# Patient Record
Sex: FEMALE | Race: WHITE | NOT HISPANIC OR LATINO | Employment: OTHER | URBAN - METROPOLITAN AREA
[De-identification: names, ages, dates, MRNs, and addresses within clinical notes are randomized per-mention and may not be internally consistent; named-entity substitution may affect disease eponyms.]

---

## 2017-01-03 ENCOUNTER — OFFICE VISIT (OUTPATIENT)
Dept: FAMILY MEDICINE | Facility: CLINIC | Age: 77
End: 2017-01-03
Payer: COMMERCIAL

## 2017-01-03 ENCOUNTER — RADIANT APPOINTMENT (OUTPATIENT)
Dept: GENERAL RADIOLOGY | Facility: CLINIC | Age: 77
End: 2017-01-03
Attending: FAMILY MEDICINE
Payer: COMMERCIAL

## 2017-01-03 VITALS
HEART RATE: 72 BPM | DIASTOLIC BLOOD PRESSURE: 68 MMHG | BODY MASS INDEX: 29.53 KG/M2 | SYSTOLIC BLOOD PRESSURE: 124 MMHG | WEIGHT: 173 LBS | HEIGHT: 64 IN | TEMPERATURE: 98.1 F

## 2017-01-03 DIAGNOSIS — M48.061 LUMBAR SPINAL STENOSIS: ICD-10-CM

## 2017-01-03 DIAGNOSIS — Z01.818 PREOP GENERAL PHYSICAL EXAM: Primary | ICD-10-CM

## 2017-01-03 DIAGNOSIS — R05.9 COUGH: ICD-10-CM

## 2017-01-03 DIAGNOSIS — N18.30 CKD (CHRONIC KIDNEY DISEASE) STAGE 3, GFR 30-59 ML/MIN (H): ICD-10-CM

## 2017-01-03 DIAGNOSIS — Z01.812 PRE-OPERATIVE LABORATORY EXAMINATION: ICD-10-CM

## 2017-01-03 DIAGNOSIS — E11.22 DIABETES MELLITUS WITH STAGE 3 CHRONIC KIDNEY DISEASE (H): ICD-10-CM

## 2017-01-03 DIAGNOSIS — N18.30 DIABETES MELLITUS WITH STAGE 3 CHRONIC KIDNEY DISEASE (H): ICD-10-CM

## 2017-01-03 LAB
HBA1C MFR BLD: 6 % (ref 4.3–6)
HGB BLD-MCNC: 12.4 G/DL (ref 11.7–15.7)
MRSA DNA SPEC QL NAA+PROBE: NORMAL
SPECIMEN SOURCE: NORMAL

## 2017-01-03 PROCEDURE — 87640 STAPH A DNA AMP PROBE: CPT | Mod: 90 | Performed by: NEUROLOGICAL SURGERY

## 2017-01-03 PROCEDURE — 93000 ELECTROCARDIOGRAM COMPLETE: CPT | Performed by: FAMILY MEDICINE

## 2017-01-03 PROCEDURE — 87641 MR-STAPH DNA AMP PROBE: CPT | Mod: 90 | Performed by: NEUROLOGICAL SURGERY

## 2017-01-03 PROCEDURE — 71020 XR CHEST 2 VW: CPT

## 2017-01-03 PROCEDURE — 83036 HEMOGLOBIN GLYCOSYLATED A1C: CPT | Performed by: FAMILY MEDICINE

## 2017-01-03 PROCEDURE — 80048 BASIC METABOLIC PNL TOTAL CA: CPT | Performed by: FAMILY MEDICINE

## 2017-01-03 PROCEDURE — 99000 SPECIMEN HANDLING OFFICE-LAB: CPT | Performed by: NEUROLOGICAL SURGERY

## 2017-01-03 PROCEDURE — 85018 HEMOGLOBIN: CPT | Performed by: FAMILY MEDICINE

## 2017-01-03 PROCEDURE — 36415 COLL VENOUS BLD VENIPUNCTURE: CPT | Performed by: FAMILY MEDICINE

## 2017-01-03 PROCEDURE — 99214 OFFICE O/P EST MOD 30 MIN: CPT | Performed by: FAMILY MEDICINE

## 2017-01-03 ASSESSMENT — ANXIETY QUESTIONNAIRES
7. FEELING AFRAID AS IF SOMETHING AWFUL MIGHT HAPPEN: NOT AT ALL
GAD7 TOTAL SCORE: 0
IF YOU CHECKED OFF ANY PROBLEMS ON THIS QUESTIONNAIRE, HOW DIFFICULT HAVE THESE PROBLEMS MADE IT FOR YOU TO DO YOUR WORK, TAKE CARE OF THINGS AT HOME, OR GET ALONG WITH OTHER PEOPLE: NOT DIFFICULT AT ALL
1. FEELING NERVOUS, ANXIOUS, OR ON EDGE: NOT AT ALL
5. BEING SO RESTLESS THAT IT IS HARD TO SIT STILL: NOT AT ALL
6. BECOMING EASILY ANNOYED OR IRRITABLE: NOT AT ALL
3. WORRYING TOO MUCH ABOUT DIFFERENT THINGS: NOT AT ALL
2. NOT BEING ABLE TO STOP OR CONTROL WORRYING: NOT AT ALL

## 2017-01-03 ASSESSMENT — PATIENT HEALTH QUESTIONNAIRE - PHQ9: 5. POOR APPETITE OR OVEREATING: NOT AT ALL

## 2017-01-03 NOTE — PROGRESS NOTES
[address] [address]  [phone]  Dept: [phone]    PRE-OP EVALUATION:  Today's date: 1/3/2017    Maira Leon (: 1940) presents for pre-operative evaluation assessment as requested by Dr. Davies.  She requires evaluation and anesthesia risk assessment prior to undergoing surgery/procedure for treatment of spine .  Proposed procedure: spinal fusion     Date of Surgery/ Procedure: 2017  Time of Surgery/ Procedure: 7:30 am  Hospital/Surgical Facility: [facility]   Primary Physician: Michelle Monet  Type of Anesthesia Anticipated: General    Patient has a Health Care Directive or Living Will:  YES     1. NO - Do you have a history of heart attack, stroke, stent, bypass or surgery on an artery in the head, neck, heart or legs?  2. NO - Do you ever have any pain or discomfort in your chest?  3. NO - Do you have a history of  Heart Failure?  4. NO - Are you troubled by shortness of breath when: walking on the level, up a slight hill or at night?  5. YES - DO YOU CURRENTLY HAVE A COLD, BRONCHITIS OR OTHER RESPIRATORY INFECTION? Cough - see below  6. YES - DO YOU HAVE A COUGH, SHORTNESS OF BREATH OR WHEEZING? Mostly in the mornings  7. YES - DO YOU SOMETIMES GET PAINS IN THE CALVES OF YOUR LEGS WHEN YOU WALK? legs  8. NO - Do you or anyone in your family have previous history of blood clots?  9. NO - Do you or does anyone in your family have a serious bleeding problem such as prolonged bleeding following surgeries or cuts?  10. NO - Have you ever had problems with anemia or been told to take iron pills?  11. NO - Have you had any abnormal blood loss such as black, tarry or bloody stools, or abnormal vaginal bleeding?  12. NO - Have you ever had a blood transfusion?  13. NO - Have you or any of your relatives ever had problems with anesthesia?  14. NO - Do you have sleep apnea, excessive snoring or daytime  drowsiness?  15. NO - Do you have any prosthetic heart valves?  16. NO - Do you have prosthetic joints?  17. NO - Is there any chance that you may be pregnant?      HPI:                                                      Brief HPI related to upcoming procedure:     Shingles Resolved  Patient had shingles on December 5th, which resolved after 7 days of valacyclovir    Chronic Cough  Patient believes she coughs every morning due to nasal drip  Cough has been going on for years, resolves during the day.    Does not cause difficulty with sleep.    Resolves in morning after a couple of coughs.    Raised patch of skin over right eye  Over right eye raised patch that is painful to the touch    Diabetes   Blood sugars have been fine   On micheal knew she had high blood sugar because she ate many cookies        MEDICAL HISTORY:                                                      Patient Active Problem List    Diagnosis Date Noted     Spondylolisthesis of lumbar region 09/12/2016     Priority: Medium     Lumbar spinal stenosis 09/12/2016     Priority: Medium     Medial meniscus tear, left, subsequent encounter 04/12/2016     Priority: Medium     Primary osteoarthritis of left knee 04/05/2016     Priority: Medium     Diabetes mellitus with stage 3 chronic kidney disease (H) 10/12/2015     Priority: Medium     Advance Care Planning 06/30/2011     Priority: Medium     Advance Care Planning:   Followup facilitation and documentation of choices:  Maira Leon presented for follow-up session regarding ACP at their residence.  She was accompanied by no one.  Previous ACP discussions reviewed and questions answered. At this time she has completed a HCD reflecting current values, goals, and choices. Health Care Agent understands responsibility of role and choices.  Documents completed at this visit: HCD. Signed and notarized. Validation form completed and sent with copy of document to be scanned. Confirmed/documented  designated decision maker(s). See permanent comments of demographics in clinical tab. Confirmed current code status reflects current choices. View document(s) and details by clicking on code status.  Added by Ashlee Dubose on 5/14/2015  Received outside advance directive. Two witness signatures or one witness and notary signature present.  scanned and placed behind media tab.  Please see advance directive for specifics.            CKD (chronic kidney disease) stage 3, GFR 30-59 ml/min 02/08/2011     Priority: Medium     Hyperlipidemia LDL goal <100 10/31/2010     Priority: Medium     Per provider       Osteoarthritis 05/23/2005     Priority: Medium     L4-5  Gets pain down into her legs  Problem list name updated by automated process. Provider to review        Past Medical History   Diagnosis Date     Need for prophylactic hormone replacement therapy (postmenopausal)      Other and unspecified hyperlipidemia      Osteoarthrosis, unspecified whether generalized or localized, unspecified site      Type II or unspecified type diabetes mellitus without mention of complication, not stated as uncontrolled      POSTMENOPAUSAL HORMONAL REPLACEMENT TX 5/23/2005     Nonsenile cataract      left eye     Hypertension      H/O arthroscopic knee surgery left     5/6/16     Low back pain      & radiates down left buttock & left leg     History of shingles 12/6/16     Nocturia      states she gets up every 2 hours at night to urinate     Past Surgical History   Procedure Laterality Date     Phacoemulsification clear cornea with standard intraocular lens implant Right 2014     Arthroscopy knee Left 5/6/2016     Procedure: ARTHROSCOPY KNEE;  Surgeon: Ramin Ramirez MD;  Location: [facility] OR     Appendectomy  age 4     Ent surgery       Tonsillectomy     Genitourinary surgery       bladder repair/sling     Current Outpatient Prescriptions   Medication Sig Dispense Refill     IBUPROFEN PO Take 200-600 mg by mouth  every 6 hours as needed for moderate pain       multivitamin, therapeutic with minerals (MULTI-VITAMIN) TABS tablet Take 1 tablet by mouth daily       insulin pen needle 31G X 5 MM Use 2 times a day with Byetta - dispense BD Mini 5mm x 31g pen needles 200 each 2     metFORMIN (GLUCOPHAGE) 500 MG tablet Take 1 tablet (500 mg) by mouth 2 times daily (with meals) 180 tablet 1     lisinopril (PRINIVIL,ZESTRIL) 2.5 MG tablet Take 1 tablet (2.5 mg) by mouth daily 90 tablet 3     exenatide (BYETTA 10 MCG PEN) 10 MCG/0.04ML pen (contains 2.4 ml = 60 doses) Inject 10 mcg Subcutaneous 2 times daily 7.2 mL 3     ezetimibe-simvastatin (VYTORIN) 10-40 MG per tablet Take 1 tablet by mouth At Bedtime 90 tablet 3     multivitamin (OCUVITE) TABS Take 1 tablet by mouth daily. 30 each 0     fish oil-omega-3 fatty acids (FISH OIL) 1000 MG capsule Take 4 g by mouth daily.       calcium-vitamin D (CALCIUM 500 +D) 500-125 MG-UNIT TABS Take 1 tablet by mouth daily.       ONETOUCH ULTRA TEST VI STRP TEST BLOOD SUGARS TWICE DAILY AS DIRECTED. 3 MONTH SUPPLY 1 YEAR     ASPIRIN 81 MG OR TABS 1 TABLET DAILY       ONE TOUCH ULTRA SYSTEM KIT KIT as directed  1 0     ACETAMINOPHEN PO Take 1,000 mg by mouth every 8 hours as needed for pain       OTC products: NSAIDS    Allergies   Allergen Reactions     No Known Drug Allergies       Latex Allergy: NO    Social History   Substance Use Topics     Smoking status: Former Smoker     Quit date: 01/01/1981     Smokeless tobacco: Never Used     Alcohol Use: Yes      Comment: 1 glass of wine per day     History   Drug Use No       REVIEW OF SYSTEMS:                                                    Constitutional, neuro, ENT, endocrine, pulmonary, cardiac, gastrointestinal, genitourinary, musculoskeletal, integument and psychiatric systems are negative, except as otherwise noted.    The information in this document, created by the medical scribe Kimmy Coronado for me, accurately reflects the services I  "personally performed and the decisions made by me. I have reviewed and approved this document for accuracy prior to leaving the patient care area.    Michelle Monet MD      EXAM:                                                    /68 mmHg  Pulse 72  Temp(Src) 98.1  F (36.7  C) (Oral)  Ht 5' 4\" (1.626 m)  Wt 173 lb (78.472 kg)  BMI 29.68 kg/m2  Breastfeeding? No    GENERAL APPEARANCE: healthy, alert and no distress     EYES: EOMI,- PERRL     HENT: ear canals and TM's normal and nose and mouth without ulcers or lesions     NECK: no adenopathy, no asymmetry, masses, or scars and thyroid normal to palpation     RESP: lungs clear to auscultation - no rales, rhonchi or wheezes     BREAST: normal without masses, tenderness or nipple discharge and no palpable axillary masses or adenopathy     CV: regular rates and rhythm, normal S1 S2, no S3 or S4 and no murmur, click or rub -     ABDOMEN:  soft, nontender, no HSM or masses and bowel sounds normal     : normal cervix, adnexae, and uterus without masses or discharge and rectal exam normal without masses-guaiac negative stool     MS: extremities normal- no gross deformities noted, no evidence of inflammation in joints, FROM in all extremities.     SKIN: 4 mm red macule next to right eye      NEURO: Normal strength and tone, sensory exam grossly normal, mentation intact and speech normal     PSYCH: mentation appears normal. and affect normal/bright     LYMPHATICS: No axillary, cervical, inguinal, or supraclavicular nodes    DIAGNOSTICS:                                                    EKG: appears normal, NSR, normal axis, normal intervals, no acute ST/T changes c/w ischemia, no LVH by voltage criteria, unchanged from previous tracings    Recent Labs   Lab Test  11/15/16   0931  04/28/16   1240   04/09/15   1045   03/08/11   0918   05/20/10   0801   HGB   --   13.4   --    --    --   13.5   --   13.8   PLT   --    --    --    --    --   288   --   " 265   NA   --   140   --   141   < >  143   --   141   POTASSIUM   --   4.0   --   4.0   < >  4.5   --   4.8   CR   --   0.85   --   0.91   < >  0.88   --   1.04   A1C  5.8  6.0   < >  6.1*   < >   --    < >  6.5*    < > = values in this interval not displayed.    CXR - appears clear - awaiting radiology overread    IMPRESSION:                                                    Reason for surgery/procedure: back pain  Diagnosis/reason for consult: spinal stenosis, causing symptoms.    The proposed surgical procedure is considered INTERMEDIATE risk.    REVISED CARDIAC RISK INDEX  The patient has the following serious cardiovascular risks for perioperative complications such as (MI, PE, VFib and 3  AV Block):  No serious cardiac risks  INTERPRETATION: 1 risks: Class II (low risk - 0.9% complication rate)    The patient has the following additional risks for perioperative complications:  No identified additional risks      ICD-10-CM    1. Preop general physical exam Z01.818 Basic metabolic panel     Hemoglobin A1c     Hemoglobin     EKG 12-lead complete w/read - Clinics   2. Lumbar spinal stenosis M48.06    3. Diabetes mellitus with stage 3 chronic kidney disease (H) E11.22 Hemoglobin A1c    N18.3    4. CKD (chronic kidney disease) stage 3, GFR 30-59 ml/min N18.3 Basic metabolic panel     Hemoglobin   5. Pre-operative laboratory examination Z01.812 Methicillin Resistant Staph Aureus PCR   6. Cough R05 XR Chest 2 Views   suspect cough is due to chronic nasal drainage.    Macule next to right eye appears benign.  Discussed ABCD's of skin monitoring.    RECOMMENDATIONS:                                                      --Consult hospital rounder / IM to assist post-op medical management    --Patient is to take all scheduled medications on the day of surgery EXCEPT for modifications listed below.    Diabetes Medication Use  Hold glucophage the evening before surgery  Hold byetta the morning of surgery      Anticoagulant  or Antiplatelet Medication Use  ASPIRIN: Discontinue ASA 5 days prior to procedure to reduce bleeding risk.  It should be resumed post-operatively.        ACE Inhibitor or Angiotensin Receptor Blocker (ARB) Use  Ace inhibitor or Angiotensin Receptor Blocker (ARB) and should HOLD this medication for the 24 hours prior to surgery.      APPROVAL GIVEN to proceed with proposed procedure, without further diagnostic evaluation     The information in this document, created by the medical scribe Kimmy Coronado for me, accurately reflects the services I personally performed and the decisions made by me. I have reviewed and approved this document for accuracy prior to leaving the patient care area.    Michelle Monet MD      Signed Electronically by: Michelle Monet MD    Copy of this evaluation report is provided to requesting physician.    [facility] Preop Guidelines

## 2017-01-03 NOTE — MR AVS SNAPSHOT
After Visit Summary   1/3/2017    Maira Leon    MRN: [id number]           Patient Information     Date Of Birth          1940        Visit Information        Provider Department      1/3/2017 9:20 AM Michelle Monet MD [facility]        Today's Diagnoses     Preop general physical exam    -  1     Lumbar spinal stenosis         Diabetes mellitus with stage 3 chronic kidney disease (H)         CKD (chronic kidney disease) stage 3, GFR 30-59 ml/min         Pre-operative laboratory examination         Cough           Care Instructions    Stop aspirin and ibuprofen 5-7 days before surgery.    Hold metformin/glucophage and lisinopril the night before surgery.  Hold byetta the morning of surgery.    I will look forward to seeing you 3-4 weeks after surgery.  But I am certainly happy to see you sooner if you have concerns.  Before Your Surgery      Call your surgeon if there is any change in your health. This includes signs of a cold or flu (such as a sore throat, runny nose, cough, rash or fever).    Do not smoke, drink alcohol or take over the counter medicine (unless your surgeon or primary care doctor tells you to) for the 24 hours before and after surgery.    If you take prescribed drugs: Follow your doctor s orders about which medicines to take and which to stop until after surgery.    Eating and drinking prior to surgery: follow the instructions from your surgeon    Take a shower or bath the night before surgery. Use the soap your surgeon gave you to gently clean your skin. If you do not have soap from your surgeon, use your regular soap. Do not shave or scrub the surgery site.  Wear clean pajamas and have clean sheets on your bed.     [facility]   Discharged by : Ashwini WITT CMA (AAMA)    Paper scripts provided to patient : none      If you have any questions regarding your visit please contact your care team:     [facility] Clinic Hours  Telephone Number   Dr. Dara Simon, ROBERT   7am-7pm Monday - Thursday   7am-5pm Fridays  [phone]   (Appointment scheduling available 24/7)   RN Line   [phone] option 2       For a Price Quote for your services, please call our Consumer Price Line at [phone].     What options do I have for visits at the clinic other than the traditional office visit?     To expand how we care for you, many of our providers are utilizing electronic visits (e-visits) and telephone visits, when medically appropriate, for interactions with their patients rather than a visit in the clinic. We also offer nurse visits for many medical concerns. Just like any other service, we will bill your insurance company for this type of visit based on time spent on the phone with your provider. Not all insurance companies cover these visits. Please check with your medical insurance if this type of visit is covered. You will be responsible for any charges that are not paid by your insurance.   E-visits via [employer]: generally incur a $35.00 fee.     Telephone visits:   Time spent on the phone: *charged based on time that is spent on the phone in increments of 10 minutes. Estimated cost:   5-10 mins $30.00   11-20 mins. $59.00   21-30 mins. $85.00     Use [employer] (secure email communication and access to your chart) to send your primary care provider a message or make an appointment. Ask someone on your Team how to sign up for [employer].     As always, Thank you for trusting us with your health care needs!    WHERE TO GO FOR CARE?    Clinic    Make an appointment if you:       Are sick (cold, cough, flu, sore throat, earache or in pain).       Have a small injury (sprain, small cut, burn or broken bone).       Need a physical exam, Pap smear, vaccine or prescription refill.       Have questions about your health or medicines.    To reach us:      Call [phone] ([phone]).  Open 24 hours every day. (For counseling services, call [phone].)    Log into [email or web address] at [email or web address]. (Visit [email or web address] to create an account.) Hospital emergency room    An emergency is a serious or life- threatening problem that must be treated right away.    Call 911 or get to the hospital if you have:      Very bad or sudden:            - Chest pain or pressure         - Bleeding         - Head or belly pain         - Dizziness or trouble seeing, walking or                          Speaking      Problems breathing      Blood in your vomit or you are coughing up blood      A major injury (knocked out, loss of a finger or limb, rape, broken bone protruding from skin)    A mental health crisis. (Or call the Mental Health Crisis line at 1-383.518.7974 or Suicide Prevention Hotline at 1-149.550.7250.)    Open 24 hours every day. You don't need an appointment.     Urgent care    Visit urgent care for sickness or small injuries when the clinic is closed. You don't need an appointment. To check hours or find an urgent care near you, visit [email or web address]. Online care    Get online care from [employer] for more than 70 common problems, like colds, allergies and infections. Open 24 hours every day at: [email or web address]   Need help deciding?    For advice about where to be seen, you may call your clinic and ask to speak with a nurse. We're here for you 24 hours every day.         If you are deaf or hard of hearing, please let us know. We provide many free services including sign language interpreters, oral interpreters, TTYs, telephone amplifiers, note takers and written materials.                       Follow-ups after your visit        Your next 10 appointments already scheduled     Jan 20, 2017   Procedure with Buddy Davies MD   [facility]    [address] [facility] [address] [phone]              Future tests that were  "ordered for you today     Open Future Orders        Priority Expected Expires Ordered    XR Chest 2 Views Routine 1/3/2017 1/3/2018 1/3/2017            Who to contact     If you have questions or need follow up information about today's clinic visit or your schedule please contact [facility] directly at [phone].  Normal or non-critical lab and imaging results will be communicated to you by MyChart, letter or phone within 4 business days after the clinic has received the results. If you do not hear from us within 7 days, please contact the clinic through [employer] or phone. If you have a critical or abnormal lab result, we will notify you by phone as soon as possible.  Submit refill requests through [employer] or call your pharmacy and they will forward the refill request to us. Please allow 3 business days for your refill to be completed.          Additional Information About Your Visit        [employer] Information     [employer] gives you secure access to your electronic health record. If you see a primary care provider, you can also send messages to your care team and make appointments. If you have questions, please call your primary care clinic.  If you do not have a primary care provider, please call [phone] and they will assist you.        Care EveryWhere ID     This is your Care EveryWhere ID. This could be used by other organizations to access your [place] medical records  [id number]        Your Vitals Were     Pulse Temperature Height BMI (Body Mass Index) Breastfeeding?       72 98.1  F (36.7  C) (Oral) 5' 4\" (1.626 m) 29.68 kg/m2 No        Blood Pressure from Last 3 Encounters:   01/03/17 124/68   12/05/16 128/72   11/22/16 127/85    Weight from Last 3 Encounters:   01/03/17 173 lb (78.472 kg)   12/05/16 174 lb (78.926 kg)   11/22/16 177 lb (80.287 kg)              We Performed the Following     Basic metabolic panel     EKG 12-lead complete w/read - Clinics     Hemoglobin A1c     " Hemoglobin     Methicillin Resistant Staph Aureus PCR        Primary Care Provider Office Phone # Fax #    Michelle Verenice Monet [phone]       [facility] [address] [facility] [address]        Thank you!     Thank you for choosing [facility]  for your care. Our goal is always to provide you with excellent care. Hearing back from our patients is one way we can continue to improve our services. Please take a few minutes to complete the written survey that you may receive in the mail after your visit with us. Thank you!             Your Updated Medication List - Protect others around you: Learn how to safely use, store and throw away your medicines at www.disposemymeds.org.          This list is accurate as of: 1/3/17 10:25 AM.  Always use your most recent med list.                   Brand Name Dispense Instructions for use    ACETAMINOPHEN PO      Take 1,000 mg by mouth every 8 hours as needed for pain       aspirin 81 MG tablet      1 TABLET DAILY       calcium 500 +D 500-400 MG-UNIT Tabs   Generic drug:  Calcium Carb-Cholecalciferol      Take 1 tablet by mouth daily.       exenatide 10 MCG/0.04ML injection    BYETTA 10 MCG PEN    7.2 mL    Inject 10 mcg Subcutaneous 2 times daily       ezetimibe-simvastatin 10-40 MG per tablet    VYTORIN    90 tablet    Take 1 tablet by mouth At Bedtime       fish oil-omega-3 fatty acids 1000 MG capsule      Take 4 g by mouth daily.       IBUPROFEN PO      Take 200-600 mg by mouth every 6 hours as needed for moderate pain       insulin pen needle 31G X 5 MM     200 each    Use 2 times a day with Byetta - dispense BD Mini 5mm x 31g pen needles       lisinopril 2.5 MG tablet    PRINIVIL/Zestril    90 tablet    Take 1 tablet (2.5 mg) by mouth daily       metFORMIN 500 MG tablet    GLUCOPHAGE    180 tablet    Take 1 tablet (500 mg) by mouth 2 times daily (with meals)       * Multi-vitamin Tabs tablet      Take 1 tablet by  mouth daily       * multivitamin Tabs tablet     30 each    Take 1 tablet by mouth daily.       ONE TOUCH ULTRA SYSTEM KIT W/DEVICE Kit     1    as directed       ONE TOUCH ULTRA test strip   Generic drug:  blood glucose monitoring     3 MONTH SUPPLY    TEST BLOOD SUGARS TWICE DAILY AS DIRECTED.       * Notice:  This list has 2 medication(s) that are the same as other medications prescribed for you. Read the directions carefully, and ask your doctor or other care provider to review them with you.

## 2017-01-03 NOTE — NURSING NOTE
"Chief Complaint   Patient presents with     Pre-Op Exam     DOS: 1/20/2017       Initial /68 mmHg  Pulse 72  Temp(Src) 98.1  F (36.7  C) (Oral)  Ht 5' 4\" (1.626 m)  Wt 173 lb (78.472 kg)  BMI 29.68 kg/m2  Breastfeeding? No Estimated body mass index is 29.68 kg/(m^2) as calculated from the following:    Height as of this encounter: 5' 4\" (1.626 m).    Weight as of this encounter: 173 lb (78.472 kg).  BP completed using cuff size: veronika Hobbs CMA (AAMA)      "

## 2017-01-03 NOTE — PATIENT INSTRUCTIONS
Stop aspirin and ibuprofen 5-7 days before surgery.    Hold metformin/glucophage and lisinopril the night before surgery.  Hold byetta the morning of surgery.    I will look forward to seeing you 3-4 weeks after surgery.  But I am certainly happy to see you sooner if you have concerns.  Before Your Surgery      Call your surgeon if there is any change in your health. This includes signs of a cold or flu (such as a sore throat, runny nose, cough, rash or fever).    Do not smoke, drink alcohol or take over the counter medicine (unless your surgeon or primary care doctor tells you to) for the 24 hours before and after surgery.    If you take prescribed drugs: Follow your doctor s orders about which medicines to take and which to stop until after surgery.    Eating and drinking prior to surgery: follow the instructions from your surgeon    Take a shower or bath the night before surgery. Use the soap your surgeon gave you to gently clean your skin. If you do not have soap from your surgeon, use your regular soap. Do not shave or scrub the surgery site.  Wear clean pajamas and have clean sheets on your bed.     [facility]   Discharged by : Ashwini WITT CMA ([facility])    Paper scripts provided to patient : none      If you have any questions regarding your visit please contact your care team:     [facility] Clinic Hours Telephone Number   Dr. Dara Simon PA-C   7am-7pm Monday - Thursday   7am-5pm Fridays  [phone]   (Appointment scheduling available 24/7)   RN Line   [phone] option 2       For a Price Quote for your services, please call our Consumer Price Line at [phone].     What options do I have for visits at the clinic other than the traditional office visit?     To expand how we care for you, many of our providers are utilizing electronic visits (e-visits) and telephone visits, when medically appropriate, for interactions with their  patients rather than a visit in the clinic. We also offer nurse visits for many medical concerns. Just like any other service, we will bill your insurance company for this type of visit based on time spent on the phone with your provider. Not all insurance companies cover these visits. Please check with your medical insurance if this type of visit is covered. You will be responsible for any charges that are not paid by your insurance.   E-visits via [email or web address]: generally incur a $35.00 fee.     Telephone visits:   Time spent on the phone: *charged based on time that is spent on the phone in increments of 10 minutes. Estimated cost:   5-10 mins $30.00   11-20 mins. $59.00   21-30 mins. $85.00     Use [email or web address] (secure email communication and access to your chart) to send your primary care provider a message or make an appointment. Ask someone on your Team how to sign up for [email or web address].     As always, Thank you for trusting us with your health care needs!    WHERE TO GO FOR CARE?    Clinic    Make an appointment if you:       Are sick (cold, cough, flu, sore throat, earache or in pain).       Have a small injury (sprain, small cut, burn or broken bone).       Need a physical exam, Pap smear, vaccine or prescription refill.       Have questions about your health or medicines.    To reach us:      Call [phone] ([phone]). Open 24 hours every day. (For counseling services, call [phone].)    Log into [email or web address] at [email or web address]. (Visit [email or web address] to create an account.) Hospital emergency room    An emergency is a serious or life- threatening problem that must be treated right away.    Call 410 or get to the hospital if you have:      Very bad or sudden:            - Chest pain or pressure         - Bleeding         - Head or belly pain         - Dizziness or trouble seeing, walking or                          Speaking      Problems breathing      Blood in your vomit or you are coughing up  blood      A major injury (knocked out, loss of a finger or limb, rape, broken bone protruding from skin)    A mental health crisis. (Or call the Mental Health Crisis line at 1-826.753.8007 or Suicide Prevention Hotline at 1-204.697.4421.)    Open 24 hours every day. You don't need an appointment.     Urgent care    Visit urgent care for sickness or small injuries when the clinic is closed. You don't need an appointment. To check hours or find an urgent care near you, visit [email or web address]. Online care    Get online care from [facility] for more than 70 common problems, like colds, allergies and infections. Open 24 hours every day at: [email or web address]   Need help deciding?    For advice about where to be seen, you may call your clinic and ask to speak with a nurse. We're here for you 24 hours every day.         If you are deaf or hard of hearing, please let us know. We provide many free services including sign language interpreters, oral interpreters, TTYs, telephone amplifiers, note takers and written materials.

## 2017-01-04 LAB
ANION GAP SERPL CALCULATED.3IONS-SCNC: 10 MMOL/L (ref 3–14)
BUN SERPL-MCNC: 23 MG/DL (ref 7–30)
CALCIUM SERPL-MCNC: 9.5 MG/DL (ref 8.5–10.1)
CHLORIDE SERPL-SCNC: 106 MMOL/L (ref 94–109)
CO2 SERPL-SCNC: 25 MMOL/L (ref 20–32)
CREAT SERPL-MCNC: 0.95 MG/DL (ref 0.52–1.04)
GFR SERPL CREATININE-BSD FRML MDRD: 57 ML/MIN/1.7M2
GLUCOSE SERPL-MCNC: 120 MG/DL (ref 70–99)
POTASSIUM SERPL-SCNC: 4 MMOL/L (ref 3.4–5.3)
SODIUM SERPL-SCNC: 141 MMOL/L (ref 133–144)

## 2017-01-04 ASSESSMENT — PATIENT HEALTH QUESTIONNAIRE - PHQ9: SUM OF ALL RESPONSES TO PHQ QUESTIONS 1-9: 0

## 2017-01-04 ASSESSMENT — ANXIETY QUESTIONNAIRES: GAD7 TOTAL SCORE: 0

## 2017-01-19 ENCOUNTER — HOSPITAL ENCOUNTER (OUTPATIENT)
Dept: LAB | Facility: CLINIC | Age: 77
Discharge: HOME | DRG: 460 | End: 2017-01-19
Attending: NEUROLOGICAL SURGERY | Admitting: NEUROLOGICAL SURGERY
Payer: COMMERCIAL

## 2017-01-19 DIAGNOSIS — Z01.812 PRE-OPERATIVE LABORATORY EXAMINATION: ICD-10-CM

## 2017-01-19 LAB
ABO + RH BLD: NORMAL
ABO + RH BLD: NORMAL
BLD GP AB SCN SERPL QL: NORMAL
BLOOD BANK CMNT PATIENT-IMP: NORMAL
SPECIMEN EXP DATE BLD: NORMAL

## 2017-01-20 ENCOUNTER — APPOINTMENT (OUTPATIENT)
Dept: GENERAL RADIOLOGY | Facility: CLINIC | Age: 77
DRG: 460 | End: 2017-01-20
Attending: NEUROLOGICAL SURGERY
Payer: COMMERCIAL

## 2017-01-20 ENCOUNTER — SURGERY (OUTPATIENT)
Age: 77
End: 2017-01-20

## 2017-01-20 ENCOUNTER — ANESTHESIA EVENT (OUTPATIENT)
Dept: SURGERY | Facility: CLINIC | Age: 77
DRG: 460 | End: 2017-01-20
Payer: COMMERCIAL

## 2017-01-20 ENCOUNTER — ANESTHESIA (OUTPATIENT)
Dept: SURGERY | Facility: CLINIC | Age: 77
DRG: 460 | End: 2017-01-20
Payer: COMMERCIAL

## 2017-01-20 PROBLEM — Z98.1 S/P LUMBAR FUSION: Status: ACTIVE | Noted: 2017-01-20

## 2017-01-20 PROCEDURE — 25000125 ZZHC RX 250: Performed by: NEUROLOGICAL SURGERY

## 2017-01-20 PROCEDURE — 25800025 ZZH RX 258: Performed by: ANESTHESIOLOGY

## 2017-01-20 PROCEDURE — 25000125 ZZHC RX 250: Performed by: ANESTHESIOLOGY

## 2017-01-20 PROCEDURE — 40000278 XR SURGERY CARM FLUORO LESS THAN 5 MIN: Mod: TC

## 2017-01-20 PROCEDURE — 25000125 ZZHC RX 250: Performed by: NURSE ANESTHETIST, CERTIFIED REGISTERED

## 2017-01-20 PROCEDURE — 40000940 XR LUMBAR SPINE PORT 1 VW

## 2017-01-20 PROCEDURE — 40000940 XR LUMBAR SPINE PORT 1 VW: Mod: TC

## 2017-01-20 PROCEDURE — 25000128 H RX IP 250 OP 636: Performed by: NURSE ANESTHETIST, CERTIFIED REGISTERED

## 2017-01-20 RX ORDER — PROPOFOL 10 MG/ML
INJECTION, EMULSION INTRAVENOUS PRN
Status: DISCONTINUED | OUTPATIENT
Start: 2017-01-20 | End: 2017-01-20

## 2017-01-20 RX ORDER — KETOROLAC TROMETHAMINE 30 MG/ML
INJECTION, SOLUTION INTRAMUSCULAR; INTRAVENOUS PRN
Status: DISCONTINUED | OUTPATIENT
Start: 2017-01-20 | End: 2017-01-20

## 2017-01-20 RX ORDER — DEXAMETHASONE SODIUM PHOSPHATE 4 MG/ML
INJECTION, SOLUTION INTRA-ARTICULAR; INTRALESIONAL; INTRAMUSCULAR; INTRAVENOUS; SOFT TISSUE PRN
Status: DISCONTINUED | OUTPATIENT
Start: 2017-01-20 | End: 2017-01-20

## 2017-01-20 RX ORDER — GLYCOPYRROLATE 0.2 MG/ML
INJECTION, SOLUTION INTRAMUSCULAR; INTRAVENOUS PRN
Status: DISCONTINUED | OUTPATIENT
Start: 2017-01-20 | End: 2017-01-20

## 2017-01-20 RX ORDER — FENTANYL CITRATE 50 UG/ML
INJECTION, SOLUTION INTRAMUSCULAR; INTRAVENOUS PRN
Status: DISCONTINUED | OUTPATIENT
Start: 2017-01-20 | End: 2017-01-20

## 2017-01-20 RX ORDER — ONDANSETRON 2 MG/ML
INJECTION INTRAMUSCULAR; INTRAVENOUS PRN
Status: DISCONTINUED | OUTPATIENT
Start: 2017-01-20 | End: 2017-01-20

## 2017-01-20 RX ADMIN — HYDROMORPHONE HYDROCHLORIDE 1 MG: 1 INJECTION, SOLUTION INTRAMUSCULAR; INTRAVENOUS; SUBCUTANEOUS at 07:42

## 2017-01-20 RX ADMIN — SODIUM CHLORIDE, POTASSIUM CHLORIDE, SODIUM LACTATE AND CALCIUM CHLORIDE: 600; 310; 30; 20 INJECTION, SOLUTION INTRAVENOUS at 07:34

## 2017-01-20 RX ADMIN — SODIUM CHLORIDE, POTASSIUM CHLORIDE, SODIUM LACTATE AND CALCIUM CHLORIDE: 600; 310; 30; 20 INJECTION, SOLUTION INTRAVENOUS at 11:00

## 2017-01-20 RX ADMIN — CEFAZOLIN 1 G: 1 INJECTION, POWDER, FOR SOLUTION INTRAMUSCULAR; INTRAVENOUS at 09:43

## 2017-01-20 RX ADMIN — PHENYLEPHRINE HYDROCHLORIDE 100 MCG: 10 INJECTION, SOLUTION INTRAMUSCULAR; INTRAVENOUS; SUBCUTANEOUS at 08:30

## 2017-01-20 RX ADMIN — SODIUM CHLORIDE 200 ML: 900 IRRIGANT IRRIGATION at 11:08

## 2017-01-20 RX ADMIN — THROMBIN, TOPICAL (BOVINE) 15000 UNITS: KIT at 11:04

## 2017-01-20 RX ADMIN — PHENYLEPHRINE HYDROCHLORIDE 100 MCG: 10 INJECTION, SOLUTION INTRAMUSCULAR; INTRAVENOUS; SUBCUTANEOUS at 08:17

## 2017-01-20 RX ADMIN — PHENYLEPHRINE HYDROCHLORIDE 100 MCG: 10 INJECTION, SOLUTION INTRAMUSCULAR; INTRAVENOUS; SUBCUTANEOUS at 08:01

## 2017-01-20 RX ADMIN — SODIUM CHLORIDE, POTASSIUM CHLORIDE, SODIUM LACTATE AND CALCIUM CHLORIDE: 600; 310; 30; 20 INJECTION, SOLUTION INTRAVENOUS at 08:00

## 2017-01-20 RX ADMIN — PHENYLEPHRINE HYDROCHLORIDE 100 MCG: 10 INJECTION, SOLUTION INTRAMUSCULAR; INTRAVENOUS; SUBCUTANEOUS at 10:23

## 2017-01-20 RX ADMIN — PHENYLEPHRINE HYDROCHLORIDE 100 MCG: 10 INJECTION, SOLUTION INTRAMUSCULAR; INTRAVENOUS; SUBCUTANEOUS at 10:02

## 2017-01-20 RX ADMIN — PHENYLEPHRINE HYDROCHLORIDE 100 MCG: 10 INJECTION, SOLUTION INTRAMUSCULAR; INTRAVENOUS; SUBCUTANEOUS at 08:51

## 2017-01-20 RX ADMIN — PHENYLEPHRINE HYDROCHLORIDE 100 MCG: 10 INJECTION, SOLUTION INTRAMUSCULAR; INTRAVENOUS; SUBCUTANEOUS at 09:41

## 2017-01-20 RX ADMIN — PHENYLEPHRINE HYDROCHLORIDE 100 MCG: 10 INJECTION, SOLUTION INTRAMUSCULAR; INTRAVENOUS; SUBCUTANEOUS at 08:14

## 2017-01-20 RX ADMIN — PHENYLEPHRINE HYDROCHLORIDE 100 MCG: 10 INJECTION, SOLUTION INTRAMUSCULAR; INTRAVENOUS; SUBCUTANEOUS at 07:53

## 2017-01-20 RX ADMIN — KETOROLAC TROMETHAMINE 15 MG: 30 INJECTION, SOLUTION INTRAMUSCULAR at 07:42

## 2017-01-20 RX ADMIN — BUPIVACAINE HYDROCHLORIDE AND EPINEPHRINE BITARTRATE 10 ML: 5; .005 INJECTION, SOLUTION EPIDURAL; INTRACAUDAL; PERINEURAL at 08:12

## 2017-01-20 RX ADMIN — PHENYLEPHRINE HYDROCHLORIDE 100 MCG: 10 INJECTION, SOLUTION INTRAMUSCULAR; INTRAVENOUS; SUBCUTANEOUS at 08:24

## 2017-01-20 RX ADMIN — PHENYLEPHRINE HYDROCHLORIDE 100 MCG: 10 INJECTION, SOLUTION INTRAMUSCULAR; INTRAVENOUS; SUBCUTANEOUS at 10:40

## 2017-01-20 RX ADMIN — PHENYLEPHRINE HYDROCHLORIDE 100 MCG: 10 INJECTION, SOLUTION INTRAMUSCULAR; INTRAVENOUS; SUBCUTANEOUS at 10:55

## 2017-01-20 RX ADMIN — FENTANYL CITRATE 50 MCG: 50 INJECTION, SOLUTION INTRAMUSCULAR; INTRAVENOUS at 11:15

## 2017-01-20 RX ADMIN — FENTANYL CITRATE 50 MCG: 50 INJECTION, SOLUTION INTRAMUSCULAR; INTRAVENOUS at 09:40

## 2017-01-20 RX ADMIN — Medication 1 G: at 11:04

## 2017-01-20 RX ADMIN — ROCURONIUM BROMIDE 40 MG: 10 INJECTION INTRAVENOUS at 07:42

## 2017-01-20 RX ADMIN — PHENYLEPHRINE HYDROCHLORIDE 100 MCG: 10 INJECTION, SOLUTION INTRAMUSCULAR; INTRAVENOUS; SUBCUTANEOUS at 10:33

## 2017-01-20 RX ADMIN — CEFAZOLIN SODIUM 2 G: 2 INJECTION, SOLUTION INTRAVENOUS at 07:34

## 2017-01-20 RX ADMIN — FIBRINOGEN HUMAN AND THROMBIN HUMAN 5 ML: KIT at 11:03

## 2017-01-20 RX ADMIN — PHENYLEPHRINE HYDROCHLORIDE 100 MCG: 10 INJECTION, SOLUTION INTRAMUSCULAR; INTRAVENOUS; SUBCUTANEOUS at 08:08

## 2017-01-20 RX ADMIN — GENTAMICIN SULFATE 1000 ML: 40 INJECTION, SOLUTION INTRAMUSCULAR; INTRAVENOUS at 11:03

## 2017-01-20 RX ADMIN — ONDANSETRON 4 MG: 2 INJECTION INTRAMUSCULAR; INTRAVENOUS at 07:42

## 2017-01-20 RX ADMIN — PHENYLEPHRINE HYDROCHLORIDE 100 MCG: 10 INJECTION, SOLUTION INTRAMUSCULAR; INTRAVENOUS; SUBCUTANEOUS at 10:45

## 2017-01-20 RX ADMIN — SODIUM CHLORIDE, POTASSIUM CHLORIDE, SODIUM LACTATE AND CALCIUM CHLORIDE: 600; 310; 30; 20 INJECTION, SOLUTION INTRAVENOUS at 08:42

## 2017-01-20 RX ADMIN — DEXAMETHASONE SODIUM PHOSPHATE 8 MG: 4 INJECTION, SOLUTION INTRAMUSCULAR; INTRAVENOUS at 07:42

## 2017-01-20 RX ADMIN — PROPOFOL 150 MG: 10 INJECTION, EMULSION INTRAVENOUS at 07:42

## 2017-01-20 RX ADMIN — THROMBIN HUMAN 2 KIT: 2000 POWDER, FOR SOLUTION TOPICAL at 11:03

## 2017-01-20 RX ADMIN — PHENYLEPHRINE HYDROCHLORIDE 100 MCG: 10 INJECTION, SOLUTION INTRAMUSCULAR; INTRAVENOUS; SUBCUTANEOUS at 09:07

## 2017-01-20 RX ADMIN — Medication 30 MG: at 07:42

## 2017-01-20 RX ADMIN — GLYCOPYRROLATE 0.2 MG: 0.2 INJECTION, SOLUTION INTRAMUSCULAR; INTRAVENOUS at 07:42

## 2017-01-20 RX ADMIN — FENTANYL CITRATE 200 MCG: 50 INJECTION, SOLUTION INTRAMUSCULAR; INTRAVENOUS at 07:42

## 2017-01-20 NOTE — ANESTHESIA CARE TRANSFER NOTE
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    Anesthesia Type:   General, ETT     Note:  Airway :Face Mask  Patient transferred to:PACU        Vitals: (Last set prior to Anesthesia Care Transfer)              Electronically Signed By: LINDA Tran CRNA  January 20, 2017  11:51 AM

## 2017-01-20 NOTE — ANESTHESIA PREPROCEDURE EVALUATION
Anesthesia Evaluation     . Pt has had prior anesthetic. Type: General    No history of anesthetic complications     ROS/MED HX    ENT/Pulmonary:     (+), . Other pulmonary disease chronic morning cough pt related to post nasal drip.    Neurologic:     (+)other neuro spinal stenosis with neural claudication    Cardiovascular:     (+) Dyslipidemia, hypertension----. : . . . :. .       METS/Exercise Tolerance:     Hematologic:         Musculoskeletal:   (+) arthritis, , , -       GI/Hepatic:  - neg GI/hepatic ROS       Renal/Genitourinary:     (+) chronic renal disease, type: CRI, Pt does not require dialysis, Pt has no history of transplant,       Endo:     (+) type II DM Using insulin - not using insulin pump not previously admitted for DM/DKA Diabetic complications: nephropathy, .      Psychiatric:  - neg psychiatric ROS       Infectious Disease:  - neg infectious disease ROS       Malignancy:      - no malignancy   Other:    - neg other ROS           Physical Exam  Normal systems: cardiovascular, pulmonary and dental    Airway   Mallampati: II  TM distance: >3 FB  Neck ROM: full    Dental     Cardiovascular   Rhythm and rate: regular and normal      Pulmonary    breath sounds clear to auscultation    Other findings: Lab Test        01/03/17     04/28/16     03/08/11     05/20/10     11/11/09                       1030          1240          0918          0801          0936          WBC           --           --          6.6          6.8          9.0           HGB          12.4         13.4         13.5         13.8         13.8          MCV           --           --          89           89           89            PLT           --           --          288          265          258            Lab Test        01/03/17     04/28/16     04/09/15                       1030          1240          1045          NA           141          140          141           POTASSIUM    4.0          4.0          4.0            CHLORIDE     106          107          106           CO2          25           25           26            BUN          23           24           18            CR           0.95         0.85         0.91          ANIONGAP     10           8            9             ODETTE          9.5          9.5          9.8           GLC          120*         116*         97                     ECG  Sinus  Rhythm   Low voltage in precordial leads.    -Old anterior infarct.     ABNORMAL                   Anesthesia Plan      History & Physical Review  History and physical reviewed and following examination; no interval change.    ASA Status:  3 .    NPO Status:  > 8 hours    Plan for General and ETT with Intravenous induction. Maintenance will be Balanced.    PONV prophylaxis:  Ondansetron (or other 5HT-3) and Dexamethasone or Solumedrol       Postoperative Care  Postoperative pain management:  IV analgesics, Oral pain medications and Multi-modal analgesia.      Consents  Anesthetic plan, risks, benefits and alternatives discussed with:  Patient.  Use of blood products discussed: No .   .                          .

## 2017-01-20 NOTE — ANESTHESIA POSTPROCEDURE EVALUATION
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 Davies    Anesthesia Type:  General, ETT    Note:  Anesthesia Post Evaluation    Patient location during evaluation: PACU  Patient participation: Able to fully participate in evaluation  Level of consciousness: awake  Pain management: adequate  Airway patency: patent  Cardiovascular status: acceptable  Respiratory status: acceptable  Hydration status: acceptable  PONV: none             Last vitals:  Filed Vitals:    01/20/17 1300 01/20/17 1315 01/20/17 1330   BP: 99/59 110/58 107/58   Pulse:      Temp:      Resp: 15 11 12   SpO2: 97% 99% 99%       Electronically Signed By: Dex Paulson MD  January 20, 2017  1:58 PM

## 2017-01-21 ENCOUNTER — APPOINTMENT (OUTPATIENT)
Dept: OCCUPATIONAL THERAPY | Facility: CLINIC | Age: 77
DRG: 460 | End: 2017-01-21
Attending: NEUROLOGICAL SURGERY
Payer: COMMERCIAL

## 2017-01-21 ENCOUNTER — APPOINTMENT (OUTPATIENT)
Dept: PHYSICAL THERAPY | Facility: CLINIC | Age: 77
DRG: 460 | End: 2017-01-21
Attending: NEUROLOGICAL SURGERY
Payer: COMMERCIAL

## 2017-01-21 ENCOUNTER — APPOINTMENT (OUTPATIENT)
Dept: GENERAL RADIOLOGY | Facility: CLINIC | Age: 77
DRG: 460 | End: 2017-01-21
Attending: NEUROLOGICAL SURGERY
Payer: COMMERCIAL

## 2017-01-21 PROCEDURE — 40000940 XR LUMBAR SPINE 1 VW

## 2017-01-22 ENCOUNTER — APPOINTMENT (OUTPATIENT)
Dept: OCCUPATIONAL THERAPY | Facility: CLINIC | Age: 77
DRG: 460 | End: 2017-01-22
Attending: NEUROLOGICAL SURGERY
Payer: COMMERCIAL

## 2017-01-22 ENCOUNTER — APPOINTMENT (OUTPATIENT)
Dept: PHYSICAL THERAPY | Facility: CLINIC | Age: 77
DRG: 460 | End: 2017-01-22
Attending: NEUROLOGICAL SURGERY
Payer: COMMERCIAL

## 2017-01-23 ENCOUNTER — APPOINTMENT (OUTPATIENT)
Dept: GENERAL RADIOLOGY | Facility: CLINIC | Age: 77
DRG: 460 | End: 2017-01-23
Attending: INTERNAL MEDICINE
Payer: COMMERCIAL

## 2017-01-23 ENCOUNTER — APPOINTMENT (OUTPATIENT)
Dept: PHYSICAL THERAPY | Facility: CLINIC | Age: 77
DRG: 460 | End: 2017-01-23
Attending: NEUROLOGICAL SURGERY
Payer: COMMERCIAL

## 2017-01-23 PROCEDURE — 71010 XR CHEST PORT 1 VW: CPT

## 2017-01-24 ENCOUNTER — CARE COORDINATION (OUTPATIENT)
Dept: CASE MANAGEMENT | Facility: CLINIC | Age: 77
End: 2017-01-24

## 2017-01-24 NOTE — PROGRESS NOTES
Clinic Care Coordination Contact  [facility]/Voicemail    Referral Source: IP/TCU Report  Clinical Data: Care Coordinator Outreach. Patient was inpatient at [facility] from 1/20/17-1/23/17 for lumbar fusion.   Outreach attempted x 1.  Left message on voicemail with call back information and requested return call.  Plan: Care Coordinator will try to reach patient again in 1-2 business days.  Jackie Killian RN   FPA [facility]   [phone]  January 24, 2017

## 2017-01-24 NOTE — Clinical Note
Health Care Home - Access Care Plan    About Me  Patient Name:  Maira Leon    YOB: 1940  Age:                            77 year old   [facility] MRN:         [id number] Telephone Information:     Home Phone [phone]   Mobile [phone]       Address:    [address] [place]  [place] [address] Email address:  No e-mail address on record      Emergency Contact(s)  Name Relationship Lgl Grd Work Phone Home Phone Mobile Phone   1. MARIA ELENA VASQUEZ Relative  [phone]   2. STEVEN BARAHONA Relative    [phone]             Health Maintenance: Routine Health maintenance Reviewed: Due/Overdue Please discuss with PCP    My Access Plan  Medical Emergency 911   Questions or concerns during clinic hours Primary Clinic Line, I will call the clinic directly: Primary Clinic: [facility] [phone]   24 Hour Appointment Line [phone] or  [phone])  (toll free)   24 Hour Nurse Line [phone] (toll free)   Questions or concerns outside clinic hours 24 Hour Appointment Line, I will call the after-hours on-call line:   [facility] [phone] or [phone]) (toll-free)   Preferred Urgent Care Preferred Urgent Care: [facility], [phone]   Preferred Hospital Preferred Hospital: Other (not assessed)   Preferred Pharmacy [facility]     Behavioral Health Crisis Line [facility], [phone] or 911     My Care Team Members  Patient Care Team       Relationship Specialty Notifications Start End    Michelle Monet MD PCP - General Family Practice  4/17/12     Phone: [phone] Fax: [phone]         [facility] [address] [facility] [address]    Blake Powell MD MD Ophthalmology  3/11/15     Phone: [phone] Fax: [phone]         [facility] [address] [place] [address]     Jackie Killian, RN Case Manager   1/25/17     Comment:  [facility] Care Coordinator    Phone: [phone] Fax: [phone]            My Medical and Care Information  Problem List   Patient Active Problem List   Diagnosis     Osteoarthritis     Hyperlipidemia LDL goal <100     CKD (chronic kidney disease) stage 3, GFR 30-59 ml/min     Advance Care Planning     Diabetes mellitus with stage 3 chronic kidney disease (H)     Primary osteoarthritis of left knee     Medial meniscus tear, left, subsequent encounter     Spondylolisthesis of lumbar region     Lumbar spinal stenosis     S/P lumbar fusion      Current Medications and Allergies:  See printed Medication Report

## 2017-01-24 NOTE — Clinical Note
[facility] - CARE MANAGEMENT DEPT  [address] [facility] [address]  [facility] [address]  Phone: [phone]      January 25, 2017      Maira Leon  [address] [facility] [address]    Dear Maira,  I am the Clinic Care Coordinator that works with your primary care provider's clinic. I have been trying to reach you recently to introduce Clinic Care Coordination and to see if there was anything I could assist you with.  Below is a description of what Clinic Care Coordination is and how I can further assist you.     The Clinic Care Coordinator role is a Registered Nurse and/or  who understands the health care system. The goal of Clinic Care Coordination is to help you manage your health and improve access to the [facility] system in the most efficient manner.  The Registered Nurse can assist you in meeting your health care goals by providing education, coordinating services, and strengthening the communication among your providers. The  can assist you with financial, behavioral, psychosocial, and chemical dependency and counseling/psychiatric resources.    Please feel free to keep this letter and contact information to contact me at [phone] with any further questions or concerns that may arise. We at [facility] are focused on providing you with the highest-quality healthcare experience possible and that all starts with you.       Sincerely,     Jackie Killian RN   FPA [facility]   [phone]  January 25, 2017     Enclosed: I have enclosed a copy of a 24 Hour Access Plan. This has helpful phone numbers for you to call when needed. Please keep this in an easy to access place to use as needed.

## 2017-01-25 NOTE — PROGRESS NOTES
Clinic Care Coordination Contact  [facility]/Voicemail    Referral Source: IP/TCU Report  Clinical Data: Care Coordinator Outreach  Outreach attempted x 2.  Left message on voicemail with call back information and requested return call.  Plan: Care Coordinator will mail out care coordination introduction letter with care coordinator contact information and explanation of care coordination services. Care Coordinator will do no further outreaches at this time.  Jackie Killian, RN   A [facility]   [phone]  January 25, 2017

## 2017-01-27 ENCOUNTER — TELEPHONE (OUTPATIENT)
Dept: NEUROSURGERY | Facility: CLINIC | Age: 77
End: 2017-01-27

## 2017-02-01 ENCOUNTER — OFFICE VISIT (OUTPATIENT)
Dept: NEUROSURGERY | Facility: CLINIC | Age: 77
End: 2017-02-01
Attending: NURSE PRACTITIONER
Payer: COMMERCIAL

## 2017-02-01 VITALS
OXYGEN SATURATION: 97 % | WEIGHT: 172 LBS | TEMPERATURE: 97.9 F | BODY MASS INDEX: 29.51 KG/M2 | SYSTOLIC BLOOD PRESSURE: 121 MMHG | HEART RATE: 103 BPM | DIASTOLIC BLOOD PRESSURE: 77 MMHG

## 2017-02-01 DIAGNOSIS — Z98.1 S/P LUMBAR FUSION: Primary | ICD-10-CM

## 2017-02-01 PROCEDURE — 99024 POSTOP FOLLOW-UP VISIT: CPT | Performed by: NURSE PRACTITIONER

## 2017-02-01 PROCEDURE — 40000269 ZZH STATISTIC NO CHARGE FACILITY FEE: Performed by: NURSE PRACTITIONER

## 2017-02-01 RX ORDER — HYDROMORPHONE HYDROCHLORIDE 2 MG/1
2 TABLET ORAL EVERY 6 HOURS PRN
Qty: 60 TABLET | Refills: 0 | Status: SHIPPED | OUTPATIENT
Start: 2017-02-01 | End: 2017-02-21

## 2017-02-01 NOTE — PROGRESS NOTES
Suture/Staple removal visit note:  S: Maira Leon is a 76 yo with a history of low back pain with lower extremity pain and L4-5 spondylolisthesis.  She underwent L3-4 decompression and fusion and L4-5 TLIF with Dr. Buddy Davies on 1/20/16.   Patient has minimal pain, her leg pain has resolved.  She states her low back pain is her most prominent pain and she is taking Dilaudid 2mg every 5 hrs prn.  She has been taking Valium as well but limites to BID to TID.  O:  Four States removed by Leydi Soares MA.  Steri strips placed; wound is healing well without erythema, fluctuation, or induration with mild scabbing.  A: Maira returned to clinic post-op for staple removal.  Patient tolerated procedure without incident.  P:      - Keep your incision clean and dry at all times. No bathing swimming or submerging in water.  Call immediately or come to ED if any drainage occurs, or you develop new pain, redness, or swelling.    - Return in 4 weeks for follow up appointment and xray    - Brace on when out of bed. No lifting greater than 10-15 pounds. No bending, twisting, or overhead reaching.     Mimi Cohn [facility]  Sp[facility]  [address] [address]    Tel [phone]  Pager [phone]

## 2017-02-01 NOTE — PATIENT INSTRUCTIONS
-Keep your incision clean and dry at all times. No bathing swimming or submerging in water.  Call immediately or come to ED if any drainage occurs, or you develop new pain, redness, or swelling.    - Return in 4 weeks for follow up appointment and xray    - Brace on when out of bed. No lifting greater than 10-15 pounds. No bending, twisting, or overhead reaching.

## 2017-02-01 NOTE — MR AVS SNAPSHOT
After Visit Summary   2/1/2017    Maira Leon    MRN: [id number]           Patient Information     Date Of Birth          1940        Visit Information        Provider Department      2/1/2017 12:10 PM Mimi Cohn NP [facility]        Today's Diagnoses     S/P lumbar fusion    -  1       Care Instructions     -Keep your incision clean and dry at all times. No bathing swimming or submerging in water.  Call immediately or come to ED if any drainage occurs, or you develop new pain, redness, or swelling.    - Return in 4 weeks for follow up appointment and xray    - Brace on when out of bed. No lifting greater than 10-15 pounds. No bending, twisting, or overhead reaching.             Follow-ups after your visit        Your next 10 appointments already scheduled     Feb 21, 2017  8:40 AM   SHORT with Michelle Monet MD   [facility]    [address] [place] [address] [phone]           Your Arrival times is X, We need you to be here at this time to get checked in and have the assistant get you ready for the provider, which can take about 15 minutes. Your appointmen time with your provider is at  X. Thank you              Who to contact     If you have questions or need follow up information about today's clinic visit or your schedule please contact [facility]UR[facility] directly at [phone].  Normal or non-critical lab and imaging results will be communicated to you by MyChart, letter or phone within 4 business days after the clinic has received the results. If you do not hear from us within 7 days, please contact the clinic through MyChart or phone. If you have a critical or abnormal lab result, we will notify you by phone as soon as possible.  Submit refill requests through [employer] or call your pharmacy and they will forward the refill request to us.  Please allow 3 business days for your refill to be completed.          Additional Information About Your Visit        MyChart Information     [employer] gives you secure access to your electronic health record. If you see a primary care provider, you can also send messages to your care team and make appointments. If you have questions, please call your primary care clinic.  If you do not have a primary care provider, please call [phone] and they will assist you.        Care EveryWhere ID     This is your Care EveryWhere ID. This could be used by other organizations to access your [facility] medical records  [id number]        Your Vitals Were     Pulse Temperature Pulse Oximetry Breastfeeding?          103 97.9  F (36.6  C) (Oral) 97% No         Blood Pressure from Last 3 Encounters:   02/01/17 121/77   01/23/17 124/71   01/03/17 124/68    Weight from Last 3 Encounters:   02/01/17 172 lb (78.019 kg)   01/20/17 173 lb (78.472 kg)   01/03/17 173 lb (78.472 kg)              Today, you had the following     No orders found for display         Today's Medication Changes          These changes are accurate as of: 2/1/17 12:28 PM.  If you have any questions, ask your nurse or doctor.               These medicines have changed or have updated prescriptions.        Dose/Directions    HYDROmorphone 2 MG tablet   Commonly known as:  DILAUDID   This may have changed:    - how much to take  - when to take this   Used for:  S/P lumbar fusion   Changed by:  Mimi Cohn, NP        Dose:  2 mg   Take 1 tablet (2 mg) by mouth every 6 hours as needed for moderate to severe pain   Quantity:  60 tablet   Refills:  0            Where to get your medicines      Some of these will need a paper prescription and others can be bought over the counter.  Ask your nurse if you have questions.     Bring a paper prescription for each of these medications    - HYDROmorphone 2 MG tablet             Primary Care Provider Office Phone # Fax  #    Michelle Monet -[phone]       [facility] [address] [facility] [address]        Thank you!     Thank you for choosing [facility]  for your care. Our goal is always to provide you with excellent care. Hearing back from our patients is one way we can continue to improve our services. Please take a few minutes to complete the written survey that you may receive in the mail after your visit with us. Thank you!             Your Updated Medication List - Protect others around you: Learn how to safely use, store and throw away your medicines at www.disposemymeds.org.          This list is accurate as of: 2/1/17 12:28 PM.  Always use your most recent med list.                   Brand Name Dispense Instructions for use    * ACETAMINOPHEN PO      Take 1,000 mg by mouth every 8 hours as needed for pain       * acetaminophen 325 MG tablet    TYLENOL    100 tablet    Take 1-2 tablets (325-650 mg) by mouth every 6 hours as needed for mild pain Do not exceed 4000 mg in a 24 hour period.       aspirin 81 MG tablet     30 tablet    Take 1 tablet (81 mg) by mouth daily       bisacodyl 10 MG Suppository    DULCOLAX    30 suppository    Place 1 suppository (10 mg) rectally daily as needed for constipation       calcium 500 +D 500-400 MG-UNIT Tabs   Generic drug:  Calcium Carb-Cholecalciferol      Take 1 tablet by mouth daily.       diazepam 5 MG tablet    VALIUM    30 tablet    Take 0.5-1 tablets (2.5-5 mg) by mouth every 12 hours as needed for muscle spasms       exenatide 10 MCG/0.04ML injection    BYETTA 10 MCG PEN    7.2 mL    Inject 10 mcg Subcutaneous 2 times daily       ezetimibe-simvastatin 10-40 MG per tablet    VYTORIN    90 tablet    Take 1 tablet by mouth At Bedtime       fish oil-omega-3 fatty acids 1000 MG capsule      Take 4 g by mouth daily.       HYDROmorphone 2 MG tablet    DILAUDID    60 tablet    Take 1 tablet (2 mg) by mouth every  6 hours as needed for moderate to severe pain       insulin pen needle 31G X 5 MM     200 each    Use 2 times a day with Byetta - dispense BD Mini 5mm x 31g pen needles       lisinopril 2.5 MG tablet    PRINIVIL/Zestril    90 tablet    Take 1 tablet (2.5 mg) by mouth daily       metFORMIN 500 MG tablet    GLUCOPHAGE    180 tablet    Take 1 tablet (500 mg) by mouth 2 times daily (with meals)       * Multi-vitamin Tabs tablet      Take 1 tablet by mouth daily       * multivitamin Tabs tablet     30 each    Take 1 tablet by mouth daily.       ONE TOUCH ULTRA SYSTEM KIT W/DEVICE Kit     1    as directed       ONE TOUCH ULTRA test strip   Generic drug:  blood glucose monitoring     3 MONTH SUPPLY    TEST BLOOD SUGARS TWICE DAILY AS DIRECTED.       polyethylene glycol powder    MIRALAX    510 g    Take 17 g (1 capful) by mouth 2 times daily as needed for constipation       senna-docusate 8.6-50 MG per tablet    SENOKOT-S;PERICOLACE    50 tablet    Take 1-2 tablets by mouth 2 times daily       * Notice:  This list has 4 medication(s) that are the same as other medications prescribed for you. Read the directions carefully, and ask your doctor or other care provider to review them with you.

## 2017-02-01 NOTE — NURSING NOTE
"Maira Leon is a 77 year old female who presents for:  Chief Complaint   Patient presents with     Neurologic Problem     Schedule lumbar MRI        Initial Vitals:  There were no vitals taken for this visit. Estimated body mass index is 29.68 kg/(m^2) as calculated from the following:    Height as of 1/20/17: 5' 4\" (1.626 m).    Weight as of 1/3/17: 173 lb (78.472 kg).. There is no height or weight on file to calculate BSA. BP completed using cuff size: large  Data Unavailable    Do you feel safe in your environment?  Yes  Do you need any refills today? No    Nursing Comments: s/p lumbar fusion staple removal.  Patient rates her pain today as 2 muscle spasms      5 min. nursing intake time  Leydi Soares CMA, AAS      Discharge plan:   -Keep your incision clean and dry at all times. No bathing swimming or submerging in water.  Call immediately or come to ED if any drainage occurs, or you develop new pain, redness, or swelling.    - Return in 4 weeks for follow up appointment and xray    - Brace on when out of bed. No lifting greater than 10-15 pounds. No bending, twisting, or overhead reaching.         2 min. nursing discharge time  Leydi Soares CMA, AAS         "

## 2017-02-21 ENCOUNTER — OFFICE VISIT (OUTPATIENT)
Dept: FAMILY MEDICINE | Facility: CLINIC | Age: 77
End: 2017-02-21
Payer: COMMERCIAL

## 2017-02-21 VITALS
TEMPERATURE: 98 F | DIASTOLIC BLOOD PRESSURE: 70 MMHG | SYSTOLIC BLOOD PRESSURE: 122 MMHG | BODY MASS INDEX: 27.66 KG/M2 | HEART RATE: 72 BPM | HEIGHT: 64 IN | WEIGHT: 162 LBS

## 2017-02-21 DIAGNOSIS — M48.061 LUMBAR SPINAL STENOSIS: ICD-10-CM

## 2017-02-21 DIAGNOSIS — N18.30 DIABETES MELLITUS WITH STAGE 3 CHRONIC KIDNEY DISEASE (H): ICD-10-CM

## 2017-02-21 DIAGNOSIS — Z98.1 S/P LUMBAR FUSION: Primary | ICD-10-CM

## 2017-02-21 DIAGNOSIS — D62 POSTOPERATIVE ANEMIA DUE TO ACUTE BLOOD LOSS: ICD-10-CM

## 2017-02-21 DIAGNOSIS — E11.22 DIABETES MELLITUS WITH STAGE 3 CHRONIC KIDNEY DISEASE (H): ICD-10-CM

## 2017-02-21 LAB
ERYTHROCYTE [DISTWIDTH] IN BLOOD BY AUTOMATED COUNT: 14.7 % (ref 10–15)
FERRITIN SERPL-MCNC: 34 NG/ML (ref 8–252)
HCT VFR BLD AUTO: 38.4 % (ref 35–47)
HGB BLD-MCNC: 12.1 G/DL (ref 11.7–15.7)
MCH RBC QN AUTO: 28.7 PG (ref 26.5–33)
MCHC RBC AUTO-ENTMCNC: 31.5 G/DL (ref 31.5–36.5)
MCV RBC AUTO: 91 FL (ref 78–100)
PLATELET # BLD AUTO: 347 10E9/L (ref 150–450)
RBC # BLD AUTO: 4.21 10E12/L (ref 3.8–5.2)
WBC # BLD AUTO: 7.4 10E9/L (ref 4–11)

## 2017-02-21 PROCEDURE — 36415 COLL VENOUS BLD VENIPUNCTURE: CPT | Performed by: FAMILY MEDICINE

## 2017-02-21 PROCEDURE — 99214 OFFICE O/P EST MOD 30 MIN: CPT | Performed by: FAMILY MEDICINE

## 2017-02-21 PROCEDURE — 85027 COMPLETE CBC AUTOMATED: CPT | Performed by: FAMILY MEDICINE

## 2017-02-21 PROCEDURE — 82728 ASSAY OF FERRITIN: CPT | Performed by: FAMILY MEDICINE

## 2017-02-21 NOTE — NURSING NOTE
"Chief Complaint   Patient presents with     Hospital F/U       Initial /70 (BP Location: Right arm, Patient Position: Chair, Cuff Size: Adult Regular)  Pulse 72  Temp 98  F (36.7  C) (Oral)  Ht 5' 4\" (1.626 m)  Wt 162 lb (73.5 kg)  BMI 27.81 kg/m2 Estimated body mass index is 27.81 kg/(m^2) as calculated from the following:    Height as of this encounter: 5' 4\" (1.626 m).    Weight as of this encounter: 162 lb (73.5 kg).  Medication Reconciliation: complete   Aline Rojas MA      "

## 2017-02-21 NOTE — MR AVS SNAPSHOT
After Visit Summary   2/21/2017    Maira Leon    MRN: [id number]           Patient Information     Date Of Birth          1940        Visit Information        Provider Department      2/21/2017 8:40 AM Michelle Monet MD [facility]        Today's Diagnoses     S/P lumbar fusion    -  1    Lumbar spinal stenosis        Postoperative anemia due to acute blood loss        Diabetes mellitus with stage 3 chronic kidney disease (H)          Care Instructions    I recommend taking your pain medication at bedtime for at least a few more days until sleeping feels more comfortable.    If you are concerned about your pain please call Dr. Davies or his nurse Mimi.        Follow-ups after your visit        Follow-up notes from your care team     Return in about 6 months (around 8/21/2017) for chronic disease monitoring/management, diabetes.      Your next 10 appointments already scheduled     Feb 28, 2017 11:45 AM CST   MA SCREENING DIGITAL BILATERAL with FKMA1   [facility] ([facility])    [address] [place] [id number] [phone]           Do not use any powder, lotion or deodorant under your arms or on your breast. If you do, we will ask you to remove it before your exam.  Wear comfortable, two-piece clothing.  If you have any allergies, tell your care team.  Bring any previous mammograms from other facilities or have them mailed to the breast center.            Mar 01, 2017  9:30 AM CST   XR LUMBAR SPINE 2/3 VIEWS with SHXR3   [facility] ([facility])    [address] [place] [id number] [phone]           Please bring a list of your current medicines to your exam. (Include vitamins, minerals and over-thecounter medicines.) Leave your valuables at home.  Tell your doctor if there is a chance you may be pregnant.  You do not need to do anything special for this exam.     "        Mar 01, 2017 10:10 AM CST   Return Visit with Mimi Cohn NP   [facility] ([facility])    [address] [place] [address] [address]  [facility] [address] [phone]            Apr 25, 2017  8:20 AM CDT   Complete Exam with Blake Powell MD   [facility])    [facility]  [address]  [facility] [address] [phone]              Who to contact     If you have questions or need follow up information about today's clinic visit or your schedule please contact [facility] directly at [phone].  Normal or non-critical lab and imaging results will be communicated to you by MyChart, letter or phone within 4 business days after the clinic has received the results. If you do not hear from us within 7 days, please contact the clinic through MyChart or phone. If you have a critical or abnormal lab result, we will notify you by phone as soon as possible.  Submit refill requests through [email or web address] or call your pharmacy and they will forward the refill request to us. Please allow 3 business days for your refill to be completed.          Additional Information About Your Visit        [employer] Information     [email or web address] gives you secure access to your electronic health record. If you see a primary care provider, you can also send messages to your care team and make appointments. If you have questions, please call your primary care clinic.  If you do not have a primary care provider, please call [phone] and they will assist you.        Care EveryWhere ID     This is your Care EveryWhere ID. This could be used by other organizations to access your [facility] medical records  [id number]        Your Vitals Were     Pulse Temperature Height BMI (Body Mass Index)          72 98  F (36.7  C) (Oral) 5' 4\" (1.626 m) 27.81 kg/m2         Blood Pressure from Last 3 " Encounters:   02/21/17 122/70   02/01/17 121/77   01/23/17 124/71    Weight from Last 3 Encounters:   02/21/17 162 lb (73.5 kg)   02/01/17 172 lb (78 kg)   01/20/17 173 lb (78.5 kg)              We Performed the Following     CBC with platelets     Ferritin          Today's Medication Changes          These changes are accurate as of: 2/21/17  9:17 AM.  If you have any questions, ask your nurse or doctor.               Stop taking these medicines if you haven't already. Please contact your care team if you have questions.     HYDROmorphone 2 MG tablet   Commonly known as:  DILAUDID   Stopped by:  Michelle Monet MD                    Primary Care Provider Office Phone # Fax #    Michelle Monet [phone]       [facility] [address] [place] [address]        Thank you!     Thank you for choosing [facility]  for your care. Our goal is always to provide you with excellent care. Hearing back from our patients is one way we can continue to improve our services. Please take a few minutes to complete the written survey that you may receive in the mail after your visit with us. Thank you!             Your Updated Medication List - Protect others around you: Learn how to safely use, store and throw away your medicines at www.disposemymeds.org.          This list is accurate as of: 2/21/17  9:17 AM.  Always use your most recent med list.                   Brand Name Dispense Instructions for use    * ACETAMINOPHEN PO      Take 1,000 mg by mouth every 8 hours as needed for pain       * acetaminophen 325 MG tablet    TYLENOL    100 tablet    Take 1-2 tablets (325-650 mg) by mouth every 6 hours as needed for mild pain Do not exceed 4000 mg in a 24 hour period.       aspirin 81 MG tablet     30 tablet    Take 1 tablet (81 mg) by mouth daily       bisacodyl 10 MG Suppository    DULCOLAX    30 suppository    Place 1 suppository (10 mg) rectally daily  as needed for constipation       calcium 500 +D 500-400 MG-UNIT Tabs   Generic drug:  Calcium Carb-Cholecalciferol      Take 1 tablet by mouth daily.       diazepam 5 MG tablet    VALIUM    30 tablet    Take 0.5-1 tablets (2.5-5 mg) by mouth every 12 hours as needed for muscle spasms       exenatide 10 MCG/0.04ML injection    BYETTA 10 MCG PEN    7.2 mL    Inject 10 mcg Subcutaneous 2 times daily       ezetimibe-simvastatin 10-40 MG per tablet    VYTORIN    90 tablet    Take 1 tablet by mouth At Bedtime       fish oil-omega-3 fatty acids 1000 MG capsule      Take 4 g by mouth daily.       insulin pen needle 31G X 5 MM     200 each    Use 2 times a day with Byetta - dispense BD Mini 5mm x 31g pen needles       lisinopril 2.5 MG tablet    PRINIVIL/Zestril    90 tablet    Take 1 tablet (2.5 mg) by mouth daily       metFORMIN 500 MG tablet    GLUCOPHAGE    180 tablet    Take 1 tablet (500 mg) by mouth 2 times daily (with meals)       * Multi-vitamin Tabs tablet      Take 1 tablet by mouth daily       * multivitamin Tabs tablet     30 each    Take 1 tablet by mouth daily.       ONE TOUCH ULTRA SYSTEM KIT W/DEVICE Kit     1    as directed       ONE TOUCH ULTRA test strip   Generic drug:  blood glucose monitoring     3 MONTH SUPPLY    TEST BLOOD SUGARS TWICE DAILY AS DIRECTED.       polyethylene glycol powder    MIRALAX    510 g    Take 17 g (1 capful) by mouth 2 times daily as needed for constipation       senna-docusate 8.6-50 MG per tablet    SENOKOT-S;PERICOLACE    50 tablet    Take 1-2 tablets by mouth 2 times daily       * Notice:  This list has 4 medication(s) that are the same as other medications prescribed for you. Read the directions carefully, and ask your doctor or other care provider to review them with you.

## 2017-02-21 NOTE — PATIENT INSTRUCTIONS
I recommend taking your pain medication at bedtime for at least a few more days until sleeping feels more comfortable.    If you are concerned about your pain please call Dr. Davies or his nurse Paco

## 2017-02-21 NOTE — PROGRESS NOTES
"  SUBJECTIVE:                                                    Maira Leon is a 77 year old female who presents to clinic today for the following health issues:      Hospital Follow-up Visit:    Hospital/Nursing Home/IP Rehab Facility: [facility]  Date of Admission: 1/20/17  Date of Discharge: 1/23/17  Reason(s) for Admission: L3-4 decompression and fusion and L4-5 TLIF with Dr. Buddy Davies    Patient has not taken any pain medication other than tylenol for about three days            Problems taking medications regularly:  None       Medication changes since discharge: None       Problems adhering to non-medication therapy:  None    Summary of hospitalization:  [facility] discharge summary reviewed  Diagnostic Tests/Treatments reviewed.  Follow up needed: with Dr. Davies  Other Healthcare Providers Involved in Patient s Care:         Care Coordination and othropedics, neurosurgery  Update since discharge: improved.     Post Discharge Medication Reconciliation: discharge medications reconciled, continue medications without change.  Plan of care communicated with patient     Coding guidelines for this visit:  Type of Medical   Decision Making Face-to-Face Visit       within 7 Days of discharge Face-to-Face Visit        within 14 days of discharge   Moderate Complexity [id number]   High Complexity 19815 [id number]          Patient was slightly anemia following surgery. She did have a fever that night after surgery but it resolved.    Staples were removed two weeks ago. She will follow up with Dr. Davies for a 6-week post-op appointment.     She was prescribed Dilaudid 2 mg and Valium 5 mg for pain management but has been taking only Tylenol for the past 24 hours, however this is not providing relief. She also has some hydrocodone that was given upon discharge. She \"felt like it was time\" to go off the narcotic medications. She has no pain during the day but in the evening feels a " "\"jabbing\" pain in the lower back that radiates down her legs. She notes she is having trouble sleeping and is uncomfortable in any position.     Discharged with stool softener, Dulcolax suppository, and prn enema for constipation. She is still having trouble with constipation, and is using all of the above therapies. Miralax did not seem to be helpful. She has been taking an OTC medication for constipation (unable to identify which one, although presume this is the senna listed on her chart) 3-4 tablets nightly. This is a medication she has taken for several years as needed. Also using prune juice and milk of magnesia with good results.     She walks 1/2 a mile per day. If she walks further she does start to feel some discomfort.     No cough, shortness of breath, or chest pains.    Mood overall is doing well, but slightly down today due to lack of sleep. She is able to get out of the house about 3 times per week.       Problem list and histories reviewed & adjusted, as indicated.  Additional history: as documented    Patient Active Problem List   Diagnosis     Osteoarthritis     Hyperlipidemia LDL goal <100     CKD (chronic kidney disease) stage 3, GFR 30-59 ml/min     Advance Care Planning     Diabetes mellitus with stage 3 chronic kidney disease (H)     Primary osteoarthritis of left knee     Medial meniscus tear, left, subsequent encounter     Spondylolisthesis of lumbar region     Lumbar spinal stenosis     S/P lumbar fusion     Past Surgical History   Procedure Laterality Date     Phacoemulsification clear cornea with standard intraocular lens implant Right 2014     Arthroscopy knee Left 5/6/2016     Procedure: ARTHROSCOPY KNEE;  Surgeon: Ramin Ramirez MD;  Location: [facility] OR     Appendectomy  age 4     Ent surgery       Tonsillectomy     Genitourinary surgery       bladder repair/sling     Optical tracking system fusion spine posterior lumbar two levels N/A 1/20/2017     Procedure: OPTICAL TRACKING " SYSTEM FUSION SPINE POSTERIOR LUMBAR TWO LEVELS;  Surgeon: Buddy Davies MD;  Location: [facility] OR     Back surgery  01/20/2017     Lumbar fusion       Social History   Substance Use Topics     Smoking status: Former Smoker     Quit date: 1/1/1981     Smokeless tobacco: Never Used     Alcohol use Yes      Comment: 1 glass of wine per day     Family History   Problem Relation Age of Onset     OSTEOPOROSIS Mother      HEART DISEASE Father      CEREBROVASCULAR DISEASE Father      Musculoskeletal Disorder Father      gout     Genitourinary Problems Maternal Grandmother      CANCER Maternal Grandfather      CANCER Paternal Grandmother      CEREBROVASCULAR DISEASE Paternal Grandfather      Eye Disorder Brother      macular degeneration     Macular Degeneration Brother      DIABETES No family hx of      Hypertension No family hx of          Current Outpatient Prescriptions   Medication Sig Dispense Refill     diazepam (VALIUM) 5 MG tablet Take 0.5-1 tablets (2.5-5 mg) by mouth every 12 hours as needed for muscle spasms 30 tablet 0     polyethylene glycol (MIRALAX) powder Take 17 g (1 capful) by mouth 2 times daily as needed for constipation 510 g 0     acetaminophen (TYLENOL) 325 MG tablet Take 1-2 tablets (325-650 mg) by mouth every 6 hours as needed for mild pain Do not exceed 4000 mg in a 24 hour period. 100 tablet 0     aspirin 81 MG tablet Take 1 tablet (81 mg) by mouth daily 30 tablet 0     ACETAMINOPHEN PO Take 1,000 mg by mouth every 8 hours as needed for pain       multivitamin, therapeutic with minerals (MULTI-VITAMIN) TABS tablet Take 1 tablet by mouth daily       insulin pen needle 31G X 5 MM Use 2 times a day with Byetta - dispense BD Mini 5mm x 31g pen needles 200 each 2     metFORMIN (GLUCOPHAGE) 500 MG tablet Take 1 tablet (500 mg) by mouth 2 times daily (with meals) 180 tablet 1     lisinopril (PRINIVIL,ZESTRIL) 2.5 MG tablet Take 1 tablet (2.5 mg) by mouth daily 90 tablet 3     exenatide (BYETTA  "10 MCG PEN) 10 MCG/0.04ML pen (contains 2.4 ml = 60 doses) Inject 10 mcg Subcutaneous 2 times daily 7.2 mL 3     ezetimibe-simvastatin (VYTORIN) 10-40 MG per tablet Take 1 tablet by mouth At Bedtime 90 tablet 3     multivitamin (OCUVITE) TABS Take 1 tablet by mouth daily. 30 each 0     fish oil-omega-3 fatty acids (FISH OIL) 1000 MG capsule Take 4 g by mouth daily.       calcium-vitamin D (CALCIUM 500 +D) 500-125 MG-UNIT TABS Take 1 tablet by mouth daily.       ONETOUCH ULTRA TEST VI STRP TEST BLOOD SUGARS TWICE DAILY AS DIRECTED. 3 MONTH SUPPLY 1 YEAR     ONE TOUCH ULTRA SYSTEM KIT KIT as directed  1 0     bisacodyl (DULCOLAX) 10 MG Suppository Place 1 suppository (10 mg) rectally daily as needed for constipation (Patient not taking: Reported on 2/21/2017) 30 suppository 0     senna-docusate (SENOKOT-S;PERICOLACE) 8.6-50 MG per tablet Take 1-2 tablets by mouth 2 times daily (Patient not taking: Reported on 2/21/2017) 50 tablet 0     Allergies   Allergen Reactions     No Known Drug Allergies        ROS:  Constitutional, HEENT, cardiovascular, pulmonary, gi and gu systems are negative, except as otherwise noted.    This document serves as a record of the services and decisions personally performed by Michelle Monet MD. It was created on his/her behalf by Adele Keys, a trained medical scribe. The creation of this document is based on the provider's statements to the medical scribe. Adele Keys February 21, 2017 8:51 AM  OBJECTIVE:                                                    /70 (BP Location: Right arm, Patient Position: Chair, Cuff Size: Adult Regular)  Pulse 72  Temp 98  F (36.7  C) (Oral)  Ht 5' 4\" (1.626 m)  Wt 162 lb (73.5 kg)  BMI 27.81 kg/m2  Body mass index is 27.81 kg/(m^2).  GENERAL: healthy, alert and no distress  RESP: lungs clear to auscultation - no rales, rhonchi or wheezes  CV: regular rate and rhythm, normal S1 S2, no S3 or S4, no murmur, click or rub, no peripheral edema and " peripheral pulses strong  BACK: Lumbar incision healing well.      Diagnostic Test Results:  No results found for this or any previous visit (from the past 24 hour(s)).     ASSESSMENT/PLAN:                                                    (Z98.1) S/P lumbar fusion  (primary encounter diagnosis)  Comment: Doing well, although still having lower back pain at night which is impacting sleep.  Plan: CBC with platelets, Ferritin        Recommended continuing pain medication at bedtime for a few more days until able to sleep comfortably without it. F/u with Dr. Davies for 6-week post-op appt and sooner if concerned about ongoing pain or worsening pain.    (M48.06) Lumbar spinal stenosis  Comment: As above.  Plan: As above.    (D62) Postoperative anemia due to acute blood loss  Comment: Hb was 10 after surgery  Plan: CBC with platelets, Ferritin        Adjust therapy based on labs    (E11.22,  N18.3) Diabetes mellitus with stage 3 chronic kidney disease (H)  Comment: Stable. A1C was 6.2 on 1/21/17  Plan: Follow up in 6 months.    Patient Instructions   I recommend taking your pain medication at bedtime for at least a few more days until sleeping feels more comfortable.    If you are concerned about your pain please call Dr. Davies or his nurse Mimi.      The information in this document, created by the medical scribe Adele Keys for me, accurately reflects the services I personally performed and the decisions made by me. I have reviewed and approved this document for accuracy prior to leaving the patient care area.    Michelle Monet MD  [facility]

## 2017-02-24 DIAGNOSIS — E78.5 HYPERLIPIDEMIA LDL GOAL <100: Primary | ICD-10-CM

## 2017-02-24 DIAGNOSIS — E11.22 DIABETES MELLITUS WITH STAGE 3 CHRONIC KIDNEY DISEASE (H): ICD-10-CM

## 2017-02-24 DIAGNOSIS — N18.30 DIABETES MELLITUS WITH STAGE 3 CHRONIC KIDNEY DISEASE (H): ICD-10-CM

## 2017-02-24 RX ORDER — SIMVASTATIN 40 MG
40 TABLET ORAL AT BEDTIME
Qty: 90 TABLET | Refills: 0 | Status: CANCELLED | OUTPATIENT
Start: 2017-02-24

## 2017-02-24 RX ORDER — ATORVASTATIN CALCIUM 40 MG/1
40 TABLET, FILM COATED ORAL DAILY
Qty: 90 TABLET | Refills: 1 | Status: SHIPPED | OUTPATIENT
Start: 2017-02-24 | End: 2017-09-22

## 2017-02-24 RX ORDER — EZETIMIBE 10 MG/1
10 TABLET ORAL DAILY
Qty: 60 TABLET | Refills: 0 | Status: CANCELLED | OUTPATIENT
Start: 2017-02-24

## 2017-02-24 NOTE — TELEPHONE ENCOUNTER
Reason for Call:  Other prescription    Detailed comments: ezetimibe-simvastatin (VYTORIN) 10-40 MG per tablet   Patient is wondering if there is a generic for this, the copay went up this year    Phone Number Patient can be reached at: Home number on file [phone] (home)    Best Time: any    Can we leave a detailed message on this number? YES    Call taken on 2/24/2017 at 11:06 AM by Shweta Pryor

## 2017-02-24 NOTE — TELEPHONE ENCOUNTER
Zetia is now generic but Wilmer, pharmacist, isn't sure if it would be cheaper to split the two ingredients of vytorin but we can try it. She states that there is less evidence that Zetia is effective so she suggests I ask PCP if we want to try without it & just send the simvastatin. Pended both.    Saira Killian, RN

## 2017-02-24 NOTE — TELEPHONE ENCOUNTER
Called pharmacy, lipitor is covered at copay of $30 for a 90 day supply. Spoke to Nadya and she is happy with this option. However she has a couple weeks of Vytorin left and would like to finish those since she already paid for them Will pick Lipitor up once she runs out.     Hazel Ghotra RN   February 24, 2017 4:40 PM  [facility] Triage   [phone]

## 2017-02-24 NOTE — TELEPHONE ENCOUNTER
I would favor a trial of 40 mg of lipitor if that would be more cost effective for the patient.  Can we check with the pharmacy about possible cost? And then make sure the patient is okay with that transition?    And if so, then route back to me, I will send new med and we should recheck cholesterol levels in three months.

## 2017-02-28 ENCOUNTER — RADIANT APPOINTMENT (OUTPATIENT)
Dept: MAMMOGRAPHY | Facility: CLINIC | Age: 77
End: 2017-02-28
Attending: FAMILY MEDICINE
Payer: COMMERCIAL

## 2017-02-28 DIAGNOSIS — Z12.31 VISIT FOR SCREENING MAMMOGRAM: ICD-10-CM

## 2017-02-28 PROCEDURE — G0202 SCR MAMMO BI INCL CAD: HCPCS | Mod: TC

## 2017-03-01 ENCOUNTER — OFFICE VISIT (OUTPATIENT)
Dept: NEUROSURGERY | Facility: CLINIC | Age: 77
End: 2017-03-01
Attending: NURSE PRACTITIONER
Payer: COMMERCIAL

## 2017-03-01 ENCOUNTER — HOSPITAL ENCOUNTER (OUTPATIENT)
Dept: GENERAL RADIOLOGY | Facility: CLINIC | Age: 77
Discharge: HOME | End: 2017-03-01
Attending: NURSE PRACTITIONER | Admitting: NURSE PRACTITIONER
Payer: COMMERCIAL

## 2017-03-01 VITALS
HEART RATE: 90 BPM | SYSTOLIC BLOOD PRESSURE: 126 MMHG | OXYGEN SATURATION: 96 % | TEMPERATURE: 97.3 F | DIASTOLIC BLOOD PRESSURE: 80 MMHG | WEIGHT: 167 LBS | BODY MASS INDEX: 28.67 KG/M2

## 2017-03-01 DIAGNOSIS — Z98.1 S/P LUMBAR SPINAL FUSION: Primary | ICD-10-CM

## 2017-03-01 DIAGNOSIS — Z98.1 S/P LUMBAR FUSION: ICD-10-CM

## 2017-03-01 DIAGNOSIS — R29.818 NEUROGENIC CLAUDICATION: ICD-10-CM

## 2017-03-01 PROCEDURE — 40000269 ZZH STATISTIC NO CHARGE FACILITY FEE

## 2017-03-01 PROCEDURE — 99024 POSTOP FOLLOW-UP VISIT: CPT | Performed by: NURSE PRACTITIONER

## 2017-03-01 PROCEDURE — 72100 X-RAY EXAM L-S SPINE 2/3 VWS: CPT

## 2017-03-01 RX ORDER — HYDROMORPHONE HYDROCHLORIDE 2 MG/1
2 TABLET ORAL EVERY 4 HOURS PRN
Qty: 40 TABLET | Refills: 0 | Status: SHIPPED | OUTPATIENT
Start: 2017-03-01 | End: 2017-09-22

## 2017-03-01 NOTE — PATIENT INSTRUCTIONS
- Wean from brace per patient comfort   - May increase lifting restriction to 15-20 pounds   - followup in 6 weeks with xray prior   - Call the clinic at [phone] for increasing redness, swelling or pus draining from the incision, increased pain or any other questions and concerns.

## 2017-03-01 NOTE — NURSING NOTE
"Maira Leon is a 77 year old female who presents for:  Chief Complaint   Patient presents with     Neurologic Problem     s/p lumbar fusion 1-20-17        Initial Vitals:  There were no vitals taken for this visit. Estimated body mass index is 27.81 kg/(m^2) as calculated from the following:    Height as of 2/21/17: 5' 4\" (1.626 m).    Weight as of 2/21/17: 162 lb (73.5 kg).. There is no height or weight on file to calculate BSA. BP completed using cuff size: regular  Data Unavailable    Do you feel safe in your environment?  Yes  Do you need any refills today? No    Nursing Comments: s/p lumbar fusion 1-20-17.  Patient rates her pain today as  Lower incision feels not healed into buttucks, @ night increased leg restlessness      5 min. nursing intake time  Leydi Soares CMA, AAS      Discharge plan:   - Wean from brace per patient comfort   - May increase lifting restriction to 15-20 pounds   - followup in 6 weeks with xray prior   - Call the clinic at [phone] for increasing redness, swelling or pus draining from the incision, increased pain or any other questions and concerns.        2 min. nursing discharge time  Leydi Soares CMA, NEETA       "

## 2017-03-01 NOTE — PROGRESS NOTES
".[facility]  Neurosurgery followup:    HPI: Maira Leon is a 76 yo with a history of low back pain with lower extremity pain and L4-5 spondylolisthesis.  She underwent L3-4 decompression and fusion and L4-5 TLIF with Dr. Buddy Davies on 1/20/17.  She states that her leg pain has resolved, however, she has stiffness to the lower back.  She has been taking hydromorphone prn, not daily.  She states that she only takes it when the pain becomes \"severe,\" but not everyday.  She states she has been active, walking daily, and is inquiring about starting PT for her left knee as well as the \"Silver Sneakers\" class at the gym.  She denies weakness to the lower extremities or changes to bowel or bladder.    Exam:  Constitutional:  Alert, well nourished, NAD.  HEENT: Normocephalic, atraumatic.   Pulm:  Without shortness of breath   CV:  No pitting edema of BLE.      Neurological:  Awake  Alert  Oriented x 3  Motor exam:        IP Q DF PF EHL  R   5  5   5   5    5  L   5  5   5   5    5     Able to spontaneously move L/E bilaterally  Sensation intact throughout all L/E dermatomes     Incision: Clean, dry and intact. Mild scabbing to inferior border.  Imaging: Per Xray fusion is intact and stable    A/P: S/p L3-4 decompression and fusion and L4-5 TLIF  Maira is doing well post-op and is interested in increasing activities.  We discussed walking regularly as a good option and would wait another 4-6 weeks before participating in the Silver Sneaker Class due to it involving repetitive upper body movements.  She is also inquiring about PT for her left knee, again we will hold off on this until after her 12 wk post-op appointment when restrictions will be lifted.  She would like a refill for Dilaudid which she takes intermittently; we dicussed to decrease as able the next 4-6 weeks.    - Wean from brace per patient comfort   - May increase lifting restriction to 15-20 pounds   - followup in 6 weeks with xray " prior   - Call the clinic at [phone] for increasing redness, swelling or pus draining from the incision, increased pain or any other questions and concerns.      Mimi Cohn [facility]  [facility]  [address] [address]    Tel [phone]  Pager [phone]

## 2017-03-01 NOTE — MR AVS SNAPSHOT
After Visit Summary   3/1/2017    Maira Leon    MRN: [id number]           Patient Information     Date Of Birth          1940        Visit Information        Provider Department      3/1/2017 11:50 AM Mimi Cohn NP [facility] Neurosurgery [facility]        Today's Diagnoses     S/P lumbar spinal fusion    -  1      Care Instructions    - Wean from brace per patient comfort   - May increase lifting restriction to 15-20 pounds   - followup in 6 weeks with xray prior   - Call the clinic at [phone] for increasing redness, swelling or pus draining from the incision, increased pain or any other questions and concerns.            Follow-ups after your visit        Your next 10 appointments already scheduled     Apr 25, 2017  8:20 AM CDT   Complete Exam with Blake Powell MD   [facility]    [facility]  [address] [address] [phone]            Aug 22, 2017  9:40 AM CDT   SHORT with Michelle Monet MD   [facility]    [address] [place] [address] [phone]           Your Arrival times is X, We need you to be here at this time to get checked in and have the assistant get you ready for the provider, which can take about 15 minutes. Your appointmen time with your provider is at  X. Thank you              Future tests that were ordered for you today     Open Future Orders        Priority Expected Expires Ordered    XR Lumbar Spine 2/3 Views Routine 4/12/2017 3/1/2018 3/1/2017            Who to contact     If you have questions or need follow up information about today's clinic visit or your schedule please contact [facility] directly at [phone].  Normal or non-critical lab and imaging results will be communicated to you by MyChart, letter or phone  within 4 business days after the clinic has received the results. If you do not hear from us within 7 days, please contact the clinic through [employer] or phone. If you have a critical or abnormal lab result, we will notify you by phone as soon as possible.  Submit refill requests through [employer] or call your pharmacy and they will forward the refill request to us. Please allow 3 business days for your refill to be completed.          Additional Information About Your Visit        [employer] Information     [employer] gives you secure access to your electronic health record. If you see a primary care provider, you can also send messages to your care team and make appointments. If you have questions, please call your primary care clinic.  If you do not have a primary care provider, please call [phone] and they will assist you.        Care EveryWhere ID     This is your Care EveryWhere ID. This could be used by other organizations to access your [facility] medical records  [id number]        Your Vitals Were     Pulse Temperature Pulse Oximetry Breastfeeding? BMI (Body Mass Index)       90 97.3  F (36.3  C) (Oral) 96% No 28.67 kg/m2        Blood Pressure from Last 3 Encounters:   03/01/17 126/80   02/21/17 122/70   02/01/17 121/77    Weight from Last 3 Encounters:   03/01/17 167 lb (75.8 kg)   02/21/17 162 lb (73.5 kg)   02/01/17 172 lb (78 kg)               Primary Care Provider Office Phone # Fax #    Michelle Verenice Monet [phone]       [address] [address]        Thank you!     Thank you for choosing [facility]  for your care. Our goal is always to provide you with excellent care. Hearing back from our patients is one way we can continue to improve our services. Please take a few minutes to complete the written survey that you may receive in the mail after your visit with us. Thank you!             Your Updated Medication  List - Protect others around you: Learn how to safely use, store and throw away your medicines at www.disposemymeds.org.          This list is accurate as of: 3/1/17 12:23 PM.  Always use your most recent med list.                   Brand Name Dispense Instructions for use    * ACETAMINOPHEN PO      Take 1,000 mg by mouth every 8 hours as needed for pain       * acetaminophen 325 MG tablet    TYLENOL    100 tablet    Take 1-2 tablets (325-650 mg) by mouth every 6 hours as needed for mild pain Do not exceed 4000 mg in a 24 hour period.       aspirin 81 MG tablet     30 tablet    Take 1 tablet (81 mg) by mouth daily       atorvastatin 40 MG tablet    LIPITOR    90 tablet    Take 1 tablet (40 mg) by mouth daily       calcium 500 +D 500-400 MG-UNIT Tabs   Generic drug:  Calcium Carb-Cholecalciferol      Take 1 tablet by mouth daily.       exenatide 10 MCG/0.04ML injection    BYETTA 10 MCG PEN    7.2 mL    Inject 10 mcg Subcutaneous 2 times daily       fish oil-omega-3 fatty acids 1000 MG capsule      Take 4 g by mouth daily.       insulin pen needle 31G X 5 MM     200 each    Use 2 times a day with Byetta - dispense BD Mini 5mm x 31g pen needles       lisinopril 2.5 MG tablet    PRINIVIL/Zestril    90 tablet    Take 1 tablet (2.5 mg) by mouth daily       metFORMIN 500 MG tablet    GLUCOPHAGE    180 tablet    Take 1 tablet (500 mg) by mouth 2 times daily (with meals)       * Multi-vitamin Tabs tablet      Take 1 tablet by mouth daily       * multivitamin Tabs tablet     30 each    Take 1 tablet by mouth daily.       ONE TOUCH ULTRA SYSTEM KIT W/DEVICE Kit     1    as directed       ONE TOUCH ULTRA test strip   Generic drug:  blood glucose monitoring     3 MONTH SUPPLY    TEST BLOOD SUGARS TWICE DAILY AS DIRECTED.       polyethylene glycol powder    MIRALAX    510 g    Take 17 g (1 capful) by mouth 2 times daily as needed for constipation       senna-docusate 8.6-50 MG per tablet    SENOKOT-S;PERICOLACE    50 tablet     Take 1-2 tablets by mouth 2 times daily       * Notice:  This list has 4 medication(s) that are the same as other medications prescribed for you. Read the directions carefully, and ask your doctor or other care provider to review them with you.

## 2017-03-07 ENCOUNTER — TELEPHONE (OUTPATIENT)
Dept: NEUROSURGERY | Facility: CLINIC | Age: 77
End: 2017-03-07

## 2017-03-07 NOTE — TELEPHONE ENCOUNTER
Patient was recently seen on 3/1/17 for her 6 week post op visit. She is s/p lumbar fusion 1/20/17.  It was recommended to wean off wearing her brace. Patient reports 2 days ago she had her brace off and bent down to pick something up without thinking and heard a popping sound similar to when knuckles are cracked. Patient said she was immediately sore in her buttock area. Patient reports today she is much better. She said she took 1/2 of a pain pill. Advised to take pain med if needed, tylenol and apply ice to area. Reminded patient to continue to maintain restrictions as recommended by Mimi Cohn CNP from her 3/1/17 visit. Informed her to call back if pain persists or worsens. Patient verbalized understanding.

## 2017-03-07 NOTE — TELEPHONE ENCOUNTER
Did something with back brace off and wondering now if pain she is experiencing is ok.   Please call

## 2017-04-07 ENCOUNTER — OFFICE VISIT (OUTPATIENT)
Dept: PODIATRY | Facility: CLINIC | Age: 77
End: 2017-04-07
Payer: COMMERCIAL

## 2017-04-07 VITALS
BODY MASS INDEX: 29.19 KG/M2 | DIASTOLIC BLOOD PRESSURE: 70 MMHG | WEIGHT: 171 LBS | HEART RATE: 74 BPM | HEIGHT: 64 IN | SYSTOLIC BLOOD PRESSURE: 119 MMHG | RESPIRATION RATE: 18 BRPM

## 2017-04-07 DIAGNOSIS — M20.42 HAMMER TOES OF BOTH FEET: ICD-10-CM

## 2017-04-07 DIAGNOSIS — M21.619 BUNION: ICD-10-CM

## 2017-04-07 DIAGNOSIS — E11.22 DIABETES MELLITUS WITH STAGE 3 CHRONIC KIDNEY DISEASE (H): Primary | ICD-10-CM

## 2017-04-07 DIAGNOSIS — M20.41 HAMMER TOES OF BOTH FEET: ICD-10-CM

## 2017-04-07 DIAGNOSIS — N18.30 DIABETES MELLITUS WITH STAGE 3 CHRONIC KIDNEY DISEASE (H): Primary | ICD-10-CM

## 2017-04-07 PROCEDURE — 99213 OFFICE O/P EST LOW 20 MIN: CPT | Performed by: PODIATRIST

## 2017-04-07 NOTE — NURSING NOTE
"Chief Complaint   Patient presents with     RECHECK     Bilateral hammertoe on 2nd toe     Bunion     Bilateral bunions       Initial /70 (BP Location: Right arm, Patient Position: Chair, Cuff Size: Adult Regular)  Pulse 74  Resp 18  Ht 1.626 m (5' 4\")  Wt 77.6 kg (171 lb)  BMI 29.35 kg/m2 Estimated body mass index is 29.35 kg/(m^2) as calculated from the following:    Height as of this encounter: 1.626 m (5' 4\").    Weight as of this encounter: 77.6 kg (171 lb).  Medication Reconciliation: complete   Lidia Raza CMA      "

## 2017-04-07 NOTE — PATIENT INSTRUCTIONS
We wish you continued good healing. If you have any questions or concerns, please do not hesitate to contact us at [phone].      Please remember to call and schedule a follow up appointment if one was recommended at your earliest convenience.   PODIATRY CLINIC HOURS  TELEPHONE NUMBER    Dr. Brenden Rodrigues D.P.M [facility]    Clinics:  [facility]        Claudette Sharma MA  Medical Assistant  Tuesday 1PM-6PM  [facility]  Wednesday 7AM-2PM  [place]  Thursday 10AM-6PM  [place] Friday 7AM-345PM  [place]  Specialty schedulers:   [phone] to make an appointment with any Specialty Provider.        Urgent Care locations:    [facility] Monday-Friday 5 pm - 9 pm. Saturday-Sunday 9 am -5pm    Monday-Friday 11 am - 9 pm Saturday 9 am - 5 pm     Monday-Sunday 12 noon-8PM [phone]     If you need a medication refill, please contact us you may need lab work and/or a follow up visit prior to your refill (i.e. Antifungal medications).    [employer] (secure e-mail communication and access to your chart) to send a message or to make an appointment.    If MRI needed please call Vinnie Siddiqui at [phone]        Weight management plan: Patient was referred to their PCP to discuss a diet and exercise plan.

## 2017-04-07 NOTE — LETTER
4/7/2017       RE: Maira Leon  [address] [facility] [address]           Dear Colleague,    Thank you for referring your patient, Maira Leon, to the [facility]. Please see a copy of my visit note below.     Subjective:    2/2/16  Pt is seen today as a new pt referral with the c/c of painful left foot.  This has been symptomatic for the past few years.  Points to PIPJ of second toe.  Has pain w/ ambulation and shoewear and is relieved by rest and going barefoot.  Denies pain in the contralateral foot.  Describes as burning pain.  Only certain shoes bother this.  Has bilateral bunions but really no pain with these, but patient worried about these getting larger.  She has diabetes, CKD stage III     4/7/17  Patient using budin splints and mostly no pain when using these.  Had back surgery 3 months ago and left knee surgery 10 months ago.  Has diabetes and states this is well controlled.          ROS:  Denies weakness, numbness, edema and ecchymosis       Allergies   Allergen Reactions     No Known Drug Allergies        Current Outpatient Prescriptions   Medication Sig Dispense Refill     HYDROmorphone (DILAUDID) 2 MG tablet Take 1 tablet (2 mg) by mouth every 4 hours as needed for pain maximum 2 tablet(s) per day 40 tablet 0     atorvastatin (LIPITOR) 40 MG tablet Take 1 tablet (40 mg) by mouth daily 90 tablet 1     senna-docusate (SENOKOT-S;PERICOLACE) 8.6-50 MG per tablet Take 1-2 tablets by mouth 2 times daily 50 tablet 0     polyethylene glycol (MIRALAX) powder Take 17 g (1 capful) by mouth 2 times daily as needed for constipation 510 g 0     acetaminophen (TYLENOL) 325 MG tablet Take 1-2 tablets (325-650 mg) by mouth every 6 hours as needed for mild pain Do not exceed 4000 mg in a 24 hour period. 100 tablet 0     aspirin 81 MG tablet Take 1 tablet (81 mg) by mouth daily 30 tablet 0     ACETAMINOPHEN PO Take 1,000 mg by mouth every 8 hours as needed for pain        multivitamin, therapeutic with minerals (MULTI-VITAMIN) TABS tablet Take 1 tablet by mouth daily       insulin pen needle 31G X 5 MM Use 2 times a day with Byetta - dispense BD Mini 5mm x 31g pen needles 200 each 2     metFORMIN (GLUCOPHAGE) 500 MG tablet Take 1 tablet (500 mg) by mouth 2 times daily (with meals) 180 tablet 1     lisinopril (PRINIVIL,ZESTRIL) 2.5 MG tablet Take 1 tablet (2.5 mg) by mouth daily 90 tablet 3     exenatide (BYETTA 10 MCG PEN) 10 MCG/0.04ML pen (contains 2.4 ml = 60 doses) Inject 10 mcg Subcutaneous 2 times daily 7.2 mL 3     multivitamin (OCUVITE) TABS Take 1 tablet by mouth daily. 30 each 0     fish oil-omega-3 fatty acids (FISH OIL) 1000 MG capsule Take 4 g by mouth daily.       calcium-vitamin D (CALCIUM 500 +D) 500-125 MG-UNIT TABS Take 1 tablet by mouth daily.       ONETOUCH ULTRA TEST VI STRP TEST BLOOD SUGARS TWICE DAILY AS DIRECTED. 3 MONTH SUPPLY 1 YEAR     ONE TOUCH ULTRA SYSTEM KIT KIT as directed  1 0       Patient Active Problem List   Diagnosis     Osteoarthritis     Hyperlipidemia LDL goal <100     CKD (chronic kidney disease) stage 3, GFR 30-59 ml/min     Advance Care Planning     Diabetes mellitus with stage 3 chronic kidney disease (H)     Primary osteoarthritis of left knee     Lumbar spinal stenosis     S/P lumbar fusion       Past Medical History:   Diagnosis Date     H/O arthroscopic knee surgery left    5/6/16     History of shingles 12/6/16     Hypertension      Low back pain     & radiates down left buttock & left leg     Need for prophylactic hormone replacement therapy (postmenopausal)      Nocturia     states she gets up every 2 hours at night to urinate     Nonsenile cataract     left eye     Osteoarthrosis, unspecified whether generalized or localized, unspecified site      Other and unspecified hyperlipidemia      POSTMENOPAUSAL HORMONAL REPLACEMENT TX 5/23/2005     Type II or unspecified type diabetes mellitus without mention of complication, not stated  as uncontrolled        Past Surgical History:   Procedure Laterality Date     APPENDECTOMY  age 4     ARTHROSCOPY KNEE Left 5/6/2016    Procedure: ARTHROSCOPY KNEE;  Surgeon: Ramin Ramirez MD;  Location: [facility] OR     BACK SURGERY  01/20/2017    Lumbar fusion     ENT SURGERY      Tonsillectomy     GENITOURINARY SURGERY      bladder repair/sling     OPTICAL TRACKING SYSTEM FUSION SPINE POSTERIOR LUMBAR TWO LEVELS N/A 1/20/2017    Procedure: OPTICAL TRACKING SYSTEM FUSION SPINE POSTERIOR LUMBAR TWO LEVELS;  Surgeon: Buddy Davies MD;  Location: [facility] OR     PHACOEMULSIFICATION CLEAR CORNEA WITH STANDARD INTRAOCULAR LENS IMPLANT Right 2014       Family History   Problem Relation Age of Onset     OSTEOPOROSIS Mother      HEART DISEASE Father      CEREBROVASCULAR DISEASE Father      Musculoskeletal Disorder Father      gout     Genitourinary Problems Maternal Grandmother      CANCER Maternal Grandfather      CANCER Paternal Grandmother      CEREBROVASCULAR DISEASE Paternal Grandfather      Eye Disorder Brother      macular degeneration     Macular Degeneration Brother      DIABETES No family hx of      Hypertension No family hx of        Social History   Substance Use Topics     Smoking status: Former Smoker     Quit date: 1/1/1981     Smokeless tobacco: Never Used     Alcohol use Yes      Comment: 1 glass of wine per day       Objective:    O:  Alert and oriented X 3.  Patient good historian.   Pulses DP 2/4, PT 2/4 b/l.  CRT < 3 seconds X 10 digits.  Bilateral ankle edema or varicosities noted.  Sensation to light touch intact b/l.  Reflexes 2/4 b/l.  Skin thins with scant hair growth b/l.  Normal arch with weightbearing.  MS 5/5 all compartments.    No equinus.   Bilateral HAV and second hammertoe deformities noted with weightbearing with the left hammertoe being worse than the right, but right bunion larger.  Bunions have normal ROM with no pain.  No pain with range of motion either second toe and there  are reducible.  No masses noted.  No pain on the metatarsal heads or dorsally.  X-ray three views foot shows signs consistent with a hammertoe.  Bilateral IM angle 14 degrees and met heads look somewhat cystic.   No other fractures/pathology noted.      Assessment:  right and left second hammertoes                         Bilateral HAV    Plan:  Xray again reviewed.    Discussed conservative treatments  with wide forefoot and taping down toes and budin splint seems to be working well.  She will buy shoes with a large toe box.   Briefly discussed surgery and explained would have to fix bunion and hammertoe.  May or may not do osteotomy depending on bone density.     Increased risk with diabetes and CKD of complications.   Would recommend she give her back surgery another 3-4 months of healing, especially since foot pain minimally painful.   Return to clinic prn.    Brenden Rodrigues DPM, FACFAS              Again, thank you for allowing me to participate in the care of your patient.        Sincerely,              Brenden Rodrigues DPM

## 2017-04-07 NOTE — MR AVS SNAPSHOT
After Visit Summary   4/7/2017    Maira Leon    MRN: [id number]           Patient Information     Date Of Birth          1940        Visit Information        Provider Department      4/7/2017 7:45 AM Brenden Rodrigues, MADINA [facility]        Today's Diagnoses     Diabetes mellitus with stage 3 chronic kidney disease (H)    -  1    Bunion        Hammer toes of both feet          Care Instructions    We wish you continued good healing. If you have any questions or concerns, please do not hesitate to contact us at [phone].      Please remember to call and schedule a follow up appointment if one was recommended at your earliest convenience.   PODIATRY CLINIC HOURS  TELEPHONE NUMBER    Dr. Brenden Rodrigues DFaustinoPTOSIN [facility]    Clinics:  [facility]        Claudette Sharma MA  Medical Assistant  Tuesday 1PM-6PM  [place]  Wednesday 7AM-2PM  [place]  Thursday 10AM-6PM  [place]  Friday 7AM-345PM  [place]  Specialty schedulers:   [phone] to make an appointment with any Specialty Provider.        Urgent Care locations:    [facility] Monday-Friday 5 pm - 9 pm. Saturday-Sunday 9 am -5pm    Monday-Friday 11 am - 9 pm Saturday 9 am - 5 pm     Monday-Sunday 12 noon-8PM [phone]     If you need a medication refill, please contact us you may need lab work and/or a follow up visit prior to your refill (i.e. Antifungal medications).    [employer] (secure e-mail communication and access to your chart) to send a message or to make an appointment.    If MRI needed please call [place] S[facility]iqu[facility] at [phone]        Weight management plan: Patient was referred to their PCP to discuss a diet and exercise plan.          Follow-ups after your visit        Your next 10 appointments already scheduled     Apr 12, 2017 11:40 AM CDT   XR LUMBAR  SPINE 2/3 VIEWS with SHXR3   [facility] ([facility])    [address] [facility] [id number] [phone]           Please bring a list of your current medicines to your exam. (Include vitamins, minerals and over-thecounter medicines.) Leave your valuables at home.  Tell your doctor if there is a chance you may be pregnant.  You do not need to do anything special for this exam.            Apr 12, 2017  1:10 PM CDT   Return Visit with Mimi Cohn NP   [facility] ([facility])    [address] [id number] [phone]            Apr 25, 2017  8:20 AM CDT   Complete Exam with Blake Powell MD   [facility])    [facility]  [address] [id number] [phone]            Aug 22, 2017  9:40 AM CDT   SHORT with Michelle Monet MD   [facility]    [address] [facility] [address] [phone]           Your Arrival times is X, We need you to be here at this time to get checked in and have the assistant get you ready for the provider, which can take about 15 minutes. Your appointmen time with your provider is at  X. Thank you              Who to contact     If you have questions or need follow up information about today's clinic visit or your schedule please contact [facility] directly at [phone].  Normal or non-critical lab and imaging results will be communicated to you by MyChart, letter or phone within 4 business days after the clinic has received the results. If you do not hear from us within 7 days, please contact the clinic through MyChart or phone. If you have a critical or abnormal lab result, we will notify you by phone as soon as possible.  Submit refill requests  "through [employer] or call your pharmacy and they will forward the refill request to us. Please allow 3 business days for your refill to be completed.          Additional Information About Your Visit        [employer] Information     [employer] gives you secure access to your electronic health record. If you see a primary care provider, you can also send messages to your care team and make appointments. If you have questions, please call your primary care clinic.  If you do not have a primary care provider, please call [phone] and they will assist you.        Care EveryWhere ID     This is your Care EveryWhere ID. This could be used by other organizations to access your [place] medical records  [id number]        Your Vitals Were     Pulse Respirations Height BMI (Body Mass Index)          74 18 1.626 m (5' 4\") 29.35 kg/m2         Blood Pressure from Last 3 Encounters:   04/07/17 119/70   03/01/17 126/80   02/21/17 122/70    Weight from Last 3 Encounters:   04/07/17 77.6 kg (171 lb)   03/01/17 75.8 kg (167 lb)   02/21/17 73.5 kg (162 lb)              Today, you had the following     No orders found for display       Primary Care Provider Office Phone # Fax #    Michelle Monet [phone]       [address] [address]        Thank you!     Thank you for choosing [facility]  for your care. Our goal is always to provide you with excellent care. Hearing back from our patients is one way we can continue to improve our services. Please take a few minutes to complete the written survey that you may receive in the mail after your visit with us. Thank you!             Your Updated Medication List - Protect others around you: Learn how to safely use, store and throw away your medicines at www.disposemymeds.org.          This list is accurate as of: 4/7/17  8:33 AM.  Always use your most recent med list.                   Brand Name " Dispense Instructions for use    * ACETAMINOPHEN PO      Take 1,000 mg by mouth every 8 hours as needed for pain       * acetaminophen 325 MG tablet    TYLENOL    100 tablet    Take 1-2 tablets (325-650 mg) by mouth every 6 hours as needed for mild pain Do not exceed 4000 mg in a 24 hour period.       aspirin 81 MG tablet     30 tablet    Take 1 tablet (81 mg) by mouth daily       atorvastatin 40 MG tablet    LIPITOR    90 tablet    Take 1 tablet (40 mg) by mouth daily       calcium 500 +D 500-400 MG-UNIT Tabs   Generic drug:  Calcium Carb-Cholecalciferol      Take 1 tablet by mouth daily.       exenatide 10 MCG/0.04ML injection    BYETTA 10 MCG PEN    7.2 mL    Inject 10 mcg Subcutaneous 2 times daily       fish oil-omega-3 fatty acids 1000 MG capsule      Take 4 g by mouth daily.       HYDROmorphone 2 MG tablet    DILAUDID    40 tablet    Take 1 tablet (2 mg) by mouth every 4 hours as needed for pain maximum 2 tablet(s) per day       insulin pen needle 31G X 5 MM     200 each    Use 2 times a day with Byetta - dispense BD Mini 5mm x 31g pen needles       lisinopril 2.5 MG tablet    PRINIVIL/Zestril    90 tablet    Take 1 tablet (2.5 mg) by mouth daily       metFORMIN 500 MG tablet    GLUCOPHAGE    180 tablet    Take 1 tablet (500 mg) by mouth 2 times daily (with meals)       * Multi-vitamin Tabs tablet      Take 1 tablet by mouth daily       * multivitamin Tabs tablet     30 each    Take 1 tablet by mouth daily.       ONE TOUCH ULTRA SYSTEM KIT W/DEVICE Kit     1    as directed       ONE TOUCH ULTRA test strip   Generic drug:  blood glucose monitoring     3 MONTH SUPPLY    TEST BLOOD SUGARS TWICE DAILY AS DIRECTED.       polyethylene glycol powder    MIRALAX    510 g    Take 17 g (1 capful) by mouth 2 times daily as needed for constipation       senna-docusate 8.6-50 MG per tablet    SENOKOT-S;PERICOLACE    50 tablet    Take 1-2 tablets by mouth 2 times daily       * Notice:  This list has 4 medication(s) that are  the same as other medications prescribed for you. Read the directions carefully, and ask your doctor or other care provider to review them with you.

## 2017-04-07 NOTE — PROGRESS NOTES
Subjective:    2/2/16  Pt is seen today as a new pt referral with the c/c of painful left foot.  This has been symptomatic for the past few years.  Points to PIPJ of second toe.  Has pain w/ ambulation and shoewear and is relieved by rest and going barefoot.  Denies pain in the contralateral foot.  Describes as burning pain.  Only certain shoes bother this.  Has bilateral bunions but really no pain with these, but patient worried about these getting larger.  She has diabetes, CKD stage III     4/7/17  Patient using budin splints and mostly no pain when using these.  Had back surgery 3 months ago and left knee surgery 10 months ago.  Has diabetes and states this is well controlled.          ROS:  Denies weakness, numbness, edema and ecchymosis       Allergies   Allergen Reactions     No Known Drug Allergies        Current Outpatient Prescriptions   Medication Sig Dispense Refill     HYDROmorphone (DILAUDID) 2 MG tablet Take 1 tablet (2 mg) by mouth every 4 hours as needed for pain maximum 2 tablet(s) per day 40 tablet 0     atorvastatin (LIPITOR) 40 MG tablet Take 1 tablet (40 mg) by mouth daily 90 tablet 1     senna-docusate (SENOKOT-S;PERICOLACE) 8.6-50 MG per tablet Take 1-2 tablets by mouth 2 times daily 50 tablet 0     polyethylene glycol (MIRALAX) powder Take 17 g (1 capful) by mouth 2 times daily as needed for constipation 510 g 0     acetaminophen (TYLENOL) 325 MG tablet Take 1-2 tablets (325-650 mg) by mouth every 6 hours as needed for mild pain Do not exceed 4000 mg in a 24 hour period. 100 tablet 0     aspirin 81 MG tablet Take 1 tablet (81 mg) by mouth daily 30 tablet 0     ACETAMINOPHEN PO Take 1,000 mg by mouth every 8 hours as needed for pain       multivitamin, therapeutic with minerals (MULTI-VITAMIN) TABS tablet Take 1 tablet by mouth daily       insulin pen needle 31G X 5 MM Use 2 times a day with Byetta - dispense BD Mini 5mm x 31g pen needles 200 each 2     metFORMIN (GLUCOPHAGE) 500 MG tablet  Take 1 tablet (500 mg) by mouth 2 times daily (with meals) 180 tablet 1     lisinopril (PRINIVIL,ZESTRIL) 2.5 MG tablet Take 1 tablet (2.5 mg) by mouth daily 90 tablet 3     exenatide (BYETTA 10 MCG PEN) 10 MCG/0.04ML pen (contains 2.4 ml = 60 doses) Inject 10 mcg Subcutaneous 2 times daily 7.2 mL 3     multivitamin (OCUVITE) TABS Take 1 tablet by mouth daily. 30 each 0     fish oil-omega-3 fatty acids (FISH OIL) 1000 MG capsule Take 4 g by mouth daily.       calcium-vitamin D (CALCIUM 500 +D) 500-125 MG-UNIT TABS Take 1 tablet by mouth daily.       ONETOUCH ULTRA TEST VI STRP TEST BLOOD SUGARS TWICE DAILY AS DIRECTED. 3 MONTH SUPPLY 1 YEAR     ONE TOUCH ULTRA SYSTEM KIT KIT as directed  1 0       Patient Active Problem List   Diagnosis     Osteoarthritis     Hyperlipidemia LDL goal <100     CKD (chronic kidney disease) stage 3, GFR 30-59 ml/min     Advance Care Planning     Diabetes mellitus with stage 3 chronic kidney disease (H)     Primary osteoarthritis of left knee     Lumbar spinal stenosis     S/P lumbar fusion       Past Medical History:   Diagnosis Date     H/O arthroscopic knee surgery left    5/6/16     History of shingles 12/6/16     Hypertension      Low back pain     & radiates down left buttock & left leg     Need for prophylactic hormone replacement therapy (postmenopausal)      Nocturia     states she gets up every 2 hours at night to urinate     Nonsenile cataract     left eye     Osteoarthrosis, unspecified whether generalized or localized, unspecified site      Other and unspecified hyperlipidemia      POSTMENOPAUSAL HORMONAL REPLACEMENT TX 5/23/2005     Type II or unspecified type diabetes mellitus without mention of complication, not stated as uncontrolled        Past Surgical History:   Procedure Laterality Date     APPENDECTOMY  age 4     ARTHROSCOPY KNEE Left 5/6/2016    Procedure: ARTHROSCOPY KNEE;  Surgeon: Ramin Ramirez MD;  Location: [facility] OR     BACK SURGERY  01/20/2017    Lumbar  fusion     ENT SURGERY      Tonsillectomy     GENITOURINARY SURGERY      bladder repair/sling     OPTICAL TRACKING SYSTEM FUSION SPINE POSTERIOR LUMBAR TWO LEVELS N/A 1/20/2017    Procedure: OPTICAL TRACKING SYSTEM FUSION SPINE POSTERIOR LUMBAR TWO LEVELS;  Surgeon: Buddy Davies MD;  Location: [facility] OR     PHACOEMULSIFICATION CLEAR CORNEA WITH STANDARD INTRAOCULAR LENS IMPLANT Right 2014       Family History   Problem Relation Age of Onset     OSTEOPOROSIS Mother      HEART DISEASE Father      CEREBROVASCULAR DISEASE Father      Musculoskeletal Disorder Father      gout     Genitourinary Problems Maternal Grandmother      CANCER Maternal Grandfather      CANCER Paternal Grandmother      CEREBROVASCULAR DISEASE Paternal Grandfather      Eye Disorder Brother      macular degeneration     Macular Degeneration Brother      DIABETES No family hx of      Hypertension No family hx of        Social History   Substance Use Topics     Smoking status: Former Smoker     Quit date: 1/1/1981     Smokeless tobacco: Never Used     Alcohol use Yes      Comment: 1 glass of wine per day       Objective:    O:  Alert and oriented X 3.  Patient good historian.   Pulses DP 2/4, PT 2/4 b/l.  CRT < 3 seconds X 10 digits.  Bilateral ankle edema or varicosities noted.  Sensation to light touch intact b/l.  Reflexes 2/4 b/l.  Skin thins with scant hair growth b/l.  Normal arch with weightbearing.  MS 5/5 all compartments.    No equinus.   Bilateral HAV and second hammertoe deformities noted with weightbearing with the left hammertoe being worse than the right, but right bunion larger.  Bunions have normal ROM with no pain.  No pain with range of motion either second toe and there are reducible.  No masses noted.  No pain on the metatarsal heads or dorsally.  X-ray three views foot shows signs consistent with a hammertoe.  Bilateral IM angle 14 degrees and met heads look somewhat cystic.   No other fractures/pathology noted.       Assessment:  right and left second hammertoes                         Bilateral HAV    Plan:  Xray again reviewed.    Discussed conservative treatments  with wide forefoot and taping down toes and budin splint seems to be working well.  She will buy shoes with a large toe box.   Briefly discussed surgery and explained would have to fix bunion and hammertoe.  May or may not do osteotomy depending on bone density.     Increased risk with diabetes and CKD of complications.   Would recommend she give her back surgery another 3-4 months of healing, especially since foot pain minimally painful.   Return to clinic prn.    Brenden Rodrigues DPM, FACFAS

## 2017-04-12 ENCOUNTER — HOSPITAL ENCOUNTER (OUTPATIENT)
Dept: GENERAL RADIOLOGY | Facility: CLINIC | Age: 77
Discharge: HOME | End: 2017-04-12
Attending: NURSE PRACTITIONER | Admitting: NURSE PRACTITIONER
Payer: COMMERCIAL

## 2017-04-12 ENCOUNTER — OFFICE VISIT (OUTPATIENT)
Dept: NEUROSURGERY | Facility: CLINIC | Age: 77
End: 2017-04-12
Attending: NURSE PRACTITIONER
Payer: COMMERCIAL

## 2017-04-12 VITALS
SYSTOLIC BLOOD PRESSURE: 126 MMHG | WEIGHT: 171 LBS | HEART RATE: 75 BPM | OXYGEN SATURATION: 97 % | DIASTOLIC BLOOD PRESSURE: 79 MMHG | BODY MASS INDEX: 29.35 KG/M2 | TEMPERATURE: 97.4 F

## 2017-04-12 DIAGNOSIS — Z98.1 S/P LUMBAR SPINAL FUSION: ICD-10-CM

## 2017-04-12 DIAGNOSIS — Z98.1 S/P LUMBAR FUSION: Primary | ICD-10-CM

## 2017-04-12 PROCEDURE — 72100 X-RAY EXAM L-S SPINE 2/3 VWS: CPT

## 2017-04-12 PROCEDURE — 40000269 ZZH STATISTIC NO CHARGE FACILITY FEE: Performed by: NURSE PRACTITIONER

## 2017-04-12 PROCEDURE — 99024 POSTOP FOLLOW-UP VISIT: CPT | Performed by: NURSE PRACTITIONER

## 2017-04-12 NOTE — PROGRESS NOTES
"[facility]  Neurosurgery followup:    HPI: Maira Leon is a 76 yo with a history of low back pain with lower extremity pain and L4-5 spondylolisthesis.  She underwent L3-4 decompression and fusion and L4-5 TLIF with Dr. Buddy Davies on 1/20/17.  She reports today that she continues to do well with minimal pain.  Her pain is most notable with prolonged sitting, \"Then I get a stiff lower back.\"  She does have tingling in the toes, but she attributes this to neuropathies related to DM Type I.  She denies weakness in her BLE or changes to bowel or bladder.     Exam:  Constitutional:  Alert, well nourished, NAD.  HEENT: Normocephalic, atraumatic.   Pulm:  Without shortness of breath   CV:  No pitting edema of BLE.      Neurological:  Awake  Alert  Oriented x 3  Motor exam:        IP Q DF PF EHL  R   5  5   5   5    5  L   5  5   5   5    5     Able to spontaneously move L/E bilaterally  Sensation intact throughout all L/E dermatomes     Incision:Clean, dry and intact.  Small scab to lower aspect.  Imaging: Per Xray fusion intact and stable.    A/P: S/p L3-4 decompression and fusion and L4-5 TLIF     Maira Leon is a 76 yo female who is doing well postoperatively. We reviewed easing into normal activities.  She does have a PT session available to her and she states she'd like to go to work on body posture.  We discussed no soaking in bath or submerging in water until incision looks like old scar, and scab has fallen off.  She will continue to monitor and contact us if concerns or any changes to incision.  Otherwise, she will f/u in 3 months.      Patient Instructions   -Ease into normal activities  -Followup in 3 months with xray prior   - Call the clinic at [phone] for increasing redness, swelling or pus draining from the incision, increased pain or any other questions and concerns.      Mimi Cohn [facility]  [address] " [address] [address]    Tel [phone]  Pager [phone]

## 2017-04-12 NOTE — NURSING NOTE
"Maira Leon is a 77 year old female who presents for:  Chief Complaint   Patient presents with     Neurologic Problem     s/p lumbar fusion 1-20-17        Initial Vitals:  There were no vitals taken for this visit. Estimated body mass index is 29.35 kg/(m^2) as calculated from the following:    Height as of 4/7/17: 5' 4\" (1.626 m).    Weight as of 4/7/17: 171 lb (77.6 kg).. There is no height or weight on file to calculate BSA. BP completed using cuff size: regular  Data Unavailable    Do you feel safe in your environment?  Yes  Do you need any refills today? No    Nursing Comments: s/p lumbar fusion 1-20-17.  Patient rates her pain today as 0      5 min. nursing intake time  Leydi Soares CMA, AAS      Discharge plan:   See providers dictation   2 min. nursing discharge time  Leydi Soares CMA, AAS       "

## 2017-04-12 NOTE — PATIENT INSTRUCTIONS
-Ease into normal activities  -Followup in 3 months with xray prior   - Call the clinic at [phone] for increasing redness, swelling or pus draining from the incision, increased pain or any other questions and concerns.

## 2017-04-12 NOTE — MR AVS SNAPSHOT
After Visit Summary   4/12/2017    Maira Leon    MRN: [id number]           Patient Information     Date Of Birth          1940        Visit Information        Provider Department      4/12/2017 1:10 PM Mimi Cohn NP [facility]        Today's Diagnoses     S/P lumbar fusion    -  1      Care Instructions    -Ease into normal activities  -Followup in 3 months with xray prior   - Call the clinic at [phone] for increasing redness, swelling or pus draining from the incision, increased pain or any other questions and concerns.        Follow-ups after your visit        Your next 10 appointments already scheduled     Apr 12, 2017  1:10 PM CDT   Return Visit with Mimi oChn NP   [facility] ([facility])    [address] [place] [address]   [phone]            Apr 25, 2017  8:20 AM CDT   Complete Exam with Blake Powell MD   [facility])    [facility]  [address] [address]   [phone]            Aug 22, 2017  9:40 AM CDT   SHORT with Michelle Monet MD   [facility]    [address] [place] [id number] [phone]           Your Arrival times is X, We need you to be here at this time to get checked in and have the assistant get you ready for the provider, which can take about 15 minutes. Your appointmen time with your provider is at  X. Thank you              Future tests that were ordered for you today     Open Future Orders        Priority Expected Expires Ordered    XR Lumbar Spine 2/3 Views Routine 7/12/2017 4/12/2018 4/12/2017            Who to contact     If you have questions or need follow up information about today's clinic visit or your schedule please contact [place]  [facility] directly at [phone].  Normal or non-critical lab and imaging results will be communicated to you by MyChart, letter or phone within 4 business days after the clinic has received the results. If you do not hear from us within 7 days, please contact the clinic through Biotzhart or phone. If you have a critical or abnormal lab result, we will notify you by phone as soon as possible.  Submit refill requests through Palingen or call your pharmacy and they will forward the refill request to us. Please allow 3 business days for your refill to be completed.          Additional Information About Your Visit        BiotzharRxApps Information     [employer] gives you secure access to your electronic health record. If you see a primary care provider, you can also send messages to your care team and make appointments. If you have questions, please call your primary care clinic.  If you do not have a primary care provider, please call [phone] and they will assist you.        Care EveryWhere ID     This is your Care EveryWhere ID. This could be used by other organizations to access your [place] medical records  [id number]        Your Vitals Were     Pulse Temperature Pulse Oximetry Breastfeeding? BMI (Body Mass Index)       75 97.4  F (36.3  C) (Oral) 97% No 29.35 kg/m2        Blood Pressure from Last 3 Encounters:   04/12/17 126/79   04/07/17 119/70   03/01/17 126/80    Weight from Last 3 Encounters:   04/12/17 171 lb (77.6 kg)   04/07/17 171 lb (77.6 kg)   03/01/17 167 lb (75.8 kg)               Primary Care Provider Office Phone # Fax #    Michelle Monet [phone]       [facility] [address] [place] [id number]        Thank you!     Thank you for choosing [facility]  for your care. Our goal is always to provide you with excellent care. Hearing back from our patients is one way we can continue to improve our services.  Please take a few minutes to complete the written survey that you may receive in the mail after your visit with us. Thank you!             Your Updated Medication List - Protect others around you: Learn how to safely use, store and throw away your medicines at www.disposemymeds.org.          This list is accurate as of: 4/12/17 12:00 PM.  Always use your most recent med list.                   Brand Name Dispense Instructions for use    * ACETAMINOPHEN PO      Take 1,000 mg by mouth every 8 hours as needed for pain       * acetaminophen 325 MG tablet    TYLENOL    100 tablet    Take 1-2 tablets (325-650 mg) by mouth every 6 hours as needed for mild pain Do not exceed 4000 mg in a 24 hour period.       aspirin 81 MG tablet     30 tablet    Take 1 tablet (81 mg) by mouth daily       atorvastatin 40 MG tablet    LIPITOR    90 tablet    Take 1 tablet (40 mg) by mouth daily       calcium 500 +D 500-400 MG-UNIT Tabs   Generic drug:  Calcium Carb-Cholecalciferol      Take 1 tablet by mouth daily.       exenatide 10 MCG/0.04ML injection    BYETTA 10 MCG PEN    7.2 mL    Inject 10 mcg Subcutaneous 2 times daily       fish oil-omega-3 fatty acids 1000 MG capsule      Take 4 g by mouth daily.       HYDROmorphone 2 MG tablet    DILAUDID    40 tablet    Take 1 tablet (2 mg) by mouth every 4 hours as needed for pain maximum 2 tablet(s) per day       insulin pen needle 31G X 5 MM     200 each    Use 2 times a day with Byetta - dispense BD Mini 5mm x 31g pen needles       lisinopril 2.5 MG tablet    PRINIVIL/Zestril    90 tablet    Take 1 tablet (2.5 mg) by mouth daily       metFORMIN 500 MG tablet    GLUCOPHAGE    180 tablet    Take 1 tablet (500 mg) by mouth 2 times daily (with meals)       * Multi-vitamin Tabs tablet      Take 1 tablet by mouth daily       * multivitamin Tabs tablet     30 each    Take 1 tablet by mouth daily.       ONE TOUCH ULTRA SYSTEM KIT W/DEVICE Kit     1    as directed       ONE TOUCH ULTRA test strip    Generic drug:  blood glucose monitoring     3 MONTH SUPPLY    TEST BLOOD SUGARS TWICE DAILY AS DIRECTED.       polyethylene glycol powder    MIRALAX    510 g    Take 17 g (1 capful) by mouth 2 times daily as needed for constipation       senna-docusate 8.6-50 MG per tablet    SENOKOT-S;PERICOLACE    50 tablet    Take 1-2 tablets by mouth 2 times daily       * Notice:  This list has 4 medication(s) that are the same as other medications prescribed for you. Read the directions carefully, and ask your doctor or other care provider to review them with you.

## 2017-04-20 ENCOUNTER — TELEPHONE (OUTPATIENT)
Dept: NEUROSURGERY | Facility: CLINIC | Age: 77
End: 2017-04-20

## 2017-04-20 DIAGNOSIS — Z98.1 S/P LUMBAR FUSION: Primary | ICD-10-CM

## 2017-04-20 NOTE — TELEPHONE ENCOUNTER
Rogelio Posada is the PT Nadya saw last, and she is wondering if Dr. Davies or Mimi would be willing to put an order in for her to go back to Rogelio.

## 2017-04-20 NOTE — TELEPHONE ENCOUNTER
Ok per Mimi Cohn CNP for patient to resume PT. She is s/p lumbar fusion 1/20/17 so she is 12 weeks post op. Spoke to patient to let her know order has been placed with MELVIN Psoada, FELIPA to work on strengthening exercises. Advised to call back if any further questions or concerns. Patient verbalized understanding.

## 2017-04-25 ENCOUNTER — OFFICE VISIT (OUTPATIENT)
Dept: OPHTHALMOLOGY | Facility: CLINIC | Age: 77
End: 2017-04-25

## 2017-04-25 ENCOUNTER — THERAPY VISIT (OUTPATIENT)
Dept: PHYSICAL THERAPY | Facility: CLINIC | Age: 77
End: 2017-04-25
Payer: COMMERCIAL

## 2017-04-25 DIAGNOSIS — H25.019 CORTICAL SENILE CATARACT, UNSPECIFIED LATERALITY: ICD-10-CM

## 2017-04-25 DIAGNOSIS — E11.9 CONTROLLED TYPE 2 DIABETES MELLITUS WITHOUT COMPLICATION, WITHOUT LONG-TERM CURRENT USE OF INSULIN (H): Primary | ICD-10-CM

## 2017-04-25 DIAGNOSIS — H25.10 SENILE NUCLEAR SCLEROSIS, UNSPECIFIED LATERALITY: ICD-10-CM

## 2017-04-25 DIAGNOSIS — H52.223 REGULAR ASTIGMATISM, BILATERAL: ICD-10-CM

## 2017-04-25 DIAGNOSIS — Z98.1 S/P LUMBAR FUSION: Primary | ICD-10-CM

## 2017-04-25 PROCEDURE — 97110 THERAPEUTIC EXERCISES: CPT | Mod: GP | Performed by: PHYSICAL THERAPIST

## 2017-04-25 PROCEDURE — 97161 PT EVAL LOW COMPLEX 20 MIN: CPT | Mod: GP | Performed by: PHYSICAL THERAPIST

## 2017-04-25 ASSESSMENT — VISUAL ACUITY
OD_SC: 20/40
METHOD: SNELLEN - LINEAR
OD_CC: 20/20
OS_SC+: +2
OS_CC: 20/30
OD_SC+: -2
OS_CC+: -2
OS_BAT_MED: 20/50
OD_CC+: -2
OS_SC: 20/60

## 2017-04-25 ASSESSMENT — SLIT LAMP EXAM - LIDS
COMMENTS: NORMAL
COMMENTS: NORMAL

## 2017-04-25 ASSESSMENT — CONF VISUAL FIELD
OS_NORMAL: 1
OD_NORMAL: 1

## 2017-04-25 ASSESSMENT — CUP TO DISC RATIO
OS_RATIO: 0.25
OD_RATIO: 0.3

## 2017-04-25 ASSESSMENT — REFRACTION_MANIFEST
OD_AXIS: 005
OS_AXIS: 030
OS_ADD: +2.50
OD_CYLINDER: +2.25
OS_SPHERE: -0.50
OS_CYLINDER: +2.00
OD_ADD: +2.50
OD_SPHERE: -1.00

## 2017-04-25 ASSESSMENT — REFRACTION_WEARINGRX
OS_ADD: +2.50
OD_ADD: +2.50
OD_AXIS: 1
SPECS_TYPE: SVL
SPECS_TYPE: PAL
OD_SPHERE: -1.25
OD_CYLINDER: +1.50
OS_SPHERE: +3.00
OD_SPHERE: +3.00
OS_AXIS: 25
OS_CYLINDER: +1.75
OS_SPHERE: +0.25

## 2017-04-25 ASSESSMENT — TONOMETRY
OD_IOP_MMHG: 13
IOP_METHOD: ICARE
OS_IOP_MMHG: 15

## 2017-04-25 ASSESSMENT — EXTERNAL EXAM - LEFT EYE: OS_EXAM: NORMAL

## 2017-04-25 ASSESSMENT — EXTERNAL EXAM - RIGHT EYE: OD_EXAM: NORMAL

## 2017-04-25 NOTE — PROGRESS NOTES
[facility] Initial Evaluation      Subjective:    Patient is a 77 year old female presenting with rehab back hpi. The history is provided by the patient.   Maira Leon is a 77 year old female with a lumbar condition.  Condition occurred with:  Degenerative joint disease and insidious onset.  Condition occurred: for unknown reasons.  This is a chronic condition  DOS 1/20/2017.  Brace until last month.  Now able to return to activity. MD referral .    Site of Pain: Mainly in L knee now.  Radiates to:  Knee left.  Pain is described as aching (gets tired) and is intermittent and reported as 1/10.  Associated symptoms:  Loss of strength and loss of motion. Pain is the same all the time.  Symptoms are exacerbated by standing, walking and certain positions (stand 10min;  walk .5 miles) and relieved by rest.  Since onset symptoms are rapidly improving.  Special tests:  X-ray (stable per pt).  Previous treatment includes surgery.  There was significant improvement following previous treatment.  General health as reported by patient is good.  Pertinent medical history includes:  Diabetes.  Medical allergies: no.  Other surgeries include:  Orthopedic surgery (L knee).  Current medications:  Other (diabetes, cholesterol, kidney).  Current occupation is retired.    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        Red flags:  None as reported by the patient.                        Objective:    Standing Alignment:        Lumbar:  Lordosis decr (midline scar)            Gait:      Deviations:  Knee:  Knee extension decr L               Lumbar/SI Evaluation  ROM:    AROM Lumbar:   Flexion:          Hands to shins  Ext:                    Decreased 25%   Side Bend:        Left:  Decreased 25%    Right:  Decreased 25%  Rotation:           Left:     Right:   Side Glide:        Left:     Right:         Strength: Fair trunk strength  Lumbar Myotomes:  normal            Lumbar DTR's:   normal          Neural Tension/Mobility:  Neural tension wnl lumbar: tight HS on L.      Left side:SLR or Slump  negative.     Right side:   Slump or SLR  negative.   Lumbar Palpation:  normal (denies any tenderness)                                                         General     ROS    Assessment/Plan:      Patient is a 77 year old female with lumbar complaints.    Patient has the following significant findings with corresponding treatment plan.                Diagnosis 1:  S/p Lumbar fusion  Pain -  self management, education, directional preference exercise and home program  Decreased ROM/flexibility - manual therapy and therapeutic exercise  Decreased strength - therapeutic exercise and therapeutic activities  Impaired muscle performance - neuro re-education  Decreased function - therapeutic activities    Therapy Evaluation Codes:   1) History comprised of:   Personal factors that impact the plan of care:      None.    Comorbidity factors that impact the plan of care are:      Diabetes.     Medications impacting care: DM, cholesterol, kidney.  2) Examination of Body Systems comprised of:   Body structures and functions that impact the plan of care:      Lumbar spine.   Activity limitations that impact the plan of care are:      Standing and Walking.  3) Clinical presentation characteristics are:   Stable/Uncomplicated.  4) Decision-Making    Low complexity using standardized patient assessment instrument and/or measureable assessment of functional outcome.  Cumulative Therapy Evaluation is: Low complexity.    Previous and current functional limitations:  (See Goal Flow Sheet for this information)    Short term and Long term goals: (See Goal Flow Sheet for this information)     Communication ability:  Patient appears to be able to clearly communicate and understand verbal and written communication and follow directions correctly.  Treatment Explanation - The following has been discussed with the patient:   RX  ordered/plan of care  Anticipated outcomes  Possible risks and side effects  This patient would benefit from PT intervention to resume normal activities.   Rehab potential is good.    Frequency:  1 X week, once daily  Duration:  for 6 weeks  Discharge Plan:  Achieve all LTG.  Independent in home treatment program.  Reach maximal therapeutic benefit.    Please refer to the daily flowsheet for treatment today, total treatment time and time spent performing 1:1 timed codes.

## 2017-04-25 NOTE — MR AVS SNAPSHOT
After Visit Summary   4/25/2017    Maira Leon    MRN: [id number]           Patient Information     Date Of Birth          1940        Visit Information        Provider Department      4/25/2017 8:20 AM Blake Powell MD [facility]        Today's Diagnoses     Controlled type 2 diabetes mellitus without complication, without long-term current use of insulin (H)    -  1    Senile nuclear sclerosis, unspecified laterality - Left Eye        Cortical senile cataract, unspecified laterality - Left Eye        Regular astigmatism, bilateral           Follow-ups after your visit        Follow-up notes from your care team     Return in about 3 months (around 7/25/2017) for Follow Up, Cataract-Plan surgery OS then.      Your next 10 appointments already scheduled     Apr 25, 2017 10:30 AM CDT   MELVIN Spine with Rogelio Posada, PT   [facility] ([facility])    [address]  [facility] [address]   [phone]            May 03, 2017  7:40 AM CDT   MELVIN Spine with Rogelio Posada, PT   [facility] ([facility])    [address]  [facility] [address]   [phone]            May 10, 2017  7:40 AM CDT   MELVIN Spine with Rogelio Posada, PT   [facility] ([facility])    [address]  [facility] [address]   [phone]            Jul 13, 2017  8:20 AM CDT   Return Visit with Mimi Cohn NP   [facility] ([facility])    [address] [facility] [address]   [phone]            Jul 19, 2017 11:00 AM CDT   Return Visit with Blake Powell MD   [facility] ([facility])    [facility]  [address] [address]   [phone]            Aug 22,  2017  9:40 AM CDT   SHORT with Michelle Monet MD   [facility]    [address] [place] [address] [phone]           Your Arrival times is X, We need you to be here at this time to get checked in and have the assistant get you ready for the provider, which can take about 15 minutes. Your appointmen time with your provider is at  X. Thank you              Who to contact     Please call your clinic at [phone] to:    Ask questions about your health    Make or cancel appointments    Discuss your medicines    Learn about your test results    Speak to your doctor   If you have compliments or concerns about an experience at your clinic, or if you wish to file a complaint, please contact [facility] Patient Relations at [phone] or email us at [email or web address]         Additional Information About Your Visit        ZumigoharPlayFirst Information     CID[employer] gives you secure access to your electronic health record. If you see a primary care provider, you can also send messages to your care team and make appointments. If you have questions, please call your primary care clinic.  If you do not have a primary care provider, please call [phone] and they will assist you.      [employer] is an electronic gateway that provides easy, online access to your medical records. With [employer], you can request a clinic appointment, read your test results, renew a prescription or communicate with your care team.     To access your existing account, please contact your [facility] or call [phone] for assistance.        Care EveryWhere ID     This is your Care EveryWhere ID. This could be used by other organizations to access your [place] medical records  [id number]         Blood Pressure from Last 3 Encounters:   04/12/17 126/79   04/07/17 119/70   03/01/17 126/80    Weight from Last  3 Encounters:   04/12/17 77.6 kg (171 lb)   04/07/17 77.6 kg (171 lb)   03/01/17 75.8 kg (167 lb)              Today, you had the following     No orders found for display       Primary Care Provider Office Phone # Fax #    Michelle Monet [phone]       [facility] [address] [place] [address]        Thank you!     Thank you for choosing [facility]  for your care. Our goal is always to provide you with excellent care. Hearing back from our patients is one way we can continue to improve our services. Please take a few minutes to complete the written survey that you may receive in the mail after your visit with us. Thank you!             Your Updated Medication List - Protect others around you: Learn how to safely use, store and throw away your medicines at www.disposemymeds.org.          This list is accurate as of: 4/25/17  9:25 AM.  Always use your most recent med list.                   Brand Name Dispense Instructions for use    * ACETAMINOPHEN PO      Take 1,000 mg by mouth every 8 hours as needed for pain       * acetaminophen 325 MG tablet    TYLENOL    100 tablet    Take 1-2 tablets (325-650 mg) by mouth every 6 hours as needed for mild pain Do not exceed 4000 mg in a 24 hour period.       aspirin 81 MG tablet     30 tablet    Take 1 tablet (81 mg) by mouth daily       atorvastatin 40 MG tablet    LIPITOR    90 tablet    Take 1 tablet (40 mg) by mouth daily       calcium 500 +D 500-400 MG-UNIT Tabs   Generic drug:  Calcium Carb-Cholecalciferol      Take 1 tablet by mouth daily.       exenatide 10 MCG/0.04ML injection    BYETTA 10 MCG PEN    7.2 mL    Inject 10 mcg Subcutaneous 2 times daily       fish oil-omega-3 fatty acids 1000 MG capsule      Take 4 g by mouth daily.       HYDROmorphone 2 MG tablet    DILAUDID    40 tablet    Take 1 tablet (2 mg) by mouth every 4 hours as needed for pain maximum 2 tablet(s) per day        insulin pen needle 31G X 5 MM     200 each    Use 2 times a day with Byetta - dispense BD Mini 5mm x 31g pen needles       lisinopril 2.5 MG tablet    PRINIVIL/Zestril    90 tablet    Take 1 tablet (2.5 mg) by mouth daily       metFORMIN 500 MG tablet    GLUCOPHAGE    180 tablet    Take 1 tablet (500 mg) by mouth 2 times daily (with meals)       * Multi-vitamin Tabs tablet      Take 1 tablet by mouth daily       * multivitamin Tabs tablet     30 each    Take 1 tablet by mouth daily.       ONE TOUCH ULTRA SYSTEM KIT W/DEVICE Kit     1    as directed       ONE TOUCH ULTRA test strip   Generic drug:  blood glucose monitoring     3 MONTH SUPPLY    TEST BLOOD SUGARS TWICE DAILY AS DIRECTED.       polyethylene glycol powder    MIRALAX    510 g    Take 17 g (1 capful) by mouth 2 times daily as needed for constipation       senna-docusate 8.6-50 MG per tablet    SENOKOT-S;PERICOLACE    50 tablet    Take 1-2 tablets by mouth 2 times daily       * Notice:  This list has 4 medication(s) that are the same as other medications prescribed for you. Read the directions carefully, and ask your doctor or other care provider to review them with you.

## 2017-04-25 NOTE — NURSING NOTE
Chief Complaints and History of Present Illnesses   Patient presents with     Eye Exam For Diabetes     Follow Up For     Cataract LE/BAT     HPI    Last Eye Exam:  4/8/15   Symptoms:              Comments:  Lab Results       Component                Value               Date                       A1C                      6.2                 01/21/2017                 A1C                      6.0                 01/03/2017                 A1C                      5.8                 11/15/2016                 A1C                      6.0                 04/28/2016                 A1C                      6.0                 10/12/2015            Pt tends to close LE to read.  Katiuska Viveros COT 8:39 AM April 25, 2017

## 2017-04-25 NOTE — MR AVS SNAPSHOT
After Visit Summary   4/25/2017    Maira Leon    MRN: [id number]           Patient Information     Date Of Birth          1940        Visit Information        Provider Department      4/25/2017 10:30 AM Rogelio Posada PT [facility]        Today's Diagnoses     S/P lumbar fusion    -  1       Follow-ups after your visit        Your next 10 appointments already scheduled     May 03, 2017  7:40 AM CDT   MELVIN Spine with Rogelio Posada PT   [facility]    [address]  [facility] [address]   [phone]            May 10, 2017  7:40 AM CDT   MELVIN Spine with Rogelio Posada PT   [facility] ([facility] [place])    [address]  [facility] [address]   [phone]            Jul 13, 2017  8:20 AM CDT   Return Visit with Mimi Cohn NP   [facility] ([facility])    [address] [place] [address]   [phone]            Jul 19, 2017 11:00 AM CDT   Return Visit with Blake Powell MD   [facility]    [facility]  [address] [address]   [phone]            Aug 22, 2017  9:40 AM CDT   SHORT with Michelle Monet MD   [facility] ([facility])    [address] [place] [id number] [phone]           Your Arrival times is X, We need you to be here at this time to get checked in and have the assistant get you ready for the provider, which can take about 15 minutes. Your appointmen time with your provider is at  X. Thank you              Who to contact     If you have questions or need follow up information about today's clinic visit or your schedule please contact [facility]  [facility] PT directly at [phone].  Normal or non-critical lab and imaging results will be communicated to you by MyChart, letter or phone within 4 business days after the clinic has received the results. If you do not hear from us within 7 days, please contact the clinic through KeyView[employer] or phone. If you have a critical or abnormal lab result, we will notify you by phone as soon as possible.  Submit refill requests through [employer] or call your pharmacy and they will forward the refill request to us. Please allow 3 business days for your refill to be completed.          Additional Information About Your Visit        [employer] Information     [employer] gives you secure access to your electronic health record. If you see a primary care provider, you can also send messages to your care team and make appointments. If you have questions, please call your primary care clinic.  If you do not have a primary care provider, please call [phone] and they will assist you.        Care EveryWhere ID     This is your Care EveryWhere ID. This could be used by other organizations to access your [place] medical records  [id number]         Blood Pressure from Last 3 Encounters:   04/12/17 126/79   04/07/17 119/70   03/01/17 126/80    Weight from Last 3 Encounters:   04/12/17 77.6 kg (171 lb)   04/07/17 77.6 kg (171 lb)   03/01/17 75.8 kg (167 lb)              We Performed the Following     HC PT EVAL, LOW COMPLEXITY     MELVIN INITIAL EVAL REPORT     THERAPEUTIC EXERCISES        Primary Care Provider Office Phone # Fax #    Michelle Verenice Monet [phone]       [address] [address]        Thank you!     Thank you for choosing [facility]  for your care. Our goal is always to provide you with excellent care. Hearing back from our patients is one way we can continue to improve our services. Please take a few minutes to  complete the written survey that you may receive in the mail after your visit with us. Thank you!             Your Updated Medication List - Protect others around you: Learn how to safely use, store and throw away your medicines at www.disposemymeds.org.          This list is accurate as of: 4/25/17 11:45 AM.  Always use your most recent med list.                   Brand Name Dispense Instructions for use    * ACETAMINOPHEN PO      Take 1,000 mg by mouth every 8 hours as needed for pain       * acetaminophen 325 MG tablet    TYLENOL    100 tablet    Take 1-2 tablets (325-650 mg) by mouth every 6 hours as needed for mild pain Do not exceed 4000 mg in a 24 hour period.       aspirin 81 MG tablet     30 tablet    Take 1 tablet (81 mg) by mouth daily       atorvastatin 40 MG tablet    LIPITOR    90 tablet    Take 1 tablet (40 mg) by mouth daily       calcium 500 +D 500-400 MG-UNIT Tabs   Generic drug:  Calcium Carb-Cholecalciferol      Take 1 tablet by mouth daily.       exenatide 10 MCG/0.04ML injection    BYETTA 10 MCG PEN    7.2 mL    Inject 10 mcg Subcutaneous 2 times daily       fish oil-omega-3 fatty acids 1000 MG capsule      Take 4 g by mouth daily.       HYDROmorphone 2 MG tablet    DILAUDID    40 tablet    Take 1 tablet (2 mg) by mouth every 4 hours as needed for pain maximum 2 tablet(s) per day       insulin pen needle 31G X 5 MM     200 each    Use 2 times a day with Byetta - dispense BD Mini 5mm x 31g pen needles       lisinopril 2.5 MG tablet    PRINIVIL/Zestril    90 tablet    Take 1 tablet (2.5 mg) by mouth daily       metFORMIN 500 MG tablet    GLUCOPHAGE    180 tablet    Take 1 tablet (500 mg) by mouth 2 times daily (with meals)       * Multi-vitamin Tabs tablet      Take 1 tablet by mouth daily       * multivitamin Tabs tablet     30 each    Take 1 tablet by mouth daily.       ONE TOUCH ULTRA SYSTEM KIT W/DEVICE Kit     1    as directed       ONE TOUCH ULTRA test strip   Generic drug:  blood glucose  monitoring     3 MONTH SUPPLY    TEST BLOOD SUGARS TWICE DAILY AS DIRECTED.       polyethylene glycol powder    MIRALAX    510 g    Take 17 g (1 capful) by mouth 2 times daily as needed for constipation       senna-docusate 8.6-50 MG per tablet    SENOKOT-S;PERICOLACE    50 tablet    Take 1-2 tablets by mouth 2 times daily       * Notice:  This list has 4 medication(s) that are the same as other medications prescribed for you. Read the directions carefully, and ask your doctor or other care provider to review them with you.

## 2017-04-25 NOTE — PROGRESS NOTES
Assessment & Plan      Maira Leon is a 77 year old female with the following diagnoses:   (E11.9) Controlled type 2 diabetes mellitus without complication, without long-term current use of insulin (H)  (primary encounter diagnosis)  Comment: Good control  Plan: Follow    (H25.10) Senile nuclear sclerosis, unspecified laterality - Left Eye  Comment: Worsening  Plan: See below    (H25.019) Cortical senile cataract, unspecified laterality - Left Eye  Comment: worse  Plan: Return in 3 months to schedule surgery    (H52.223) Regular astigmatism, bilateral  Comment: Change  Plan: No glasses now     -----------------------------------------------------------------------------------      Patient disposition:   Return in about 3 months (around 7/25/2017) for Follow Up, Cataract-Plan surgery OS then. or sooner as needed.    Complete documentation of historical and exam elements from today's encounter can  be found in the full encounter summary report (not reduplicated in this progress  note). I personally obtained the chief complaint(s) and history of present illness. I  confirmed and edited as necessary the review of systems, past medical/surgical  history, family history, social history, and examination findings as documented by  others; and I examined the patient myself. I personally reviewed the relevant tests,  images, and reports as documented above. I formulated and edited as necessary the  assessment and plan and discussed the findings and management plan with the  patient and family.    ALEXSANDER Powell M.D

## 2017-05-03 ENCOUNTER — THERAPY VISIT (OUTPATIENT)
Dept: PHYSICAL THERAPY | Facility: CLINIC | Age: 77
End: 2017-05-03
Payer: COMMERCIAL

## 2017-05-03 DIAGNOSIS — Z98.1 S/P LUMBAR FUSION: ICD-10-CM

## 2017-05-03 PROCEDURE — 97110 THERAPEUTIC EXERCISES: CPT | Mod: GP | Performed by: PHYSICAL THERAPIST

## 2017-05-03 PROCEDURE — 97112 NEUROMUSCULAR REEDUCATION: CPT | Mod: GP | Performed by: PHYSICAL THERAPIST

## 2017-05-11 ENCOUNTER — THERAPY VISIT (OUTPATIENT)
Dept: PHYSICAL THERAPY | Facility: CLINIC | Age: 77
End: 2017-05-11
Payer: COMMERCIAL

## 2017-05-11 DIAGNOSIS — Z98.1 S/P LUMBAR FUSION: ICD-10-CM

## 2017-05-11 PROCEDURE — 97112 NEUROMUSCULAR REEDUCATION: CPT | Mod: GP | Performed by: PHYSICAL THERAPIST

## 2017-05-11 PROCEDURE — 97110 THERAPEUTIC EXERCISES: CPT | Mod: GP | Performed by: PHYSICAL THERAPIST

## 2017-05-18 ENCOUNTER — THERAPY VISIT (OUTPATIENT)
Dept: PHYSICAL THERAPY | Facility: CLINIC | Age: 77
End: 2017-05-18
Payer: COMMERCIAL

## 2017-05-18 DIAGNOSIS — Z98.1 S/P LUMBAR FUSION: ICD-10-CM

## 2017-05-18 PROCEDURE — 97112 NEUROMUSCULAR REEDUCATION: CPT | Mod: GP | Performed by: PHYSICAL THERAPIST

## 2017-05-18 PROCEDURE — 97110 THERAPEUTIC EXERCISES: CPT | Mod: GP | Performed by: PHYSICAL THERAPIST

## 2017-06-08 ENCOUNTER — THERAPY VISIT (OUTPATIENT)
Dept: PHYSICAL THERAPY | Facility: CLINIC | Age: 77
End: 2017-06-08
Payer: COMMERCIAL

## 2017-06-08 DIAGNOSIS — Z98.1 S/P LUMBAR FUSION: ICD-10-CM

## 2017-06-08 PROCEDURE — 97110 THERAPEUTIC EXERCISES: CPT | Mod: GP | Performed by: PHYSICAL THERAPIST

## 2017-06-08 PROCEDURE — 97112 NEUROMUSCULAR REEDUCATION: CPT | Mod: GP | Performed by: PHYSICAL THERAPIST

## 2017-06-08 NOTE — MR AVS SNAPSHOT
After Visit Summary   6/8/2017    Maira Leon    MRN: [id number]           Patient Information     Date Of Birth          1940        Visit Information        Provider Department      6/8/2017 7:30 AM Rogelio Posada PT [facility]        Today's Diagnoses     S/P lumbar fusion           Follow-ups after your visit        Your next 10 appointments already scheduled     Jul 13, 2017  8:20 AM CDT   Return Visit with Mimi Cohn NP   [facility] ([facility])    [address] [facility] [address]   [phone]            Jul 19, 2017 11:00 AM CDT   Return Visit with Blake Powell MD   [facility])    [facility]  [address] [address]   [phone]            Aug 22, 2017  9:40 AM CDT   SHORT with Michelle Monet MD   [facility] ([facility])    [address] [place] [id number] [phone]           Your Arrival times is X, We need you to be here at this time to get checked in and have the assistant get you ready for the provider, which can take about 15 minutes. Your appointmen time with your provider is at  X. Thank you              Who to contact     If you have questions or need follow up information about today's clinic visit or your schedule please contact [facility] directly at [phone].  Normal or non-critical lab and imaging results will be communicated to you by MyChart, letter or phone within 4 business days after the clinic has received the results. If you do not hear from us within 7 days, please contact the clinic through MyChart or phone. If you have a critical or abnormal lab result, we will notify you by phone as soon as possible.  Submit refill requests through [employer] or  call your pharmacy and they will forward the refill request to us. Please allow 3 business days for your refill to be completed.          Additional Information About Your Visit        [employer] Information     [employer] gives you secure access to your electronic health record. If you see a primary care provider, you can also send messages to your care team and make appointments. If you have questions, please call your primary care clinic.  If you do not have a primary care provider, please call [phone] and they will assist you.        Care EveryWhere ID     This is your Care EveryWhere ID. This could be used by other organizations to access your [place] medical records  [id number]         Blood Pressure from Last 3 Encounters:   04/12/17 126/79   04/07/17 119/70   03/01/17 126/80    Weight from Last 3 Encounters:   04/12/17 77.6 kg (171 lb)   04/07/17 77.6 kg (171 lb)   03/01/17 75.8 kg (167 lb)              We Performed the Following     MELVIN PROGRESS NOTES REPORT     NEUROMUSCULAR RE-EDUCATION     THERAPEUTIC EXERCISES        Primary Care Provider Office Phone # Fax #    Michelle Monet [phone]       [address] [address]        Thank you!     Thank you for choosing [facility]  for your care. Our goal is always to provide you with excellent care. Hearing back from our patients is one way we can continue to improve our services. Please take a few minutes to complete the written survey that you may receive in the mail after your visit with us. Thank you!             Your Updated Medication List - Protect others around you: Learn how to safely use, store and throw away your medicines at www.disposemymeds.org.          This list is accurate as of: 6/8/17  9:02 AM.  Always use your most recent med list.                   Brand Name Dispense Instructions for use    * ACETAMINOPHEN PO      Take 1,000 mg  by mouth every 8 hours as needed for pain       * acetaminophen 325 MG tablet    TYLENOL    100 tablet    Take 1-2 tablets (325-650 mg) by mouth every 6 hours as needed for mild pain Do not exceed 4000 mg in a 24 hour period.       aspirin 81 MG tablet     30 tablet    Take 1 tablet (81 mg) by mouth daily       atorvastatin 40 MG tablet    LIPITOR    90 tablet    Take 1 tablet (40 mg) by mouth daily       calcium 500 +D 500-400 MG-UNIT Tabs   Generic drug:  Calcium Carb-Cholecalciferol      Take 1 tablet by mouth daily.       exenatide 10 MCG/0.04ML injection    BYETTA 10 MCG PEN    7.2 mL    Inject 10 mcg Subcutaneous 2 times daily       fish oil-omega-3 fatty acids 1000 MG capsule      Take 4 g by mouth daily.       HYDROmorphone 2 MG tablet    DILAUDID    40 tablet    Take 1 tablet (2 mg) by mouth every 4 hours as needed for pain maximum 2 tablet(s) per day       insulin pen needle 31G X 5 MM     200 each    Use 2 times a day with Byetta - dispense BD Mini 5mm x 31g pen needles       lisinopril 2.5 MG tablet    PRINIVIL/Zestril    90 tablet    Take 1 tablet (2.5 mg) by mouth daily       metFORMIN 500 MG tablet    GLUCOPHAGE    180 tablet    Take 1 tablet (500 mg) by mouth 2 times daily (with meals)       * Multi-vitamin Tabs tablet      Take 1 tablet by mouth daily       * multivitamin Tabs tablet     30 each    Take 1 tablet by mouth daily.       ONE TOUCH ULTRA SYSTEM KIT W/DEVICE Kit     1    as directed       ONE TOUCH ULTRA test strip   Generic drug:  blood glucose monitoring     3 MONTH SUPPLY    TEST BLOOD SUGARS TWICE DAILY AS DIRECTED.       polyethylene glycol powder    MIRALAX    510 g    Take 17 g (1 capful) by mouth 2 times daily as needed for constipation       senna-docusate 8.6-50 MG per tablet    SENOKOT-S;PERICOLACE    50 tablet    Take 1-2 tablets by mouth 2 times daily       * Notice:  This list has 4 medication(s) that are the same as other medications prescribed for you. Read the directions  carefully, and ask your doctor or other care provider to review them with you.

## 2017-06-08 NOTE — PROGRESS NOTES
Subjective:    HPI                    Objective:    System    Physical Exam    General     ROS    Assessment/Plan:      DISCHARGE REPORT    Progress reporting period is from 4/25/2017 to 6/8/2017.       SUBJECTIVE  Subjective changes noted by patient:  No pain.  Gets tired occasionally.      Current pain level is : 1/10.     Previous pain level was  : 1/10.   Changes in function:  Yes (See Goal flowsheet attached for changes in current functional level)  Adverse reaction to treatment or activity: None    OBJECTIVE  Objective: WNL lumbar ROM.     Trunk strength will improve with HEP    ASSESSMENT/PLAN  Updated problem list and treatment plan:Diagnosis 1:  S/p Lumbar fusion  Pain -  self management, education, directional preference exercise and home program  Decreased ROM/flexibility - manual therapy and therapeutic exercise  Decreased strength - therapeutic exercise and therapeutic activities  Impaired muscle performance - neuro re-education  Decreased function - therapeutic activities   STG/LTGs have been met or progress has been made towards goals:  Yes (See Goal flow sheet completed today.)  Assessment of Progress: The patient's condition is improving.  The patient has met all of their long term goals.  Self Management Plans:  Patient has been instructed in a home treatment program.    Maira continues to require the following intervention to meet STG and LTG's:  PT intervention is no longer required to meet STG/LTG.    Recommendations:  This patient is ready to be discharged from therapy and continue their home treatment program.    Please refer to the daily flowsheet for treatment today, total treatment time and time spent performing 1:1 timed codes.

## 2017-07-13 ENCOUNTER — OFFICE VISIT (OUTPATIENT)
Dept: NEUROSURGERY | Facility: CLINIC | Age: 77
End: 2017-07-13
Payer: COMMERCIAL

## 2017-07-13 ENCOUNTER — RADIANT APPOINTMENT (OUTPATIENT)
Dept: GENERAL RADIOLOGY | Facility: CLINIC | Age: 77
End: 2017-07-13
Attending: NURSE PRACTITIONER
Payer: COMMERCIAL

## 2017-07-13 VITALS
TEMPERATURE: 97.5 F | OXYGEN SATURATION: 95 % | WEIGHT: 174 LBS | BODY MASS INDEX: 29.87 KG/M2 | HEART RATE: 84 BPM | DIASTOLIC BLOOD PRESSURE: 93 MMHG | SYSTOLIC BLOOD PRESSURE: 142 MMHG

## 2017-07-13 DIAGNOSIS — Z98.1 S/P LUMBAR FUSION: ICD-10-CM

## 2017-07-13 DIAGNOSIS — Z98.1 S/P LUMBAR FUSION: Primary | ICD-10-CM

## 2017-07-13 PROCEDURE — 99213 OFFICE O/P EST LOW 20 MIN: CPT | Performed by: NURSE PRACTITIONER

## 2017-07-13 PROCEDURE — 72100 X-RAY EXAM L-S SPINE 2/3 VWS: CPT

## 2017-07-13 NOTE — NURSING NOTE
"Maira Leon is a 77 year old female who presents for:  Chief Complaint   Patient presents with     Neurologic Problem     s/p lumbar fusion 1-20-17        Initial Vitals:  There were no vitals taken for this visit. Estimated body mass index is 29.35 kg/(m^2) as calculated from the following:    Height as of 4/7/17: 5' 4\" (1.626 m).    Weight as of 4/12/17: 171 lb (77.6 kg).. There is no height or weight on file to calculate BSA. BP completed using cuff size: regular  Data Unavailable    Do you feel safe in your environment?  Yes  Do you need any refills today? No    Nursing Comments: s/p lumbar fusion 1-20-17        Leydi Soares      "

## 2017-07-13 NOTE — PROGRESS NOTES
"[facility]  Neurosurgery followup:    HPI: Maira Leon is a 78 yo with a history of low back pain with lower extremity pain and L4-5 spondylolisthesis.  She underwent L3-4 decompression and fusion and L4-5 TLIF with Dr. Buddy Davies on 1/20/17.  She is here today for her 6 month follow up appointment.  She states she is doing well and denies back pain.  She is dealing with knee pain that tends to radiate into the leg, \"But it's my knee.\"  She denies weakness to BLE or changes to bowel or bladder.  She has been golfing and states it has been going well.    Exam:  Constitutional:  Alert, well nourished, NAD.  HEENT: Normocephalic, atraumatic.   Pulm:  Without shortness of breath   CV:  No pitting edema of BLE.      Neurological:  Awake  Alert  Oriented x 3  Motor exam:        IP Q DF PF EHL  R   5  5   5   5    5  L   5  5   5   5    5     Able to spontaneously move L/E bilaterally  Sensation intact throughout all L/E dermatomes     Incision: Well healed.  Imaging: Per Xray fusion intact    A/P: S/p  L3-4 decompression and fusion and L4-5 TLIF    Maira is here today for her 6 month follow up appointment.  She states she is doing well and denies back pain.  She is dealing with knee pain that tends to radiate into the leg, \"But it's my knee.\"  She denies weakness to BLE or changes to bowel or bladder.  She has been golfing and states it has been going well.  She plans to f/u with ortho regarding her knee pain.  She will contact us if any concerns otherwise f/u in 6 months.      Patient Instructions   - followup in 6 months with xray prior   - Call the clinic at [phone] for increased pain or any other questions and concerns.        Mimi oChn [facility]  [facility]  [address]  [address] [place] [address]    Tel [phone]  Pager [phone]      "

## 2017-07-13 NOTE — PATIENT INSTRUCTIONS
- followup in 6 months with xray prior   - Call the clinic at [phone] for increased pain or any other questions and concerns.

## 2017-07-13 NOTE — MR AVS SNAPSHOT
After Visit Summary   7/13/2017    Maira Leon    MRN: [id number]           Patient Information     Date Of Birth          1940        Visit Information        Provider Department      7/13/2017 12:00 PM Mimi Cohn NP [facility]        Care Instructions    - followup in 6 months with xray prior   - Call the clinic at [phone] for increased pain or any other questions and concerns.              Follow-ups after your visit        Your next 10 appointments already scheduled     Jul 19, 2017 11:00 AM CDT   Return Visit with Blake Powell MD   [facility]    [facility]  [address] [id number] [phone]            Aug 22, 2017  9:40 AM CDT   SHORT with Michelle Monet MD   [facility] ([facility])    [address] [place] [address] [phone]           Your Arrival times is X, We need you to be here at this time to get checked in and have the assistant get you ready for the provider, which can take about 15 minutes. Your appointmen time with your provider is at  X. Thank you            Aug 25, 2017  9:40 AM CDT   SHORT with Michelle Monet MD   [facility] ([facility])    [address] [place] [address] [phone]           Your Arrival times is X, We need you to be here at this time to get checked in and have the assistant get you ready for the provider, which can take about 15 minutes. Your appointmen time with your provider is at  X. Thank you              Who to contact     If you have questions or need follow up information about today's clinic visit or your schedule please contact [facility] directly at [phone].  Normal or non-critical lab and imaging results will be communicated to you by  MyChart, letter or phone within 4 business days after the clinic has received the results. If you do not hear from us within 7 days, please contact the clinic through [employer] or phone. If you have a critical or abnormal lab result, we will notify you by phone as soon as possible.  Submit refill requests through [employer] or call your pharmacy and they will forward the refill request to us. Please allow 3 business days for your refill to be completed.          Additional Information About Your Visit        [email or web address] Information     [employer] gives you secure access to your electronic health record. If you see a primary care provider, you can also send messages to your care team and make appointments. If you have questions, please call your primary care clinic.  If you do not have a primary care provider, please call [phone] and they will assist you.        Care EveryWhere ID     This is your Care EveryWhere ID. This could be used by other organizations to access your [place] medical records  [id number]        Your Vitals Were     Pulse Temperature Pulse Oximetry Breastfeeding? BMI (Body Mass Index)       84 97.5  F (36.4  C) (Oral) 95% No 29.87 kg/m2        Blood Pressure from Last 3 Encounters:   07/13/17 (!) 142/93   04/12/17 126/79   04/07/17 119/70    Weight from Last 3 Encounters:   07/13/17 174 lb (78.9 kg)   04/12/17 171 lb (77.6 kg)   04/07/17 171 lb (77.6 kg)              Today, you had the following     No orders found for display       Primary Care Provider Office Phone # Fax #    Michelle Verenice Monet [phone]       [facility] [address] [place] [phone]        Equal Access to Services     [facility] : Hadii bret dean Sowilliam, waaxda luqadaha, qaybta kaalmada adeegyada, julio rice. So [facility] [phone].    ATENCIÓN: Si habla español, tiene a ellis disposición servicios gratuitos de asistencia lingüística. Llame al  [phone].    We comply with applicable federal civil rights laws and [place] laws. We do not discriminate on the basis of race, color, national origin, age, disability sex, sexual orientation or gender identity.            Thank you!     Thank you for choosing [facility]  for your care. Our goal is always to provide you with excellent care. Hearing back from our patients is one way we can continue to improve our services. Please take a few minutes to complete the written survey that you may receive in the mail after your visit with us. Thank you!             Your Updated Medication List - Protect others around you: Learn how to safely use, store and throw away your medicines at www.disposemymeds.org.          This list is accurate as of: 7/13/17 12:24 PM.  Always use your most recent med list.                   Brand Name Dispense Instructions for use Diagnosis    * ACETAMINOPHEN PO      Take 1,000 mg by mouth every 8 hours as needed for pain        * acetaminophen 325 MG tablet    TYLENOL    100 tablet    Take 1-2 tablets (325-650 mg) by mouth every 6 hours as needed for mild pain Do not exceed 4000 mg in a 24 hour period.    S/P lumbar fusion       aspirin 81 MG tablet     30 tablet    Take 1 tablet (81 mg) by mouth daily    S/P lumbar fusion       atorvastatin 40 MG tablet    LIPITOR    90 tablet    Take 1 tablet (40 mg) by mouth daily    Hyperlipidemia LDL goal <100, Diabetes mellitus with stage 3 chronic kidney disease (H)       calcium 500 +D 500-400 MG-UNIT Tabs   Generic drug:  Calcium Carb-Cholecalciferol      Take 1 tablet by mouth daily.        exenatide 10 MCG/0.04ML injection    BYETTA 10 MCG PEN    7.2 mL    Inject 10 mcg Subcutaneous 2 times daily    Diabetes mellitus with stage 3 chronic kidney disease (H)       fish oil-omega-3 fatty acids 1000 MG capsule      Take 4 g by mouth daily.        HYDROmorphone 2 MG tablet    DILAUDID    40 tablet    Take 1 tablet (2 mg) by mouth every  4 hours as needed for pain maximum 2 tablet(s) per day    S/P lumbar spinal fusion       insulin pen needle 31G X 5 MM     200 each    Use 2 times a day with Byetta - dispense BD Mini 5mm x 31g pen needles    Diabetes mellitus with stage 3 chronic kidney disease (H)       lisinopril 2.5 MG tablet    PRINIVIL/Zestril    90 tablet    Take 1 tablet (2.5 mg) by mouth daily    CKD (chronic kidney disease) stage 3, GFR 30-59 ml/min, Diabetes mellitus with stage 3 chronic kidney disease (H)       metFORMIN 500 MG tablet    GLUCOPHAGE    180 tablet    Take 1 tablet (500 mg) by mouth 2 times daily (with meals)    Diabetes mellitus with stage 3 chronic kidney disease (H)       * Multi-vitamin Tabs tablet      Take 1 tablet by mouth daily        * multivitamin Tabs tablet     30 each    Take 1 tablet by mouth daily.        ONE TOUCH ULTRA SYSTEM KIT W/DEVICE Kit     1    as directed        ONE TOUCH ULTRA test strip   Generic drug:  blood glucose monitoring     3 MONTH SUPPLY    TEST BLOOD SUGARS TWICE DAILY AS DIRECTED.    Type II or unspecified type diabetes mellitus without mention of complication, not stated as uncontrolled       polyethylene glycol powder    MIRALAX    510 g    Take 17 g (1 capful) by mouth 2 times daily as needed for constipation    Constipation, unspecified constipation type       senna-docusate 8.6-50 MG per tablet    SENOKOT-S;PERICOLACE    50 tablet    Take 1-2 tablets by mouth 2 times daily    S/P lumbar fusion       * Notice:  This list has 4 medication(s) that are the same as other medications prescribed for you. Read the directions carefully, and ask your doctor or other care provider to review them with you.

## 2017-07-19 ENCOUNTER — OFFICE VISIT (OUTPATIENT)
Dept: OPHTHALMOLOGY | Facility: CLINIC | Age: 77
End: 2017-07-19

## 2017-07-19 DIAGNOSIS — H25.019 CORTICAL SENILE CATARACT, UNSPECIFIED LATERALITY: ICD-10-CM

## 2017-07-19 DIAGNOSIS — H25.10 SENILE NUCLEAR SCLEROSIS, UNSPECIFIED LATERALITY: Primary | ICD-10-CM

## 2017-07-19 ASSESSMENT — VISUAL ACUITY
OS_SC: 20/30
METHOD: SNELLEN - LINEAR
OD_SC: 20/50
OD_SC+: +2

## 2017-07-19 ASSESSMENT — TONOMETRY
IOP_METHOD: ICARE
OD_IOP_MMHG: 12
OS_IOP_MMHG: 17

## 2017-07-19 ASSESSMENT — EXTERNAL EXAM - RIGHT EYE: OD_EXAM: NORMAL

## 2017-07-19 ASSESSMENT — SLIT LAMP EXAM - LIDS
COMMENTS: NORMAL
COMMENTS: NORMAL

## 2017-07-19 ASSESSMENT — EXTERNAL EXAM - LEFT EYE: OS_EXAM: NORMAL

## 2017-07-19 ASSESSMENT — CONF VISUAL FIELD
METHOD: COUNTING FINGERS
OD_NORMAL: 1

## 2017-07-19 NOTE — NURSING NOTE
Chief Complaints and History of Present Illnesses   Patient presents with     Cataract Follow Up     discuss cataract surgery of LE     HPI    Affected eye(s):  Left   Symptoms:     Decreased vision      Duration:  3 months   Frequency:  Constant       Do you have eye pain now?:  No      Comments:  Pt ready to schedule cataract surgery for LE.    Sumaya THORNTON 11:10 AM 07/19/2017

## 2017-07-19 NOTE — MR AVS SNAPSHOT
After Visit Summary   7/19/2017    Maira Leon    MRN: [id number]           Patient Information     Date Of Birth          1940        Visit Information        Provider Department      7/19/2017 11:00 AM Blake Powell MD [facility]        Today's Diagnoses     Senile nuclear sclerosis, unspecified laterality - Left Eye    -  1    Cortical senile cataract, unspecified laterality - Left Eye           Follow-ups after your visit        Follow-up notes from your care team     Return in about 4 months (around 11/21/2017) for Phaco OS.      Your next 10 appointments already scheduled     Nov 22, 2017  9:40 AM CST   Post-Op with Blake Powell MD   [facility] ([facility])    [facility]  [address]  [facility] [address]   [phone]            Dec 04, 2017  9:40 AM CST   Post-Op with Blake Powell MD   [facility] ([facility])    [facility]  [address]  [facility] [address]   [phone]            Dec 19, 2017  8:40 AM CST   SHORT with Michelle Monet MD   [facility]    [address] [place] [id number] [phone]           Your Arrival times is X, We need you to be here at this time to get checked in and have the assistant get you ready for the provider, which can take about 15 minutes. Your appointmen time with your provider is at  X. Thank you            Jan 18, 2018 11:00 AM CST   Return Visit with Mimi Cohn NP   [facility] ([address] [address] [phone]              Who to contact     Please call your clinic at [phone] to:    Ask questions about your health    Make or cancel appointments    Discuss your  medicines    Learn about your test results    Speak to your doctor   If you have compliments or concerns about an experience at your clinic, or if you wish to file a complaint, please contact [facility] Patient Relations at [phone] or email us at [email or web address]         Additional Information About Your Visit        MyChart Information     LastRoomhart gives you secure access to your electronic health record. If you see a primary care provider, you can also send messages to your care team and make appointments. If you have questions, please call your primary care clinic.  If you do not have a primary care provider, please call [phone] and they will assist you.      [email or web address] is an electronic gateway that provides easy, online access to your medical records. With [email or web address], you can request a clinic appointment, read your test results, renew a prescription or communicate with your care team.     To access your existing account, please contact your [facility] or call [phone] for assistance.        Care EveryWhere ID     This is your Care EveryWhere ID. This could be used by other organizations to access your [place] medical records  [id number]         Blood Pressure from Last 3 Encounters:   11/14/17 126/70   09/22/17 126/74   07/13/17 (!) 142/93    Weight from Last 3 Encounters:   11/14/17 75.4 kg (166 lb 3.2 oz)   09/22/17 75.3 kg (166 lb)   07/13/17 78.9 kg (174 lb)              We Performed the Following     IOL Biometry w/ IOL calc OU (both eye)        Primary Care Provider Office Phone # Fax #    Michelle Monet [phone]       [address] [facility] [address]        Equal Access to Services     CONRADO RODRÍGUEZ : Nichole Winter, bakari marie, julio laughlin. So [facility] [phone].    ATENCIÓN: Si alden steele, brittni agosto ellis  disposición servicios gratuitos de asistencia lingüística. Rubi sanchez [phone].    We comply with applicable federal civil rights laws and [place] laws. We do not discriminate on the basis of race, color, national origin, age, disability, sex, sexual orientation, or gender identity.            Thank you!     Thank you for choosing [facility]  for your care. Our goal is always to provide you with excellent care. Hearing back from our patients is one way we can continue to improve our services. Please take a few minutes to complete the written survey that you may receive in the mail after your visit with us. Thank you!             Your Updated Medication List - Protect others around you: Learn how to safely use, store and throw away your medicines at www.disposemymeds.org.          This list is accurate as of: 7/19/17 11:59 PM.  Always use your most recent med list.                   Brand Name Dispense Instructions for use Diagnosis    aspirin 81 MG tablet     30 tablet    Take 1 tablet (81 mg) by mouth daily    S/P lumbar fusion       calcium 500 +D 500-400 MG-UNIT Tabs   Generic drug:  Calcium Carb-Cholecalciferol      Take 1 tablet by mouth daily.        fish oil-omega-3 fatty acids 1000 MG capsule      Take 4 g by mouth daily.        insulin pen needle 31G X 5 MM     200 each    Use 2 times a day with Byetta - dispense BD Mini 5mm x 31g pen needles    Diabetes mellitus with stage 3 chronic kidney disease (H)       * Multi-vitamin Tabs tablet      Take 1 tablet by mouth daily        * multivitamin Tabs tablet     30 each    Take 1 tablet by mouth daily.        ONETOUCH ULTRA SYSTEM KIT W/DEVICE Kit     1    as directed        ONETOUCH ULTRA test strip   Generic drug:  blood glucose monitoring     3 MONTH SUPPLY    TEST BLOOD SUGARS TWICE DAILY AS DIRECTED.    Type II or unspecified type diabetes mellitus without mention of complication, not stated as uncontrolled       polyethylene  glycol powder    MIRALAX    510 g    Take 17 g (1 capful) by mouth 2 times daily as needed for constipation    Constipation, unspecified constipation type       * Notice:  This list has 2 medication(s) that are the same as other medications prescribed for you. Read the directions carefully, and ask your doctor or other care provider to review them with you.

## 2017-07-21 DIAGNOSIS — H25.12 SENILE NUCLEAR SCLEROSIS, LEFT: Primary | ICD-10-CM

## 2017-07-23 DIAGNOSIS — E11.22 DIABETES MELLITUS WITH STAGE 3 CHRONIC KIDNEY DISEASE (H): ICD-10-CM

## 2017-07-23 DIAGNOSIS — N18.30 DIABETES MELLITUS WITH STAGE 3 CHRONIC KIDNEY DISEASE (H): ICD-10-CM

## 2017-07-24 NOTE — TELEPHONE ENCOUNTER
Metformin         Last Written Prescription Date: 11.15.16  Last Fill Quantity: 180, # refills: 1  Last Office Visit with [facility], P or [facility] prescribing provider:  2.21.17   Next 5 appointments (look out 90 days)     Aug 22, 2017  9:40 AM CDT   SHORT with Michelle Monet MD   [facility]    [address] [place] [address]   [phone]            Aug 24, 2017  6:40 AM CDT   Pre-Op physical with Meliza Mederos DO   [facility]    [address] [place] [address]   [phone]            Aug 25, 2017  9:40 AM CDT   SHORT with Michelle Monet MD   [facility]    [address] [place] [address]   [phone]                   BP Readings from Last 3 Encounters:   07/13/17 (!) 142/93   04/12/17 126/79   04/07/17 119/70     Lab Results   Component Value Date    MICROL 22 11/15/2016     Lab Results   Component Value Date    UMALCR 22.75 11/15/2016     Creatinine   Date Value Ref Range Status   01/22/2017 0.79 0.52 - 1.04 mg/dL Final   ]  GFR Estimate   Date Value Ref Range Status   01/22/2017 71 >60 mL/min/1.7m2 Final     Comment:     Non  GFR Calc   01/03/2017 57 (L) >60 mL/min/1.7m2 Final     Comment:     Non  GFR Calc   04/28/2016 65 >60 mL/min/1.7m2 Final     Comment:     Non  GFR Calc     GFR Estimate If Black   Date Value Ref Range Status   01/22/2017 86 >60 mL/min/1.7m2 Final     Comment:      GFR Calc   01/03/2017 69 >60 mL/min/1.7m2 Final     Comment:      GFR Calc   04/28/2016 79 >60 mL/min/1.7m2 Final     Comment:      GFR Calc     Lab Results   Component Value Date    CHOL 140 04/28/2016     Lab Results   Component Value Date    HDL 60 04/28/2016     Lab Results   Component Value Date    LDL 58  04/28/2016     Lab Results   Component Value Date    TRIG 110 04/28/2016     Lab Results   Component Value Date    CHOLHDLRATIO 2.5 04/09/2015     Lab Results   Component Value Date    AST 25 03/08/2011     Lab Results   Component Value Date    ALT <6 10/17/2012     Lab Results   Component Value Date    A1C 6.2 01/21/2017    A1C 6.0 01/03/2017    A1C 5.8 11/15/2016    A1C 6.0 04/28/2016    A1C 6.0 10/12/2015     Potassium   Date Value Ref Range Status   01/22/2017 3.6 3.4 - 5.3 mmol/L Final     Unable to approve Metformin per medication refill protocol because due for labs (LDL and A1c).  Patient needs to be seen, given one month supply with no refills.       Rena Johnson PharmD, [facility]  Pharmacist Manager   [facility]  [phone]

## 2017-07-24 NOTE — TELEPHONE ENCOUNTER
metFORMIN (GLUCOPHAGE) 500 MG tablet   500 mg, 2 TIMES DAILY WITH MEALS 1 ordered  Edit     Summary: Take 1 tablet (500 mg) by mouth 2 times daily (with meals), Disp-180 tablet, R-1, E-Prescribe   Dose, Route, Frequency: 500 mg, Oral, 2 TIMES DAILY WITH MEALS  Start: 11/15/2016  Ord/Sold: 11/15/2016 (O)  Report  Taking:   Long-term:   Pharmacy: [facility] - [address]  Med Dose History       Patient Sig: Take 1 tablet (500 mg) by mouth 2 times daily (with meals)       Ordered on: 11/15/2016       Authorized by: MICHELLE MONET       Dispense: 180 tablet          Last Office Visit with [facility], [facility] or [facility] prescribing provider:  2-   Next 5 appointments (look out 90 days)     Aug 22, 2017  9:40 AM CDT   SHORT with Michelle Monet MD   [facility]    [address] [place] [address]   [phone]            Aug 24, 2017  6:40 AM CDT   Pre-Op physical with Meliza Mederos DO   [facility])    [address] [place] [address]   [phone]            Aug 25, 2017  9:40 AM CDT   SHORT with Michelle Monet MD   [facility] ([address] [address]   [phone]                   BP Readings from Last 3 Encounters:   07/13/17 (!) 142/93   04/12/17 126/79   04/07/17 119/70     Lab Results   Component Value Date    MICROL 22 11/15/2016     Lab Results   Component Value Date    UMALCR 22.75 11/15/2016     Creatinine   Date Value Ref Range Status   01/22/2017 0.79 0.52 - 1.04 mg/dL Final   ]  GFR Estimate   Date Value Ref Range Status   01/22/2017 71 >60 mL/min/1.7m2 Final     Comment:     Non  GFR Calc   01/03/2017 57 (L) >60 mL/min/1.7m2 Final     Comment:     Non  GFR Calc   04/28/2016 65 >60  mL/min/1.7m2 Final     Comment:     Non  GFR Calc     GFR Estimate If Black   Date Value Ref Range Status   01/22/2017 86 >60 mL/min/1.7m2 Final     Comment:      GFR Calc   01/03/2017 69 >60 mL/min/1.7m2 Final     Comment:      GFR Calc   04/28/2016 79 >60 mL/min/1.7m2 Final     Comment:      GFR Calc     Lab Results   Component Value Date    CHOL 140 04/28/2016     Lab Results   Component Value Date    HDL 60 04/28/2016     Lab Results   Component Value Date    LDL 58 04/28/2016     Lab Results   Component Value Date    TRIG 110 04/28/2016     Lab Results   Component Value Date    CHOLHDLRATIO 2.5 04/09/2015     Lab Results   Component Value Date    AST 25 03/08/2011     Lab Results   Component Value Date    ALT <6 10/17/2012     Lab Results   Component Value Date    A1C 6.2 01/21/2017    A1C 6.0 01/03/2017    A1C 5.8 11/15/2016    A1C 6.0 04/28/2016    A1C 6.0 10/12/2015     Potassium   Date Value Ref Range Status   01/22/2017 3.6 3.4 - 5.3 mmol/L Final

## 2017-07-25 RX ORDER — PREDNISOLONE ACETATE 10 MG/ML
1 SUSPENSION/ DROPS OPHTHALMIC 4 TIMES DAILY
Qty: 5 ML | Refills: 1 | Status: SHIPPED | OUTPATIENT
Start: 2017-08-03 | End: 2018-05-16

## 2017-07-25 RX ORDER — KETOROLAC TROMETHAMINE 5 MG/ML
1 SOLUTION OPHTHALMIC 4 TIMES DAILY
Qty: 5 ML | Refills: 1 | Status: SHIPPED | OUTPATIENT
Start: 2017-07-29 | End: 2018-05-16

## 2017-08-15 ENCOUNTER — TRANSFERRED RECORDS (OUTPATIENT)
Dept: HEALTH INFORMATION MANAGEMENT | Facility: CLINIC | Age: 77
End: 2017-08-15

## 2017-08-16 ENCOUNTER — TRANSFERRED RECORDS (OUTPATIENT)
Dept: HEALTH INFORMATION MANAGEMENT | Facility: CLINIC | Age: 77
End: 2017-08-16

## 2017-08-18 ENCOUNTER — TRANSFERRED RECORDS (OUTPATIENT)
Dept: HEALTH INFORMATION MANAGEMENT | Facility: CLINIC | Age: 77
End: 2017-08-18

## 2017-08-29 ENCOUNTER — TELEPHONE (OUTPATIENT)
Dept: FAMILY MEDICINE | Facility: CLINIC | Age: 77
End: 2017-08-29

## 2017-08-29 ENCOUNTER — TRANSFERRED RECORDS (OUTPATIENT)
Dept: HEALTH INFORMATION MANAGEMENT | Facility: CLINIC | Age: 77
End: 2017-08-29

## 2017-08-30 ENCOUNTER — TELEPHONE (OUTPATIENT)
Dept: FAMILY MEDICINE | Facility: CLINIC | Age: 77
End: 2017-08-30

## 2017-08-30 DIAGNOSIS — E11.22 DIABETES MELLITUS WITH STAGE 3 CHRONIC KIDNEY DISEASE (H): Primary | ICD-10-CM

## 2017-08-30 DIAGNOSIS — N18.30 DIABETES MELLITUS WITH STAGE 3 CHRONIC KIDNEY DISEASE (H): Primary | ICD-10-CM

## 2017-08-30 NOTE — TELEPHONE ENCOUNTER
Mary BUCKNER from [facility] rehab in [place] called to let Dr Monet know Nadya fell [place] off a Doc trying to dock a boat and broke her Left hip she had a left hip fracture repair in [place] and has been in recovery facility for past 10 days she is discharging on Friday with 2 weeks of Narcotic this provider feels she should be seen sooner than her apt 9/22 with Dr Monet to renew narcotics. Will route to Dr Monet as a fyi and then route back Team please move apt up if possible patient is available by cell phone.  Alexandra Guerra,Clinic Rn  [place]

## 2017-08-31 ENCOUNTER — TELEPHONE (OUTPATIENT)
Dept: FAMILY MEDICINE | Facility: CLINIC | Age: 77
End: 2017-08-31

## 2017-08-31 NOTE — TELEPHONE ENCOUNTER
Called patient and left a message for her to call so that we can change her appointment from 9/22 to a sooner date.    See Dr Monet note below. Ok to work patient into her schedule.    Roxy Lauren

## 2017-08-31 NOTE — TELEPHONE ENCOUNTER
She has outpt PT in [place] for 10 days & so she is unable to get back down here for an earlier appt. She has a ride here for 9/22.   There she doesn't have stairs & she has people there that can help her.   She doesn't have a blood test kit with her & the hospital didn't have her medical records & she didn't have her insulins & so her sugars have been a little off. Sent new script to [place], [place] per her request.  She had to switch to pain meds from every 2 to every 4 hours & so she couldn't sleep last night & is tearful. She will see her surgeon tomorrow & make a f/u plan.      Saira Killian RN

## 2017-08-31 NOTE — TELEPHONE ENCOUNTER
Reason for Call:  Form (Release of information?)    Detailed comments: Digna called a couple days ago and has not heard back yet. We are suppose to get a form to Digna to get to the patient to sign so that we can access patients hospital and rehab information via [facility]. Tracy fax is not working but you can e-mail the form to her please at her e-mail address [email or web address]    Phone Number Patient can be reached at:   Malachi Flynn () [phone]         Best Time: anytime    Can we leave a detailed message on this number? YES    Call taken on 8/31/2017 at 1:02 PM by Brittney Calix

## 2017-09-01 ENCOUNTER — MEDICAL CORRESPONDENCE (OUTPATIENT)
Dept: HEALTH INFORMATION MANAGEMENT | Facility: CLINIC | Age: 77
End: 2017-09-01

## 2017-09-01 ENCOUNTER — TRANSFERRED RECORDS (OUTPATIENT)
Dept: HEALTH INFORMATION MANAGEMENT | Facility: CLINIC | Age: 77
End: 2017-09-01

## 2017-09-05 ENCOUNTER — TRANSFERRED RECORDS (OUTPATIENT)
Dept: HEALTH INFORMATION MANAGEMENT | Facility: CLINIC | Age: 77
End: 2017-09-05

## 2017-09-06 ENCOUNTER — TRANSFERRED RECORDS (OUTPATIENT)
Dept: HEALTH INFORMATION MANAGEMENT | Facility: CLINIC | Age: 77
End: 2017-09-06

## 2017-09-21 DIAGNOSIS — E78.5 HYPERLIPIDEMIA LDL GOAL <100: ICD-10-CM

## 2017-09-21 DIAGNOSIS — N18.30 DIABETES MELLITUS WITH STAGE 3 CHRONIC KIDNEY DISEASE (H): ICD-10-CM

## 2017-09-21 DIAGNOSIS — E11.22 DIABETES MELLITUS WITH STAGE 3 CHRONIC KIDNEY DISEASE (H): ICD-10-CM

## 2017-09-21 RX ORDER — ATORVASTATIN CALCIUM 40 MG/1
TABLET, FILM COATED ORAL
Qty: 90 TABLET | Refills: 1 | Status: CANCELLED | OUTPATIENT
Start: 2017-09-21

## 2017-09-21 NOTE — TELEPHONE ENCOUNTER
atorvastatin (LIPITOR) 40 MG tablet   40 mg, DAILY 1 ordered  Edit     Summary: Take 1 tablet (40 mg) by mouth daily, Disp-90 tablet, R-1, E-Prescribe   Dose, Route, Frequency: 40 mg, Oral, DAILY  Start: 2/24/2017  Ord/Sold: 2/24/2017 (O)  Report  Taking:   Long-term:   Pharmacy: [address]  Med Dose History       Patient Sig: Take 1 tablet (40 mg) by mouth daily       Ordered on: 2/24/2017       Authorized by: MICHELLE MONET       Dispense: 90 tablet          Last Office Visit with [facility], [facility] or [facility] prescribing provider: 2-  Next 5 appointments (look out 90 days)     Sep 22, 2017 11:00 AM CDT   SHORT with Michelle Monet MD   [facility] ([address] [id number] [phone]                   Lab Results   Component Value Date    CHOL 140 04/28/2016     Lab Results   Component Value Date    HDL 60 04/28/2016     Lab Results   Component Value Date    LDL 58 04/28/2016     Lab Results   Component Value Date    TRIG 110 04/28/2016     Lab Results   Component Value Date    CHOLHDLRATIO 2.5 04/09/2015         metFORMIN (GLUCOPHAGE) 500 MG tablet   500 mg, 2 TIMES DAILY WITH MEALS 0 ordered  Edit     Summary: Take 1 tablet (500 mg) by mouth 2 times daily (with meals) 7.24.17 due for labs and office visit for further refills., Disp-60 tablet, R-0, E-Prescribe   Dose, Route, Frequency: 500 mg, Oral, 2 TIMES DAILY WITH MEALS  Start: 7/24/2017  Ord/Sold: 7/24/2017 (O)  Report  Taking:   Long-term:   Pharmacy: [address]  Med Dose History       Patient Sig: Take 1 tablet (500 mg) by mouth 2 times daily (with meals) 7.24.17 due for labs and office visit for further refills.       Ordered on: 7/24/2017       Authorized by: MICHELLE MONET       Dispense: 60 tablet       Admin  Instructions: 7.24.17 due for labs and office visit for further refills.       Prior Authorization: Request PA          Last Office Visit with [facility], [facility] or [facility] prescribing provider:  2-   Next 5 appointments (look out 90 days)     Sep 22, 2017 11:00 AM CDT   [facility]    [address] [place] [id number] [phone]                   BP Readings from Last 3 Encounters:   07/13/17 (!) 142/93   04/12/17 126/79   04/07/17 119/70     Lab Results   Component Value Date    MICROL 22 11/15/2016     Lab Results   Component Value Date    UMALCR 22.75 11/15/2016     Creatinine   Date Value Ref Range Status   01/22/2017 0.79 0.52 - 1.04 mg/dL Final   ]  GFR Estimate   Date Value Ref Range Status   01/22/2017 71 >60 mL/min/1.7m2 Final     Comment:     Non  GFR Calc   01/03/2017 57 (L) >60 mL/min/1.7m2 Final     Comment:     Non  GFR Calc   04/28/2016 65 >60 mL/min/1.7m2 Final     Comment:     Non  GFR Calc     GFR Estimate If Black   Date Value Ref Range Status   01/22/2017 86 >60 mL/min/1.7m2 Final     Comment:      GFR Calc   01/03/2017 69 >60 mL/min/1.7m2 Final     Comment:      GFR Calc   04/28/2016 79 >60 mL/min/1.7m2 Final     Comment:      GFR Calc     Lab Results   Component Value Date    CHOL 140 04/28/2016     Lab Results   Component Value Date    HDL 60 04/28/2016     Lab Results   Component Value Date    LDL 58 04/28/2016     Lab Results   Component Value Date    TRIG 110 04/28/2016     Lab Results   Component Value Date    CHOLHDLRATIO 2.5 04/09/2015     Lab Results   Component Value Date    AST 25 03/08/2011     Lab Results   Component Value Date    ALT <6 10/17/2012     Lab Results   Component Value Date    A1C 6.2 01/21/2017    A1C 6.0 01/03/2017    A1C 5.8 11/15/2016    A1C 6.0 04/28/2016    A1C  6.0 10/12/2015     Potassium   Date Value Ref Range Status   01/22/2017 3.6 3.4 - 5.3 mmol/L Final

## 2017-09-22 ENCOUNTER — OFFICE VISIT (OUTPATIENT)
Dept: FAMILY MEDICINE | Facility: CLINIC | Age: 77
End: 2017-09-22
Payer: COMMERCIAL

## 2017-09-22 VITALS
WEIGHT: 166 LBS | DIASTOLIC BLOOD PRESSURE: 74 MMHG | HEART RATE: 70 BPM | BODY MASS INDEX: 28.34 KG/M2 | HEIGHT: 64 IN | TEMPERATURE: 98.2 F | SYSTOLIC BLOOD PRESSURE: 126 MMHG

## 2017-09-22 DIAGNOSIS — N18.30 CKD (CHRONIC KIDNEY DISEASE) STAGE 3, GFR 30-59 ML/MIN (H): ICD-10-CM

## 2017-09-22 DIAGNOSIS — N18.30 DIABETES MELLITUS WITH STAGE 3 CHRONIC KIDNEY DISEASE (H): ICD-10-CM

## 2017-09-22 DIAGNOSIS — Z23 NEED FOR PROPHYLACTIC VACCINATION AND INOCULATION AGAINST INFLUENZA: ICD-10-CM

## 2017-09-22 DIAGNOSIS — G25.81 RESTLESS LEG SYNDROME: ICD-10-CM

## 2017-09-22 DIAGNOSIS — E11.22 DIABETES MELLITUS WITH STAGE 3 CHRONIC KIDNEY DISEASE (H): ICD-10-CM

## 2017-09-22 DIAGNOSIS — M85.89 OSTEOPENIA OF MULTIPLE SITES: ICD-10-CM

## 2017-09-22 DIAGNOSIS — S72.002D HIP FRACTURE, LEFT, CLOSED, WITH ROUTINE HEALING, SUBSEQUENT ENCOUNTER: Primary | ICD-10-CM

## 2017-09-22 DIAGNOSIS — E78.5 HYPERLIPIDEMIA LDL GOAL <100: ICD-10-CM

## 2017-09-22 DIAGNOSIS — D62 ACUTE POSTHEMORRHAGIC ANEMIA: ICD-10-CM

## 2017-09-22 LAB
HBA1C MFR BLD: 5.2 % (ref 4.3–6)
HGB BLD-MCNC: 11.6 G/DL (ref 11.7–15.7)

## 2017-09-22 PROCEDURE — G0008 ADMIN INFLUENZA VIRUS VAC: HCPCS | Performed by: FAMILY MEDICINE

## 2017-09-22 PROCEDURE — 36415 COLL VENOUS BLD VENIPUNCTURE: CPT | Performed by: FAMILY MEDICINE

## 2017-09-22 PROCEDURE — 85018 HEMOGLOBIN: CPT | Performed by: FAMILY MEDICINE

## 2017-09-22 PROCEDURE — 84443 ASSAY THYROID STIM HORMONE: CPT | Performed by: FAMILY MEDICINE

## 2017-09-22 PROCEDURE — 80061 LIPID PANEL: CPT | Performed by: FAMILY MEDICINE

## 2017-09-22 PROCEDURE — 99214 OFFICE O/P EST MOD 30 MIN: CPT | Mod: 25 | Performed by: FAMILY MEDICINE

## 2017-09-22 PROCEDURE — 83036 HEMOGLOBIN GLYCOSYLATED A1C: CPT | Performed by: FAMILY MEDICINE

## 2017-09-22 PROCEDURE — 90662 IIV NO PRSV INCREASED AG IM: CPT | Performed by: FAMILY MEDICINE

## 2017-09-22 RX ORDER — ATORVASTATIN CALCIUM 40 MG/1
40 TABLET, FILM COATED ORAL DAILY
Qty: 90 TABLET | Refills: 3 | Status: SHIPPED | OUTPATIENT
Start: 2017-09-22 | End: 2018-11-01

## 2017-09-22 RX ORDER — LISINOPRIL 2.5 MG/1
2.5 TABLET ORAL DAILY
Qty: 90 TABLET | Refills: 3 | Status: SHIPPED | OUTPATIENT
Start: 2017-09-22 | End: 2018-11-01

## 2017-09-22 NOTE — MR AVS SNAPSHOT
After Visit Summary   9/22/2017    Maira eLon    MRN: [id number]           Patient Information     Date Of Birth          1940        Visit Information        Provider Department      9/22/2017 11:00 AM Michelle Monet MD [facility]        Today's Diagnoses     Hip fracture, left, closed, with routine healing, subsequent encounter    -  1    Diabetes mellitus with stage 3 chronic kidney disease (H)        Restless leg syndrome        Acute posthemorrhagic anemia        Hyperlipidemia LDL goal <100        CKD (chronic kidney disease) stage 3, GFR 30-59 ml/min        Need for prophylactic vaccination and inoculation against influenza           Follow-ups after your visit        Who to contact     If you have questions or need follow up information about today's clinic visit or your schedule please contact [facility] [place] [facility] directly at [phone].  Normal or non-critical lab and imaging results will be communicated to you by Aloricahart, letter or phone within 4 business days after the clinic has received the results. If you do not hear from us within 7 days, please contact the clinic through Aloricahart or phone. If you have a critical or abnormal lab result, we will notify you by phone as soon as possible.  Submit refill requests through Bvents or call your pharmacy and they will forward the refill request to us. Please allow 3 business days for your refill to be completed.          Additional Information About Your Visit        MyChart Information     Bvents gives you secure access to your electronic health record. If you see a primary care provider, you can also send messages to your care team and make appointments. If you have questions, please call your primary care clinic.  If you do not have a primary care provider, please call [phone] and they will assist you.        Care EveryWhere ID     This is your Care EveryWhere ID. This could be used  "by other organizations to access your [facility] medical records  [id number]        Your Vitals Were     Pulse Temperature Height BMI (Body Mass Index)          70 98.2  F (36.8  C) (Oral) 5' 4\" (1.626 m) 28.49 kg/m2         Blood Pressure from Last 3 Encounters:   09/22/17 126/74   07/13/17 (!) 142/93   04/12/17 126/79    Weight from Last 3 Encounters:   09/22/17 166 lb (75.3 kg)   07/13/17 174 lb (78.9 kg)   04/12/17 171 lb (77.6 kg)              We Performed the Following     FLU VACCINE, INCREASED ANTIGEN, PRESV FREE, AGE 65+ [78287]     HEMOGLOBIN A1C     Hemoglobin     Lipid panel reflex to direct LDL     TSH WITH FREE T4 REFLEX     Vaccine Administration, Initial [95452]          Today's Medication Changes          These changes are accurate as of: 9/22/17 11:55 AM.  If you have any questions, ask your nurse or doctor.               These medicines have changed or have updated prescriptions.        Dose/Directions    metFORMIN 500 MG tablet   Commonly known as:  GLUCOPHAGE   This may have changed:  additional instructions   Used for:  Diabetes mellitus with stage 3 chronic kidney disease (H)   Changed by:  Michelle Monet MD        Dose:  500 mg   Take 1 tablet (500 mg) by mouth 2 times daily (with meals)   Quantity:  180 tablet   Refills:  1            Where to get your medicines      These medications were sent to [facility] [address], [place] [id number]     Phone:  [phone]     atorvastatin 40 MG tablet    exenatide 10 MCG/0.04ML injection    lisinopril 2.5 MG tablet    metFORMIN 500 MG tablet                Primary Care Provider Office Phone # Fax #    Michelle Monet [phone]       [address] [place] [address]        Equal Access to Services     CONRADO RODRÍGUEZ AH: Nichole Winter, waaxda luqadaha, qaybta kaalmasophia cheney, julio avilez " lanancy rice. So [facility] [phone].    ATENCIÓN: Si habla ivette, tiene a ellis disposición servicios gratuitos de asistencia lingüística. Rubi sanchez [phone].    We comply with applicable federal civil rights laws and [place] laws. We do not discriminate on the basis of race, color, national origin, age, disability sex, sexual orientation or gender identity.            Thank you!     Thank you for choosing [facility]  for your care. Our goal is always to provide you with excellent care. Hearing back from our patients is one way we can continue to improve our services. Please take a few minutes to complete the written survey that you may receive in the mail after your visit with us. Thank you!             Your Updated Medication List - Protect others around you: Learn how to safely use, store and throw away your medicines at www.disposemymeds.org.          This list is accurate as of: 9/22/17 11:55 AM.  Always use your most recent med list.                   Brand Name Dispense Instructions for use Diagnosis    aspirin 81 MG tablet     30 tablet    Take 1 tablet (81 mg) by mouth daily    S/P lumbar fusion       atorvastatin 40 MG tablet    LIPITOR    90 tablet    Take 1 tablet (40 mg) by mouth daily    Hyperlipidemia LDL goal <100, Diabetes mellitus with stage 3 chronic kidney disease (H)       blood glucose lancets standard    no brand specified    100 each    Use to test blood sugar daily or as directed.    Diabetes mellitus with stage 3 chronic kidney disease (H)       calcium 500 +D 500-400 MG-UNIT Tabs   Generic drug:  Calcium Carb-Cholecalciferol      Take 1 tablet by mouth daily.        exenatide 10 MCG/0.04ML injection    BYETTA 10 MCG PEN    7.2 mL    Inject 10 mcg Subcutaneous 2 times daily    Diabetes mellitus with stage 3 chronic kidney disease (H)       fish oil-omega-3 fatty acids 1000 MG capsule      Take 4 g by mouth daily.        insulin pen needle 31G X 5 MM     200 each    Use 2  times a day with Byetta - dispense BD Mini 5mm x 31g pen needles    Diabetes mellitus with stage 3 chronic kidney disease (H)       ketorolac 0.5 % ophthalmic solution    ACULAR    5 mL    Place 1 drop Into the left eye 4 times daily Starting 3 days prior to surgery    Senile nuclear sclerosis, left       lisinopril 2.5 MG tablet    PRINIVIL/Zestril    90 tablet    Take 1 tablet (2.5 mg) by mouth daily    CKD (chronic kidney disease) stage 3, GFR 30-59 ml/min, Diabetes mellitus with stage 3 chronic kidney disease (H)       metFORMIN 500 MG tablet    GLUCOPHAGE    180 tablet    Take 1 tablet (500 mg) by mouth 2 times daily (with meals)    Diabetes mellitus with stage 3 chronic kidney disease (H)       * Multi-vitamin Tabs tablet      Take 1 tablet by mouth daily        * multivitamin Tabs tablet     30 each    Take 1 tablet by mouth daily.        * ONE TOUCH ULTRA SYSTEM KIT W/DEVICE Kit     1    as directed        * blood glucose monitoring meter device kit    no brand specified    1 kit    Use to test blood sugar daily or as directed.    Diabetes mellitus with stage 3 chronic kidney disease (H)       * ONE TOUCH ULTRA test strip   Generic drug:  blood glucose monitoring     3 MONTH SUPPLY    TEST BLOOD SUGARS TWICE DAILY AS DIRECTED.    Type II or unspecified type diabetes mellitus without mention of complication, not stated as uncontrolled       * blood glucose monitoring test strip    no brand specified    100 strip    Use to test blood sugars daily or as directed    Diabetes mellitus with stage 3 chronic kidney disease (H)       polyethylene glycol powder    MIRALAX    510 g    Take 17 g (1 capful) by mouth 2 times daily as needed for constipation    Constipation, unspecified constipation type       prednisoLONE acetate 1 % ophthalmic susp    PRED FORTE    5 mL    Place 1 drop Into the left eye 4 times daily Start day after surgery    Senile nuclear sclerosis, left       senna-docusate 8.6-50 MG per tablet     SENOKOT-S;PERICOLACE    50 tablet    Take 1-2 tablets by mouth 2 times daily    S/P lumbar fusion       * Notice:  This list has 6 medication(s) that are the same as other medications prescribed for you. Read the directions carefully, and ask your doctor or other care provider to review them with you.

## 2017-09-22 NOTE — PROGRESS NOTES
"  SUBJECTIVE:   Maira Leon is a 77 year old female who presents to clinic today for the following health issues:  Accompanied by her friend.    Was hospitalized in [place] from 8/16-8/19/17  Then in rehab facility in [place] for two weeks.    Recent Hip Fracture:  Patient states that she fell off a boat when she was getting back onto land and her leg twisted. This is why she had a hip fracture. They were concerned that this was an osteoporotic fracture. She had a recent DEXA scan which showed low risk of fracture. She has increased her calcium supplements. She has not been taking any additional Vitamin D other than in her Multivitamin. She states that walking 1/2 hour each day is hard for her right now, but she is working on it. After surgery she went to a rehab facility, she was in for 2 week. She now goes into outpatient rehab twice a week. She sees her surgeon for a follow up on Oct. 3rd. She states he will then decide if she needs more therapy.    Left Shoulder Pain:  When she fell she tried to grab the dock and she thinks she hurt her shoulder. She had an xray of this that showed no breaks or tears. She has been using Biofreeze and Voltaren gel. She states that she has pain with use of her left arm. She says that she has not been working with PT on this. She was given some exercises for this, but she discontinued them because they hurt too bad.     Restless Legs:  Patient states she is having worse restless legs in the night. It wakes her up at night. She states that she has always had restless legs, but they haven't woken her up as much as they do now. She says that she wakes up to tingling and her legs wanting to ,ove. She says there is a sensation in her feet that has changed since the surgery. She describes the sensation in her feet as a tension, and being \"less limber\" She is wondering if this is related to her neuropathy. She still can't put all of her weight on her left leg after the surgery. "     Other Concerns/Comments:   Patient feels as if there is a lack of sharing information between different health records.     Patient would like to quit taking Tylenol. Not taking pain pills anymore. She is still taking 4 day Tylenol.        Diabetes Follow-up    Patient is checking blood sugars: once daily.  Results are as follows:       am - before meals. Hard to remember at times since starting to take blood sugar readings again. Range has been from 125 to 189. The highest was shortly after the surgery, and she was taking byetta after she eats.  Seems to average mid 140's. Has not settled down yet. There was a week she did not get Byetta and she got insulin due to some mistake while she was hospitalized.     Diabetic concerns: None     Symptoms of hypoglycemia (low blood sugar): at times she will, knows she needs to eat something     Paresthesias (numbness or burning in feet) or sores: no, but has terrible time with restless legs. Ill only sleep for a few hours at a time. Has been awakened with leg pains. Will walk and massage them . Will end up turning on light and reading. Tried to use her pain pills to help but it did not help. Can still feel things with her toes but also notices a change in her toes. She wiggles her toes during one of her physical      Date of last diabetic eye exam: goes yearly, was in rehab in [place] when a cataract was scheduled to be fixed. Has not gotten it done yet.         Lab Results   Component Value Date    A1C 5.2 09/22/2017    A1C 6.2 01/21/2017    A1C 6.0 01/03/2017    A1C 5.8 11/15/2016    A1C 6.0 04/28/2016       Hyperlipidemia Follow-Up      Rate your low fat/cholesterol diet?: good    Taking statin?  No    Other lipid medications/supplements?:  none          Amount of exercise or physical activity: PT    Problems taking medications regularly: No    Medication side effects: none  Diet: regular (no restrictions)          Problem list and histories reviewed & adjusted, as  indicated.  Additional history: as documented    Patient Active Problem List   Diagnosis     Osteoarthritis     Hyperlipidemia LDL goal <100     CKD (chronic kidney disease) stage 3, GFR 30-59 ml/min     Advance Care Planning     Diabetes mellitus with stage 3 chronic kidney disease (H)     Primary osteoarthritis of left knee     Lumbar spinal stenosis     Osteopenia of multiple sites     Past Surgical History:   Procedure Laterality Date     APPENDECTOMY  age 4     ARTHROSCOPY KNEE Left 5/6/2016    Procedure: ARTHROSCOPY KNEE;  Surgeon: Ramin Ramirez MD;  Location:  OR     BACK SURGERY  01/20/2017    Lumbar fusion     ENT SURGERY      Tonsillectomy     GENITOURINARY SURGERY      bladder repair/sling     OPTICAL TRACKING SYSTEM FUSION SPINE POSTERIOR LUMBAR TWO LEVELS N/A 1/20/2017    Procedure: OPTICAL TRACKING SYSTEM FUSION SPINE POSTERIOR LUMBAR TWO LEVELS;  Surgeon: Buddy Davies MD;  Location: [facility] OR     PHACOEMULSIFICATION CLEAR CORNEA WITH STANDARD INTRAOCULAR LENS IMPLANT Right 2014       Social History   Substance Use Topics     Smoking status: Former Smoker     Quit date: 1/1/1981     Smokeless tobacco: Never Used     Alcohol use Yes      Comment: 1 glass of wine per day     Family History   Problem Relation Age of Onset     OSTEOPOROSIS Mother      HEART DISEASE Father      CEREBROVASCULAR DISEASE Father      Musculoskeletal Disorder Father      gout     Genitourinary Problems Maternal Grandmother      CANCER Maternal Grandfather      CANCER Paternal Grandmother      CEREBROVASCULAR DISEASE Paternal Grandfather      Eye Disorder Brother      macular degeneration     Macular Degeneration Brother      DIABETES No family hx of      Hypertension No family hx of          Current Outpatient Prescriptions   Medication Sig Dispense Refill     metFORMIN (GLUCOPHAGE) 500 MG tablet Take 1 tablet (500 mg) by mouth 2 times daily (with meals) 180 tablet 1     atorvastatin (LIPITOR) 40 MG tablet  Take 1 tablet (40 mg) by mouth daily 90 tablet 3     exenatide (BYETTA 10 MCG PEN) 10 MCG/0.04ML injection Inject 10 mcg Subcutaneous 2 times daily 7.2 mL 3     lisinopril (PRINIVIL/ZESTRIL) 2.5 MG tablet Take 1 tablet (2.5 mg) by mouth daily 90 tablet 3     blood glucose monitoring (NO BRAND SPECIFIED) test strip Use to test blood sugars daily or as directed 100 strip 0     blood glucose (NO BRAND SPECIFIED) lancets standard Use to test blood sugar daily or as directed. 100 each 0     blood glucose monitoring (NO BRAND SPECIFIED) meter device kit Use to test blood sugar daily or as directed. 1 kit 0     prednisoLONE acetate (PRED FORTE) 1 % ophthalmic susp Place 1 drop Into the left eye 4 times daily Start day after surgery 5 mL 1     ketorolac (ACULAR) 0.5 % ophthalmic solution Place 1 drop Into the left eye 4 times daily Starting 3 days prior to surgery 5 mL 1     senna-docusate (SENOKOT-S;PERICOLACE) 8.6-50 MG per tablet Take 1-2 tablets by mouth 2 times daily 50 tablet 0     polyethylene glycol (MIRALAX) powder Take 17 g (1 capful) by mouth 2 times daily as needed for constipation 510 g 0     aspirin 81 MG tablet Take 1 tablet (81 mg) by mouth daily 30 tablet 0     multivitamin, therapeutic with minerals (MULTI-VITAMIN) TABS tablet Take 1 tablet by mouth daily       insulin pen needle 31G X 5 MM Use 2 times a day with Byetta - dispense BD Mini 5mm x 31g pen needles 200 each 2     multivitamin (OCUVITE) TABS Take 1 tablet by mouth daily. 30 each 0     fish oil-omega-3 fatty acids (FISH OIL) 1000 MG capsule Take 4 g by mouth daily.       calcium-vitamin D (CALCIUM 500 +D) 500-125 MG-UNIT TABS Take 1 tablet by mouth daily.       ONETOUCH ULTRA TEST VI STRP TEST BLOOD SUGARS TWICE DAILY AS DIRECTED. 3 MONTH SUPPLY 1 YEAR     ONE TOUCH ULTRA SYSTEM KIT KIT as directed  1 0     [DISCONTINUED] metFORMIN (GLUCOPHAGE) 500 MG tablet Take 1 tablet (500 mg) by mouth 2 times daily (with meals) 7.24.17 due for labs and  "office visit for further refills. 60 tablet 0     [DISCONTINUED] atorvastatin (LIPITOR) 40 MG tablet Take 1 tablet (40 mg) by mouth daily 90 tablet 1     [DISCONTINUED] lisinopril (PRINIVIL,ZESTRIL) 2.5 MG tablet Take 1 tablet (2.5 mg) by mouth daily 90 tablet 3     [DISCONTINUED] exenatide (BYETTA 10 MCG PEN) 10 MCG/0.04ML pen (contains 2.4 ml = 60 doses) Inject 10 mcg Subcutaneous 2 times daily 7.2 mL 3     Allergies   Allergen Reactions     No Known Drug Allergies          Reviewed and updated as needed this visit by clinical staffTobacco  Allergies  Meds  Problems  Med Hx  Surg Hx  Fam Hx  Soc Hx        Reviewed and updated as needed this visit by Provider  Allergies  Meds  Problems         ROS:  Constitutional, HEENT, cardiovascular, pulmonary, gi and gu systems are negative, except as otherwise noted.    This document serves as a record of the services and decisions personally performed by KEN LLAMAS. It was created on his/her behalf by Belkys Cohen, a trained medical scribe. The creation of this document is based on the provider's statements to the medical scribe. Belkys Cohen, September 22, 2017 11:16 AM    OBJECTIVE:   /74 (BP Location: Right arm, Patient Position: Sitting, Cuff Size: Adult Large)  Pulse 70  Temp 98.2  F (36.8  C) (Oral)  Ht 5' 4\" (1.626 m)  Wt 166 lb (75.3 kg)  BMI 28.49 kg/m2  Body mass index is 28.49 kg/(m^2).  GENERAL: healthy, alert and no distress  RESP: lungs clear to auscultation - no rales, rhonchi or wheezes  CV: regular rate and rhythm, normal S1 S2, no S3 or S4, no murmur, click or rub, no peripheral edema and peripheral pulses strong  Left shoulder: limited range of motion due to pain - can flex and abduct to about 90 degrees.  PSYCH: mentation appears normal, affect normal/bright    Reviewed [facility] Records.  hgb after surgery down to 8.5 on 8/18/17  DEXA results reviewed    Diagnostic Test Results:  Results for orders placed or " performed in visit on 09/22/17 (from the past 24 hour(s))   HEMOGLOBIN A1C   Result Value Ref Range    Hemoglobin A1C 5.2 4.3 - 6.0 %   Hemoglobin   Result Value Ref Range    Hemoglobin 11.6 (L) 11.7 - 15.7 g/dL       ASSESSMENT/PLAN:     (S72.002D) Hip fracture, left, closed, with routine healing, subsequent encounter  (primary encounter diagnosis)  Comment: Patient had surgery to repair a left hip fracture 5 weeks ago.   Plan: Patient will follow up with her surgeon on Oct. 3rd to determine if further therapy is needed.  Advised to also restart her home shoulder exercises and follow up with orthopedist regarding shoulder pain.    (E11.22,  N18.3) Diabetes mellitus with stage 3 chronic kidney disease (H)  Comment: Well controlled on current medications.  Plan: HEMOGLOBIN A1C, Lipid panel reflex to direct         LDL, TSH WITH FREE T4 REFLEX, metFORMIN         (GLUCOPHAGE) 500 MG tablet, atorvastatin         (LIPITOR) 40 MG tablet, exenatide (BYETTA 10         MCG PEN) 10 MCG/0.04ML injection, lisinopril         (PRINIVIL/ZESTRIL) 2.5 MG tablet        Continue current medications.  Discussed increase in sugars due to trauma and surgery.  Anticipate sugars continuing to settle back down over next two to four weeks.  Discussed warning signs/symptoms for which she needs followup.      (G25.81) Restless leg syndrome  Comment: Patient complains of restless legs that wake her in the night.   Plan: We will not add any medications for restless legs at this time. I believe the waking in the night could be due to the healing of her hip surgery. We will revisit this after healing.   adjust therapy based on labs  This may be related to anemia as well.    (D62) Acute posthemorrhagic anemia  Comment: related to hip surgery  Plan: adjust therapy based on labs      (E78.5) Hyperlipidemia LDL goal <100  Comment: Well controlled on current medications.  Plan: atorvastatin (LIPITOR) 40 MG tablet         Continue current  medications.    (N18.3) CKD (chronic kidney disease) stage 3, GFR 30-59 ml/min  Comment: Well controlled on current medications.  Plan: lisinopril (PRINIVIL/ZESTRIL) 2.5 MG tablet        Continue current medications.    (Z23) Need for prophylactic vaccination and inoculation against influenza  Comment: Health maintenance.  Plan: FLU VACCINE, INCREASED ANTIGEN, PRESV FREE, AGE        65+ [41629], Vaccine Administration, Initial         [[id number]],               There are no Patient Instructions on file for this visit.    Michelle Monet MD  [facility]    The information in this document, created by the medical scribe Belkys Cohen for me, accurately reflects the services I personally performed and the decisions made by me. I have reviewed and approved this document for accuracy prior to leaving the patient care area.      Injectable Influenza Immunization Documentation    1.  Is the person to be vaccinated sick today?   No    2. Does the person to be vaccinated have an allergy to a component   of the vaccine?   No    3. Has the person to be vaccinated ever had a serious reaction   to influenza vaccine in the past?   No    4. Has the person to be vaccinated ever had Guillain-Barré syndrome?   No    Form completed by patient

## 2017-09-23 LAB
CHOLEST SERPL-MCNC: 122 MG/DL
HDLC SERPL-MCNC: 54 MG/DL
LDLC SERPL CALC-MCNC: 48 MG/DL
NONHDLC SERPL-MCNC: 68 MG/DL
TRIGL SERPL-MCNC: 101 MG/DL
TSH SERPL DL<=0.005 MIU/L-ACNC: 1.35 MU/L (ref 0.4–4)

## 2017-10-02 ENCOUNTER — TELEPHONE (OUTPATIENT)
Dept: FAMILY MEDICINE | Facility: CLINIC | Age: 77
End: 2017-10-02

## 2017-10-02 NOTE — TELEPHONE ENCOUNTER
Called patient and left a VM message that I was returning her call about the appointment.    Reviewed appointment notes and do not see any specific reasons just says to see Dr Monet in 3 months.    Roxy Lauren

## 2017-10-02 NOTE — TELEPHONE ENCOUNTER
Reason for Call:  Other appointment    Detailed comments: Patient said she was to schedule a three months follow up with primary provider and unclear of what to schedule appointment for.    Phone Number Patient can be reached at: Other phone number:    Maira Leon (Self) [phone]         Best Time:     Can we leave a detailed message on this number? Not Applicable    Call taken on 10/2/2017 at 11:25 AM by Libby Phillip

## 2017-10-19 DIAGNOSIS — N18.30 DIABETES MELLITUS WITH STAGE 3 CHRONIC KIDNEY DISEASE (H): ICD-10-CM

## 2017-10-19 DIAGNOSIS — E11.22 DIABETES MELLITUS WITH STAGE 3 CHRONIC KIDNEY DISEASE (H): ICD-10-CM

## 2017-10-19 NOTE — TELEPHONE ENCOUNTER
Drug:test strips   Last Fill Date: 08-  Last Fill Quantity: 50    Last Office Visit:8-  [facility]  [facility] [phone]    Needs new rx sent to us  For test strips

## 2017-10-25 ENCOUNTER — TRANSFERRED RECORDS (OUTPATIENT)
Dept: HEALTH INFORMATION MANAGEMENT | Facility: CLINIC | Age: 77
End: 2017-10-25

## 2017-10-27 PROBLEM — M75.102 TEAR OF LEFT SUPRASPINATUS TENDON: Status: ACTIVE | Noted: 2017-10-27

## 2017-10-27 PROBLEM — S43.432A SUPERIOR GLENOID LABRUM LESION OF LEFT SHOULDER: Status: ACTIVE | Noted: 2017-10-27

## 2017-11-14 ENCOUNTER — TELEPHONE (OUTPATIENT)
Dept: FAMILY MEDICINE | Facility: CLINIC | Age: 77
End: 2017-11-14

## 2017-11-14 ENCOUNTER — OFFICE VISIT (OUTPATIENT)
Dept: FAMILY MEDICINE | Facility: CLINIC | Age: 77
End: 2017-11-14
Payer: COMMERCIAL

## 2017-11-14 VITALS
HEART RATE: 92 BPM | OXYGEN SATURATION: 97 % | WEIGHT: 166.2 LBS | TEMPERATURE: 97.7 F | BODY MASS INDEX: 28.38 KG/M2 | SYSTOLIC BLOOD PRESSURE: 126 MMHG | HEIGHT: 64 IN | DIASTOLIC BLOOD PRESSURE: 70 MMHG

## 2017-11-14 DIAGNOSIS — H25.9 SENILE CATARACT OF LEFT EYE, UNSPECIFIED AGE-RELATED CATARACT TYPE: ICD-10-CM

## 2017-11-14 DIAGNOSIS — E11.22 DIABETES MELLITUS WITH STAGE 3 CHRONIC KIDNEY DISEASE (H): ICD-10-CM

## 2017-11-14 DIAGNOSIS — D62 ACUTE POSTHEMORRHAGIC ANEMIA: ICD-10-CM

## 2017-11-14 DIAGNOSIS — Z01.818 PREOP GENERAL PHYSICAL EXAM: Primary | ICD-10-CM

## 2017-11-14 DIAGNOSIS — N18.30 DIABETES MELLITUS WITH STAGE 3 CHRONIC KIDNEY DISEASE (H): ICD-10-CM

## 2017-11-14 LAB — HGB BLD-MCNC: 13.4 G/DL (ref 11.7–15.7)

## 2017-11-14 PROCEDURE — 99214 OFFICE O/P EST MOD 30 MIN: CPT | Performed by: FAMILY MEDICINE

## 2017-11-14 PROCEDURE — 36415 COLL VENOUS BLD VENIPUNCTURE: CPT | Performed by: FAMILY MEDICINE

## 2017-11-14 PROCEDURE — 82728 ASSAY OF FERRITIN: CPT | Performed by: FAMILY MEDICINE

## 2017-11-14 PROCEDURE — 99207 C FOOT EXAM  NO CHARGE: CPT | Performed by: FAMILY MEDICINE

## 2017-11-14 PROCEDURE — 85018 HEMOGLOBIN: CPT | Performed by: FAMILY MEDICINE

## 2017-11-14 PROCEDURE — 82043 UR ALBUMIN QUANTITATIVE: CPT | Performed by: FAMILY MEDICINE

## 2017-11-14 RX ORDER — ASCORBIC ACID 500 MG
500 TABLET ORAL 2 TIMES DAILY
COMMUNITY
End: 2017-11-15

## 2017-11-14 RX ORDER — PNV NO.95/FERROUS FUM/FOLIC AC 28MG-0.8MG
1 TABLET ORAL 2 TIMES DAILY
COMMUNITY
End: 2017-11-15

## 2017-11-14 NOTE — NURSING NOTE
"Chief Complaint   Patient presents with     Pre-Op Exam     DOS: 11/22/17       Initial /70 (BP Location: Right arm, Patient Position: Sitting, Cuff Size: Adult Large)  Pulse 92  Temp 97.7  F (36.5  C) (Oral)  Ht 5' 4\" (1.626 m)  Wt 166 lb 3.2 oz (75.4 kg)  SpO2 97%  BMI 28.53 kg/m2 Estimated body mass index is 28.53 kg/(m^2) as calculated from the following:    Height as of this encounter: 5' 4\" (1.626 m).    Weight as of this encounter: 166 lb 3.2 oz (75.4 kg).  Medication Reconciliation: complete    "

## 2017-11-14 NOTE — TELEPHONE ENCOUNTER
Called to let patient know that she should hold Byetta and Metformin. She verbalized understanding.    Cezar Good RN

## 2017-11-14 NOTE — PROGRESS NOTES
[address] [id number] [phone]  Dept: [phone]    PRE-OP EVALUATION:  Today's date: 2017    Maira Leon (: 1940) presents for pre-operative evaluation assessment as requested by Dr. Blake Powell.  He requires evaluation and anesthesia risk assessment prior to undergoing surgery/procedure for treatment of Cataract .  Proposed procedure: Left Eye Cataract    Date of Surgery/ Procedure: 17  Time of Surgery/ Procedure: 9:40am  Hospital/Surgical Facility: [facility]  Fax number for surgical facility: [phone]  Primary Physician: Michelle Monet  Type of Anesthesia Anticipated: to be determined    Patient has a Health Care Directive or Living Will:  YES  On file    Preop Questions 2017   1.  Do you have a history of heart attack, stroke, stent, bypass or surgery on an artery in the head, neck, heart or legs? No   2.  Do you ever have any pain or discomfort in your chest? No   3.  Do you have a history of  Heart Failure? No   4.   Are you troubled by shortness of breath when:  walking on a level surface, or up a slight hill, or at night? No   5.  Do you currently have a cold, bronchitis or other respiratory infection? No   6.  Do you have a cough, shortness of breath, or wheezing? No   7.  Do you sometimes get pains in the calves of your legs when you walk? YES -  sometimes   8. Do you or anyone in your family have previous history of blood clots? No   9.  Do you or does anyone in your family have a serious bleeding problem such as prolonged bleeding following surgeries or cuts? No   10. Have you ever had problems with anemia or been told to take iron pills? YES - Currently on Iron   11. Have you had any abnormal blood loss such as black, tarry or bloody stools, or abnormal vaginal bleeding? No   12. Have you ever had a blood transfusion? No   13. Have you or any of your relatives ever had  problems with anesthesia? No   14. Do you have sleep apnea, excessive snoring or daytime drowsiness? No   15. Do you have any prosthetic heart valves? No   16. Do you have prosthetic joints? No   17. Is there any chance that you may be pregnant? No           HPI:                                                      Brief HPI related to upcoming procedure: She states that her vision in her left eye is tinted yellow.     Feet:  She states that she is wearing a splint on her left foot for a hammer toe. The podiatrist was concerned about fixing the hammer toe because he was worried about the bone density in the toe. She has also decided not to have the bunion on her left foot removed.     Restless Legs:  She states that she gets restless legs in the evening. She states that she usually gets up and walks around when her legs become restless.     DIABETES - Patient has a longstanding history of DiabetesType Type II . Patient is being treated with oral agents and insulin injections and denies significant side effects. Control has been good. Complicating factors include but are not limited to: high cholesterol .                                                                                         .  HYPERLIPIDEMIA - Patient has a long history of significant Hyperlipidemia requiring medication for treatment with recent good control. Patient reports no problems or side effects with the medication.                                                                                                                                                       .  ANEMIA - Patient has a recent history of moderate severity anemia after her recent orthopedic surgery, which has not been symptomatic. Work up to date has revealed acute posthemorrhagic anemia, improving. Treatment has been Ferous Sulfate 325 mg tablet twice daily.                                                                                                                    .  RENAL INSUFFICIENCY - Patient has a longstanding history of chronic renal insufficiency of moderate severity. Exacerbating triggers in the past have been - none. Last Cr - see labs.                                                                                                                                                                               .    MEDICAL HISTORY:                                                    Patient Active Problem List    Diagnosis Date Noted     Superior glenoid labrum lesion of left shoulder 10/27/2017     Priority: Medium     MRI 10/25/17       Tear of left supraspinatus tendon 10/27/2017     Priority: Medium     MRI 10/25/17       Osteopenia of multiple sites 09/22/2017     Priority: Medium     Seen on 9/17 DEXA  Lumbar -0.4, right femur -1.4  Repeat DEXA in two years.       Lumbar spinal stenosis 09/12/2016     Priority: Medium     Primary osteoarthritis of left knee 04/05/2016     Priority: Medium     Diabetes mellitus with stage 3 chronic kidney disease (H) 10/12/2015     Priority: Medium     Advance Care Planning 06/30/2011     Priority: Medium     Advance Care Planning:   Followup facilitation and documentation of choices:  Maira Leon presented for follow-up session regarding ACP at their residence.  She was accompanied by no one.  Previous ACP discussions reviewed and questions answered. At this time she has completed a HCD reflecting current values, goals, and choices. Health Care Agent understands responsibility of role and choices.  Documents completed at this visit: HCD. Signed and notarized. Validation form completed and sent with copy of document to be scanned. Confirmed/documented designated decision maker(s). See permanent comments of demographics in clinical tab. Confirmed current code status reflects current choices. View document(s) and details by clicking on code status.  Added by Ashlee Dubose on 5/14/2015  Received outside advance directive.  Two witness signatures or one witness and notary signature present.  scanned and placed behind media tab.  Please see advance directive for specifics.            CKD (chronic kidney disease) stage 3, GFR 30-59 ml/min 02/08/2011     Priority: Medium     Hyperlipidemia LDL goal <100 10/31/2010     Priority: Medium     Per provider       Osteoarthritis 05/23/2005     Priority: Medium     L4-5  Gets pain down into her legs  Problem list name updated by automated process. Provider to review        Past Medical History:   Diagnosis Date     H/O arthroscopic knee surgery left    5/6/16     History of shingles 12/6/16     Hypertension      Low back pain     & radiates down left buttock & left leg     Need for prophylactic hormone replacement therapy (postmenopausal)      Nocturia     states she gets up every 2 hours at night to urinate     Nonsenile cataract     left eye     Osteoarthrosis, unspecified whether generalized or localized, unspecified site      Other and unspecified hyperlipidemia      POSTMENOPAUSAL HORMONAL REPLACEMENT TX 5/23/2005     Type II or unspecified type diabetes mellitus without mention of complication, not stated as uncontrolled      Past Surgical History:   Procedure Laterality Date     APPENDECTOMY  age 4     ARTHROSCOPY KNEE Left 5/6/2016    Procedure: ARTHROSCOPY KNEE;  Surgeon: Ramin Ramirez MD;  Location:  OR     BACK SURGERY  01/20/2017    Lumbar fusion     ENT SURGERY      Tonsillectomy     GENITOURINARY SURGERY      bladder repair/sling     OPTICAL TRACKING SYSTEM FUSION SPINE POSTERIOR LUMBAR TWO LEVELS N/A 1/20/2017    Procedure: OPTICAL TRACKING SYSTEM FUSION SPINE POSTERIOR LUMBAR TWO LEVELS;  Surgeon: Buddy Davies MD;  Location: [facility] OR     PHACOEMULSIFICATION CLEAR CORNEA WITH STANDARD INTRAOCULAR LENS IMPLANT Right 2014     Current Outpatient Prescriptions   Medication Sig Dispense Refill     Ferrous Sulfate (IRON) 325 (65 FE) MG tablet Take 1 tablet  by mouth 2 times daily       ascorbic acid (VITAMIN C) 500 MG tablet Take 500 mg by mouth 2 times daily       blood glucose monitoring (NO BRAND SPECIFIED) test strip Use to test blood sugars daily or as directed 100 strip 1     metFORMIN (GLUCOPHAGE) 500 MG tablet Take 1 tablet (500 mg) by mouth 2 times daily (with meals) 180 tablet 1     atorvastatin (LIPITOR) 40 MG tablet Take 1 tablet (40 mg) by mouth daily 90 tablet 3     exenatide (BYETTA 10 MCG PEN) 10 MCG/0.04ML injection Inject 10 mcg Subcutaneous 2 times daily 7.2 mL 3     lisinopril (PRINIVIL/ZESTRIL) 2.5 MG tablet Take 1 tablet (2.5 mg) by mouth daily 90 tablet 3     blood glucose (NO BRAND SPECIFIED) lancets standard Use to test blood sugar daily or as directed. 100 each 0     blood glucose monitoring (NO BRAND SPECIFIED) meter device kit Use to test blood sugar daily or as directed. 1 kit 0     prednisoLONE acetate (PRED FORTE) 1 % ophthalmic susp Place 1 drop Into the left eye 4 times daily Start day after surgery 5 mL 1     ketorolac (ACULAR) 0.5 % ophthalmic solution Place 1 drop Into the left eye 4 times daily Starting 3 days prior to surgery 5 mL 1     polyethylene glycol (MIRALAX) powder Take 17 g (1 capful) by mouth 2 times daily as needed for constipation 510 g 0     aspirin 81 MG tablet Take 1 tablet (81 mg) by mouth daily 30 tablet 0     multivitamin, therapeutic with minerals (MULTI-VITAMIN) TABS tablet Take 1 tablet by mouth daily       insulin pen needle 31G X 5 MM Use 2 times a day with Byetta - dispense BD Mini 5mm x 31g pen needles 200 each 2     multivitamin (OCUVITE) TABS Take 1 tablet by mouth daily. 30 each 0     fish oil-omega-3 fatty acids (FISH OIL) 1000 MG capsule Take 4 g by mouth daily.       calcium-vitamin D (CALCIUM 500 +D) 500-125 MG-UNIT TABS Take 1 tablet by mouth daily.       ONETOUCH ULTRA TEST VI STRP TEST BLOOD SUGARS TWICE DAILY AS DIRECTED. 3 MONTH SUPPLY 1 YEAR     ONE TOUCH ULTRA SYSTEM KIT KIT as directed  1 0  "    OTC products: None, except as noted above    Allergies   Allergen Reactions     No Known Drug Allergies       Latex Allergy: NO    Social History   Substance Use Topics     Smoking status: Former Smoker     Quit date: 1/1/1981     Smokeless tobacco: Never Used     Alcohol use Yes      Comment: 1 glass of wine per day     History   Drug Use No       REVIEW OF SYSTEMS:                                                    C: NEGATIVE for fever, chills, change in weight  I: NEGATIVE for worrisome rashes, moles or lesions  E: NEGATIVE for vision changes or irritation  E/M: NEGATIVE for ear, mouth and throat problems  R: NEGATIVE for significant cough or SOB  B: NEGATIVE for masses, tenderness or discharge  CV: NEGATIVE for chest pain, palpitations or peripheral edema  GI: NEGATIVE for nausea, abdominal pain, heartburn, or change in bowel habits  : NEGATIVE for frequency, dysuria, or hematuria  M: NEGATIVE for significant arthralgias or myalgia  N: NEGATIVE for weakness, dizziness or paresthesias  E: NEGATIVE for temperature intolerance, skin/hair changes  H: NEGATIVE for bleeding problems  P: NEGATIVE for changes in mood or affect    This document serves as a record of the services and decisions personally performed by KEN LLAMAS. It was created on his/her behalf by Belkys Cohen, a trained medical scribe. The creation of this document is based on the provider's statements to the medical scribe. Belkys Cohen, November 14, 2017 3:23 PM    EXAM:                                                    Temp 97.7  F (36.5  C) (Oral)  Ht 5' 4\" (1.626 m)  Wt 166 lb 3.2 oz (75.4 kg)  BMI 28.53 kg/m2    GENERAL APPEARANCE: healthy, alert and no distress     EYES: EOMI, PERRL     HENT: ear canals and TM's normal and nose and mouth without ulcers or lesions     NECK: no adenopathy, no asymmetry, masses, or scars and thyroid normal to palpation     RESP: lungs clear to auscultation - no rales, rhonchi or wheezes     CV: " regular rates and rhythm, normal S1 S2, no S3 or S4 and no murmur, click or rub     PSYCH: mentation appears normal. and affect normal/bright    DIABETIC FOOT EXAM: normal DP and PT pulses, no trophic changes or ulcerative lesions and normal sensory exam      DIAGNOSTICS:                                                    No labs or EKG required for low risk surgery (cataract, skin procedure, breast biopsy, etc)    Recent Labs   Lab Test  09/22/17   1039  02/21/17   0920  01/22/17   0618  01/21/17   0630  01/03/17   1030   HGB  11.6*  12.1  10.0*   --   12.4   PLT   --   347  210   --    --    NA   --    --   140   --   141   POTASSIUM   --    --   3.6   --   4.0   CR   --    --   0.79   --   0.95   A1C  5.2   --    --   6.2*  6.0        IMPRESSION:                                                    Reason for surgery/procedure: Left Cataract      The proposed surgical procedure is considered LOW risk.    REVISED CARDIAC RISK INDEX  The patient has the following serious cardiovascular risks for perioperative complications such as (MI, PE, VFib and 3  AV Block):  No serious cardiac risks  INTERPRETATION: 0 risks: Class I (very low risk - 0.4% complication rate)    The patient has the following additional risks for perioperative complications:  No identified additional risks      ICD-10-CM    1. Screening for diabetic peripheral neuropathy Z13.89 FOOT EXAM  NO CHARGE [81378.114]   2. Preop general physical exam Z01.818        RECOMMENDATIONS:                                                          --Patient is to take all scheduled medications on the day of surgery EXCEPT for modifications listed below.    Diabetes Medication Use  -----Hold mefofrmin and byetta while NPO.       Anticoagulant or Antiplatelet Medication Use  Bleeding risk is low for this procedure (e.g. dental, skin, cataract)  Although eye doctor still advised her to discontinue ASA prior to surgery.            APPROVAL GIVEN to proceed with proposed  procedure, without further diagnostic evaluation       Signed Electronically by: Michelle Monet MDThe information in this document, created by the medical scribe Belkys Cohen for me, accurately reflects the services I personally performed and the decisions made by me. I have reviewed and approved this document for accuracy prior to leaving the patient care area.    Copy of this evaluation report is provided to requesting physician.    [place] Preop Guidelines

## 2017-11-14 NOTE — MR AVS SNAPSHOT
After Visit Summary   11/14/2017    Maira Leon    MRN: [id number]           Patient Information     Date Of Birth          1940        Visit Information        Provider Department      11/14/2017 3:00 PM Michelle Monet MD [facility]        Today's Diagnoses     Preop general physical exam    -  1    Senile cataract of left eye, unspecified age-related cataract type        Acute posthemorrhagic anemia        Diabetes mellitus with stage 3 chronic kidney disease (H)          Care Instructions      Before Your Surgery      Call your surgeon if there is any change in your health. This includes signs of a cold or flu (such as a sore throat, runny nose, cough, rash or fever).    Do not smoke, drink alcohol or take over the counter medicine (unless your surgeon or primary care doctor tells you to) for the 24 hours before and after surgery.    If you take prescribed drugs: Follow your doctor s orders about which medicines to take and which to stop until after surgery.    Eating and drinking prior to surgery: follow the instructions from your surgeon    Take a shower or bath the night before surgery. Use the soap your surgeon gave you to gently clean your skin. If you do not have soap from your surgeon, use your regular soap. Do not shave or scrub the surgery site.  Wear clean pajamas and have clean sheets on your bed.     [facility]   Discharged by : Minal RICARDO MA    If you have any questions regarding your visit please contact your care team:     Team Gold                Clinic Hours Telephone Number     Dr. Dara Roque 7am-7pm  Monday - Thursday   7am-5pm  Fridays  [phone]   (Appointment scheduling available 24/7)     RN Line  [phone] option 2     Urgent Care - [place] [facility] - 11am-9pm Monday-Friday Saturday-Sunday- 9am-5pm      [place] -   5pm-9pm Monday-Friday Saturday-Sunday- 9am-5pm    [phone] - [place]    [phone] - [place]       For a Price Quote for your services, please call our Consumer Price Line at [phone].     What options do I have for visits at the clinic other than the traditional office visit?     To expand how we care for you, many of our providers are utilizing electronic visits (e-visits) and telephone visits, when medically appropriate, for interactions with their patients rather than a visit in the clinic. We also offer nurse visits for many medical concerns. Just like any other service, we will bill your insurance company for this type of visit based on time spent on the phone with your provider. Not all insurance companies cover these visits. Please check with your medical insurance if this type of visit is covered. You will be responsible for any charges that are not paid by your insurance.   E-visits via Wh[employer] City [employer]: generally incur a $35.00 fee.     Telephone visits:   Time spent on the phone: *charged based on time that is spent on the phone in increments of 10 minutes. Estimated cost:   5-10 mins $30.00   11-20 mins. $59.00   21-30 mins. $85.00     Use Numblebeet (secure email communication and access to your chart) to send your primary care provider a message or make an appointment. Ask someone on your Team how to sign up for Numblebeet.     As always, Thank you for trusting us with your health care needs!      [facility]:    Scheduling Appointments  [facility]  Call: [phone]    [facility]  Call: [phone]    [facility]  Call: [phone]    For Gastroenterology referrals   [facility]   Clinics and Surgery Center, 4th Floor   [address] [place] [address]   Appointments: [phone]    WHERE TO GO FOR CARE?  Clinic    Make an appointment if you:       Are sick  (cold, cough, flu, sore throat, earache or in pain).       Have a small injury (sprain, small cut, burn or broken bone).       Need a physical exam, Pap smear, vaccine or prescription refill.       Have questions about your health or medicines.    To reach us:      Call [phone] ([phone]). Open 24 hours every day. (For counseling services, call [phone].)    Log into Hyperpot at [employer]. (Visit [email or web address] to create an account.) Hospital emergency room    An emergency is a serious or life- threatening problem that must be treated right away.    Call 911 or get to the hospital if you have:      Very bad or sudden:            - Chest pain or pressure         - Bleeding         - Head or belly pain         - Dizziness or trouble seeing, walking or                          Speaking      Problems breathing      Blood in your vomit or you are coughing up blood      A major injury (knocked out, loss of a finger or limb, rape, broken bone protruding from skin)    A mental health crisis. (Or call the Mental Health Crisis line at 1-577.832.7056 or Suicide Prevention Hotline at 1-275.407.5302.)    Open 24 hours every day. You don't need an appointment.     Urgent care    Visit urgent care for sickness or small injuries when the clinic is closed. You don't need an appointment. To check hours or find an urgent care near you, visit [email or web address]. Online care    Get online care from [facility] for more than 70 common problems, like colds, allergies and infections. Open 24 hours every day at:   [email or web address]   Need help deciding?    For advice about where to be seen, you may call your clinic and ask to speak with a nurse. We're here for you 24 hours every day.         If you are deaf or hard of hearing, please let us know. We provide many free services including sign language interpreters, oral interpreters, TTYs, telephone amplifiers, note takers and written materials.                    Follow-ups after your visit        Your next 10 appointments already scheduled     Nov 22, 2017  9:40 AM CST   Post-Op with Blake Powell MD   [facility] ([facility])    [facility] [address]  [address]  [facility] [address]   [phone]            Dec 04, 2017  9:40 AM CST   Post-Op with Blake Powell MD   [facility]    [facility] [address] [facility]  [address]  [facility] [address]   [phone]            Dec 19, 2017  8:40 AM CST   SHORT with Michelle Monet MD   [facility]    [address] [place] [id number] [phone]           Your Arrival times is X, We need you to be here at this time to get checked in and have the assistant get you ready for the provider, which can take about 15 minutes. Your appointmen time with your provider is at  X. Thank you            Jan 18, 2018 11:00 AM CST   Return Visit with Mimi Cohn NP   [facility]    [address] [place] [id number] [phone]              Who to contact     If you have questions or need follow up information about today's clinic visit or your schedule please contact [facility] directly at [phone].  Normal or non-critical lab and imaging results will be communicated to you by MyChart, letter or phone within 4 business days after the clinic has received the results. If you do not hear from us within 7 days, please contact the clinic through MyChart or phone. If you have a critical or abnormal lab result, we will notify you by phone as soon as possible.  Submit refill requests through [employer] or call your pharmacy and they will forward the refill request to us. Please allow 3 business days for your refill to be completed.     "      Additional Information About Your Visit        MyChart Information     [employer] Technologies gives you secure access to your electronic health record. If you see a primary care provider, you can also send messages to your care team and make appointments. If you have questions, please call your primary care clinic.  If you do not have a primary care provider, please call [phone] and they will assist you.        Care EveryWhere ID     This is your Care EveryWhere ID. This could be used by other organizations to access your [place] medical records  [id number]        Your Vitals Were     Pulse Temperature Height Pulse Oximetry BMI (Body Mass Index)       92 97.7  F (36.5  C) (Oral) 5' 4\" (1.626 m) 97% 28.53 kg/m2        Blood Pressure from Last 3 Encounters:   11/14/17 126/70   09/22/17 126/74   07/13/17 (!) 142/93    Weight from Last 3 Encounters:   11/14/17 166 lb 3.2 oz (75.4 kg)   09/22/17 166 lb (75.3 kg)   07/13/17 174 lb (78.9 kg)              We Performed the Following     Albumin Random Urine Quantitative with Creat Ratio     Ferritin     FOOT EXAM  NO CHARGE [[id number]]     Hemoglobin        Primary Care Provider Office Phone # Fax #    Michelle Verenice Monet [phone]       [facility] [address]        Equal Access to Services     CONRADO RODRÍGUEZ : Hadii bret ku hadasho Soomaali, waaxda luqadaha, qaybta kaalmada noni, julio rice. So [facility] [phone].    ATENCIÓN: Si habla español, tiene a ellis disposición servicios gratuitos de asistencia lingüística. Llame al [phone].    We comply with applicable federal civil rights laws and [place] laws. We do not discriminate on the basis of race, color, national origin, age, disability, sex, sexual orientation, or gender identity.            Thank you!     Thank you for choosing [facility]  for your care. Our goal is always to provide you with excellent care. Hearing back " from our patients is one way we can continue to improve our services. Please take a few minutes to complete the written survey that you may receive in the mail after your visit with us. Thank you!             Your Updated Medication List - Protect others around you: Learn how to safely use, store and throw away your medicines at www.disposemymeds.org.          This list is accurate as of: 11/14/17  3:25 PM.  Always use your most recent med list.                   Brand Name Dispense Instructions for use Diagnosis    ascorbic acid 500 MG tablet    VITAMIN C     Take 500 mg by mouth 2 times daily        aspirin 81 MG tablet     30 tablet    Take 1 tablet (81 mg) by mouth daily    S/P lumbar fusion       atorvastatin 40 MG tablet    LIPITOR    90 tablet    Take 1 tablet (40 mg) by mouth daily    Hyperlipidemia LDL goal <100, Diabetes mellitus with stage 3 chronic kidney disease (H)       blood glucose lancets standard    no brand specified    100 each    Use to test blood sugar daily or as directed.    Diabetes mellitus with stage 3 chronic kidney disease (H)       calcium 500 +D 500-400 MG-UNIT Tabs   Generic drug:  Calcium Carb-Cholecalciferol      Take 1 tablet by mouth daily.        exenatide 10 MCG/0.04ML injection    BYETTA 10 MCG PEN    7.2 mL    Inject 10 mcg Subcutaneous 2 times daily    Diabetes mellitus with stage 3 chronic kidney disease (H)       fish oil-omega-3 fatty acids 1000 MG capsule      Take 4 g by mouth daily.        insulin pen needle 31G X 5 MM     200 each    Use 2 times a day with Byetta - dispense BD Mini 5mm x 31g pen needles    Diabetes mellitus with stage 3 chronic kidney disease (H)       iron 325 (65 FE) MG tablet      Take 1 tablet by mouth 2 times daily        ketorolac 0.5 % ophthalmic solution    ACULAR    5 mL    Place 1 drop Into the left eye 4 times daily Starting 3 days prior to surgery    Senile nuclear sclerosis, left       lisinopril 2.5 MG tablet    PRINIVIL/Zestril    90  tablet    Take 1 tablet (2.5 mg) by mouth daily    CKD (chronic kidney disease) stage 3, GFR 30-59 ml/min, Diabetes mellitus with stage 3 chronic kidney disease (H)       metFORMIN 500 MG tablet    GLUCOPHAGE    180 tablet    Take 1 tablet (500 mg) by mouth 2 times daily (with meals)    Diabetes mellitus with stage 3 chronic kidney disease (H)       * Multi-vitamin Tabs tablet      Take 1 tablet by mouth daily        * multivitamin Tabs tablet     30 each    Take 1 tablet by mouth daily.        * Qwaq ULTRA SYSTEM KIT W/DEVICE Kit     1    as directed        * blood glucose monitoring meter device kit    no brand specified    1 kit    Use to test blood sugar daily or as directed.    Diabetes mellitus with stage 3 chronic kidney disease (H)       * ONETOUCH ULTRA test strip   Generic drug:  blood glucose monitoring     3 MONTH SUPPLY    TEST BLOOD SUGARS TWICE DAILY AS DIRECTED.    Type II or unspecified type diabetes mellitus without mention of complication, not stated as uncontrolled       * blood glucose monitoring test strip    no brand specified    100 strip    Use to test blood sugars daily or as directed    Diabetes mellitus with stage 3 chronic kidney disease (H)       polyethylene glycol powder    MIRALAX    510 g    Take 17 g (1 capful) by mouth 2 times daily as needed for constipation    Constipation, unspecified constipation type       prednisoLONE acetate 1 % ophthalmic susp    PRED FORTE    5 mL    Place 1 drop Into the left eye 4 times daily Start day after surgery    Senile nuclear sclerosis, left       * Notice:  This list has 6 medication(s) that are the same as other medications prescribed for you. Read the directions carefully, and ask your doctor or other care provider to review them with you.

## 2017-11-14 NOTE — TELEPHONE ENCOUNTER
I misspoke earlier during Nadya's Pre-Op.  I forgot to mention that she should hold her byetta and her metformin when she is NPO (Not eating) in preparation for her surgery.  Can you please let her know?

## 2017-11-14 NOTE — PATIENT INSTRUCTIONS
Before Your Surgery      Call your surgeon if there is any change in your health. This includes signs of a cold or flu (such as a sore throat, runny nose, cough, rash or fever).    Do not smoke, drink alcohol or take over the counter medicine (unless your surgeon or primary care doctor tells you to) for the 24 hours before and after surgery.    If you take prescribed drugs: Follow your doctor s orders about which medicines to take and which to stop until after surgery.    Eating and drinking prior to surgery: follow the instructions from your surgeon    Take a shower or bath the night before surgery. Use the soap your surgeon gave you to gently clean your skin. If you do not have soap from your surgeon, use your regular soap. Do not shave or scrub the surgery site.  Wear clean pajamas and have clean sheets on your bed.     [facility]   Discharged by : Minal RICARDO MA    If you have any questions regarding your visit please contact your care team:     Team Gold                Clinic Hours Telephone Number     Dr. Dara Roque 7am-7pm  Monday - Thursday   7am-5pm  Fridays  [phone]   (Appointment scheduling available 24/7)     RN Line  [phone] option 2     [place]gent Care - [place] [facility] - 11am-9pm Monday-Friday Saturday-Sunday- 9am-5pm     [place] -   5pm-9pm Monday-Friday Saturday-Sunday- 9am-5pm    [phone] - [place]    [phone] - [place]       For a Price Quote for your services, please call our Consumer Price Line at [phone].     What options do I have for visits at the clinic other than the traditional office visit?     To expand how we care for you, many of our providers are utilizing electronic visits (e-visits) and telephone visits, when medically appropriate, for interactions with their patients rather than a visit in the clinic. We also offer nurse  visits for many medical concerns. Just like any other service, we will bill your insurance company for this type of visit based on time spent on the phone with your provider. Not all insurance companies cover these visits. Please check with your medical insurance if this type of visit is covered. You will be responsible for any charges that are not paid by your insurance.   E-visits via [email or web address]: generally incur a $35.00 fee.     Telephone visits:   Time spent on the phone: *charged based on time that is spent on the phone in increments of 10 minutes. Estimated cost:   5-10 mins $30.00   11-20 mins. $59.00   21-30 mins. $85.00     Use [employer] (secure email communication and access to your chart) to send your primary care provider a message or make an appointment. Ask someone on your Team how to sign up for [employer].     As always, Thank you for trusting us with your health care needs!      [facility]:    Scheduling Appointments  [facility] [place] [facility]  Call: [phone]    [facility]  Call: [phone]    [facility]  Call: [phone]    For Gastroenterology referrals   [facility]   Clinics and Surgery Center, 4th Floor   [address] [place] [address]   Appointments: [phone]    WHERE TO GO FOR CARE?  Clinic    Make an appointment if you:       Are sick (cold, cough, flu, sore throat, earache or in pain).       Have a small injury (sprain, small cut, burn or broken bone).       Need a physical exam, Pap smear, vaccine or prescription refill.       Have questions about your health or medicines.    To reach us:      Call [phone] ([phone]). Open 24 hours every day. (For counseling services, call [phone].)    Log into [employer] at [email or web address]. (Visit [email or web address] to create an account.) Hospital emergency room    An emergency is a serious or life- threatening problem that  must be treated right away.    Call 911 or get to the hospital if you have:      Very bad or sudden:            - Chest pain or pressure         - Bleeding         - Head or belly pain         - Dizziness or trouble seeing, walking or                          Speaking      Problems breathing      Blood in your vomit or you are coughing up blood      A major injury (knocked out, loss of a finger or limb, rape, broken bone protruding from skin)    A mental health crisis. (Or call the Mental Health Crisis line at 1-393.958.7493 or Suicide Prevention Hotline at 1-266.566.9753.)    Open 24 hours every day. You don't need an appointment.     Urgent care    Visit urgent care for sickness or small injuries when the clinic is closed. You don't need an appointment. To check hours or find an urgent care near you, visit [email or web address]. Online care    Get online care from [facility] for more than 70 common problems, like colds, allergies and infections. Open 24 hours every day at:   [email or web address]   Need help deciding?    For advice about where to be seen, you may call your clinic and ask to speak with a nurse. We're here for you 24 hours every day.         If you are deaf or hard of hearing, please let us know. We provide many free services including sign language interpreters, oral interpreters, TTYs, telephone amplifiers, note takers and written materials.

## 2017-11-15 LAB
CREAT UR-MCNC: 130 MG/DL
FERRITIN SERPL-MCNC: 33 NG/ML (ref 8–252)
MICROALBUMIN UR-MCNC: 12 MG/L
MICROALBUMIN/CREAT UR: 9.31 MG/G CR (ref 0–25)

## 2017-11-17 NOTE — PROGRESS NOTES
The patient was advised that their significant symptoms of blurred vision were primarily secondary to cataracts. The risks, benefits, and alternatives to cataract surgery with intraocular lens implant were discussed, and the patient opted to plan cataract surgery in the near future at [facility]. A-scan measurements were taken to plan appropriate lens power to achieve optimum vision. Prescription was written for lakisha-operative eyedrops and the patient was advised of the necessity for a pre-op physical with their primary physician. The patient was also asked to arrange to have someone at home when returning from surgery.    Dilates to: 5.9 mm  Alpha blockers/Flomax: None  Trauma/Pseudoxfoliation: None  Fuchs dystrophy/guttae: None    Diabetes:YES  Anticoagulation: Asprin-Stop 5 days pre-op    Cyl:    We discussed the risks and benefits of cataract surgery in the LEFT eye, and informed consent was obtained.  Proceed with CE/IOL OS.    Surgical plan:  BLOCK  Malyugin ring-Possible    BCVA=20/30-, BATmed=20/50, 2+ NS, 2+ CO, IOL=+22.50 D  [id number], Target=[place]

## 2017-11-21 ENCOUNTER — DOCUMENTATION ONLY (OUTPATIENT)
Dept: OPHTHALMOLOGY | Facility: CLINIC | Age: 77
End: 2017-11-21

## 2017-11-21 ENCOUNTER — TRANSFERRED RECORDS (OUTPATIENT)
Dept: HEALTH INFORMATION MANAGEMENT | Facility: CLINIC | Age: 77
End: 2017-11-21

## 2017-11-22 ENCOUNTER — OFFICE VISIT (OUTPATIENT)
Dept: OPHTHALMOLOGY | Facility: CLINIC | Age: 77
End: 2017-11-22

## 2017-11-22 DIAGNOSIS — Z96.1 PSEUDOPHAKIA: Primary | ICD-10-CM

## 2017-11-22 ASSESSMENT — VISUAL ACUITY
OS_SC: 20/25-3
METHOD: SNELLEN - LINEAR

## 2017-11-22 ASSESSMENT — EXTERNAL EXAM - RIGHT EYE: OD_EXAM: NORMAL

## 2017-11-22 ASSESSMENT — EXTERNAL EXAM - LEFT EYE: OS_EXAM: NORMAL

## 2017-11-22 ASSESSMENT — TONOMETRY
OS_IOP_MMHG: 17
IOP_METHOD: ICARE

## 2017-11-22 ASSESSMENT — SLIT LAMP EXAM - LIDS: COMMENTS: NORMAL

## 2017-11-22 NOTE — PATIENT INSTRUCTIONS
Post-Operative Instructions - THIS WEEK      Do not lift heavy objects over 15 pounds.  Do not bend over from the waist; you may stoop.    Do not perform strenuous exercise such as weight lifting or yard work, and avoid physical activities that could result in eye injury.    Do not go swimming or use a jacuzzi or hot tub for one month after surgery.  You may shower or take a bath.    Wear the eye shield at bedtime for one week after surgery.    Wash your hands well before touching your eye or putting in medications.    If you notice any of the following symptoms, call the Eye Clinic immediately: Increased redness of the eye, increased pain not relieved by Tylenol, sudden decrease in vision, increased sensitivity to light, unusual green or pus-like discharge.      Please use the following drops until your doctor discontinues.  When giving drops, allow 1 minute between drops.  Do not touch the bottle to the eye.  Drops may sting momentarily.      1) Steroid (pink or white top): Prednisolone Acetate 4 times a day to the left eye.    2) Ketorolac (gray top) 4 times a day to the left eye.    If you have any questions, please call [phone].  If you call after hours or on the weekend, you will be directed to the doctor on call.                                                                1 Week Post Op Instructions- STARTING NEXT WEEK       1)  CONTINUE PREDNISOLONE ACETATE AND KETOROLAC AS FOLLOWS:          WEEK 1 (STARTING___________) ONE DROP 3 TIMES A DAY          WEEK 2 (STARTING___________) ONE DROP 2 TIMES A DAY          WEEK 3 (STARTING___________) ONE DROP AT BEDTIME FOR ONE WEEK    2)  DISCONTINUE PLASTIC SHIELD    3)  RESUME REGULAR ACTIVITIES    4)  RETURN TO OFFICE IN ABOUT 1 WEEK

## 2017-11-22 NOTE — NURSING NOTE
Chief Complaints and History of Present Illnesses   Patient presents with     Post Op (Ophthalmology) Left Eye     1 day s/p phaco LE +22.50 D  [id number],   SN [id number]     HPI    Affected eye(s):  Left   Symptoms:     No decreased vision      Frequency:  Constant       Do you have eye pain now?:  No      Comments:  Here for 1 day post op Le, doing well, no nausea, no pain    COLTEN Hyatt 10:10 AM 11/22/2017

## 2017-11-22 NOTE — PROGRESS NOTES
Assessment & Plan      Maira Leon is a 77 year old female with the following diagnoses:   (Z96.1) Pseudophakia - Left Eye  (primary encounter diagnosis)  Comment: Excellent  Plan: See Instructions     -----------------------------------------------------------------------------------      Patient disposition:   Return in about 2 weeks (around 12/6/2017) for Follow Up, Pseudophakia. or sooner as needed.    Complete documentation of historical and exam elements from today's encounter can  be found in the full encounter summary report (not reduplicated in this progress  note). I personally obtained the chief complaint(s) and history of present illness. I  confirmed and edited as necessary the review of systems, past medical/surgical  history, family history, social history, and examination findings as documented by  others; and I examined the patient myself. I personally reviewed the relevant tests,  images, and reports as documented above. I formulated and edited as necessary the  assessment and plan and discussed the findings and management plan with the  patient and family.    ALEXSANDER Powell M.D

## 2017-11-22 NOTE — MR AVS SNAPSHOT
After Visit Summary   11/22/2017    Maira Leon    MRN: [id number]           Patient Information     Date Of Birth          1940        Visit Information        Provider Department      11/22/2017 10:00 AM Blake Powell MD [facility]        Today's Diagnoses     Pseudophakia - Left Eye    -  1      Care Instructions        Post-Operative Instructions - THIS WEEK      Do not lift heavy objects over 15 pounds.  Do not bend over from the waist; you may stoop.    Do not perform strenuous exercise such as weight lifting or yard work, and avoid physical activities that could result in eye injury.    Do not go swimming or use a jacuzzi or hot tub for one month after surgery.  You may shower or take a bath.    Wear the eye shield at bedtime for one week after surgery.    Wash your hands well before touching your eye or putting in medications.    If you notice any of the following symptoms, call the Eye Clinic immediately: Increased redness of the eye, increased pain not relieved by Tylenol, sudden decrease in vision, increased sensitivity to light, unusual green or pus-like discharge.      Please use the following drops until your doctor discontinues.  When giving drops, allow 1 minute between drops.  Do not touch the bottle to the eye.  Drops may sting momentarily.      1) Steroid (pink or white top): Prednisolone Acetate 4 times a day to the left eye.    2) Ketorolac (gray top) 4 times a day to the left eye.    If you have any questions, please call [phone].  If you call after hours or on the weekend, you will be directed to the doctor on call.                                                                1 Week Post Op Instructions- STARTING NEXT WEEK       1)  CONTINUE PREDNISOLONE ACETATE AND KETOROLAC AS FOLLOWS:          WEEK 1 (STARTING___________) ONE DROP 3 TIMES A DAY          WEEK 2 (STARTING___________) ONE DROP 2 TIMES A DAY          WEEK 3  (STARTING___________) ONE DROP AT BEDTIME FOR ONE WEEK    2)  DISCONTINUE PLASTIC SHIELD    3)  RESUME REGULAR ACTIVITIES    4)  RETURN TO OFFICE IN ABOUT 1 WEEK          Follow-ups after your visit        Follow-up notes from your care team     Return in about 2 weeks (around 12/6/2017) for Follow Up, Pseudophakia.      Your next 10 appointments already scheduled     Dec 04, 2017  9:40 AM CST   Post-Op with Blake Powell MD   [facility]    [facility]  [address] [id number] [phone]            Dec 19, 2017  8:40 AM CST   SHORT with Michelle Monet MD   [facility] ([facility])    [address] [facility] [address] [phone]           Your Arrival times is X, We need you to be here at this time to get checked in and have the assistant get you ready for the provider, which can take about 15 minutes. Your appointmen time with your provider is at  X. Thank you            Jan 18, 2018 11:00 AM CST   Return Visit with Mimi Cohn NP   [facility] ([facility]    [address] [facility] [id number] [phone]              Who to contact     Please call your clinic at [phone] to:    Ask questions about your health    Make or cancel appointments    Discuss your medicines    Learn about your test results    Speak to your doctor   If you have compliments or concerns about an experience at your clinic, or if you wish to file a complaint, please contact [facility] Patient Relations at [phone] or email us at [email or web address]         Additional Information About Your Visit        MyChart Information     MyChart gives you secure access to your electronic health record. If you see a primary care provider, you can also send messages to your care team  and make appointments. If you have questions, please call your primary care clinic.  If you do not have a primary care provider, please call [phone] and they will assist you.      [facility] is an electronic gateway that provides easy, online access to your medical records. With [facility], you can request a clinic appointment, read your test results, renew a prescription or communicate with your care team.     To access your existing account, please contact your [facility] or call [phone] for assistance.        Care EveryWhere ID     This is your Care EveryWhere ID. This could be used by other organizations to access your [place] medical records  [id number]         Blood Pressure from Last 3 Encounters:   11/14/17 126/70   09/22/17 126/74   07/13/17 (!) 142/93    Weight from Last 3 Encounters:   11/14/17 75.4 kg (166 lb 3.2 oz)   09/22/17 75.3 kg (166 lb)   07/13/17 78.9 kg (174 lb)              Today, you had the following     No orders found for display       Primary Care Provider Office Phone # Fax #    Michelle Verenice Monet [phone]       [address] [facility] [address]        Equal Access to Services     CONRADO RODRÍGUEZ : Hadii aad ku hadasho Soomaali, waaxda luqadaha, qaybta kaalmada adeegyada, waxay aminta haysushilan ramiro rice. So [facility] [phone].    ATENCIÓN: Si habla español, tiene a ellis disposición servicios gratuitos de asistencia lingüística. Llame al [phone].    We comply with applicable federal civil rights laws and [place] laws. We do not discriminate on the basis of race, color, national origin, age, disability, sex, sexual orientation, or gender identity.            Thank you!     Thank you for choosing [facility]  for your care. Our goal is always to provide you with excellent care. Hearing back from our patients is one way we can continue to improve our services. Please take a few  minutes to complete the written survey that you may receive in the mail after your visit with us. Thank you!             Your Updated Medication List - Protect others around you: Learn how to safely use, store and throw away your medicines at www.disposemymeds.org.          This list is accurate as of: 11/22/17 10:41 AM.  Always use your most recent med list.                   Brand Name Dispense Instructions for use Diagnosis    aspirin 81 MG tablet     30 tablet    Take 1 tablet (81 mg) by mouth daily    S/P lumbar fusion       atorvastatin 40 MG tablet    LIPITOR    90 tablet    Take 1 tablet (40 mg) by mouth daily    Hyperlipidemia LDL goal <100, Diabetes mellitus with stage 3 chronic kidney disease (H)       blood glucose lancets standard    no brand specified    100 each    Use to test blood sugar daily or as directed.    Diabetes mellitus with stage 3 chronic kidney disease (H)       calcium 500 +D 500-400 MG-UNIT Tabs   Generic drug:  Calcium Carb-Cholecalciferol      Take 1 tablet by mouth daily.        exenatide 10 MCG/0.04ML injection    BYETTA 10 MCG PEN    7.2 mL    Inject 10 mcg Subcutaneous 2 times daily    Diabetes mellitus with stage 3 chronic kidney disease (H)       fish oil-omega-3 fatty acids 1000 MG capsule      Take 4 g by mouth daily.        insulin pen needle 31G X 5 MM     200 each    Use 2 times a day with Byetta - dispense BD Mini 5mm x 31g pen needles    Diabetes mellitus with stage 3 chronic kidney disease (H)       ketorolac 0.5 % ophthalmic solution    ACULAR    5 mL    Place 1 drop Into the left eye 4 times daily Starting 3 days prior to surgery    Senile nuclear sclerosis, left       lisinopril 2.5 MG tablet    PRINIVIL/Zestril    90 tablet    Take 1 tablet (2.5 mg) by mouth daily    CKD (chronic kidney disease) stage 3, GFR 30-59 ml/min, Diabetes mellitus with stage 3 chronic kidney disease (H)       metFORMIN 500 MG tablet    GLUCOPHAGE    180 tablet    Take 1 tablet (500 mg) by  mouth 2 times daily (with meals)    Diabetes mellitus with stage 3 chronic kidney disease (H)       * Multi-vitamin Tabs tablet      Take 1 tablet by mouth daily        * multivitamin Tabs tablet     30 each    Take 1 tablet by mouth daily.        * ONETOUCH ULTRA SYSTEM KIT W/DEVICE Kit     1    as directed        * blood glucose monitoring meter device kit    no brand specified    1 kit    Use to test blood sugar daily or as directed.    Diabetes mellitus with stage 3 chronic kidney disease (H)       * ONETOUCH ULTRA test strip   Generic drug:  blood glucose monitoring     3 MONTH SUPPLY    TEST BLOOD SUGARS TWICE DAILY AS DIRECTED.    Type II or unspecified type diabetes mellitus without mention of complication, not stated as uncontrolled       * blood glucose monitoring test strip    no brand specified    100 strip    Use to test blood sugars daily or as directed    Diabetes mellitus with stage 3 chronic kidney disease (H)       polyethylene glycol powder    MIRALAX    510 g    Take 17 g (1 capful) by mouth 2 times daily as needed for constipation    Constipation, unspecified constipation type       prednisoLONE acetate 1 % ophthalmic susp    PRED FORTE    5 mL    Place 1 drop Into the left eye 4 times daily Start day after surgery    Senile nuclear sclerosis, left       * Notice:  This list has 6 medication(s) that are the same as other medications prescribed for you. Read the directions carefully, and ask your doctor or other care provider to review them with you.

## 2017-12-04 ENCOUNTER — OFFICE VISIT (OUTPATIENT)
Dept: OPHTHALMOLOGY | Facility: CLINIC | Age: 77
End: 2017-12-04

## 2017-12-04 DIAGNOSIS — N18.30 DIABETES MELLITUS WITH STAGE 3 CHRONIC KIDNEY DISEASE (H): ICD-10-CM

## 2017-12-04 DIAGNOSIS — E11.22 DIABETES MELLITUS WITH STAGE 3 CHRONIC KIDNEY DISEASE (H): ICD-10-CM

## 2017-12-04 DIAGNOSIS — Z96.1 PSEUDOPHAKIA: Primary | ICD-10-CM

## 2017-12-04 ASSESSMENT — EXTERNAL EXAM - LEFT EYE: OS_EXAM: NORMAL

## 2017-12-04 ASSESSMENT — REFRACTION_MANIFEST
OS_AXIS: 005
OS_SPHERE: -1.00
OS_CYLINDER: +1.00

## 2017-12-04 ASSESSMENT — VISUAL ACUITY
METHOD: SNELLEN - LINEAR
OS_SC: 20/25-2/+2

## 2017-12-04 ASSESSMENT — SLIT LAMP EXAM - LIDS: COMMENTS: NORMAL

## 2017-12-04 ASSESSMENT — EXTERNAL EXAM - RIGHT EYE: OD_EXAM: NORMAL

## 2017-12-04 ASSESSMENT — TONOMETRY
OS_IOP_MMHG: 15
IOP_METHOD: ICARE

## 2017-12-04 NOTE — MR AVS SNAPSHOT
After Visit Summary   12/4/2017    Maira Leon    MRN: [id number]           Patient Information     Date Of Birth          1940        Visit Information        Provider Department      12/4/2017 9:40 AM Blake Powell MD [facility]        Today's Diagnoses     Pseudophakia - Left Eye    -  1       Follow-ups after your visit        Follow-up notes from your care team     Return in about 2 weeks (around 12/18/2017) for Follow Up, Pseudophakia, Refraction OU.      Your next 10 appointments already scheduled     Jan 04, 2018 11:00 AM CST   Post-Op with Blake Powell MD   [facility])    [facility]  [address] [phone] [phone]            Jan 18, 2018 11:00 AM CST   Return Visit with Mimi Cohn NP   [facility]    [address] [place] [id number] [phone]            Apr 17, 2018  8:40 AM CDT   SHORT with Michelle Monet MD   [facility] ([facility])    [address] [place] [address] [phone]           Your Arrival times is X, We need you to be here at this time to get checked in and have the assistant get you ready for the provider, which can take about 15 minutes. Your appointmen time with your provider is at  X. Thank you              Who to contact     Please call your clinic at [phone] to:    Ask questions about your health    Make or cancel appointments    Discuss your medicines    Learn about your test results    Speak to your doctor   If you have compliments or concerns about an experience at your clinic, or if you wish to file a complaint, please contact [facility] Patient Relations at [phone] or email us at [email or web address]         Additional  Information About Your Visit        [employer] of [employer] Information     [facility] gives you secure access to your electronic health record. If you see a primary care provider, you can also send messages to your care team and make appointments. If you have questions, please call your primary care clinic.  If you do not have a primary care provider, please call [phone] and they will assist you.      [facility] is an electronic gateway that provides easy, online access to your medical records. With [employer], you can request a clinic appointment, read your test results, renew a prescription or communicate with your care team.     To access your existing account, please contact your [facility] or call [phone] for assistance.        Care EveryWhere ID     This is your Care EveryWhere ID. This could be used by other organizations to access your [place] medical records  [id number]         Blood Pressure from Last 3 Encounters:   12/19/17 126/80   11/14/17 126/70   09/22/17 126/74    Weight from Last 3 Encounters:   12/19/17 75.7 kg (166 lb 12.8 oz)   11/14/17 75.4 kg (166 lb 3.2 oz)   09/22/17 75.3 kg (166 lb)              Today, you had the following     No orders found for display         Today's Medication Changes          These changes are accurate as of: 12/4/17 11:59 PM.  If you have any questions, ask your nurse or doctor.               Start taking these medicines.        Dose/Directions    ONETOUCH DELICA LANCETS 33G Misc   Used for:  Diabetes mellitus with stage 3 chronic kidney disease (H)   Replaces:  blood glucose lancets standard   Started by:  Michelle Monet MD        USE TO TEST BLOOD SUGAR DAILY OR AS DIRECTED   Quantity:  100 each   Refills:  3         These medicines have changed or have updated prescriptions.        Dose/Directions    * ONETOUCH ULTRA test strip   This may have changed:  Another medication with the same name was added. Make sure you understand how  and when to take each.   Used for:  Type II or unspecified type diabetes mellitus without mention of complication, not stated as uncontrolled   Generic drug:  blood glucose monitoring   Changed by:  Dex Costa MD        TEST BLOOD SUGARS TWICE DAILY AS DIRECTED.   Quantity:  3 MONTH SUPPLY   Refills:  1 YEAR       * blood glucose monitoring test strip   Commonly known as:  no brand specified   This may have changed:  Another medication with the same name was added. Make sure you understand how and when to take each.   Used for:  Diabetes mellitus with stage 3 chronic kidney disease (H)   Changed by:  Michelle Monet MD        Use to test blood sugars daily or as directed   Quantity:  100 strip   Refills:  1       * ONETOUCH ULTRA test strip   This may have changed:  You were already taking a medication with the same name, and this prescription was added. Make sure you understand how and when to take each.   Used for:  Diabetes mellitus with stage 3 chronic kidney disease (H)   Generic drug:  blood glucose monitoring   Changed by:  Michelle Monet MD        USE TO TEST BLOOD SUGARS DAILY OR AS DIRECTED   Quantity:  100 strip   Refills:  3       * Notice:  This list has 3 medication(s) that are the same as other medications prescribed for you. Read the directions carefully, and ask your doctor or other care provider to review them with you.      Stop taking these medicines if you haven't already. Please contact your care team if you have questions.     blood glucose lancets standard   Commonly known as:  no brand specified   Replaced by:  ONETOUCH DELICA LANCETS 33G Misc   Stopped by:  Michelle Monet MD                Where to get your medicines      [facility] [address] [place] [address] - [address] [facility]  [address] [facility] [address]     Phone:  [phone]     ONETOUCH DELICA LANCETS 33G Misc     ONETOUCH ULTRA test strip                Primary Care Provider Office Phone # Fax #    Michelle Verenice Monet -[phone]       [address] [place] [address]        Equal Access to Services     CONRADO RODRÍGUEZ : Hadii aad ku hadevenso Soomaali, waaxda luqadaha, qaybta kaalmada adeegyada, julio morenon racielamrita avilez brie rice. So [facility] [phone].    ATENCIÓN: Si habla español, tiene a ellis disposición servicios gratuitos de asistencia lingüística. Llame al [phone].    We comply with applicable federal civil rights laws and [place] laws. We do not discriminate on the basis of race, color, national origin, age, disability, sex, sexual orientation, or gender identity.            Thank you!     Thank you for choosing [facility]  for your care. Our goal is always to provide you with excellent care. Hearing back from our patients is one way we can continue to improve our services. Please take a few minutes to complete the written survey that you may receive in the mail after your visit with us. Thank you!             Your Updated Medication List - Protect others around you: Learn how to safely use, store and throw away your medicines at www.disposemymeds.org.          This list is accurate as of: 12/4/17 11:59 PM.  Always use your most recent med list.                   Brand Name Dispense Instructions for use Diagnosis    aspirin 81 MG tablet     30 tablet    Take 1 tablet (81 mg) by mouth daily    S/P lumbar fusion       atorvastatin 40 MG tablet    LIPITOR    90 tablet    Take 1 tablet (40 mg) by mouth daily    Hyperlipidemia LDL goal <100, Diabetes mellitus with stage 3 chronic kidney disease (H)       blood glucose monitoring meter device kit    no brand specified    1 kit    Use to test blood sugar daily or as directed.    Diabetes mellitus with stage 3 chronic kidney disease (H)       calcium 500 +D 500-400 MG-UNIT Tabs   Generic drug:  Calcium  Carb-Cholecalciferol      Take 1 tablet by mouth daily.        exenatide 10 MCG/0.04ML injection    BYETTA 10 MCG PEN    7.2 mL    Inject 10 mcg Subcutaneous 2 times daily    Diabetes mellitus with stage 3 chronic kidney disease (H)       fish oil-omega-3 fatty acids 1000 MG capsule      Take 4 g by mouth daily.        ketorolac 0.5 % ophthalmic solution    ACULAR    5 mL    Place 1 drop Into the left eye 4 times daily Starting 3 days prior to surgery    Senile nuclear sclerosis, left       lisinopril 2.5 MG tablet    PRINIVIL/Zestril    90 tablet    Take 1 tablet (2.5 mg) by mouth daily    CKD (chronic kidney disease) stage 3, GFR 30-59 ml/min, Diabetes mellitus with stage 3 chronic kidney disease (H)       metFORMIN 500 MG tablet    GLUCOPHAGE    180 tablet    Take 1 tablet (500 mg) by mouth 2 times daily (with meals)    Diabetes mellitus with stage 3 chronic kidney disease (H)       * Multi-vitamin Tabs tablet      Take 1 tablet by mouth daily        * multivitamin Tabs tablet     30 each    Take 1 tablet by mouth daily.        ONETOUCH DELICA LANCETS 33G Misc     100 each    USE TO TEST BLOOD SUGAR DAILY OR AS DIRECTED    Diabetes mellitus with stage 3 chronic kidney disease (H)       * ONETOUCH ULTRA test strip   Generic drug:  blood glucose monitoring     3 MONTH SUPPLY    TEST BLOOD SUGARS TWICE DAILY AS DIRECTED.    Type II or unspecified type diabetes mellitus without mention of complication, not stated as uncontrolled       * blood glucose monitoring test strip    no brand specified    100 strip    Use to test blood sugars daily or as directed    Diabetes mellitus with stage 3 chronic kidney disease (H)       * ONETOUCH ULTRA test strip   Generic drug:  blood glucose monitoring     100 strip    USE TO TEST BLOOD SUGARS DAILY OR AS DIRECTED    Diabetes mellitus with stage 3 chronic kidney disease (H)       polyethylene glycol powder    MIRALAX    510 g    Take 17 g (1 capful) by mouth 2 times daily as  needed for constipation    Constipation, unspecified constipation type       prednisoLONE acetate 1 % ophthalmic susp    PRED FORTE    5 mL    Place 1 drop Into the left eye 4 times daily Start day after surgery    Senile nuclear sclerosis, left       * Notice:  This list has 5 medication(s) that are the same as other medications prescribed for you. Read the directions carefully, and ask your doctor or other care provider to review them with you.

## 2017-12-04 NOTE — NURSING NOTE
Chief Complaints and History of Present Illnesses   Patient presents with     Post Op (Ophthalmology) Left Eye     s/p phaco LE +22.50 D  [id number],   SN [id number]     HPI    Affected eye(s):  Left   Symptoms:     No decreased vision      Duration:  2 weeks   Frequency:  Constant       Do you have eye pain now?:  No      Comments:  Doing well, using drops three times daily now    COLTEN Hyatt 11:27 AM 12/04/2017

## 2017-12-04 NOTE — TELEPHONE ENCOUNTER
blood glucose (NO BRAND SPECIFIED) lancets standard    0 ordered  Edit     Summary: Use to test blood sugar daily or as directed.  Disp-100 each, R-0  E-Prescribe   Start: 8/31/2017  Ord/Sold: 8/31/2017 (O)  Report  Taking:   Long-term:   Pharmacy: [facility] [address] [place] [address], [place] - [address]  Med Dose History       Patient Sig: Use to test blood sugar daily or as directed.       Ordered on: 8/31/2017       Authorized by: KEN LLAMAS       Dispense: 100 each       Admin Instructions: Use to test blood sugar daily or as directed.        blood glucose monitoring (NO BRAND SPECIFIED) test strip    1 ordered  Reorder     Summary: Use to test blood sugars daily or as directed, Disp-100 strip, R-1, E-Prescribe   Start: 10/23/2017  Ord/Sold: 10/23/2017 (O)  Report  Taking:   Long-term:   Pharmacy: [address]  Med Dose History  ChangeDiscontinue       Patient Sig: Use to test blood sugars daily or as directed       Ordered on: 10/23/2017       Authorized by: KEN LLAMAS       Dispense: 100 strip       Admin Instructions: Use to test blood sugars daily or as directed       Prior Authorization: Request PA          LOV: 11/14/2017

## 2017-12-05 RX ORDER — LANCETS 33 GAUGE
EACH MISCELLANEOUS
Qty: 100 EACH | Refills: 3 | Status: SHIPPED | OUTPATIENT
Start: 2017-12-05

## 2017-12-13 DIAGNOSIS — E11.22 DIABETES MELLITUS WITH STAGE 3 CHRONIC KIDNEY DISEASE (H): ICD-10-CM

## 2017-12-13 DIAGNOSIS — N18.30 DIABETES MELLITUS WITH STAGE 3 CHRONIC KIDNEY DISEASE (H): ICD-10-CM

## 2017-12-13 NOTE — TELEPHONE ENCOUNTER
insulin pen needle 31G X 5 MM    2 ordered  Edit     Summary: Use 2 times a day with Byetta - dispense BD Mini 5mm x 31g pen needles  Disp-200 each, R-2  Fax   Start: 12/15/2016  Ord/Sold: 12/15/2016 (O)  Report  Taking:   Long-term:   [place] [facility] [place] [address] [facility].  Med Dose History       Patient Sig: Use 2 times a day with Byetta - dispense BD Mini 5mm x 31g pen needles       Ordered on: 12/15/2016       Authorized by: KEN LLAMAS       Dispense: 200 each       Admin Instructions: Use 2 times a day with Byetta - dispense BD Mini 5mm x 31g pen needles        LOV: 11/14/2017

## 2017-12-15 RX ORDER — FLURBIPROFEN SODIUM 0.3 MG/ML
SOLUTION/ DROPS OPHTHALMIC
Qty: 200 EACH | Refills: 2 | Status: SHIPPED | OUTPATIENT
Start: 2017-12-15 | End: 2018-07-16

## 2017-12-19 ENCOUNTER — OFFICE VISIT (OUTPATIENT)
Dept: FAMILY MEDICINE | Facility: CLINIC | Age: 77
End: 2017-12-19
Payer: COMMERCIAL

## 2017-12-19 VITALS
HEIGHT: 63 IN | WEIGHT: 166.8 LBS | SYSTOLIC BLOOD PRESSURE: 126 MMHG | BODY MASS INDEX: 29.55 KG/M2 | TEMPERATURE: 97.7 F | HEART RATE: 72 BPM | DIASTOLIC BLOOD PRESSURE: 80 MMHG

## 2017-12-19 DIAGNOSIS — S43.432D SUPERIOR GLENOID LABRUM LESION OF LEFT SHOULDER, SUBSEQUENT ENCOUNTER: ICD-10-CM

## 2017-12-19 DIAGNOSIS — E11.22 DIABETES MELLITUS WITH STAGE 3 CHRONIC KIDNEY DISEASE (H): Primary | ICD-10-CM

## 2017-12-19 DIAGNOSIS — N18.30 DIABETES MELLITUS WITH STAGE 3 CHRONIC KIDNEY DISEASE (H): Primary | ICD-10-CM

## 2017-12-19 DIAGNOSIS — Z87.81 HISTORY OF FRACTURE OF LEFT HIP: ICD-10-CM

## 2017-12-19 LAB — HBA1C MFR BLD: 6 % (ref 4.3–6)

## 2017-12-19 PROCEDURE — 83036 HEMOGLOBIN GLYCOSYLATED A1C: CPT | Performed by: FAMILY MEDICINE

## 2017-12-19 PROCEDURE — 99214 OFFICE O/P EST MOD 30 MIN: CPT | Performed by: FAMILY MEDICINE

## 2017-12-19 PROCEDURE — 36415 COLL VENOUS BLD VENIPUNCTURE: CPT | Performed by: FAMILY MEDICINE

## 2017-12-19 ASSESSMENT — PATIENT HEALTH QUESTIONNAIRE - PHQ9: SUM OF ALL RESPONSES TO PHQ QUESTIONS 1-9: 0

## 2017-12-19 NOTE — MR AVS SNAPSHOT
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  And then we can get you set up here in the [facility].    If you don't need PT now for your hip, we should consider PT in the later winter for your shoulder.    [facility]   Discharged by : Minal RICARDO MA    If you have any questions regarding your visit please contact your care team:     Team Gold                Clinic Hours Telephone Number     Dr. Dara Roque 7am-7pm  Monday - Thursday   7am-5pm  Fridays  [phone]   (Appointment scheduling available 24/7)     RN Line  [phone] option 2     Urgent Care - [place] [facility] - 11am-9pm Monday-Friday Saturday-Sunday- 9am-5pm     [place] -   5pm-9pm Monday-Friday Saturday-Sunday- 9am-5pm    [phone] - Joyce Marcial    [phone] - [place]       For a Price Quote for your services, please call our Consumer Price Line at [phone].     What options do I have for visits at the clinic other than the traditional office visit?     To expand how we care for you, many of our providers are utilizing electronic visits (e-visits) and telephone visits, when medically appropriate, for interactions with their patients rather than a visit in the clinic. We also offer nurse visits for many medical concerns. Just like  any other service, we will bill your insurance company for this type of visit based on time spent on the phone with your provider. Not all insurance companies cover these visits. Please check with your medical insurance if this type of visit is covered. You will be responsible for any charges that are not paid by your insurance.   E-visits via Homelochart: generally incur a $35.00 fee.     Telephone visits:   Time spent on the phone: *charged based on time that is spent on the phone in increments of 10 minutes. Estimated cost:   5-10 mins $30.00   11-20 mins. $59.00   21-30 mins. $85.00     Use eLearning Connections (secure email communication and access to your chart) to send your primary care provider a message or make an appointment. Ask someone on your Team how to sign up for eLearning Connections.     As always, Thank you for trusting us with your health care needs!      [facility]:    Scheduling Appointments  [facility]  Call: [phone]    [facility]  Call: [phone]    [facility]  Call: [phone]    For Gastroenterology referrals   [facility] Gastroenterology   Clinics and Surgery Center, 4th Floor   [address] [place] [address]   Appointments: [phone]    WHERE TO GO FOR CARE?  Clinic    Make an appointment if you:       Are sick (cold, cough, flu, sore throat, earache or in pain).       Have a small injury (sprain, small cut, burn or broken bone).       Need a physical exam, Pap smear, vaccine or prescription refill.       Have questions about your health or medicines.    To reach us:      Call [phone] ([phone]). Open 24 hours every day. (For counseling services, call [phone].)    Log into eLearning Connections at [email or web address]. (Visit [email or web address] to create an account.) Hospital emergency room    An emergency is a serious or life- threatening problem that must be treated right away.    Call [phone] or  get to the hospital if you have:      Very bad or sudden:            - Chest pain or pressure         - Bleeding         - Head or belly pain         - Dizziness or trouble seeing, walking or                          Speaking      Problems breathing      Blood in your vomit or you are coughing up blood      A major injury (knocked out, loss of a finger or limb, rape, broken bone protruding from skin)    A mental health crisis. (Or call the Mental Health Crisis line at 1-920.982.1598 or Suicide Prevention Hotline at 1-446.910.9599.)    Open 24 hours every day. You don't need an appointment.     Urgent care    Visit urgent care for sickness or small injuries when the clinic is closed. You don't need an appointment. To check hours or find an urgent care near you, visit [email or web address]. Online care    Get online care from [facility] for more than 70 common problems, like colds, allergies and infections. Open 24 hours every day at:   [email or web address]   Need help deciding?    For advice about where to be seen, you may call your clinic and ask to speak with a nurse. We're here for you 24 hours every day.         If you are deaf or hard of hearing, please let us know. We provide many free services including sign language interpreters, oral interpreters, TTYs, telephone amplifiers, note takers and written materials.                   Follow-ups after your visit        Your next 10 appointments already scheduled     Jan 04, 2018 11:00 AM CST   Post-Op with Blake Powell MD   [facility]    [facility]  [address] [id number] [phone]            Jan 18, 2018 11:00 AM CST   Return Visit with Mimi Cohn NP   [facility] ([facility])    [address] [facility] [address] [phone]              Who to contact     If you have questions or need follow up information about  "today's clinic visit or your schedule please contact [facility] directly at [phone].  Normal or non-critical lab and imaging results will be communicated to you by Vinglehart, letter or phone within 4 business days after the clinic has received the results. If you do not hear from us within 7 days, please contact the clinic through Vinglehart or phone. If you have a critical or abnormal lab result, we will notify you by phone as soon as possible.  Submit refill requests through Medical Direct Club or call your pharmacy and they will forward the refill request to us. Please allow 3 business days for your refill to be completed.          Additional Information About Your Visit        VingleharRedeemr Information     Medical Direct Club gives you secure access to your electronic health record. If you see a primary care provider, you can also send messages to your care team and make appointments. If you have questions, please call your primary care clinic.  If you do not have a primary care provider, please call [phone] and they will assist you.        Care EveryWhere ID     This is your Care EveryWhere ID. This could be used by other organizations to access your [place] medical records  [id number]        Your Vitals Were     Pulse Temperature Height BMI (Body Mass Index)          72 97.7  F (36.5  C) (Oral) 5' 3\" (1.6 m) 29.55 kg/m2         Blood Pressure from Last 3 Encounters:   12/19/17 126/80   11/14/17 126/70   09/22/17 126/74    Weight from Last 3 Encounters:   12/19/17 166 lb 12.8 oz (75.7 kg)   11/14/17 166 lb 3.2 oz (75.4 kg)   09/22/17 166 lb (75.3 kg)              We Performed the Following     Hemoglobin A1c          Today's Medication Changes          These changes are accurate as of: 12/19/17  9:24 AM.  If you have any questions, ask your nurse or doctor.               These medicines have changed or have updated prescriptions.        Dose/Directions    * blood glucose monitoring test strip   Commonly known as:  " no brand specified   This may have changed:  Another medication with the same name was removed. Continue taking this medication, and follow the directions you see here.   Used for:  Diabetes mellitus with stage 3 chronic kidney disease (H)   Changed by:  Michelle Monet MD        Use to test blood sugars daily or as directed   Quantity:  100 strip   Refills:  1       * ONETOUCH ULTRA test strip   This may have changed:  Another medication with the same name was removed. Continue taking this medication, and follow the directions you see here.   Used for:  Diabetes mellitus with stage 3 chronic kidney disease (H)   Generic drug:  blood glucose monitoring   Changed by:  Michelle Monet MD        USE TO TEST BLOOD SUGARS DAILY OR AS DIRECTED   Quantity:  100 strip   Refills:  3       * Notice:  This list has 2 medication(s) that are the same as other medications prescribed for you. Read the directions carefully, and ask your doctor or other care provider to review them with you.             Primary Care Provider Office Phone # Fax #    Michelle Monet [phone]       [address] [place], [place] [id number]        Equal Access to Services     [facility]: Hadii bret mary hadasho Soomaali, waaxda luqadaha, qaybta kaalmada adeamritayasophia, julio baird . So [facility] [phone].    ATENCIÓN: Si habla español, tiene a ellis disposición servicios gratuitos de asistencia lingüística. Llame al [phone].    We comply with applicable federal civil rights laws and [place] laws. We do not discriminate on the basis of race, color, national origin, age, disability, sex, sexual orientation, or gender identity.            Thank you!     Thank you for choosing [facility]  for your care. Our goal is always to provide you with excellent care. Hearing back from our patients is one way we can continue to improve our services. Please take a few  minutes to complete the written survey that you may receive in the mail after your visit with us. Thank you!             Your Updated Medication List - Protect others around you: Learn how to safely use, store and throw away your medicines at www.disposemymeds.org.          This list is accurate as of: 12/19/17  9:24 AM.  Always use your most recent med list.                   Brand Name Dispense Instructions for use Diagnosis    aspirin 81 MG tablet     30 tablet    Take 1 tablet (81 mg) by mouth daily    S/P lumbar fusion       atorvastatin 40 MG tablet    LIPITOR    90 tablet    Take 1 tablet (40 mg) by mouth daily    Hyperlipidemia LDL goal <100, Diabetes mellitus with stage 3 chronic kidney disease (H)       B-D U/F 31G X 5 MM   Generic drug:  insulin pen needle     200 each    USE TWICE DAILY WITH BYETTA    Diabetes mellitus with stage 3 chronic kidney disease (H)       blood glucose monitoring meter device kit    no brand specified    1 kit    Use to test blood sugar daily or as directed.    Diabetes mellitus with stage 3 chronic kidney disease (H)       * blood glucose monitoring test strip    no brand specified    100 strip    Use to test blood sugars daily or as directed    Diabetes mellitus with stage 3 chronic kidney disease (H)       * ONETOUCH ULTRA test strip   Generic drug:  blood glucose monitoring     100 strip    USE TO TEST BLOOD SUGARS DAILY OR AS DIRECTED    Diabetes mellitus with stage 3 chronic kidney disease (H)       calcium 500 +D 500-400 MG-UNIT Tabs   Generic drug:  Calcium Carb-Cholecalciferol      Take 1 tablet by mouth daily.        exenatide 10 MCG/0.04ML injection    BYETTA 10 MCG PEN    7.2 mL    Inject 10 mcg Subcutaneous 2 times daily    Diabetes mellitus with stage 3 chronic kidney disease (H)       fish oil-omega-3 fatty acids 1000 MG capsule      Take 4 g by mouth daily.        ketorolac 0.5 % ophthalmic solution    ACULAR    5 mL    Place 1 drop Into the left eye 4 times  daily Starting 3 days prior to surgery    Senile nuclear sclerosis, left       lisinopril 2.5 MG tablet    PRINIVIL/Zestril    90 tablet    Take 1 tablet (2.5 mg) by mouth daily    CKD (chronic kidney disease) stage 3, GFR 30-59 ml/min, Diabetes mellitus with stage 3 chronic kidney disease (H)       metFORMIN 500 MG tablet    GLUCOPHAGE    180 tablet    Take 1 tablet (500 mg) by mouth 2 times daily (with meals)    Diabetes mellitus with stage 3 chronic kidney disease (H)       * Multi-vitamin Tabs tablet      Take 1 tablet by mouth daily        * multivitamin Tabs tablet     30 each    Take 1 tablet by mouth daily.        ONETOUCH DELICA LANCETS 33G Misc     100 each    USE TO TEST BLOOD SUGAR DAILY OR AS DIRECTED    Diabetes mellitus with stage 3 chronic kidney disease (H)       polyethylene glycol powder    MIRALAX    510 g    Take 17 g (1 capful) by mouth 2 times daily as needed for constipation    Constipation, unspecified constipation type       prednisoLONE acetate 1 % ophthalmic susp    PRED FORTE    5 mL    Place 1 drop Into the left eye 4 times daily Start day after surgery    Senile nuclear sclerosis, left       * Notice:  This list has 4 medication(s) that are the same as other medications prescribed for you. Read the directions carefully, and ask your doctor or other care provider to review them with you.

## 2017-12-19 NOTE — PROGRESS NOTES
SUBJECTIVE:   Maira Leon is a 77 year old female who presents to clinic today for the following health issues:      Diabetes Follow-up    Patient is checking blood sugars: once daily.  Results are as follows:       am - 136    Average for 30 days 136 - seems higher.    Has a newer meter    No sugars below 80    Diabetic concerns: None     Symptoms of hypoglycemia (low blood sugar): shaky, dizzy, weak-- sometimes     Paresthesias (numbness or burning in feet) or sores: No     Date of last diabetic eye exam: April 2017      BP Readings from Last 2 Encounters:   12/19/17 126/80   11/14/17 126/70     Hemoglobin A1C (%)   Date Value   09/22/2017 5.2   01/21/2017 6.2 (H)     LDL Cholesterol Calculated (mg/dL)   Date Value   09/22/2017 48   04/28/2016 58     Hyperlipidemia Follow-Up      Rate your low fat/cholesterol diet?: good    Taking statin?  Yes, no muscle aches from statin    Other lipid medications/supplements?:  Fish oil/Omega 3,  without side effects          Amount of exercise or physical activity: 6-7 days/week for an average of 15-30 minutes    Problems taking medications regularly: No    Medication side effects: none  Diet: low salt and low fat/cholesterol    Can't stand up straight, since hip surgery.  And has lost an inch.  Left hip was operated on.  Is walking fine once she gets started, but limps a bit when she starts walking.  Until recently has still been using a cane to prevent falls.  Is seeing hip surgeon tomorrow - in [place]    Left shoulder - is doing PT for it.  Has progressed to weights.  Has full range of motion.    Left knee still has some pain.  Had arthroscopy but they couldn't completely repair the meniscus.  Noticed more pain in knee after back surgery, because back stopped hurting.  Some PT exercises for her hip are helping her knee.        Problem list and histories reviewed & adjusted, as indicated.  Additional history: as documented    Recent Labs   Lab Test  09/22/17   1031   "01/22/17   0618  01/21/17   0630  01/03/17   1030   04/28/16   1240   04/09/15   1045   10/17/12   1140   03/08/11   0918   05/20/10   0801   A1C  5.2   --   6.2*  6.0   < >  6.0   < >  6.1*   < >  6.0   < >   --    < >  6.5*   LDL  48   --    --    --    --   58   --   57   < >   --    < >  70   --   76   HDL  54   --    --    --    --   60   --   52   < >   --    < >  43*   --   47*   TRIG  101   --    --    --    --   110   --   93   < >   --    < >  212*   --   197*   ALT   --    --    --    --    --    --    --    --    --   <6   --   <6   --   14   CR   --   0.79   --   0.95   --   0.85   --   0.91   < >   --    < >  0.88   --   1.04   GFRESTIMATED   --   71   --   57*   --   65   --   60*   < >   --    < >  63   --   52*   GFRESTBLACK   --   86   --   69   --   79   --   73   < >   --    < >  77   --   63   POTASSIUM   --   3.6   --   4.0   --   4.0   --   4.0   < >   --    < >  4.5   --   4.8   TSH  1.35   --    --    --    --    --    --   2.41   < >   --    --   2.51   --   2.62    < > = values in this interval not displayed.      BP Readings from Last 3 Encounters:   12/19/17 126/80   11/14/17 126/70   09/22/17 126/74    Wt Readings from Last 3 Encounters:   12/19/17 166 lb 12.8 oz (75.7 kg)   11/14/17 166 lb 3.2 oz (75.4 kg)   09/22/17 166 lb (75.3 kg)                        Reviewed and updated as needed this visit by clinical staffTobacco  Allergies  Med Hx  Surg Hx  Fam Hx  Soc Hx      Reviewed and updated as needed this visit by Provider  Allergies  Meds  Problems         ROS:  Constitutional, HEENT, cardiovascular, pulmonary, gi and gu systems are negative, except as otherwise noted.      OBJECTIVE:   /80 (BP Location: Right arm, Patient Position: Sitting, Cuff Size: Adult Large)  Pulse 72  Temp 97.7  F (36.5  C) (Oral)  Ht 5' 3\" (1.6 m)  Wt 166 lb 12.8 oz (75.7 kg)  BMI 29.55 kg/m2  Body mass index is 29.55 kg/(m^2).  GENERAL: healthy, alert and no distress  RESP: lungs clear to " auscultation - no rales, rhonchi or wheezes  CV: regular rate and rhythm, normal S1 S2, no S3 or S4, no murmur, click or rub, no peripheral edema and peripheral pulses strong  MS: left shoulder with full range of motion.  Left hip appears slightly elevated compared to right, and does have a slight limp when initiating ambulation.  PSYCH: mentation appears normal, affect normal/bright    Diagnostic Test Results:  Hemoglobin A1C pending    ASSESSMENT/PLAN:         BP Screening:   Last 3 BP Readings:    BP Readings from Last 3 Encounters:   12/19/17 126/80   11/14/17 126/70   09/22/17 126/74       The following was recommended to the patient:  Re-screen BP within a year and recommended lifestyle modifications      1. Diabetes mellitus with stage 3 chronic kidney disease (H)  Blood sugars have been lower with recovery from injury.  adjust therapy based on labs    - Hemoglobin A1c    2. 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 Consider PT again for improvement of strength    3. History of fracture of left hip  Will be seeing ortho in [place] tomorrow.    Advised that I can assist her with setting up PT here in [facility] if recommended.      See Patient Instructions    Michelle Monet MD  [facility] [place]

## 2017-12-19 NOTE — PATIENT INSTRUCTIONS
Please let me know if your orthopedist does recommend more PT, and if he has any specific instructions.  And then we can get you set up here in the [facility].    If you don't need PT now for your hip, we should consider PT in the later winter for your shoulder.    [facility]   Discharged by : Minal RICARDO MA    If you have any questions regarding your visit please contact your care team:     Team Gold                Clinic Hours Telephone Number     Dr. Dara Roque 7am-7pm  Monday - Thursday   7am-5pm  Fridays  [phone]   (Appointment scheduling available 24/7)     RN Line  [phone] option 2     Urgent Care - [place] [facility] - 11am-9pm Monday-Friday Saturday-Sunday- 9am-5pm     [place] -   5pm-9pm Monday-Friday Saturday-Sunday- 9am-5pm    [phone] - [place]    [phone] - [place]       For a Price Quote for your services, please call our Consumer Price Line at [phone].     What options do I have for visits at the clinic other than the traditional office visit?     To expand how we care for you, many of our providers are utilizing electronic visits (e-visits) and telephone visits, when medically appropriate, for interactions with their patients rather than a visit in the clinic. We also offer nurse visits for many medical concerns. Just like any other service, we will bill your insurance company for this type of visit based on time spent on the phone with your provider. Not all insurance companies cover these visits. Please check with your medical insurance if this type of visit is covered. You will be responsible for any charges that are not paid by your insurance.   E-visits via bounce.io: generally incur a $35.00 fee.     Telephone visits:   Time spent on the phone: *charged based on time that is spent on the phone in increments of 10 minutes. Estimated  cost:   5-10 mins $30.00   11-20 mins. $59.00   21-30 mins. $85.00     Use ActivNetworks (secure email communication and access to your chart) to send your primary care provider a message or make an appointment. Ask someone on your Team how to sign up for ActivNetworks.     As always, Thank you for trusting us with your health care needs!      [place] [facility]:    Scheduling Appointments  [facility]  Call: [phone]    [facility]  Call: [phone]    [facility]  Call: [phone]    For Gastroenterology referrals   [facility] Gastroenterology   Clinics and Surgery Center, 4th Floor   [address] [place] [address]   Appointments: [phone]    WHERE TO GO FOR CARE?  Clinic    Make an appointment if you:       Are sick (cold, cough, flu, sore throat, earache or in pain).       Have a small injury (sprain, small cut, burn or broken bone).       Need a physical exam, Pap smear, vaccine or prescription refill.       Have questions about your health or medicines.    To reach us:      Call [phone] ([phone]). Open 24 hours every day. (For counseling services, call [phone].)    Log into ActivNetworks at [email or web address]. (Visit [email or web address] to create an account.) Hospital emergency room    An emergency is a serious or life- threatening problem that must be treated right away.    Call 463 or get to the hospital if you have:      Very bad or sudden:            - Chest pain or pressure         - Bleeding         - Head or belly pain         - Dizziness or trouble seeing, walking or                          Speaking      Problems breathing      Blood in your vomit or you are coughing up blood      A major injury (knocked out, loss of a finger or limb, rape, broken bone protruding from skin)    A mental health crisis. (Or call the Mental Health Crisis line at 1-108.707.4354 or Suicide Prevention Hotline at  5-579-870-4309.)    Open 24 hours every day. You don't need an appointment.     Urgent care    Visit urgent care for sickness or small injuries when the clinic is closed. You don't need an appointment. To check hours or find an urgent care near you, visit [email or web address]. Online care    Get [facility] care from [facility] for more than 70 common problems, like colds, allergies and infections. Open 24 hours every day at:   [email or web address]   Need help deciding?    For advice about where to be seen, you may call your clinic and ask to speak with a nurse. We're here for you 24 hours every day.         If you are deaf or hard of hearing, please let us know. We provide many free services including sign language interpreters, oral interpreters, TTYs, telephone amplifiers, note takers and written materials.

## 2018-01-02 NOTE — PROGRESS NOTES
Assessment & Plan      Maira Leon is a 77 year old female with the following diagnoses:   (Z96.1) Pseudophakia - Left Eye  (primary encounter diagnosis)  Comment: Excellent  Plan: Taper drops     -----------------------------------------------------------------------------------      Patient disposition:   Return in about 2 weeks (around 12/18/2017) for Follow Up, Pseudophakia, Refraction OU. or sooner as needed.    Complete documentation of historical and exam elements from today's encounter can  be found in the full encounter summary report (not reduplicated in this progress  note). I personally obtained the chief complaint(s) and history of present illness. I  confirmed and edited as necessary the review of systems, past medical/surgical  history, family history, social history, and examination findings as documented by  others; and I examined the patient myself. I personally reviewed the relevant tests,  images, and reports as documented above. I formulated and edited as necessary the  assessment and plan and discussed the findings and management plan with the  patient and family.    ALEXSANDER Powell M.D

## 2018-01-04 ENCOUNTER — OFFICE VISIT (OUTPATIENT)
Dept: OPHTHALMOLOGY | Facility: CLINIC | Age: 78
End: 2018-01-04
Payer: COMMERCIAL

## 2018-01-04 ENCOUNTER — TRANSFERRED RECORDS (OUTPATIENT)
Dept: HEALTH INFORMATION MANAGEMENT | Facility: CLINIC | Age: 78
End: 2018-01-04

## 2018-01-04 DIAGNOSIS — Z96.1 PSEUDOPHAKIA: Primary | ICD-10-CM

## 2018-01-04 ASSESSMENT — REFRACTION_MANIFEST
OS_SPHERE: -1.50
OD_ADD: +2.50
OD_CYLINDER: +2.50
OD_AXIS: 180
OS_ADD: +2.50
OD_SPHERE: -1.25
OS_AXIS: 180
OS_CYLINDER: +1.50

## 2018-01-04 ASSESSMENT — VISUAL ACUITY
OS_SC: 20/25
OD_SC: 20/40
METHOD: SNELLEN - LINEAR

## 2018-01-04 ASSESSMENT — EXTERNAL EXAM - LEFT EYE: OS_EXAM: NORMAL

## 2018-01-04 ASSESSMENT — EXTERNAL EXAM - RIGHT EYE: OD_EXAM: NORMAL

## 2018-01-04 ASSESSMENT — TONOMETRY
OS_IOP_MMHG: 11
OD_IOP_MMHG: 13
IOP_METHOD: ICARE

## 2018-01-04 ASSESSMENT — SLIT LAMP EXAM - LIDS
COMMENTS: NORMAL
COMMENTS: NORMAL

## 2018-01-04 NOTE — MR AVS SNAPSHOT
After Visit Summary   1/4/2018    Maira Leon    MRN: [id number]           Patient Information     Date Of Birth          1940        Visit Information        Provider Department      1/4/2018 11:00 AM Blake Powell MD [facility]        Today's Diagnoses     Pseudophakia - Left Eye    -  1       Follow-ups after your visit        Follow-up notes from your care team     Return in about 10 months (around 11/4/2018) for Complete Eye Exam, Pseudophakia, Diabetes.      Your next 10 appointments already scheduled     Jan 18, 2018 11:00 AM CST   Return Visit with Mimi Cohn NP   [facility] ([facility])    [address] [place] [id number] [phone]            Apr 17, 2018  8:40 AM CDT   SHORT with Michelle Monet MD   [facility]    [address] [place] [id number] [phone]           Your Arrival times is X, We need you to be here at this time to get checked in and have the assistant get you ready for the provider, which can take about 15 minutes. Your appointmen time with your provider is at  X. Thank you              Who to contact     Please call your clinic at [phone] to:    Ask questions about your health    Make or cancel appointments    Discuss your medicines    Learn about your test results    Speak to your doctor   If you have compliments or concerns about an experience at your clinic, or if you wish to file a complaint, please contact [facility] Patient Relations at [phone] or email us at [email or web address]         Additional Information About Your Visit        MyChart Information     MyChart gives you secure access to your electronic health record. If you see a primary care provider, you can also send messages to your care team and make appointments. If you have  questions, please call your primary care clinic.  If you do not have a primary care provider, please call [phone] and they will assist you.      [employer] is an electronic gateway that provides easy, online access to your medical records. With [employer], you can request a clinic appointment, read your test results, renew a prescription or communicate with your care team.     To access your existing account, please contact your [facility] or call [phone] for assistance.        Care EveryWhere ID     This is your Care EveryWhere ID. This could be used by other organizations to access your [place] medical records  [id number]         Blood Pressure from Last 3 Encounters:   12/19/17 126/80   11/14/17 126/70   09/22/17 126/74    Weight from Last 3 Encounters:   12/19/17 75.7 kg (166 lb 12.8 oz)   11/14/17 75.4 kg (166 lb 3.2 oz)   09/22/17 75.3 kg (166 lb)              Today, you had the following     No orders found for display       Primary Care Provider Office Phone # Fax #    Michelle Verenice Monet [phone]       [address] [facility] [address]        Equal Access to Services     CONRADO RODRÍGUEZ : Hadii bret mary hadasho Soomaali, waaxda luqadaha, qaybta kaalmada adeegyada, julio rice. So [facility] [phone].    ATENCIÓN: Si habla español, tiene a ellis disposición servicios gratuitos de asistencia lingüística. Llame al [phone].    We comply with applicable federal civil rights laws and [place] laws. We do not discriminate on the basis of race, color, national origin, age, disability, sex, sexual orientation, or gender identity.            Thank you!     Thank you for choosing [facility]  for your care. Our goal is always to provide you with excellent care. Hearing back from our patients is one way we can continue to improve our services. Please take a few minutes to complete the written  survey that you may receive in the mail after your visit with us. Thank you!             Your Updated Medication List - Protect others around you: Learn how to safely use, store and throw away your medicines at www.disposemymeds.org.          This list is accurate as of: 1/4/18 12:10 PM.  Always use your most recent med list.                   Brand Name Dispense Instructions for use Diagnosis    aspirin 81 MG tablet     30 tablet    Take 1 tablet (81 mg) by mouth daily    S/P lumbar fusion       atorvastatin 40 MG tablet    LIPITOR    90 tablet    Take 1 tablet (40 mg) by mouth daily    Hyperlipidemia LDL goal <100, Diabetes mellitus with stage 3 chronic kidney disease (H)       B-D U/F 31G X 5 MM   Generic drug:  insulin pen needle     200 each    USE TWICE DAILY WITH BYETTA    Diabetes mellitus with stage 3 chronic kidney disease (H)       blood glucose monitoring meter device kit    no brand specified    1 kit    Use to test blood sugar daily or as directed.    Diabetes mellitus with stage 3 chronic kidney disease (H)       * blood glucose monitoring test strip    no brand specified    100 strip    Use to test blood sugars daily or as directed    Diabetes mellitus with stage 3 chronic kidney disease (H)       * ONETOUCH ULTRA test strip   Generic drug:  blood glucose monitoring     100 strip    USE TO TEST BLOOD SUGARS DAILY OR AS DIRECTED    Diabetes mellitus with stage 3 chronic kidney disease (H)       calcium 500 +D 500-400 MG-UNIT Tabs   Generic drug:  Calcium Carb-Cholecalciferol      Take 1 tablet by mouth daily.        exenatide 10 MCG/0.04ML injection    BYETTA 10 MCG PEN    7.2 mL    Inject 10 mcg Subcutaneous 2 times daily    Diabetes mellitus with stage 3 chronic kidney disease (H)       fish oil-omega-3 fatty acids 1000 MG capsule      Take 4 g by mouth daily.        ketorolac 0.5 % ophthalmic solution    ACULAR    5 mL    Place 1 drop Into the left eye 4 times daily Starting 3 days prior to surgery     Senile nuclear sclerosis, left       lisinopril 2.5 MG tablet    PRINIVIL/Zestril    90 tablet    Take 1 tablet (2.5 mg) by mouth daily    CKD (chronic kidney disease) stage 3, GFR 30-59 ml/min, Diabetes mellitus with stage 3 chronic kidney disease (H)       metFORMIN 500 MG tablet    GLUCOPHAGE    180 tablet    Take 1 tablet (500 mg) by mouth 2 times daily (with meals)    Diabetes mellitus with stage 3 chronic kidney disease (H)       * Multi-vitamin Tabs tablet      Take 1 tablet by mouth daily        * multivitamin Tabs tablet     30 each    Take 1 tablet by mouth daily.        ONETOUCH DELICA LANCETS 33G Misc     100 each    USE TO TEST BLOOD SUGAR DAILY OR AS DIRECTED    Diabetes mellitus with stage 3 chronic kidney disease (H)       polyethylene glycol powder    MIRALAX    510 g    Take 17 g (1 capful) by mouth 2 times daily as needed for constipation    Constipation, unspecified constipation type       prednisoLONE acetate 1 % ophthalmic susp    PRED FORTE    5 mL    Place 1 drop Into the left eye 4 times daily Start day after surgery    Senile nuclear sclerosis, left       * Notice:  This list has 4 medication(s) that are the same as other medications prescribed for you. Read the directions carefully, and ask your doctor or other care provider to review them with you.

## 2018-01-04 NOTE — PROGRESS NOTES
Assessment & Plan      Maira Leon is a 78 year old female with the following diagnoses:   (Z96.1) Pseudophakia - Left Eye  (primary encounter diagnosis)  Comment: Excellent result  Plan: See below, OTC readers only     -----------------------------------------------------------------------------------      Patient disposition:   Return in about 10 months (around 11/4/2018) for Complete Eye Exam, Pseudophakia, Diabetes. or sooner as needed.    Complete documentation of historical and exam elements from today's encounter can  be found in the full encounter summary report (not reduplicated in this progress  note). I personally obtained the chief complaint(s) and history of present illness. I  confirmed and edited as necessary the review of systems, past medical/surgical  history, family history, social history, and examination findings as documented by  others; and I examined the patient myself. I personally reviewed the relevant tests,  images, and reports as documented above. I formulated and edited as necessary the  assessment and plan and discussed the findings and management plan with the  patient and family.    ALEXSANDER Powell M.D

## 2018-01-04 NOTE — NURSING NOTE
Chief Complaints and History of Present Illnesses   Patient presents with     Post Op (Ophthalmology) Both Eyes     No concerns     HPI    Affected eye(s):  Both   Symptoms:        Duration:  6 weeks   Frequency:  Constant       Do you have eye pain now?:  No      Comments:  Refraction Post Phaco OU  No concerns  Katiuska THORNTON 11:16 AM January 4, 2018

## 2018-01-08 ENCOUNTER — TELEPHONE (OUTPATIENT)
Dept: FAMILY MEDICINE | Facility: CLINIC | Age: 78
End: 2018-01-08

## 2018-01-08 DIAGNOSIS — S72.002D HIP FRACTURE, LEFT, CLOSED, WITH ROUTINE HEALING, SUBSEQUENT ENCOUNTER: ICD-10-CM

## 2018-01-08 DIAGNOSIS — S43.432D SUPERIOR GLENOID LABRUM LESION OF LEFT SHOULDER, SUBSEQUENT ENCOUNTER: ICD-10-CM

## 2018-01-08 NOTE — TELEPHONE ENCOUNTER
Called patient- her ortho recommended that she do PT. She would like MELVIN in [place] again.  Saira Killian RN

## 2018-01-08 NOTE — TELEPHONE ENCOUNTER
In order to send in the referral I need to know what joints she is doing PT for?  And if the ortho had any special requests?

## 2018-01-08 NOTE — TELEPHONE ENCOUNTER
Reason for Call:  Other call back    Detailed comments: Patient called saying she wants to get registered to be able to get physical therapy     Phone Number Patient can be reached at: Home number on file [phone] (home)    Best Time: anytime    Can we leave a detailed message on this number? YES    Call taken on 1/8/2018 at 1:22 PM by Linda Horton

## 2018-01-08 NOTE — TELEPHONE ENCOUNTER
Called patient- she states PT was recommended for her left shoulder & left hip. She wasn't aware of any special requests.   The bone at the top of the maru seems to be growing into the shape it should be to fit the maru. She has been getting around fine.   She is comfortable doing exercises on her own & is Mu-ism about it after a few PT sessions if it is okay with the PT.   Saira Killian RN

## 2018-01-09 ENCOUNTER — THERAPY VISIT (OUTPATIENT)
Dept: PHYSICAL THERAPY | Facility: CLINIC | Age: 78
End: 2018-01-09
Payer: COMMERCIAL

## 2018-01-09 DIAGNOSIS — M25.512 CHRONIC LEFT SHOULDER PAIN: Primary | ICD-10-CM

## 2018-01-09 DIAGNOSIS — G89.29 CHRONIC LEFT SHOULDER PAIN: Primary | ICD-10-CM

## 2018-01-09 DIAGNOSIS — M25.552 HIP PAIN, LEFT: ICD-10-CM

## 2018-01-09 DIAGNOSIS — Z96.642 H/O TOTAL HIP ARTHROPLASTY, LEFT: ICD-10-CM

## 2018-01-09 PROCEDURE — G8978 MOBILITY CURRENT STATUS: HCPCS | Mod: GP | Performed by: PHYSICAL THERAPIST

## 2018-01-09 PROCEDURE — 97110 THERAPEUTIC EXERCISES: CPT | Mod: GP | Performed by: PHYSICAL THERAPIST

## 2018-01-09 PROCEDURE — G8979 MOBILITY GOAL STATUS: HCPCS | Mod: GP | Performed by: PHYSICAL THERAPIST

## 2018-01-09 PROCEDURE — 97161 PT EVAL LOW COMPLEX 20 MIN: CPT | Mod: GP | Performed by: PHYSICAL THERAPIST

## 2018-01-09 NOTE — MR AVS SNAPSHOT
After Visit Summary   1/9/2018    Maira Leon    MRN: [id number]           Patient Information     Date Of Birth          1940        Visit Information        Provider Department      1/9/2018 2:20 PM Rogelio Posada, PT [facility]        Today's Diagnoses     Chronic left shoulder pain    -  1    Hip pain, left        H/O total hip arthroplasty, left           Follow-ups after your visit        Your next 10 appointments already scheduled     Jan 16, 2018  9:50 AM CST   MELVIN Extremity with Rogelio oPsada PT   [facility] ([facility])    [address] [facility] [address]   [phone]            Jan 18, 2018 11:00 AM CST   Return Visit with Mimi Cohn NP   [facility] ([address] [address]   [phone]            Jan 23, 2018  3:00 PM CST   MELVIN Extremity with Rogelio Posada PT   [facility] ([facility])    [address] [facility] [address]   [phone]            Apr 17, 2018  8:40 AM CDT   SHORT with Michelle Monet MD   [facility]    [address] [place] [id number] [phone]           Your Arrival times is X, We need you to be here at this time to get checked in and have the assistant get you ready for the provider, which can take about 15 minutes. Your appointmen time with your provider is at  X. Thank you              Who to contact     If you have questions or need follow up information about today's clinic visit or your schedule please contact [facility] directly at [phone].  Normal or non-critical lab and imaging results will be communicated to you by MyChart, letter or phone within 4 business days after  the clinic has received the results. If you do not hear from us within 7 days, please contact the clinic through [employer] or phone. If you have a critical or abnormal lab result, we will notify you by phone as soon as possible.  Submit refill requests through [employer] or call your pharmacy and they will forward the refill request to us. Please allow 3 business days for your refill to be completed.          Additional Information About Your Visit        [email or web address] Information     [employer] gives you secure access to your electronic health record. If you see a primary care provider, you can also send messages to your care team and make appointments. If you have questions, please call your primary care clinic.  If you do not have a primary care provider, please call [phone] and they will assist you.        Care EveryWhere ID     This is your Care EveryWhere ID. This could be used by other organizations to access your [place] medical records  [id number]         Blood Pressure from Last 3 Encounters:   12/19/17 126/80   11/14/17 126/70   09/22/17 126/74    Weight from Last 3 Encounters:   12/19/17 75.7 kg (166 lb 12.8 oz)   11/14/17 75.4 kg (166 lb 3.2 oz)   09/22/17 75.3 kg (166 lb)              We Performed the Following     HC PT EVAL, LOW COMPLEXITY     MELVIN INITIAL EVAL REPORT     THERAPEUTIC EXERCISES        Primary Care Provider Office Phone # Fax #    Michelle Verenice Monet [phone]       [facility] [address]        Equal Access to Services     CONRADO RODRÍGUEZ : Hadii aad ku hadasho Soomaali, waaxda luqadaha, qaybta kaalmada adeegyada, julio baird . So [facility] [phone].    ATENCIÓN: Si habla español, tiene a ellis disposición servicios gratuitos de asistencia lingüística. Llame al [phone].    We comply with applicable federal civil rights laws and [place] laws. We do not discriminate on the basis of race, color, national origin, age,  disability, sex, sexual orientation, or gender identity.            Thank you!     Thank you for choosing [facility]  for your care. Our goal is always to provide you with excellent care. Hearing back from our patients is one way we can continue to improve our services. Please take a few minutes to complete the written survey that you may receive in the mail after your visit with us. Thank you!             Your Updated Medication List - Protect others around you: Learn how to safely use, store and throw away your medicines at www.disposemymeds.org.          This list is accurate as of: 1/9/18 11:59 PM.  Always use your most recent med list.                   Brand Name Dispense Instructions for use Diagnosis    aspirin 81 MG tablet     30 tablet    Take 1 tablet (81 mg) by mouth daily    S/P lumbar fusion       atorvastatin 40 MG tablet    LIPITOR    90 tablet    Take 1 tablet (40 mg) by mouth daily    Hyperlipidemia LDL goal <100, Diabetes mellitus with stage 3 chronic kidney disease (H)       B-D U/F 31G X 5 MM   Generic drug:  insulin pen needle     200 each    USE TWICE DAILY WITH BYETTA    Diabetes mellitus with stage 3 chronic kidney disease (H)       blood glucose monitoring meter device kit    no brand specified    1 kit    Use to test blood sugar daily or as directed.    Diabetes mellitus with stage 3 chronic kidney disease (H)       * blood glucose monitoring test strip    no brand specified    100 strip    Use to test blood sugars daily or as directed    Diabetes mellitus with stage 3 chronic kidney disease (H)       * ONETOUCH ULTRA test strip   Generic drug:  blood glucose monitoring     100 strip    USE TO TEST BLOOD SUGARS DAILY OR AS DIRECTED    Diabetes mellitus with stage 3 chronic kidney disease (H)       calcium 500 +D 500-400 MG-UNIT Tabs   Generic drug:  Calcium Carb-Cholecalciferol      Take 1 tablet by mouth daily.        exenatide 10 MCG/0.04ML  injection    BYETTA 10 MCG PEN    7.2 mL    Inject 10 mcg Subcutaneous 2 times daily    Diabetes mellitus with stage 3 chronic kidney disease (H)       fish oil-omega-3 fatty acids 1000 MG capsule      Take 4 g by mouth daily.        ketorolac 0.5 % ophthalmic solution    ACULAR    5 mL    Place 1 drop Into the left eye 4 times daily Starting 3 days prior to surgery    Senile nuclear sclerosis, left       lisinopril 2.5 MG tablet    PRINIVIL/Zestril    90 tablet    Take 1 tablet (2.5 mg) by mouth daily    CKD (chronic kidney disease) stage 3, GFR 30-59 ml/min, Diabetes mellitus with stage 3 chronic kidney disease (H)       metFORMIN 500 MG tablet    GLUCOPHAGE    180 tablet    Take 1 tablet (500 mg) by mouth 2 times daily (with meals)    Diabetes mellitus with stage 3 chronic kidney disease (H)       * Multi-vitamin Tabs tablet      Take 1 tablet by mouth daily        * multivitamin Tabs tablet     30 each    Take 1 tablet by mouth daily.        ONETOUCH DELICA LANCETS 33G Misc     100 each    USE TO TEST BLOOD SUGAR DAILY OR AS DIRECTED    Diabetes mellitus with stage 3 chronic kidney disease (H)       polyethylene glycol powder    MIRALAX    510 g    Take 17 g (1 capful) by mouth 2 times daily as needed for constipation    Constipation, unspecified constipation type       prednisoLONE acetate 1 % ophthalmic susp    PRED FORTE    5 mL    Place 1 drop Into the left eye 4 times daily Start day after surgery    Senile nuclear sclerosis, left       * Notice:  This list has 4 medication(s) that are the same as other medications prescribed for you. Read the directions carefully, and ask your doctor or other care provider to review them with you.

## 2018-01-09 NOTE — PROGRESS NOTES
[facility] Initial Evaluation      Subjective:  Patient is a 78 year old female presenting with rehab left hip hpi and rehab left shoulder hpi. The history is provided by the patient.   Maira Leon is a 78 year old female with a left hip condition.  Condition occurred with:  A fall/slip.  Condition occurred: at home.  This is a chronic condition  Fell August 15.  DOS Aug 16.  Slipped and fell on dock..    Patient reports pain:  Lateral, anterior and groin.  Radiates to:  Thigh.  Pain is described as aching and is intermittent and reported as 3/10.  Associated symptoms:  Loss of motion/stiffness. Pain is worse during the day.  Symptoms are exacerbated by standing, walking, transfers, descending stairs and ascending stairs (walk x 15 min;  car transfers) and relieved by rest.  Since onset symptoms are gradually improving.    Previous treatment includes surgery (8/16/2017).  There was significant improvement following previous treatment.                              Maira Leon is a 78 year old female with a left shoulder condition.  Condition occurred with:  A fall.  Condition occurred: at home.  This is a chronic condition  Fell August 15.  DOS Aug 16.  Slipped and fell on dock..    Patient reports pain:  Lateral, anterior and posterior.  Radiates to:  Upper arm.  Pain is described as aching and is intermittent and reported as 3/10.  Associated symptoms:  Loss of strength. Pain is worse during the day.  Symptoms are exacerbated by using arm at shoulder level, lifting, using arm overhead and certain positions and relieved by rest.    Special tests:  MRI (spurring, supraspinatus tear, partial infraspintus tear, Small SLAP tear).      General health as reported by patient is good.  Pertinent medical history includes:  Diabetes and kidney disease.  Medical allergies: no.  Other surgeries include:  Orthopedic surgery (TKA).  Current medications:  None as reported by the patient.  Current  occupation is retired.    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    Barriers include:  None as reported by the patient.    Red flags:  None as reported by the patient.                        Objective:  Standing Alignment:    Cervical/Thoracic:  Forward head  Shoulder/UE:  Rounded shoulders              Gait:    Gait Type:  Antalgic     Deviations:  General Deviations:  Stride length decr                         Shoulder Evaluation:  ROM:  AROM:  normal                                  Strength:    Flexion: Left:4+/5   Pain:      Extension:  Left: 5/5    Pain:      Abduction:  Left: 4+/5  Pain:      Adduction:  Left: 5/5    Pain:      Internal Rotation:  Left:5-/5     Pain:      External Rotation:   Left:4/5     Pain:               Special Tests:    Left shoulder positive for the following special tests:  Labral; Impingement and Rotator cuff tear    Palpation:    Left shoulder tenderness present at:  Acrimioclavicular and Deltoid                            Hip Evaluation  HIP AROM:  AROM:   Left Hip:     Normal (post surgical)    Right Hip:                      Hip Strength:    Flexion:   Left: 5-/5   Pain:                      Extension:  Left: 4+/5  Pain:  Abduction:  Left: 4+/5     Pain:  Adduction:  Left: 5/5    Pain:  Internal Rotation:  Left: 5-/5    Pain:  External Rotation:  Left: 5-/5   Pain:                Hip Palpation:    Left hip tenderness present at:   Greater Trachanter and Adductors               General     ROS    Assessment/Plan:    Patient is a 78 year old female with left side shoulder and left side hip complaints.    Patient has the following significant findings with corresponding treatment plan.                Diagnosis 1:  L shoulder pain  Pain -  self management, education, directional preference exercise and home program  Decreased strength - therapeutic exercise and therapeutic activities  Impaired muscle performance - neuro  re-education  Decreased function - therapeutic activities  Diagnosis 2:  S/P L GAIL   Pain -  self management, education, directional preference exercise and home program  Decreased strength - therapeutic exercise and therapeutic activities  Impaired gait - gait training  Impaired muscle performance - neuro re-education  Decreased function - therapeutic activities    Therapy Evaluation Codes:   1) History comprised of:   Personal factors that impact the plan of care:      None.    Comorbidity factors that impact the plan of care are:      Diabetes.     Medications impacting care: None.  2) Examination of Body Systems comprised of:   Body structures and functions that impact the plan of care:      Hip and Shoulder.   Activity limitations that impact the plan of care are:      Lifting, Stairs, Standing and Walking.  3) Clinical presentation characteristics are:   Stable/Uncomplicated.  4) Decision-Making    Low complexity using standardized patient assessment instrument and/or measureable assessment of functional outcome.  Cumulative Therapy Evaluation is: Low complexity.    Previous and current functional limitations:  (See Goal Flow Sheet for this information)    Short term and Long term goals: (See Goal Flow Sheet for this information)     Communication ability:  Patient appears to be able to clearly communicate and understand verbal and written communication and follow directions correctly.  Treatment Explanation - The following has been discussed with the patient:   RX ordered/plan of care  Anticipated outcomes  Possible risks and side effects  This patient would benefit from PT intervention to resume normal activities.   Rehab potential is excellent.    Frequency:  1 X week, once daily  Duration:  for 8 weeks  Discharge Plan:  Achieve all LTG.  Independent in home treatment program.  Reach maximal therapeutic benefit.    Please refer to the daily flowsheet for treatment today, total treatment time and time spent  performing 1:1 timed codes.

## 2018-01-10 PROBLEM — Z96.642 H/O TOTAL HIP ARTHROPLASTY, LEFT: Status: ACTIVE | Noted: 2018-01-10

## 2018-01-10 PROBLEM — M25.552 HIP PAIN, LEFT: Status: ACTIVE | Noted: 2018-01-10

## 2018-01-10 PROBLEM — M25.512 CHRONIC LEFT SHOULDER PAIN: Status: ACTIVE | Noted: 2018-01-10

## 2018-01-10 PROBLEM — G89.29 CHRONIC LEFT SHOULDER PAIN: Status: ACTIVE | Noted: 2018-01-10

## 2018-01-16 ENCOUNTER — RADIANT APPOINTMENT (OUTPATIENT)
Dept: GENERAL RADIOLOGY | Facility: CLINIC | Age: 78
End: 2018-01-16
Attending: NURSE PRACTITIONER
Payer: COMMERCIAL

## 2018-01-16 ENCOUNTER — OFFICE VISIT (OUTPATIENT)
Dept: NEUROSURGERY | Facility: CLINIC | Age: 78
End: 2018-01-16
Payer: COMMERCIAL

## 2018-01-16 ENCOUNTER — THERAPY VISIT (OUTPATIENT)
Dept: PHYSICAL THERAPY | Facility: CLINIC | Age: 78
End: 2018-01-16
Payer: COMMERCIAL

## 2018-01-16 VITALS
HEIGHT: 63 IN | HEART RATE: 86 BPM | BODY MASS INDEX: 29.23 KG/M2 | OXYGEN SATURATION: 97 % | SYSTOLIC BLOOD PRESSURE: 133 MMHG | RESPIRATION RATE: 16 BRPM | DIASTOLIC BLOOD PRESSURE: 68 MMHG | WEIGHT: 165 LBS

## 2018-01-16 DIAGNOSIS — M25.512 CHRONIC LEFT SHOULDER PAIN: ICD-10-CM

## 2018-01-16 DIAGNOSIS — M25.552 HIP PAIN, LEFT: ICD-10-CM

## 2018-01-16 DIAGNOSIS — Z98.1 S/P LUMBAR FUSION: Primary | ICD-10-CM

## 2018-01-16 DIAGNOSIS — G89.29 CHRONIC LEFT SHOULDER PAIN: ICD-10-CM

## 2018-01-16 DIAGNOSIS — Z96.642 H/O TOTAL HIP ARTHROPLASTY, LEFT: ICD-10-CM

## 2018-01-16 DIAGNOSIS — Z98.1 S/P LUMBAR FUSION: ICD-10-CM

## 2018-01-16 PROCEDURE — 99213 OFFICE O/P EST LOW 20 MIN: CPT | Performed by: NURSE PRACTITIONER

## 2018-01-16 PROCEDURE — 97530 THERAPEUTIC ACTIVITIES: CPT | Mod: GP | Performed by: PHYSICAL THERAPIST

## 2018-01-16 PROCEDURE — 97110 THERAPEUTIC EXERCISES: CPT | Mod: GP | Performed by: PHYSICAL THERAPIST

## 2018-01-16 PROCEDURE — 72100 X-RAY EXAM L-S SPINE 2/3 VWS: CPT

## 2018-01-16 ASSESSMENT — PAIN SCALES - GENERAL: PAINLEVEL: NO PAIN (0)

## 2018-01-16 NOTE — LETTER
1/16/2018         RE: Maira Leon  [address] [facility] [address]        Dear Colleague,    Thank you for referring your patient, Maira Leon, to the [facility]. Please see a copy of my visit note below.    [facility]  Neurosurgery followup:    HPI: Maira Leon is a 78 yo with a history of low back pain with lower extremity pain and L4-5 spondylolisthesis.  She underwent L3-4 decompression and fusion and L4-5 TLIF with Dr. Buddy Davies on 1/20/17.   She is here for her one year post-op appointment.  Since her last visit she suffered a fall off of a dock on 8/15/17 in [facility] and fractured her left hip.  She subsequently underwent left hip surgery 8/16/17 with maru placement with Dr. Brian De La O in [place], [place]. She also sustained injuries to her left shoulder.  She states prior to her lumbar surgery she had 100% relief of her low back pain.  She states it increased after her accident this summer, however, it has continued to improve.  Today she states she has no low back pain.  She is attending PT since her hip surgery.  She denies weakness or changes to bowel or bladder.    Exam:  Constitutional:  Alert, well nourished, NAD.  HEENT: Normocephalic, atraumatic.   Pulm:  Without shortness of breath   CV:  No pitting edema of BLE.      Neurological:  Awake  Alert  Oriented x 3  Motor exam:        IP Q DF PF EHL  R   5  5   5   5    5  L   5  5   5   5    5     Able to spontaneously move L/E bilaterally  Sensation intact throughout all L/E dermatomes     Imaging: Per Xray fusion intact and stable    A/P:  Maira Leon is a 78 yo with a history of low back pain with lower extremity pain and L4-5 spondylolisthesis.  She underwent L3-4 decompression and fusion and L4-5 TLIF with Dr. Buddy Davies on 1/20/17.   She is here for her one year post-op appointment.  Since her last visit she suffered a fall off of a dock on 8/15/17 in [facility] and  fractured her left hip.  She subsequently underwent left hip surgery 8/16/17 with maru placement with Dr. Brian De La O in [place], [place]. She also sustained injuries to her left shoulder.  She states prior to her lumbar surgery she had 100% relief of her low back pain.  She states it increased after her accident this summer, however, it has continued to improve.  Today she states she has no low back pain.  She is attending PT since her hip surgery.  She denies weakness or changes to bowel or bladder.  We reviewed lumbar Xrays and fusion appears intact and stable.  We discussed should her pain return or should she have any question she will contact us.      Patient Instructions   Please contact the clinic if pain persists at [phone].      Mimi Cohn CNP  [facility]  [address] [address]    Tel [phone]  Pager [phone]        Again, thank you for allowing me to participate in the care of your patient.        Sincerely,        Mimi Cohn, UMM

## 2018-01-16 NOTE — MR AVS SNAPSHOT
After Visit Summary   1/16/2018    Maira Leon    MRN: [id number]           Patient Information     Date Of Birth          1940        Visit Information        Provider Department      1/16/2018 12:00 PM Mimi Cohn NP [facility]        Care Instructions    Please contact the clinic if pain persists at [phone].            Follow-ups after your visit        Your next 10 appointments already scheduled     Jan 23, 2018  3:00 PM CST   MELVIN Extremity with Rogelio Posada PT   [facility] ([facility])    [address]  [facility] [address]   [phone]            Apr 17, 2018  8:40 AM CDT   [facility]    [address] [facility] [address] [phone]           Your Arrival times is X, We need you to be here at this time to get checked in and have the assistant get you ready for the provider, which can take about 15 minutes. Your appointmen time with your provider is at  X. Thank you              Who to contact     If you have questions or need follow up information about today's clinic visit or your schedule please contact [facility] directly at [phone].  Normal or non-critical lab and imaging results will be communicated to you by MyChart, letter or phone within 4 business days after the clinic has received the results. If you do not hear from us within 7 days, please contact the clinic through [facility] or phone. If you have a critical or abnormal lab result, we will notify you by phone as soon as possible.  Submit refill requests through [facility] or call your pharmacy and they will forward the refill request to us. Please allow 3 business days for your refill to be completed.          Additional Information About Your Visit        [facility] Information     [facility] gives you  "secure access to your electronic health record. If you see a primary care provider, you can also send messages to your care team and make appointments. If you have questions, please call your primary care clinic.  If you do not have a primary care provider, please call [phone] and they will assist you.        Care EveryWhere ID     This is your Care EveryWhere ID. This could be used by other organizations to access your [facility] medical records  [id number]        Your Vitals Were     Pulse Respirations Height Pulse Oximetry BMI (Body Mass Index)       86 16 5' 3\" (1.6 m) 97% 29.23 kg/m2        Blood Pressure from Last 3 Encounters:   01/16/18 133/68   12/19/17 126/80   11/14/17 126/70    Weight from Last 3 Encounters:   01/16/18 165 lb (74.8 kg)   12/19/17 166 lb 12.8 oz (75.7 kg)   11/14/17 166 lb 3.2 oz (75.4 kg)              Today, you had the following     No orders found for display       Primary Care Provider Office Phone # Fax #    Michelle Verenice Monet [phone]       [address] [facility] [address]        Equal Access to Services     CONRADO RODRÍGUEZ AH: Hadii bret mary hadasho Soomaali, waaxda luqadaha, qaybta kaalmada adeegyada, julio rice. So [facility] [phone].    ATENCIÓN: Si habla español, tiene a ellis disposición servicios gratuitos de asistencia lingüística. Llame al [phone].    We comply with applicable federal civil rights laws and [place] laws. We do not discriminate on the basis of race, color, national origin, age, disability, sex, sexual orientation, or gender identity.            Thank you!     Thank you for choosing [facility]  for your care. Our goal is always to provide you with excellent care. Hearing back from our patients is one way we can continue to improve our services. Please take a few minutes to complete the written survey that you may receive in the mail after your visit with us. Thank you!           "   Your Updated Medication List - Protect others around you: Learn how to safely use, store and throw away your medicines at www.disposemymeds.org.          This list is accurate as of: 1/16/18 12:25 PM.  Always use your most recent med list.                   Brand Name Dispense Instructions for use Diagnosis    aspirin 81 MG tablet     30 tablet    Take 1 tablet (81 mg) by mouth daily    S/P lumbar fusion       atorvastatin 40 MG tablet    LIPITOR    90 tablet    Take 1 tablet (40 mg) by mouth daily    Hyperlipidemia LDL goal <100, Diabetes mellitus with stage 3 chronic kidney disease (H)       B-D U/F 31G X 5 MM   Generic drug:  insulin pen needle     200 each    USE TWICE DAILY WITH BYETTA    Diabetes mellitus with stage 3 chronic kidney disease (H)       blood glucose monitoring meter device kit    no brand specified    1 kit    Use to test blood sugar daily or as directed.    Diabetes mellitus with stage 3 chronic kidney disease (H)       * blood glucose monitoring test strip    no brand specified    100 strip    Use to test blood sugars daily or as directed    Diabetes mellitus with stage 3 chronic kidney disease (H)       * ONETOUCH ULTRA test strip   Generic drug:  blood glucose monitoring     100 strip    USE TO TEST BLOOD SUGARS DAILY OR AS DIRECTED    Diabetes mellitus with stage 3 chronic kidney disease (H)       calcium 500 +D 500-400 MG-UNIT Tabs   Generic drug:  Calcium Carb-Cholecalciferol      Take 1 tablet by mouth daily.        exenatide 10 MCG/0.04ML injection    BYETTA 10 MCG PEN    7.2 mL    Inject 10 mcg Subcutaneous 2 times daily    Diabetes mellitus with stage 3 chronic kidney disease (H)       fish oil-omega-3 fatty acids 1000 MG capsule      Take 4 g by mouth daily.        ketorolac 0.5 % ophthalmic solution    ACULAR    5 mL    Place 1 drop Into the left eye 4 times daily Starting 3 days prior to surgery    Senile nuclear sclerosis, left       lisinopril 2.5 MG tablet    PRINIVIL/Zestril     90 tablet    Take 1 tablet (2.5 mg) by mouth daily    CKD (chronic kidney disease) stage 3, GFR 30-59 ml/min, Diabetes mellitus with stage 3 chronic kidney disease (H)       metFORMIN 500 MG tablet    GLUCOPHAGE    180 tablet    Take 1 tablet (500 mg) by mouth 2 times daily (with meals)    Diabetes mellitus with stage 3 chronic kidney disease (H)       * Multi-vitamin Tabs tablet      Take 1 tablet by mouth daily        * multivitamin Tabs tablet     30 each    Take 1 tablet by mouth daily.        ONETOUCH DELICA LANCETS 33G Misc     100 each    USE TO TEST BLOOD SUGAR DAILY OR AS DIRECTED    Diabetes mellitus with stage 3 chronic kidney disease (H)       polyethylene glycol powder    MIRALAX    510 g    Take 17 g (1 capful) by mouth 2 times daily as needed for constipation    Constipation, unspecified constipation type       prednisoLONE acetate 1 % ophthalmic susp    PRED FORTE    5 mL    Place 1 drop Into the left eye 4 times daily Start day after surgery    Senile nuclear sclerosis, left       * Notice:  This list has 4 medication(s) that are the same as other medications prescribed for you. Read the directions carefully, and ask your doctor or other care provider to review them with you.

## 2018-01-16 NOTE — PROGRESS NOTES
[facility]  Neurosurgery followup:    HPI: Maira Leon is a 78 yo with a history of low back pain with lower extremity pain and L4-5 spondylolisthesis.  She underwent L3-4 decompression and fusion and L4-5 TLIF with Dr. Buddy Davies on 1/20/17.   She is here for her one year post-op appointment.  Since her last visit she suffered a fall off of a dock on 8/15/17 in [facility] and fractured her left hip.  She subsequently underwent left hip surgery 8/16/17 with maru placement with Dr. Brian De La O in [place], [place]. She also sustained injuries to her left shoulder.  She states prior to her lumbar surgery she had 100% relief of her low back pain.  She states it increased after her accident this summer, however, it has continued to improve.  Today she states she has no low back pain.  She is attending PT since her hip surgery.  She denies weakness or changes to bowel or bladder.    Exam:  Constitutional:  Alert, well nourished, NAD.  HEENT: Normocephalic, atraumatic.   Pulm:  Without shortness of breath   CV:  No pitting edema of BLE.      Neurological:  Awake  Alert  Oriented x 3  Motor exam:        IP Q DF PF EHL  R   5  5   5   5    5  L   5  5   5   5    5     Able to spontaneously move L/E bilaterally  Sensation intact throughout all L/E dermatomes     Imaging: Per Xray fusion intact and stable    A/P:  Maira Leon is a 78 yo with a history of low back pain with lower extremity pain and L4-5 spondylolisthesis.  She underwent L3-4 decompression and fusion and L4-5 TLIF with Dr. Buddy Davies on 1/20/17.   She is here for her one year post-op appointment.  Since her last visit she suffered a fall off of a dock on 8/15/17 in [facility] and fractured her left hip.  She subsequently underwent left hip surgery 8/16/17 with maru placement with Dr. Brian De La O in [place], [place]. She also sustained injuries to her left shoulder.  She states prior to her lumbar surgery she had 100% relief of her  low back pain.  She states it increased after her accident this summer, however, it has continued to improve.  Today she states she has no low back pain.  She is attending PT since her hip surgery.  She denies weakness or changes to bowel or bladder.  We reviewed lumbar Xrays and fusion appears intact and stable.  We discussed should her pain return or should she have any question she will contact us.      Patient Instructions   Please contact the clinic if pain persists at [phone].      Mimi Cohn [facility]  [facility]  [address] [address]    Tel [phone]  Pager [phone]

## 2018-01-16 NOTE — NURSING NOTE
"Maira Leon is a 78 year old female who presents for:  Chief Complaint   Patient presents with     Surgical Followup     S/p L3-4 decompression and fusion with L4-5 TLIF. DOS 1/20/17. Xray today. Patient states she was at 100% with her back before she had a left hip fx on 8/16/17. If she stands or walks too much she has a little pain but attributes it to her hip. Her back is getting better. Denies any N/T in legs.         Initial Vitals:  /68  Pulse 86  Resp 16  Ht 5' 3\" (1.6 m)  Wt 165 lb (74.8 kg)  SpO2 97%  BMI 29.23 kg/m2 Estimated body mass index is 29.23 kg/(m^2) as calculated from the following:    Height as of this encounter: 5' 3\" (1.6 m).    Weight as of this encounter: 165 lb (74.8 kg).. Body surface area is 1.82 meters squared. BP completed using cuff size: regular  No Pain (0)    Do you feel safe in your environment?  Yes  Do you need any refills today? No    Nursing Comments: S/p L3-4 decompression and fusion with L4-5 TLIF. DOS 1/20/17. Xray today. Patient states she was at 100% with her back before she had a left hip fx on 8/16/17. If she stands or walks too much she has a little pain but attributes it to her hip. Her back is getting better. Denies any N/T in legs.         Linda Fajardo    "

## 2018-01-23 ENCOUNTER — THERAPY VISIT (OUTPATIENT)
Dept: PHYSICAL THERAPY | Facility: CLINIC | Age: 78
End: 2018-01-23
Payer: COMMERCIAL

## 2018-01-23 DIAGNOSIS — G89.29 CHRONIC LEFT SHOULDER PAIN: ICD-10-CM

## 2018-01-23 DIAGNOSIS — Z96.642 H/O TOTAL HIP ARTHROPLASTY, LEFT: ICD-10-CM

## 2018-01-23 DIAGNOSIS — M25.512 CHRONIC LEFT SHOULDER PAIN: ICD-10-CM

## 2018-01-23 DIAGNOSIS — M25.552 HIP PAIN, LEFT: ICD-10-CM

## 2018-01-23 PROCEDURE — 97110 THERAPEUTIC EXERCISES: CPT | Mod: GP | Performed by: PHYSICAL THERAPIST

## 2018-01-23 PROCEDURE — 97530 THERAPEUTIC ACTIVITIES: CPT | Mod: GP | Performed by: PHYSICAL THERAPIST

## 2018-02-16 ENCOUNTER — THERAPY VISIT (OUTPATIENT)
Dept: PHYSICAL THERAPY | Facility: CLINIC | Age: 78
End: 2018-02-16
Payer: COMMERCIAL

## 2018-02-16 DIAGNOSIS — Z96.642 H/O TOTAL HIP ARTHROPLASTY, LEFT: ICD-10-CM

## 2018-02-16 DIAGNOSIS — M25.512 CHRONIC LEFT SHOULDER PAIN: ICD-10-CM

## 2018-02-16 DIAGNOSIS — G89.29 CHRONIC LEFT SHOULDER PAIN: ICD-10-CM

## 2018-02-16 DIAGNOSIS — M25.552 HIP PAIN, LEFT: ICD-10-CM

## 2018-02-16 PROCEDURE — 97112 NEUROMUSCULAR REEDUCATION: CPT | Mod: GP | Performed by: PHYSICAL THERAPIST

## 2018-02-16 PROCEDURE — 97110 THERAPEUTIC EXERCISES: CPT | Mod: GP | Performed by: PHYSICAL THERAPIST

## 2018-03-01 ENCOUNTER — RADIANT APPOINTMENT (OUTPATIENT)
Dept: MAMMOGRAPHY | Facility: CLINIC | Age: 78
End: 2018-03-01
Attending: FAMILY MEDICINE
Payer: COMMERCIAL

## 2018-03-01 DIAGNOSIS — Z12.31 VISIT FOR SCREENING MAMMOGRAM: ICD-10-CM

## 2018-03-01 PROCEDURE — 77067 SCR MAMMO BI INCL CAD: CPT | Mod: TC

## 2018-03-02 ENCOUNTER — THERAPY VISIT (OUTPATIENT)
Dept: PHYSICAL THERAPY | Facility: CLINIC | Age: 78
End: 2018-03-02
Payer: COMMERCIAL

## 2018-03-02 DIAGNOSIS — M25.512 CHRONIC LEFT SHOULDER PAIN: ICD-10-CM

## 2018-03-02 DIAGNOSIS — M25.552 HIP PAIN, LEFT: ICD-10-CM

## 2018-03-02 DIAGNOSIS — Z96.642 H/O TOTAL HIP ARTHROPLASTY, LEFT: ICD-10-CM

## 2018-03-02 DIAGNOSIS — G89.29 CHRONIC LEFT SHOULDER PAIN: ICD-10-CM

## 2018-03-02 PROCEDURE — 97530 THERAPEUTIC ACTIVITIES: CPT | Mod: GP | Performed by: PHYSICAL THERAPIST

## 2018-03-02 PROCEDURE — G8979 MOBILITY GOAL STATUS: HCPCS | Mod: GP | Performed by: PHYSICAL THERAPIST

## 2018-03-02 PROCEDURE — G8978 MOBILITY CURRENT STATUS: HCPCS | Mod: GP | Performed by: PHYSICAL THERAPIST

## 2018-03-02 PROCEDURE — 97112 NEUROMUSCULAR REEDUCATION: CPT | Mod: GP | Performed by: PHYSICAL THERAPIST

## 2018-03-02 PROCEDURE — G8980 MOBILITY D/C STATUS: HCPCS | Mod: GP | Performed by: PHYSICAL THERAPIST

## 2018-03-02 PROCEDURE — 97110 THERAPEUTIC EXERCISES: CPT | Mod: GP | Performed by: PHYSICAL THERAPIST

## 2018-03-02 NOTE — PROGRESS NOTES
Subjective:  HPI                    Objective:  System    Physical Exam    General     ROS    Assessment/Plan:    DISCHARGE REPORT    Progress reporting period is from 1/9/2018 to 3/2/2018.       SUBJECTIVE  Subjective changes noted by patient:  Shoulder doing well, but hip still gives her trouble    Current pain level is 2/10 (hip).     Previous pain level was   3/10.   Changes in function:  Yes (See Goal flowsheet attached for changes in current functional level)  Adverse reaction to treatment or activity: None    OBJECTIVE    Objective: normal gait pattern.   Strength in hip improving slowly.     Full shoulder ROM.   Strength improved    ASSESSMENT/PLAN  Updated problem list and treatment plan: Diagnosis 1:  L shoulder pain  Pain -  self management, education, directional preference exercise and home program  Decreased strength - therapeutic exercise and therapeutic activities  Impaired muscle performance - neuro re-education  Decreased function - therapeutic activities  Diagnosis 2:  S/P L GAIL   Pain -  self management, education, directional preference exercise and home program  Decreased strength - therapeutic exercise and therapeutic activities  Impaired gait - gait training  Impaired muscle performance - neuro re-education  Decreased function - therapeutic activities  STG/LTGs have been met or progress has been made towards goals:  Yes (See Goal flow sheet completed today.)  Assessment of Progress: The patient's condition is improving.  Self Management Plans:  Patient has been instructed in a home treatment program.    Maira continues to require the following intervention to meet STG and LTG's:  PT intervention is no longer required to meet STG/LTG.    Recommendations:  This patient is ready to be discharged from therapy and continue their home treatment program.  Pt will assess hip in one month and F/U with MD if sx in hip persist.      Please refer to the daily flowsheet for treatment today, total  treatment time and time spent performing 1:1 timed codes.

## 2018-03-02 NOTE — MR AVS SNAPSHOT
After Visit Summary   3/2/2018    Maira Leon    MRN: [id number]           Patient Information     Date Of Birth          1940        Visit Information        Provider Department      3/2/2018 8:10 AM Rogelio Posada PT [facility]        Today's Diagnoses     Chronic left shoulder pain        Hip pain, left        H/O total hip arthroplasty, left           Follow-ups after your visit        Your next 10 appointments already scheduled     Apr 17, 2018  8:40 AM CDT   SHORT with Michelle Monet MD   [facility] ([facility])    [address] [place] [id number] [phone]           Your Arrival times is X, We need you to be here at this time to get checked in and have the assistant get you ready for the provider, which can take about 15 minutes. Your appointmen time with your provider is at  X. Thank you              Who to contact     If you have questions or need follow up information about today's clinic visit or your schedule please contact [facility] directly at [phone].  Normal or non-critical lab and imaging results will be communicated to you by MyChart, letter or phone within 4 business days after the clinic has received the results. If you do not hear from us within 7 days, please contact the clinic through Niwahart or phone. If you have a critical or abnormal lab result, we will notify you by phone as soon as possible.  Submit refill requests through deeplocal or call your pharmacy and they will forward the refill request to us. Please allow 3 business days for your refill to be completed.          Additional Information About Your Visit        MyChart Information     deeplocal gives you secure access to your electronic health record. If you see a primary care provider, you can also send messages to your care team and make appointments.  If you have questions, please call your primary care clinic.  If you do not have a primary care provider, please call [phone] and they will assist you.        Care EveryWhere ID     This is your Care EveryWhere ID. This could be used by other organizations to access your [place] medical records  [id number]         Blood Pressure from Last 3 Encounters:   01/16/18 133/68   12/19/17 126/80   11/14/17 126/70    Weight from Last 3 Encounters:   01/16/18 74.8 kg (165 lb)   12/19/17 75.7 kg (166 lb 12.8 oz)   11/14/17 75.4 kg (166 lb 3.2 oz)              We Performed the Following     MELVIN PROGRESS NOTES REPORT     NEUROMUSCULAR RE-EDUCATION     THERAPEUTIC ACTIVITIES     THERAPEUTIC EXERCISES        Primary Care Provider Office Phone # Fax #    Michelle Verenice Monet [phone]       [address] [place] [address]        Equal Access to Services     CONRADO RODRÍGUEZ : Hadii aad ku hadasho Soomaali, waaxda luqadaha, qaybta kaalmada adeegyada, waxay idiin haysushilan ramiro baird . So [facility] [phone].    ATENCIÓN: Si alden steele, tiene a ellis disposición servicios gratuitos de asistencia lingüística. Llame al [phone].    We comply with applicable federal civil rights laws and [place] laws. We do not discriminate on the basis of race, color, national origin, age, disability, sex, sexual orientation, or gender identity.            Thank you!     Thank you for choosing [facility]  for your care. Our goal is always to provide you with excellent care. Hearing back from our patients is one way we can continue to improve our services. Please take a few minutes to complete the written survey that you may receive in the mail after your visit with us. Thank you!             Your Updated Medication List - Protect others around you: Learn how to safely use, store and throw away your medicines at www.disposemymeds.org.          This list is  accurate as of 3/2/18  9:33 AM.  Always use your most recent med list.                   Brand Name Dispense Instructions for use Diagnosis    aspirin 81 MG tablet     30 tablet    Take 1 tablet (81 mg) by mouth daily    S/P lumbar fusion       atorvastatin 40 MG tablet    LIPITOR    90 tablet    Take 1 tablet (40 mg) by mouth daily    Hyperlipidemia LDL goal <100, Diabetes mellitus with stage 3 chronic kidney disease (H)       B-D U/F 31G X 5 MM   Generic drug:  insulin pen needle     200 each    USE TWICE DAILY WITH BYETTA    Diabetes mellitus with stage 3 chronic kidney disease (H)       blood glucose monitoring meter device kit    no brand specified    1 kit    Use to test blood sugar daily or as directed.    Diabetes mellitus with stage 3 chronic kidney disease (H)       * blood glucose monitoring test strip    no brand specified    100 strip    Use to test blood sugars daily or as directed    Diabetes mellitus with stage 3 chronic kidney disease (H)       * ONETOUCH ULTRA test strip   Generic drug:  blood glucose monitoring     100 strip    USE TO TEST BLOOD SUGARS DAILY OR AS DIRECTED    Diabetes mellitus with stage 3 chronic kidney disease (H)       calcium 500 +D 500-400 MG-UNIT Tabs   Generic drug:  Calcium Carb-Cholecalciferol      Take 1 tablet by mouth daily.        exenatide 10 MCG/0.04ML injection    BYETTA 10 MCG PEN    7.2 mL    Inject 10 mcg Subcutaneous 2 times daily    Diabetes mellitus with stage 3 chronic kidney disease (H)       fish oil-omega-3 fatty acids 1000 MG capsule      Take 4 g by mouth daily.        ketorolac 0.5 % ophthalmic solution    ACULAR    5 mL    Place 1 drop Into the left eye 4 times daily Starting 3 days prior to surgery    Senile nuclear sclerosis, left       lisinopril 2.5 MG tablet    PRINIVIL/Zestril    90 tablet    Take 1 tablet (2.5 mg) by mouth daily    CKD (chronic kidney disease) stage 3, GFR 30-59 ml/min, Diabetes mellitus with stage 3 chronic kidney disease (H)        metFORMIN 500 MG tablet    GLUCOPHAGE    180 tablet    Take 1 tablet (500 mg) by mouth 2 times daily (with meals)    Diabetes mellitus with stage 3 chronic kidney disease (H)       * Multi-vitamin Tabs tablet      Take 1 tablet by mouth daily        * multivitamin Tabs tablet     30 each    Take 1 tablet by mouth daily.        ONETOUCH DELICA LANCETS 33G Misc     100 each    USE TO TEST BLOOD SUGAR DAILY OR AS DIRECTED    Diabetes mellitus with stage 3 chronic kidney disease (H)       polyethylene glycol powder    MIRALAX    510 g    Take 17 g (1 capful) by mouth 2 times daily as needed for constipation    Constipation, unspecified constipation type       prednisoLONE acetate 1 % ophthalmic susp    PRED FORTE    5 mL    Place 1 drop Into the left eye 4 times daily Start day after surgery    Senile nuclear sclerosis, left       * Notice:  This list has 4 medication(s) that are the same as other medications prescribed for you. Read the directions carefully, and ask your doctor or other care provider to review them with you.

## 2018-03-19 DIAGNOSIS — E11.22 DIABETES MELLITUS WITH STAGE 3 CHRONIC KIDNEY DISEASE (H): ICD-10-CM

## 2018-03-19 DIAGNOSIS — N18.30 DIABETES MELLITUS WITH STAGE 3 CHRONIC KIDNEY DISEASE (H): ICD-10-CM

## 2018-03-19 NOTE — TELEPHONE ENCOUNTER
Requested Prescriptions   Pending Prescriptions Disp Refills     metFORMIN (GLUCOPHAGE) 500 MG tablet [Pharmacy Med Name: METFORMIN HCL 500MG TABS]  Last Written Prescription Date:  9/22/2017  Last Fill Quantity: 180 tablet,  # refills: 1   Last office visit: 12/19/2017 with prescribing provider:  KATE Monet   Future Office Visit:   Next 5 appointments (look out 90 days)     Apr 17, 2018  8:40 AM CDT   SHORT with Michelle Monet MD   [facility] ([facility])    [address] [place] [id number]   [phone]               180 tablet 1     Sig: TAKE ONE TABLET BY MOUTH TWICE A DAY WITH MEALS    Biguanide Agents Passed    3/19/2018  9:47 AM       Passed - Blood pressure less than 140/90 in past 6 months    BP Readings from Last 3 Encounters:   01/16/18 133/68   12/19/17 126/80   11/14/17 126/70          Passed - Patient has documented LDL within the past 12 mos.    Recent Labs   Lab Test  09/22/17   1039   LDL  48          Passed - Patient has had a Microalbumin in the past 12 mos.    Recent Labs   Lab Test  11/14/17   1531   MICROL  12   UMALCR  9.31          Passed - Patient is age 10 or older       Passed - Patient has documented A1c within the specified period of time.    Recent Labs   Lab Test  12/19/17   0939   A1C  6.0          Passed - Patient's CR is NOT>1.4 OR Patient's EGFR is NOT<45 within past 12 mos.    Recent Labs   Lab Test  01/22/17   0618   GFRESTIMATED  71   GFRESTBLACK  86       Recent Labs   Lab Test  01/22/17   0618   CR  0.79            Passed - Patient does NOT have a diagnosis of CHF.       Passed - Patient is not pregnant       Passed - Patient has not had a positive pregnancy test within the past 12 mos.        Passed - Recent (6 mo) or future (30 days) visit within the authorizing provider's specialty    Patient had office visit in the last 6 months or has a visit in the next 30 days with authorizing provider or within the  "authorizing provider's specialty.  See \"Patient Info\" tab in inbasket, or \"Choose Columns\" in Meds & Orders section of the refill encounter.              "

## 2018-03-20 NOTE — TELEPHONE ENCOUNTER
Prescription approved per [facility] Refill Protocol.  Saira Killian RN

## 2018-05-16 ENCOUNTER — OFFICE VISIT (OUTPATIENT)
Dept: FAMILY MEDICINE | Facility: CLINIC | Age: 78
End: 2018-05-16
Payer: COMMERCIAL

## 2018-05-16 VITALS
WEIGHT: 171.6 LBS | HEART RATE: 75 BPM | HEIGHT: 63 IN | DIASTOLIC BLOOD PRESSURE: 70 MMHG | OXYGEN SATURATION: 96 % | TEMPERATURE: 98.7 F | SYSTOLIC BLOOD PRESSURE: 128 MMHG | BODY MASS INDEX: 30.41 KG/M2

## 2018-05-16 DIAGNOSIS — N18.30 DIABETES MELLITUS WITH STAGE 3 CHRONIC KIDNEY DISEASE (H): Primary | ICD-10-CM

## 2018-05-16 DIAGNOSIS — E78.5 HYPERLIPIDEMIA LDL GOAL <100: ICD-10-CM

## 2018-05-16 DIAGNOSIS — E11.22 DIABETES MELLITUS WITH STAGE 3 CHRONIC KIDNEY DISEASE (H): Primary | ICD-10-CM

## 2018-05-16 DIAGNOSIS — M25.552 HIP PAIN, LEFT: ICD-10-CM

## 2018-05-16 DIAGNOSIS — N18.30 CKD (CHRONIC KIDNEY DISEASE) STAGE 3, GFR 30-59 ML/MIN (H): ICD-10-CM

## 2018-05-16 LAB — HBA1C MFR BLD: 5.8 % (ref 0–5.6)

## 2018-05-16 PROCEDURE — 99214 OFFICE O/P EST MOD 30 MIN: CPT | Performed by: FAMILY MEDICINE

## 2018-05-16 PROCEDURE — 80048 BASIC METABOLIC PNL TOTAL CA: CPT | Performed by: FAMILY MEDICINE

## 2018-05-16 PROCEDURE — 36415 COLL VENOUS BLD VENIPUNCTURE: CPT | Performed by: FAMILY MEDICINE

## 2018-05-16 PROCEDURE — 83036 HEMOGLOBIN GLYCOSYLATED A1C: CPT | Performed by: FAMILY MEDICINE

## 2018-05-16 ASSESSMENT — ANXIETY QUESTIONNAIRES
6. BECOMING EASILY ANNOYED OR IRRITABLE: NOT AT ALL
2. NOT BEING ABLE TO STOP OR CONTROL WORRYING: NOT AT ALL
7. FEELING AFRAID AS IF SOMETHING AWFUL MIGHT HAPPEN: NOT AT ALL
GAD7 TOTAL SCORE: 0
3. WORRYING TOO MUCH ABOUT DIFFERENT THINGS: NOT AT ALL
5. BEING SO RESTLESS THAT IT IS HARD TO SIT STILL: NOT AT ALL
IF YOU CHECKED OFF ANY PROBLEMS ON THIS QUESTIONNAIRE, HOW DIFFICULT HAVE THESE PROBLEMS MADE IT FOR YOU TO DO YOUR WORK, TAKE CARE OF THINGS AT HOME, OR GET ALONG WITH OTHER PEOPLE: NOT DIFFICULT AT ALL
1. FEELING NERVOUS, ANXIOUS, OR ON EDGE: NOT AT ALL

## 2018-05-16 ASSESSMENT — PATIENT HEALTH QUESTIONNAIRE - PHQ9: 5. POOR APPETITE OR OVEREATING: NOT AT ALL

## 2018-05-16 NOTE — PROGRESS NOTES
SUBJECTIVE:   Maira Leon is a 78 year old female who presents to clinic today for the following health issues:      Diabetes Follow-up    Patient is checking blood sugars: once daily.  Results are as follows:          Av    Diabetic concerns: None     Symptoms of hypoglycemia (low blood sugar): none     Paresthesias (numbness or burning in feet) or sores: Yes  More with toes     Date of last diabetic eye exam:     Her A1C has decreased from 6.0 to 5.8.     BP Readings from Last 2 Encounters:   18 128/70   18 133/68     Hemoglobin A1C (%)   Date Value   2018 5.8 (H)   2017 6.0     LDL Cholesterol Calculated (mg/dL)   Date Value   2017 48   2016 58       Hyperlipidemia Follow-Up      Rate your low fat/cholesterol diet?: fair    Taking statin?  Yes, no muscle aches from statin    Other lipid medications/supplements?:  Fish oil/Omega 3,  without side effects    Hip Pain:  She states that she is frustrated with her hip pain. She states that she still cannot stand up very straight. She believes this is because she didn't finished her PT sessions for her back. She feels that this has been affecting her mood. She states that this has not been limiting her daily activities. She has been exercising, and has been able to play golf. This causes her pain with her activities, but does not keep her from doing them. She has been adjusting the activity so that she can continue. She states that it is also still causing her pain when she sleeps. She says that she was told by her orthopedist that she doesn't need to be seens unless there has been a significant change in her pain, but she is not sure this has happened. She does not think that injections are necessary at this time. She feels that she can improve this with exercise.     CKD:  She is wondering is there is any way that she could slow the progression of her CKD. She reports that she has a good friend who is on dialysis,  that told her there are things she can do or avoid to slow the progression. She states that she has not taken any pain medication for quite some time.     Weight Management:  She is wondering if she should be focusing on losing some weight as she has gained about 5 lbs. She is concerned about her cholesterol.         Amount of exercise or physical activity: 3-4 days/week for an average of greater than 60 minutes    Problems taking medications regularly: No    Medication side effects: none    Diet: low salt and low fat/cholesterol            Problem list and histories reviewed & adjusted, as indicated.  Additional history: as documented    Patient Active Problem List   Diagnosis     Osteoarthritis     Hyperlipidemia LDL goal <100     CKD (chronic kidney disease) stage 3, GFR 30-59 ml/min     Advance Care Planning     Diabetes mellitus with stage 3 chronic kidney disease (H)     Primary osteoarthritis of left knee     Lumbar spinal stenosis     Osteopenia of multiple sites     Superior glenoid labrum lesion of left shoulder     Tear of left supraspinatus tendon     Past Surgical History:   Procedure Laterality Date     APPENDECTOMY  age 4     ARTHROSCOPY KNEE Left 5/6/2016    Procedure: ARTHROSCOPY KNEE;  Surgeon: Ramin Ramirez MD;  Location:  OR     BACK SURGERY  01/20/2017    Lumbar fusion     ENT SURGERY      Tonsillectomy     GENITOURINARY SURGERY      bladder repair/sling     OPTICAL TRACKING SYSTEM FUSION SPINE POSTERIOR LUMBAR TWO LEVELS N/A 1/20/2017    Procedure: OPTICAL TRACKING SYSTEM FUSION SPINE POSTERIOR LUMBAR TWO LEVELS;  Surgeon: Buddy Davies MD;  Location: [facility] OR     PHACOEMULSIFICATION CLEAR CORNEA WITH STANDARD INTRAOCULAR LENS IMPLANT Right 2014     PHACOEMULSIFICATION CLEAR CORNEA WITH STANDARD INTRAOCULAR LENS IMPLANT Left 11/21/2017       Social History   Substance Use Topics     Smoking status: Former Smoker     Quit date: 1/1/1981     Smokeless tobacco: Never Used      Alcohol use Yes      Comment: 1 glass of wine per day     Family History   Problem Relation Age of Onset     OSTEOPOROSIS Mother      HEART DISEASE Father      CEREBROVASCULAR DISEASE Father      Musculoskeletal Disorder Father      gout     Genitourinary Problems Maternal Grandmother      CANCER Maternal Grandfather      CANCER Paternal Grandmother      CEREBROVASCULAR DISEASE Paternal Grandfather      Eye Disorder Brother      macular degeneration     Macular Degeneration Brother      DIABETES No family hx of      Hypertension No family hx of          Current Outpatient Prescriptions   Medication Sig Dispense Refill     aspirin 81 MG tablet Take 1 tablet (81 mg) by mouth daily 30 tablet 0     atorvastatin (LIPITOR) 40 MG tablet Take 1 tablet (40 mg) by mouth daily 90 tablet 3     B-D U/F insulin pen needle USE TWICE DAILY WITH BYETTA 200 each 2     blood glucose monitoring (NO BRAND SPECIFIED) meter device kit Use to test blood sugar daily or as directed. 1 kit 0     blood glucose monitoring (NO BRAND SPECIFIED) test strip Use to test blood sugars daily or as directed 100 strip 1     calcium-vitamin D (CALCIUM 500 +D) 500-125 MG-UNIT TABS Take 1 tablet by mouth daily.       exenatide (BYETTA 10 MCG PEN) 10 MCG/0.04ML injection Inject 10 mcg Subcutaneous 2 times daily 7.2 mL 3     fish oil-omega-3 fatty acids (FISH OIL) 1000 MG capsule Take 4 g by mouth daily.       lisinopril (PRINIVIL/ZESTRIL) 2.5 MG tablet Take 1 tablet (2.5 mg) by mouth daily 90 tablet 3     metFORMIN (GLUCOPHAGE) 500 MG tablet TAKE ONE TABLET BY MOUTH TWICE A DAY WITH MEALS 180 tablet 0     multivitamin (OCUVITE) TABS Take 1 tablet by mouth daily. 30 each 0     multivitamin, therapeutic with minerals (MULTI-VITAMIN) TABS tablet Take 1 tablet by mouth daily       ONETOUCH DELICA LANCETS 33G MISC USE TO TEST BLOOD SUGAR DAILY OR AS DIRECTED 100 each 3     ONETOUCH ULTRA test strip USE TO TEST BLOOD SUGARS DAILY OR AS DIRECTED 100 strip 3      "polyethylene glycol (MIRALAX) powder Take 17 g (1 capful) by mouth 2 times daily as needed for constipation 510 g 0     Allergies   Allergen Reactions     No Known Drug Allergies        Reviewed and updated as needed this visit by clinical staff  Tobacco  Allergies  Meds  Problems  Med Hx  Surg Hx  Fam Hx  Soc Hx        Reviewed and updated as needed this visit by Provider  Allergies  Meds  Problems         ROS:  Constitutional, HEENT, cardiovascular, pulmonary, gi and gu systems are negative, except as otherwise noted.    This document serves as a record of the services and decisions personally performed by KEN LLAMAS. It was created on his/her behalf by Belkys Cohen, a trained medical scribe. The creation of this document is based on the provider's statements to the medical scribe. Belkys Cohen, May 16, 2018 1:49 PM    OBJECTIVE:     /70 (BP Location: Right arm, Patient Position: Sitting, Cuff Size: Adult Large)  Pulse 75  Temp 98.7  F (37.1  C) (Oral)  Ht 1.6 m (5' 3\")  Wt 77.8 kg (171 lb 9.6 oz)  SpO2 96%  BMI 30.4 kg/m2  Body mass index is 30.4 kg/(m^2).  GENERAL: healthy, alert and no distress  RESP: lungs clear to auscultation - no rales, rhonchi or wheezes  CV: regular rate and rhythm, normal S1 S2, no S3 or S4, no murmur, click or rub, no peripheral edema and peripheral pulses strong  PSYCH: mentation appears normal, affect normal/bright    Diagnostic Test Results:  Results for orders placed or performed in visit on 05/16/18 (from the past 24 hour(s))   HEMOGLOBIN A1C   Result Value Ref Range    Hemoglobin A1C 5.8 (H) 0 - 5.6 %       ASSESSMENT/PLAN:     (E11.22,  N18.3) Diabetes mellitus with stage 3 chronic kidney disease (H)  (primary encounter diagnosis)  Comment: Well controlled on current medications.  Plan: BASIC METABOLIC PANEL, HEMOGLOBIN A1C, EYE EXAM        (SIMPLE-NONBILLABLE)        Continue current medications.    (N18.3) CKD (chronic kidney disease) stage 3, " GFR 30-59 ml/min  Comment: Stable.  Plan: BASIC METABOLIC PANEL        Continue to monitor.      (E78.5) Hyperlipidemia LDL goal <100  Comment:   Plan: cholesterol remains well controlled    (M25.552) Hip pain, left  Comment: status post left hip fracture  Plan: discussed consideration of evaluation with PT for further range of motion and strengthening.  Do not believe she would need to see ortho yet at this time, unless she feels her pain is worsening.  Ortho could offer injection and/or check of placement of hardware - although I do not believe this is a hardware concern at this time or I would have done an xray today.  Patient and I mutually believe perhaps this is simply frustration with the length of time it is taking to heal completely.          Patient Instructions     Doing some stretching before bed could help with some of the pain that you are experiencing in the night.     [facility]   Discharged by : Ashwini WITT CMA ([facility])    Paper scripts provided to patient : none      If you have any questions regarding your visit please contact your care team:     Team Gold                Clinic Hours Telephone Number     Dr. Dara Larsen, NP 7am-7pm  Monday - Thursday   7am-5pm  Fridays  [phone]   (Appointment scheduling available 24/7)     RN Line  [phone] option 2     Urgent Care - [facility] - 11am-9pm Monday-Friday Saturday-Sunday- 9am-5pm     [place] -   5pm-9pm Monday-Friday Saturday-Sunday- 9am-5pm    [phone] - [facility]    [phone] - [place]       For a Price Quote for your services, please call our Consumer Price Line at [phone].     What options do I have for visits at the clinic other than the traditional office visit?     To expand how we care for you, many of our providers are utilizing electronic visits (e-visits) and telephone visits, when  medically appropriate, for interactions with their patients rather than a visit in the clinic. We also offer nurse visits for many medical concerns. Just like any other service, we will bill your insurance company for this type of visit based on time spent on the phone with your provider. Not all insurance companies cover these visits. Please check with your medical insurance if this type of visit is covered. You will be responsible for any charges that are not paid by your insurance.   E-visits via [employer]: generally incur a $35.00 fee.     Telephone visits:  Time spent on the phone: *charged based on time that is spent on the phone in increments of 10 minutes. Estimated cost:   5-10 mins $30.00   11-20 mins. $59.00   21-30 mins. $85.00       Use Ignyta (secure email communication and access to your chart) to send your primary care provider a message or make an appointment. Ask someone on your Team how to sign up for Ignyta.     As always, Thank you for trusting us with your health care needs!      [facility]:    Scheduling Appointments  [place] [place]  Call: [phone]    [facility]  Call: [phone]    [facility]  Call: [phone]    For Gastroenterology referrals   [facility]   Clinics and Surgery Center, 4th Floor   [address] [place] [address]   Appointments: [phone]    WHERE TO GO FOR CARE?  Clinic    Make an appointment if you:       Are sick (cold, cough, flu, sore throat, earache or in pain).       Have a small injury (sprain, small cut, burn or broken bone).       Need a physical exam, Pap smear, vaccine or prescription refill.       Have questions about your health or medicines.    To reach us:      Call [phone] ([phone]). Open 24 hours every day. (For counseling services, call [phone].)    Log into Ignyta at [email or web address]. (Visit  [email or web address] to create an account.) Hospital emergency room    An emergency is a serious or life- threatening problem that must be treated right away.    Call 911 or get to the hospital if you have:      Very bad or sudden:            - Chest pain or pressure         - Bleeding         - Head or belly pain         - Dizziness or trouble seeing, walking or                          Speaking      Problems breathing      Blood in your vomit or you are coughing up blood      A major injury (knocked out, loss of a finger or limb, rape, broken bone protruding from skin)    A mental health crisis. (Or call the Mental Health Crisis line at 1-618.463.8513 or Suicide Prevention Hotline at 1-490.680.4232.)    Open 24 hours every day. You don't need an appointment.     Urgent care    Visit urgent care for sickness or small injuries when the clinic is closed. You don't need an appointment. To check hours or find an urgent care near you, visit [email or web address]. Online care    Get online [facility] from [facility] for more than 70 common problems, like colds, allergies and infections. Open 24 hours every day at:   [email or web address]   Need help deciding?    For advice about where to be seen, you may call your clinic and ask to speak with a nurse. We're here for you 24 hours every day.         If you are deaf or hard of hearing, please let us know. We provide many free services including sign language interpreters, oral interpreters, TTYs, telephone amplifiers, note takers and written materials.               Michelle Monet MD  [facility]    The information in this document, created by the medical scribe Belkys Cohen for me, accurately reflects the services I personally performed and the decisions made by me. I have reviewed and approved this document for accuracy prior to leaving the patient care area.

## 2018-05-16 NOTE — MR AVS SNAPSHOT
After Visit Summary   5/16/2018    Maira Leon    MRN: [id number]           Patient Information     Date Of Birth          1940        Visit Information        Provider Department      5/16/2018 1:20 PM Michelle Monet MD [facility]        Today's Diagnoses     Diabetes mellitus with stage 3 chronic kidney disease (H)    -  1    CKD (chronic kidney disease) stage 3, GFR 30-59 ml/min          Care Instructions    Doing some stretching before bed could help with some of the pain that you are experiencing in the night.     [facility]   Discharged by : Ashwini WITT CMA (AAMA)    Paper scripts provided to patient : none      If you have any questions regarding your visit please contact your care team:     Team Gold                Clinic Hours Telephone Number     Dr. Dara Larsen NP 7am-7pm  Monday - Thursday   7am-5pm  Fridays  [phone]   (Appointment scheduling available 24/7)     RN Line  [phone] option 2     Urgent Care - [place] - 11am-9pm Monday-Friday Saturday-Sunday- 9am-5pm     [place] -   5pm-9pm Monday-Friday Saturday-Sunday- 9am-5pm    [phone] - [place]    [phone] - [place]       For a Price Quote for your services, please call our Consumer Price Line at [phone].     What options do I have for visits at the clinic other than the traditional office visit?     To expand how we care for you, many of our providers are utilizing electronic visits (e-visits) and telephone visits, when medically appropriate, for interactions with their patients rather than a visit in the clinic. We also offer nurse visits for many medical concerns. Just like any other service, we will bill your insurance company for this type of visit based on time spent on the phone with your provider. Not all insurance companies  cover these visits. Please check with your medical insurance if this type of visit is covered. You will be responsible for any charges that are not paid by your insurance.   E-visits via walkbyhart: generally incur a $35.00 fee.     Telephone visits:  Time spent on the phone: *charged based on time that is spent on the phone in increments of 10 minutes. Estimated cost:   5-10 mins $30.00   11-20 mins. $59.00   21-30 mins. $85.00       Use SK biopharmaceuticalst (secure email communication and access to your chart) to send your primary care provider a message or make an appointment. Ask someone on your Team how to sign up for Wantful.     As always, Thank you for trusting us with your health care needs!      [facility]:    Scheduling Appointments  [place]  Call: [phone]    [facility]  Call: [phone]    [facility]  Call: [phone]    For Gastroenterology referrals   [facility]   Clinics and Surgery Center, 4th Floor   [address] [place] [address]   Appointments: [phone]    WHERE TO GO FOR CARE?  Clinic    Make an appointment if you:       Are sick (cold, cough, flu, sore throat, earache or in pain).       Have a small injury (sprain, small cut, burn or broken bone).       Need a physical exam, Pap smear, vaccine or prescription refill.       Have questions about your health or medicines.    To reach us:      Call [phone] ([phone]). Open 24 hours every day. (For counseling services, call [phone].)    Log into Wantful at Mode [email or web address].org. (Visit [email or web address] to create an account.) Hospital emergency room    An emergency is a serious or life- threatening problem that must be treated right away.    Call 319 or get to the hospital if you have:      Very bad or sudden:            - Chest pain or pressure         - Bleeding         - Head or belly pain         -  Dizziness or trouble seeing, walking or                          Speaking      Problems breathing      Blood in your vomit or you are coughing up blood      A major injury (knocked out, loss of a finger or limb, rape, broken bone protruding from skin)    A mental health crisis. (Or call the Mental Health Crisis line at 1-132.105.9166 or Suicide Prevention Hotline at 1-763.212.2283.)    Open 24 hours every day. You don't need an appointment.     Urgent care    Visit urgent care for sickness or small injuries when the clinic is closed. You don't need an appointment. To check hours or find an urgent care near you, visit [email or web address]. Online care    Get online care from [facility] for more than 70 common problems, like colds, allergies and infections. Open 24 hours every day at:   [email or web address]   Need help deciding?    For advice about where to be seen, you may call your clinic and ask to speak with a nurse. We're here for you 24 hours every day.         If you are deaf or hard of hearing, please let us know. We provide many free services including sign language interpreters, oral interpreters, TTYs, telephone amplifiers, note takers and written materials.                   Follow-ups after your visit        Follow-up notes from your care team     Return in about 6 months (around 11/16/2018) for Lab Work.      Who to contact     If you have questions or need follow up information about today's clinic visit or your schedule please contact [facility] directly at [phone].  Normal or non-critical lab and imaging results will be communicated to you by MyChart, letter or phone within 4 business days after the clinic has received the results. If you do not hear from us within 7 days, please contact the clinic through MyCadboxhart or phone. If you have a critical or abnormal lab result, we will notify you by phone as soon as possible.  Submit refill requests through Gotcha Ninjas or call your pharmacy and they  "will forward the refill request to us. Please allow 3 business days for your refill to be completed.          Additional Information About Your Visit        Roadsterhart Information     GuidesMob gives you secure access to your electronic health record. If you see a primary care provider, you can also send messages to your care team and make appointments. If you have questions, please call your primary care clinic.  If you do not have a primary care provider, please call [phone] and they will assist you.        Care EveryWhere ID     This is your Care EveryWhere ID. This could be used by other organizations to access your [place] medical records  [id number]        Your Vitals Were     Pulse Temperature Height Pulse Oximetry BMI (Body Mass Index)       75 98.7  F (37.1  C) (Oral) 5' 3\" (1.6 m) 96% 30.4 kg/m2        Blood Pressure from Last 3 Encounters:   05/16/18 128/70   01/16/18 133/68   12/19/17 126/80    Weight from Last 3 Encounters:   05/16/18 171 lb 9.6 oz (77.8 kg)   01/16/18 165 lb (74.8 kg)   12/19/17 166 lb 12.8 oz (75.7 kg)              We Performed the Following     BASIC METABOLIC PANEL     EYE EXAM (SIMPLE-NONBILLABLE)     HEMOGLOBIN A1C        Primary Care Provider Office Phone # Fax #    Michelle Verenice Monet [phone]       [address] [place] [address]        Equal Access to Services     CONRADO RODRÍGUEZ : Hadii aad ku hadasho Soomaali, waaxda luqadaha, qaybta kaalmada adeegyada, julio rice. So [facility] [phone].    ATENCIÓN: Si habla español, tiene a ellis disposición servicios gratuitos de asistencia lingüística. Llame al [phone].    We comply with applicable federal civil rights laws and [place] laws. We do not discriminate on the basis of race, color, national origin, age, disability, sex, sexual orientation, or gender identity.            Thank you!     Thank you for choosing [facility]  for your care. Our " goal is always to provide you with excellent care. Hearing back from our patients is one way we can continue to improve our services. Please take a few minutes to complete the written survey that you may receive in the mail after your visit with us. Thank you!             Your Updated Medication List - Protect others around you: Learn how to safely use, store and throw away your medicines at www.disposemymeds.org.          This list is accurate as of 5/16/18  2:09 PM.  Always use your most recent med list.                   Brand Name Dispense Instructions for use Diagnosis    aspirin 81 MG tablet     30 tablet    Take 1 tablet (81 mg) by mouth daily    S/P lumbar fusion       atorvastatin 40 MG tablet    LIPITOR    90 tablet    Take 1 tablet (40 mg) by mouth daily    Hyperlipidemia LDL goal <100, Diabetes mellitus with stage 3 chronic kidney disease (H)       B-D U/F 31G X 5 MM   Generic drug:  insulin pen needle     200 each    USE TWICE DAILY WITH BYETTA    Diabetes mellitus with stage 3 chronic kidney disease (H)       blood glucose monitoring meter device kit    no brand specified    1 kit    Use to test blood sugar daily or as directed.    Diabetes mellitus with stage 3 chronic kidney disease (H)       * blood glucose monitoring test strip    no brand specified    100 strip    Use to test blood sugars daily or as directed    Diabetes mellitus with stage 3 chronic kidney disease (H)       * ONETOUCH ULTRA test strip   Generic drug:  blood glucose monitoring     100 strip    USE TO TEST BLOOD SUGARS DAILY OR AS DIRECTED    Diabetes mellitus with stage 3 chronic kidney disease (H)       calcium 500 +D 500-400 MG-UNIT Tabs   Generic drug:  Calcium Carb-Cholecalciferol      Take 1 tablet by mouth daily.        exenatide 10 MCG/0.04ML injection    BYETTA 10 MCG PEN    7.2 mL    Inject 10 mcg Subcutaneous 2 times daily    Diabetes mellitus with stage 3 chronic kidney disease (H)       fish oil-omega-3 fatty acids  1000 MG capsule      Take 4 g by mouth daily.        lisinopril 2.5 MG tablet    PRINIVIL/Zestril    90 tablet    Take 1 tablet (2.5 mg) by mouth daily    CKD (chronic kidney disease) stage 3, GFR 30-59 ml/min, Diabetes mellitus with stage 3 chronic kidney disease (H)       metFORMIN 500 MG tablet    GLUCOPHAGE    180 tablet    TAKE ONE TABLET BY MOUTH TWICE A DAY WITH MEALS    Diabetes mellitus with stage 3 chronic kidney disease (H)       * Multi-vitamin Tabs tablet      Take 1 tablet by mouth daily        * multivitamin Tabs tablet     30 each    Take 1 tablet by mouth daily.        ONETOUCH DELICA LANCETS 33G Misc     100 each    USE TO TEST BLOOD SUGAR DAILY OR AS DIRECTED    Diabetes mellitus with stage 3 chronic kidney disease (H)       polyethylene glycol powder    MIRALAX    510 g    Take 17 g (1 capful) by mouth 2 times daily as needed for constipation    Constipation, unspecified constipation type       * Notice:  This list has 4 medication(s) that are the same as other medications prescribed for you. Read the directions carefully, and ask your doctor or other care provider to review them with you.

## 2018-05-16 NOTE — Clinical Note
Patient had an eye exam 1/4/2018 - documented in medical record. Not sure why this is not satisfying HM modifier.  Please evaluate

## 2018-05-16 NOTE — PATIENT INSTRUCTIONS
Doing some stretching before bed could help with some of the pain that you are experiencing in the night.     [facility]   Discharged by : Ashwini WITT CMA ([facility])    Paper scripts provided to patient : none      If you have any questions regarding your visit please contact your care team:     Team Gold                Clinic Hours Telephone Number     Dr. Dara Larsen, NP 7am-7pm  Monday - Thursday   7am-5pm  Fridays  [phone]   (Appointment scheduling available 24/7)     RN Line  [phone] option 2     Urgent Care - [place] - 11am-9pm Monday-Friday Saturday-Sunday- 9am-5pm     [place] -   5pm-9pm Monday-Friday Saturday-Sunday- 9am-5pm    [phone] - [place]    [phone] - [place]       For a Price Quote for your services, please call our Consumer Price Line at [phone].     What options do I have for visits at the clinic other than the traditional office visit?     To expand how we care for you, many of our providers are utilizing electronic visits (e-visits) and telephone visits, when medically appropriate, for interactions with their patients rather than a visit in the clinic. We also offer nurse visits for many medical concerns. Just like any other service, we will bill your insurance company for this type of visit based on time spent on the phone with your provider. Not all insurance companies cover these visits. Please check with your medical insurance if this type of visit is covered. You will be responsible for any charges that are not paid by your insurance.   E-visits via Adventi: generally incur a $35.00 fee.     Telephone visits:  Time spent on the phone: *charged based on time that is spent on the phone in increments of 10 minutes. Estimated cost:   5-10 mins $30.00   11-20 mins. $59.00   21-30 mins. $85.00       Use Adventi (secure email  communication and access to your chart) to send your primary care provider a message or make an appointment. Ask someone on your Team how to sign up for Grasswire.     As always, Thank you for trusting us with your health care needs!      [facility]:    Scheduling Appointments  [place]  Call: [phone]    [place]  Call: [phone]    [facility]  Call: [phone]    For Gastroenterology referrals   [facility]   Clinics and Surgery Center, 4th Floor   [address] [place] [address]   Appointments: [phone]    WHERE TO GO FOR CARE?  Clinic    Make an appointment if you:       Are sick (cold, cough, flu, sore throat, earache or in pain).       Have a small injury (sprain, small cut, burn or broken bone).       Need a physical exam, Pap smear, vaccine or prescription refill.       Have questions about your health or medicines.    To reach us:      Call [phone] ([phone]). Open 24 hours every day. (For counseling services, call [phone].)    Log into Grasswire at [email or web address]. (Visit [email or web address] to create an account.) Hospital emergency room    An emergency is a serious or life- threatening problem that must be treated right away.    Call 514 or get to the hospital if you have:      Very bad or sudden:            - Chest pain or pressure         - Bleeding         - Head or belly pain         - Dizziness or trouble seeing, walking or                          Speaking      Problems breathing      Blood in your vomit or you are coughing up blood      A major injury (knocked out, loss of a finger or limb, rape, broken bone protruding from skin)    A mental health crisis. (Or call the Mental Health Crisis line at 1-538.932.8538 or Suicide Prevention Hotline at 1-524.126.1865.)    Open 24 hours every day. You don't need an appointment.     Urgent care    Visit  urgent care for sickness or small injuries when the clinic is closed. You don't need an appointment. To check hours or find an urgent care near you, visit [email or web address]. Online care    Get online care from [facility] for more than 70 common problems, like colds, allergies and infections. Open 24 hours every day at:   [email or web address]   Need help deciding?    For advice about where to be seen, you may call your clinic and ask to speak with a nurse. We're here for you 24 hours every day.         If you are deaf or hard of hearing, please let us know. We provide many free services including sign language interpreters, oral interpreters, TTYs, telephone amplifiers, note takers and written materials.

## 2018-05-17 LAB
ANION GAP SERPL CALCULATED.3IONS-SCNC: 11 MMOL/L (ref 3–14)
BUN SERPL-MCNC: 25 MG/DL (ref 7–30)
CALCIUM SERPL-MCNC: 9.5 MG/DL (ref 8.5–10.1)
CHLORIDE SERPL-SCNC: 107 MMOL/L (ref 94–109)
CO2 SERPL-SCNC: 24 MMOL/L (ref 20–32)
CREAT SERPL-MCNC: 0.87 MG/DL (ref 0.52–1.04)
GFR SERPL CREATININE-BSD FRML MDRD: 63 ML/MIN/1.7M2
GLUCOSE SERPL-MCNC: 108 MG/DL (ref 70–99)
POTASSIUM SERPL-SCNC: 4.1 MMOL/L (ref 3.4–5.3)
SODIUM SERPL-SCNC: 142 MMOL/L (ref 133–144)

## 2018-05-17 ASSESSMENT — ANXIETY QUESTIONNAIRES: GAD7 TOTAL SCORE: 0

## 2018-05-17 ASSESSMENT — PATIENT HEALTH QUESTIONNAIRE - PHQ9: SUM OF ALL RESPONSES TO PHQ QUESTIONS 1-9: 0

## 2018-06-27 DIAGNOSIS — N18.30 DIABETES MELLITUS WITH STAGE 3 CHRONIC KIDNEY DISEASE (H): ICD-10-CM

## 2018-06-27 DIAGNOSIS — E11.22 DIABETES MELLITUS WITH STAGE 3 CHRONIC KIDNEY DISEASE (H): ICD-10-CM

## 2018-06-27 NOTE — TELEPHONE ENCOUNTER
"Requested Prescriptions   Pending Prescriptions Disp Refills     metFORMIN (GLUCOPHAGE) 500 MG tablet [Pharmacy Med Name: METFORMIN HCL 500MG TABS]  Last Written Prescription Date:  3/20/2018  Last Fill Quantity: 180 tabs,  # refills: 3/20/2018   Last office visit: 5/16/2018 with prescribing provider:  Chiki   Future Office Visit:     180 tablet 0     Sig: TAKE ONE TABLET BY MOUTH TWICE A DAY WITH MEALS    Biguanide Agents Passed    6/27/2018  5:33 AM       Passed - Blood pressure less than 140/90 in past 6 months    BP Readings from Last 3 Encounters:   05/16/18 128/70   01/16/18 133/68   12/19/17 126/80                Passed - Patient has documented LDL within the past 12 mos.    Recent Labs   Lab Test  09/22/17   1039   LDL  48            Passed - Patient has had a Microalbumin in the past 12 mos.    Recent Labs   Lab Test  11/14/17   1531   MICROL  12   UMALCR  9.31            Passed - Patient is age 10 or older       Passed - Patient has documented A1c within the specified period of time.    If HgbA1C is 8 or greater, it needs to be on file within the past 3 months.  If less than 8, must be on file within the past 6 months.     Recent Labs   Lab Test  05/16/18   1308   A1C  5.8*            Passed - Patient's CR is NOT>1.4 OR Patient's EGFR is NOT<45 within past 12 mos.    Recent Labs   Lab Test  05/16/18   1308   GFRESTIMATED  63   GFRESTBLACK  77       Recent Labs   Lab Test  05/16/18   1308   CR  0.87            Passed - Patient does NOT have a diagnosis of CHF.       Passed - Patient is not pregnant       Passed - Patient has not had a positive pregnancy test within the past 12 mos.        Passed - Recent (6 mo) or future (30 days) visit within the authorizing provider's specialty    Patient had office visit in the last 6 months or has a visit in the next 30 days with authorizing provider or within the authorizing provider's specialty.  See \"Patient Info\" tab in inbasket, or \"Choose Columns\" in Meds & " Orders section of the refill encounter.

## 2018-07-13 ENCOUNTER — TELEPHONE (OUTPATIENT)
Dept: FAMILY MEDICINE | Facility: CLINIC | Age: 78
End: 2018-07-13

## 2018-07-13 NOTE — TELEPHONE ENCOUNTER
Called patient and left a VM message to call clinic and reschedule their appointment on 11/15/2018 w/ Dr Monet.    Roxy Lauren

## 2018-07-16 DIAGNOSIS — N18.30 DIABETES MELLITUS WITH STAGE 3 CHRONIC KIDNEY DISEASE (H): ICD-10-CM

## 2018-07-16 DIAGNOSIS — E11.22 DIABETES MELLITUS WITH STAGE 3 CHRONIC KIDNEY DISEASE (H): ICD-10-CM

## 2018-07-16 RX ORDER — FLURBIPROFEN SODIUM 0.3 MG/ML
SOLUTION/ DROPS OPHTHALMIC
Qty: 200 EACH | Refills: 2 | Status: SHIPPED | OUTPATIENT
Start: 2018-07-16 | End: 2018-11-01

## 2018-07-16 NOTE — TELEPHONE ENCOUNTER
Reason for Call:  Other call back    Detailed comments: Patient is calling because she has not heard back about this yet. She says that pharmacy she needs this sent to is an hour drive, so if she doesn't hear back shortly she will continue to call. Writer advised patient that we will be calling her as soon as possible.    Phone Number Patient can be reached at: Other phone number:  [phone]*    Best Time: asap    Can we leave a detailed message on this number? YES    Call taken on 7/16/2018 at 3:05 PM by Cezar Lemons

## 2018-07-16 NOTE — TELEPHONE ENCOUNTER
"Route to providers for refill please.  RN unable to refill due to  \"The following order cannot be changed. It has unsigned modifications.\"    Riddhi Cannon RN    "

## 2018-07-16 NOTE — TELEPHONE ENCOUNTER
Reason for Call:  Other     Detailed comments: patient is out of town and did not bring enough pin needles with her.  Patient is needing a script sent to [facility] [address], [place], phone [phone]    Phone Number Patient can be reached at: [phone]; Patient does not have any needles for today so needs this ASAP    Best Time: any    Can we leave a detailed message on this number? YES    Call taken on 7/16/2018 at 9:07 AM by Roxy Lauren

## 2018-07-16 NOTE — TELEPHONE ENCOUNTER
Reason for Call:  Other call back    Detailed comments: Patient calling to check status of refill. Please advise.     Phone Number Patient can be reached at: [phone]    Best Time: n/a    Can we leave a detailed message on this number?     Call taken on 7/16/2018 at 4:05 PM by Alyce Lemus

## 2018-07-16 NOTE — TELEPHONE ENCOUNTER
Reason for Call:  Other prescription    Detailed comments: Patient is calling wanting to know if the prescription has been sent to the pharmacy. She would like to have the medication sent to the  [facility]'s in [place]. The address is  [address] [place] [address] and phone [phone]. Please call patient when medication is sent to the pharmacy.     Phone Number Patient can be reached at: Other Number: [phone]    Best Time: Anytime     Can we leave a detailed message on this number? YES    Call taken on 7/16/2018 at 2:09 PM by Kellen Crews

## 2018-07-17 NOTE — TELEPHONE ENCOUNTER
It looks like [facility]os[facility] to [facility] on 7/16/18.  It should have been faxed to [facility] in [place].  I faxed to [facility] [place], [place] at fax [phone].    Roxy Lauren

## 2018-10-19 ENCOUNTER — DOCUMENTATION ONLY (OUTPATIENT)
Dept: LAB | Facility: CLINIC | Age: 78
End: 2018-10-19

## 2018-10-19 DIAGNOSIS — E11.22 DIABETES MELLITUS WITH STAGE 3 CHRONIC KIDNEY DISEASE (H): Primary | ICD-10-CM

## 2018-10-19 DIAGNOSIS — N18.30 DIABETES MELLITUS WITH STAGE 3 CHRONIC KIDNEY DISEASE (H): Primary | ICD-10-CM

## 2018-10-19 NOTE — PROGRESS NOTES
This patient has a future lab only appointment on 11/12/2018 and needs orders. Please sign the pended labs taken from the overdue  and make any needed changes. Thanks felipe

## 2018-10-31 DIAGNOSIS — E11.22 DIABETES MELLITUS WITH STAGE 3 CHRONIC KIDNEY DISEASE (H): ICD-10-CM

## 2018-10-31 DIAGNOSIS — N18.30 DIABETES MELLITUS WITH STAGE 3 CHRONIC KIDNEY DISEASE (H): ICD-10-CM

## 2018-10-31 LAB
CHOLEST SERPL-MCNC: 163 MG/DL
HBA1C MFR BLD: 5.7 % (ref 0–5.6)
HDLC SERPL-MCNC: 57 MG/DL
LDLC SERPL CALC-MCNC: 78 MG/DL
NONHDLC SERPL-MCNC: 106 MG/DL
TRIGL SERPL-MCNC: 141 MG/DL

## 2018-10-31 PROCEDURE — 83036 HEMOGLOBIN GLYCOSYLATED A1C: CPT | Performed by: FAMILY MEDICINE

## 2018-10-31 PROCEDURE — 80061 LIPID PANEL: CPT | Performed by: FAMILY MEDICINE

## 2018-10-31 PROCEDURE — 36415 COLL VENOUS BLD VENIPUNCTURE: CPT | Performed by: FAMILY MEDICINE

## 2018-11-01 ENCOUNTER — OFFICE VISIT (OUTPATIENT)
Dept: FAMILY MEDICINE | Facility: CLINIC | Age: 78
End: 2018-11-01
Payer: COMMERCIAL

## 2018-11-01 VITALS
HEIGHT: 63 IN | TEMPERATURE: 97.7 F | BODY MASS INDEX: 30.9 KG/M2 | DIASTOLIC BLOOD PRESSURE: 84 MMHG | HEART RATE: 66 BPM | WEIGHT: 174.4 LBS | SYSTOLIC BLOOD PRESSURE: 154 MMHG

## 2018-11-01 DIAGNOSIS — Z00.00 ROUTINE GENERAL MEDICAL EXAMINATION AT A HEALTH CARE FACILITY: Primary | ICD-10-CM

## 2018-11-01 DIAGNOSIS — M21.612 BILATERAL BUNIONS: ICD-10-CM

## 2018-11-01 DIAGNOSIS — E11.22 DIABETES MELLITUS WITH STAGE 3 CHRONIC KIDNEY DISEASE (H): ICD-10-CM

## 2018-11-01 DIAGNOSIS — M21.611 BILATERAL BUNIONS: ICD-10-CM

## 2018-11-01 DIAGNOSIS — M25.562 LEFT KNEE PAIN, UNSPECIFIED CHRONICITY: ICD-10-CM

## 2018-11-01 DIAGNOSIS — M20.41 HAMMER TOE OF RIGHT FOOT: ICD-10-CM

## 2018-11-01 DIAGNOSIS — Z23 NEED FOR PROPHYLACTIC VACCINATION AND INOCULATION AGAINST INFLUENZA: ICD-10-CM

## 2018-11-01 DIAGNOSIS — R32 INCONTINENCE IN FEMALE: ICD-10-CM

## 2018-11-01 DIAGNOSIS — N18.30 CKD (CHRONIC KIDNEY DISEASE) STAGE 3, GFR 30-59 ML/MIN (H): ICD-10-CM

## 2018-11-01 DIAGNOSIS — E78.5 HYPERLIPIDEMIA LDL GOAL <100: ICD-10-CM

## 2018-11-01 DIAGNOSIS — M15.0 PRIMARY OSTEOARTHRITIS INVOLVING MULTIPLE JOINTS: ICD-10-CM

## 2018-11-01 DIAGNOSIS — N18.30 DIABETES MELLITUS WITH STAGE 3 CHRONIC KIDNEY DISEASE (H): ICD-10-CM

## 2018-11-01 DIAGNOSIS — M85.89 OSTEOPENIA OF MULTIPLE SITES: ICD-10-CM

## 2018-11-01 PROCEDURE — 99213 OFFICE O/P EST LOW 20 MIN: CPT | Mod: 25 | Performed by: FAMILY MEDICINE

## 2018-11-01 PROCEDURE — G0438 PPPS, INITIAL VISIT: HCPCS | Performed by: FAMILY MEDICINE

## 2018-11-01 PROCEDURE — G0008 ADMIN INFLUENZA VIRUS VAC: HCPCS | Performed by: FAMILY MEDICINE

## 2018-11-01 PROCEDURE — 82043 UR ALBUMIN QUANTITATIVE: CPT | Performed by: FAMILY MEDICINE

## 2018-11-01 PROCEDURE — 90662 IIV NO PRSV INCREASED AG IM: CPT | Performed by: FAMILY MEDICINE

## 2018-11-01 RX ORDER — ATORVASTATIN CALCIUM 40 MG/1
40 TABLET, FILM COATED ORAL DAILY
Qty: 90 TABLET | Refills: 3 | Status: SHIPPED | OUTPATIENT
Start: 2018-11-01 | End: 2019-11-22

## 2018-11-01 ASSESSMENT — ENCOUNTER SYMPTOMS
FREQUENCY: 1
CHILLS: 0
DIZZINESS: 0
ABDOMINAL PAIN: 0
HEMATOCHEZIA: 0
COUGH: 0
HEMATURIA: 0
FEVER: 0
EYE PAIN: 0
CONSTIPATION: 0
DIARRHEA: 0
NERVOUS/ANXIOUS: 0

## 2018-11-01 ASSESSMENT — ACTIVITIES OF DAILY LIVING (ADL): CURRENT_FUNCTION: NO ASSISTANCE NEEDED

## 2018-11-01 NOTE — TELEPHONE ENCOUNTER
"Requested Prescriptions   Pending Prescriptions Disp Refills     atorvastatin (LIPITOR) 40 MG tablet [Pharmacy Med Name: ATORVASTATIN CALCIUM 40MG TABS]  This may be a duplicate refill request  Last Written Prescription Date:  11/1/2018  Last Fill Quantity: 90 tabs,  # refills: 3   Last office visit: 11/1/2018 with prescribing provider:  Chiki   Future Office Visit:     90 tablet 3     Sig: TAKE ONE TABLET BY MOUTH EVERY DAY    Statins Protocol Passed    11/1/2018  3:06 PM       Passed - LDL on file in past 12 months    Recent Labs   Lab Test  10/31/18   0713   LDL  78            Passed - No abnormal creatine kinase in past 12 months    No lab results found.            Passed - Recent (12 mo) or future (30 days) visit within the authorizing provider's specialty    Patient had office visit in the last 12 months or has a visit in the next 30 days with authorizing provider or within the authorizing provider's specialty.  See \"Patient Info\" tab in inbasket, or \"Choose Columns\" in Meds & Orders section of the refill encounter.             Passed - Patient is age 18 or older       Passed - No active pregnancy on record       Passed - No positive pregnancy test in past 12 months        BYETTA 10 MCG PEN 10 MCG/0.04ML injection [Pharmacy Med Name: BYETTA 10 MCG PEN 10 SOPN]  Last Written Prescription Date:  9/22/2017  Last Fill Quantity: 7.2 ml,  # refills: 3   Last office visit: 11/1/2018 with prescribing provider:  Chiki   Future Office Visit:     4.8 mL 0     Sig: INJECT 10MCG UNDER THE SKIN TWO TIMES A DAY    GLP-1 Agonists Protocol Failed    11/1/2018  3:06 PM       Failed - Blood pressure less than 140/90 in past 6 months    BP Readings from Last 3 Encounters:   11/01/18 154/84   05/16/18 128/70   01/16/18 133/68                Passed - LDL on file in past 12 months    Recent Labs   Lab Test  10/31/18   0713   LDL  78            Passed - Microalbumin on file in past 12 months    Recent Labs   Lab Test  11/14/17   " "1531   MICROL  12   UMALCR  9.31            Passed - HgbA1C in past 3 or 6 months    If HgbA1C is 8 or greater, it needs to be on file within the past 3 months.  If less than 8, must be on file within the past 6 months.     Recent Labs   Lab Test  10/31/18   0713   A1C  5.7*            Passed - Patient is age 18 or older       Passed - No active pregnancy on record       Passed - Normal serum creatinine on file in past 12 months    Recent Labs   Lab Test  05/16/18   1308   CR  0.87            Passed - No positive pregnancy test in past 12 months       Passed - Recent (6 mo) or future (30 days) visit within the authorizing provider's specialty    Patient had office visit in the last 6 months or has a visit in the next 30 days with authorizing provider.  See \"Patient Info\" tab in inbasket, or \"Choose Columns\" in Meds & Orders section of the refill encounter.            lisinopril (PRINIVIL/ZESTRIL) 2.5 MG tablet [Pharmacy Med Name: LISINOPRIL 2.5MG TABS]  Last Written Prescription Date:  9/22/2017  Last Fill Quantity: 90 tabs,  # refills: 3   Last office visit: 11/1/2018 with prescribing provider:  Chiki   Future Office Visit:     90 tablet 3     Sig: TAKE ONE TABLET BY MOUTH EVERY DAY    ACE Inhibitors (Including Combos) Protocol Failed    11/1/2018  3:06 PM       Failed - Blood pressure under 140/90 in past 12 months    BP Readings from Last 3 Encounters:   11/01/18 154/84   05/16/18 128/70   01/16/18 133/68                Passed - Recent (12 mo) or future (30 days) visit within the authorizing provider's specialty    Patient had office visit in the last 12 months or has a visit in the next 30 days with authorizing provider or within the authorizing provider's specialty.  See \"Patient Info\" tab in inbasket, or \"Choose Columns\" in Meds & Orders section of the refill encounter.             Passed - Patient is age 18 or older       Passed - No active pregnancy on record       Passed - Normal serum creatinine on file " in past 12 months    Recent Labs   Lab Test  05/16/18   1308   CR  0.87            Passed - Normal serum potassium on file in past 12 months    Recent Labs   Lab Test  05/16/18   1308   POTASSIUM  4.1            Passed - No positive pregnancy test in past 12 months

## 2018-11-01 NOTE — MR AVS SNAPSHOT
After Visit Summary   11/1/2018    Maira Leon    MRN: [id number]           Patient Information     Date Of Birth          1940        Visit Information        Provider Department      11/1/2018 12:40 PM Michlele Monet MD [facility]        Today's Diagnoses     Routine general medical examination at a health care facility    -  1    Diabetes mellitus with stage 3 chronic kidney disease (H)        Hyperlipidemia LDL goal <100        Primary osteoarthritis involving multiple joints        Osteopenia of multiple sites        Need for prophylactic vaccination and inoculation against influenza        Bilateral bunions        Hammer toe of right foot        Left knee pain, unspecified chronicity          Care Instructions      Preventive Health Recommendations    See your health care provider every year to    Review health changes.     Discuss preventive care.      Review your medicines if your doctor has prescribed any.      You no longer need a yearly Pap test unless you've had an abnormal Pap test in the past 10 years. If you have vaginal symptoms, such as bleeding or discharge, be sure to talk with your provider about a Pap test.      Every 1 to 2 years, have a mammogram.  If you are over 69, talk with your health care provider about whether or not you want to continue having screening mammograms.      Every 10 years, have a colonoscopy. Or, have a yearly FIT test (stool test). These exams will check for colon cancer.       Have a cholesterol test every 5 years, or more often if your doctor advises it.       Have a diabetes test (fasting glucose) every three years. If you are at risk for diabetes, you should have this test more often.       At age 65, have a bone density scan (DEXA) to check for osteoporosis (brittle bone disease).    Shots:    Get a flu shot each year.    Get a tetanus shot every 10 years.    Talk to your doctor about your pneumonia vaccines.  There are now two you should receive - Pneumovax (PPSV 23) and Prevnar (PCV 13).    Talk to your pharmacist about the shingles vaccine.    Talk to your doctor about the hepatitis B vaccine.    Nutrition:     Eat at least 5 servings of fruits and vegetables each day.      Eat whole-grain bread, whole-wheat pasta and brown rice instead of white grains and rice.      Get adequate about Calcium and Vitamin D.     Lifestyle    Exercise at least 150 minutes a week (30 minutes a day, 5 days a week). This will help you control your weight and prevent disease.      Limit alcohol to one drink per day.      No smoking.       Wear sunscreen to prevent skin cancer.       See your dentist twice a year for an exam and cleaning.      See your eye doctor every 1 to 2 years to screen for conditions such as glaucoma, macular degeneration, cataracts, etc.    Personalized Prevention Plan  You are due for the preventive services outlined below.  Your care team is available to assist you in scheduling these services.  If you have already completed any of these items, please share that information with your care team to update in your medical record.    Health Maintenance Due   Topic Date Due     Flu Vaccine (1) 09/01/2018     Diabetic Foot Exam - yearly  11/14/2018     Microalbumin Lab - yearly  11/14/2018     [facility]   Discharged by : Shlomo Lerner MA  Paper scripts provided to patient : none     If you have any questions regarding your visit please contact your care team:     Team Gold                Clinic Hours Telephone Number     Dr. Dara Larsen, CNP  Anila Sanchez, CNP 7am-7pm  Monday - Thursday   7am-5pm  Fridays  [phone]   (Appointment scheduling available 24/7)     RN Line  [phone] option 2     Urgent Care - [place] and [place] [facility] - 11am-9pm Monday-Friday Saturday-Sunday- 9am-5pm     [place] -   5pm-9pm  Monday-Friday Saturday-Sunday- 9am-5pm    [phone] - [place]    [phone] - [place]       For a Price Quote for your services, please call our Consumer Price Line at [phone].     What options do I have for visits at the clinic other than the traditional office visit?     To expand how we care for you, many of our providers are utilizing electronic visits (e-visits) and telephone visits, when medically appropriate, for interactions with their patients rather than a visit in the clinic. We also offer nurse visits for many medical concerns. Just like any other service, we will bill your insurance company for this type of visit based on time spent on the phone with your provider. Not all insurance companies cover these visits. Please check with your medical insurance if this type of visit is covered. You will be responsible for any charges that are not paid by your insurance.   E-visits via extraTKT: generally incur a $35.00 fee.     Telephone visits:  Time spent on the phone: *charged based on time that is spent on the phone in increments of 10 minutes. Estimated cost:   5-10 mins $30.00   11-20 mins. $59.00   21-30 mins. $85.00       Use Displairhart (secure email communication and access to your chart) to send your primary care provider a message or make an appointment. Ask someone on your Team how to sign up for extraTKT.     As always, Thank you for trusting us with your health care needs!      [facility]:    Scheduling Appointments  [facility]  Call: [phone]    [facility]  Call: [phone]    [facility]  Call: [phone]    For Gastroenterology referrals   [facility]   Clinics and Surgery Center, 4th Floor   [address] [place] [address]   Appointments: [phone]    WHERE TO GO FOR CARE?  Clinic    Make an appointment if you:       Are sick (cold, cough, flu, sore  throat, earache or in pain).       Have a small injury (sprain, small cut, burn or broken bone).       Need a physical exam, Pap smear, vaccine or prescription refill.       Have questions about your health or medicines.    To reach us:      Call [phone] ([phone]). Open 24 hours every day. (For counseling services, call [phone].)    Log into [facility]. (Visit Asthmatx to create an account.) Hospital emergency room    An emergency is a serious or life- threatening problem that must be treated right away.    Call 911 or get to the hospital if you have:      Very bad or sudden:            - Chest pain or pressure         - Bleeding         - Head or belly pain         - Dizziness or trouble seeing, walking or                          Speaking      Problems breathing      Blood in your vomit or you are coughing up blood      A major injury (knocked out, loss of a finger or limb, rape, broken bone protruding from skin)    A mental health crisis. (Or call the Mental Health Crisis line at 1-595.586.7553 or Suicide Prevention Hotline at 1-693.284.4675.)    Open 24 hours every day. You don't need an appointment.     Urgent care    Visit urgent care for sickness or small injuries when the clinic is closed. You don't need an appointment. To check hours or find an urgent care near you, visit [email or web address]. Online care    Get online care from [facility] for more than 70 common problems, like colds, allergies and infections. Open 24 hours every day at:   [email or web address]   Need help deciding?    For advice about where to be seen, you may call your clinic and ask to speak with a nurse. We're here for you 24 hours every day.         If you are deaf or hard of hearing, please let us know. We provide many free services including sign language interpreters, oral interpreters, TTYs, telephone amplifiers, note takers and written materials.                   Follow-ups after your visit         Additional Services     ORTHO  REFERRAL       [facility] is referring you to the Orthopedic  Services at [facility].       The Counts include 234 beds at the [facility] Representative will assist you in the coordination of your Orthopedic and Musculoskeletal Care as prescribed by your physician.    The  Representative will call you within 1 business day to help schedule your appointment, or you may contact the Counts include 234 beds at the [facility] Representative at:    All areas ~ [phone]     Type of Referral : Podiatry / Foot & Ankle Surgery - Dr. Boaz Rowe please  Non Surgical / Sport Medicine - Dr. Tate      Timeframe requested: Routine    Coverage of these services is subject to the terms and limitations of your health insurance plan.  Please call member services at your health plan with any benefit or coverage questions.      If X-rays, CT or MRI's have been performed, please contact the facility where they were done to arrange for , prior to your scheduled appointment.  Please bring this referral request to your appointment and present it to your specialist.                  Follow-up notes from your care team     Return in about 6 months (around 5/1/2019) for Diabetes.      Your next 10 appointments already scheduled     Nov 07, 2018  8:40 AM CST   New Patient Visit with Elisabeth Estrada MD   [facility] ([facility])    [facility]  [address] [address] [phone]              Future tests that were ordered for you today     Open Future Orders        Priority Expected Expires Ordered    Albumin Random Urine Quantitative with Creat Ratio Routine 10/1/2019 10/31/2019 10/31/2018            Who to contact     If you have questions or need follow up information about today's clinic visit or your schedule please contact [facility] directly at  "[phone].  Normal or non-critical lab and imaging results will be communicated to you by MyChart, letter or phone within 4 business days after the clinic has received the results. If you do not hear from us within 7 days, please contact the clinic through Attunityhart or phone. If you have a critical or abnormal lab result, we will notify you by phone as soon as possible.  Submit refill requests through ShopText or call your pharmacy and they will forward the refill request to us. Please allow 3 business days for your refill to be completed.          Additional Information About Your Visit        Attunityhart Information     ShopText gives you secure access to your electronic health record. If you see a primary care provider, you can also send messages to your care team and make appointments. If you have questions, please call your primary care clinic.  If you do not have a primary care provider, please call [phone] and they will assist you.        Care EveryWhere ID     This is your Care EveryWhere ID. This could be used by other organizations to access your [facility] medical records  [id number]        Your Vitals Were     Pulse Temperature Height BMI (Body Mass Index)          66 97.7  F (36.5  C) (Oral) 5' 3\" (1.6 m) 30.89 kg/m2         Blood Pressure from Last 3 Encounters:   11/01/18 154/84   05/16/18 128/70   01/16/18 133/68    Weight from Last 3 Encounters:   11/01/18 174 lb 6.4 oz (79.1 kg)   05/16/18 171 lb 9.6 oz (77.8 kg)   01/16/18 165 lb (74.8 kg)              We Performed the Following          ADMIN VACCINE, FIRST [64290]     Albumin Random Urine Quantitative with Creat Ratio     FLU VACCINE, INCREASED ANTIGEN, PRESV FREE, AGE 65+ [17359]     ORTHO  REFERRAL          Today's Medication Changes          These changes are accurate as of 11/1/18  1:41 PM.  If you have any questions, ask your nurse or doctor.               These medicines have changed or have updated prescriptions.        " Dose/Directions    metFORMIN 500 MG tablet   Commonly known as:  GLUCOPHAGE   This may have changed:  See the new instructions.   Used for:  Diabetes mellitus with stage 3 chronic kidney disease (H)   Changed by:  Michelle Monet MD        Dose:  500 mg   Take 1 tablet (500 mg) by mouth 2 times daily (with meals)   Quantity:  180 tablet   Refills:  3            Where to get your medicines      These medications were sent to [facility] [address] [place] [address].  [address] [facility] [address]     Phone:  [phone]     atorvastatin 40 MG tablet    Calcium Carb-Cholecalciferol 500-400 MG-UNIT Tabs    insulin pen needle 31G X 5 MM    metFORMIN 500 MG tablet                Primary Care Provider Office Phone # Fax #    Michelle Monet [phone]       [address] [place] [address]        Equal Access to Services     [facility]: Hadii bret mary hadasho Soomaali, waaxda luqadaha, qaybta kaalmada adeegyada, waxay idiin hayaan ramiro kharaeleonora baird . So [facility] [phone].    ATENCIÓN: Si habla español, tiene a ellis disposición servicios gratuitos de asistencia lingüística. [facility] [phone].    We comply with applicable federal civil rights laws and [place] laws. We do not discriminate on the basis of race, color, national origin, age, disability, sex, sexual orientation, or gender identity.            Thank you!     Thank you for choosing [facility]  for your care. Our goal is always to provide you with excellent care. Hearing back from our patients is one way we can continue to improve our services. Please take a few minutes to complete the written survey that you may receive in the mail after your visit with us. Thank you!             Your Updated Medication List - Protect others around you: Learn how to safely use, store and throw away your medicines at www.disposemymeds.org.          This list is  accurate as of 11/1/18  1:41 PM.  Always use your most recent med list.                   Brand Name Dispense Instructions for use Diagnosis    aspirin 81 MG tablet     30 tablet    Take 1 tablet (81 mg) by mouth daily    S/P lumbar fusion       atorvastatin 40 MG tablet    LIPITOR    90 tablet    Take 1 tablet (40 mg) by mouth daily    Hyperlipidemia LDL goal <100, Diabetes mellitus with stage 3 chronic kidney disease (H)       blood glucose monitoring meter device kit    no brand specified    1 kit    Use to test blood sugar daily or as directed.    Diabetes mellitus with stage 3 chronic kidney disease (H)       * blood glucose monitoring test strip    no brand specified    100 strip    Use to test blood sugars daily or as directed    Diabetes mellitus with stage 3 chronic kidney disease (H)       * ONETOUCH ULTRA test strip   Generic drug:  blood glucose monitoring     100 strip    USE TO TEST BLOOD SUGARS DAILY OR AS DIRECTED    Diabetes mellitus with stage 3 chronic kidney disease (H)       Calcium Carb-Cholecalciferol 500-400 MG-UNIT Tabs    calcium 500 +D    100 tablet    Take 1 tablet by mouth daily    Osteopenia of multiple sites       exenatide 10 MCG/0.04ML injection    BYETTA 10 MCG PEN    7.2 mL    Inject 10 mcg Subcutaneous 2 times daily    Diabetes mellitus with stage 3 chronic kidney disease (H)       fish oil-omega-3 fatty acids 1000 MG capsule      Take 4 g by mouth daily.        insulin pen needle 31G X 5 MM    B-D U/F    200 each    USE TWICE DAILY WITH BYETTA    Diabetes mellitus with stage 3 chronic kidney disease (H)       lisinopril 2.5 MG tablet    PRINIVIL/Zestril    90 tablet    Take 1 tablet (2.5 mg) by mouth daily    CKD (chronic kidney disease) stage 3, GFR 30-59 ml/min (H), Diabetes mellitus with stage 3 chronic kidney disease (H)       metFORMIN 500 MG tablet    GLUCOPHAGE    180 tablet    Take 1 tablet (500 mg) by mouth 2 times daily (with meals)    Diabetes mellitus with stage 3  chronic kidney disease (H)       * Multi-vitamin Tabs tablet      Take 1 tablet by mouth daily        * multivitamin Tabs tablet     30 each    Take 1 tablet by mouth daily.        ONETOUCH DELICA LANCETS 33G Misc     100 each    USE TO TEST BLOOD SUGAR DAILY OR AS DIRECTED    Diabetes mellitus with stage 3 chronic kidney disease (H)       polyethylene glycol powder    MIRALAX    510 g    Take 17 g (1 capful) by mouth 2 times daily as needed for constipation    Constipation, unspecified constipation type       * Notice:  This list has 4 medication(s) that are the same as other medications prescribed for you. Read the directions carefully, and ask your doctor or other care provider to review them with you.

## 2018-11-01 NOTE — NURSING NOTE
Prior to injection verified patient identity using patient's name and date of birth.  Due to injection administration, patient instructed to remain in clinic for 15 minutes  afterwards, and to report any adverse reaction to me immediately.    Shlomo Lerner MA

## 2018-11-01 NOTE — PROGRESS NOTES
"SUBJECTIVE:   Maira Leon is a 78 year old female who presents for Preventive Visit.  Are you in the first 12 months of your Medicare coverage?  No    Annual Wellness Visit     In general, how would you rate your overall health?  Good    Frequency of exercise:  6-7 days/week    Duration of exercise:  30-45 minutes    Do you usually eat at least 4 servings of fruit and vegetables a day, include whole grains    & fiber and avoid regularly eating high fat or \"junk\" foods?  Yes    Ability to successfully perform activities of daily living:  No assistance needed    Home Safety:  No safety concerns identified    Hearing Impairment:  Difficulty following a conversation in a noisy restaurant or crowded room and no hearing concerns    In the past 6 months, have you been bothered by leaking of urine? Yes    In general, how would you rate your overall mental or emotional health?  Excellent    PHQ-2 Total Score: 0    Additional concerns today:  Yes    Other Concerns:    CKD  She is worried about her chronic kidney disease, and what can be done to prevent this from progressing. She has a friend in dialysis, and she would like to avoid getting to that point.     Incontinence  She has been having more leaking. She wears a light pad most of the time, and a heavier one if she is traveling. She feels that this is not as bad as what others her age are dealing with. She mike well with it, and isn't interested in taking another medication that could have side effects. She tries to go to the bathroom every two hours, but in the morning she may have to go more frequently.     Leg/Hip Pain  She lives in a house with stairs still, and is planning to move to a single level eventually. She doesn't want to have another fall, though her previous fall was not in her house.   She is having more left hip pain. She previously saw an orthopedist, but has been discharged from his care as he was in [place] near where she was injured. She does " have a maru in her hip, thinking if that could be contributing to the pain. ROM in the hip is limited since having the maru placed, and she can't cut her own toenails anymore. The pain is worse at night, whenever she flips in her bed between her hips. It hurts no matter what side she lays on.     Knee Pain  She has very little meniscus left in her left knee. Was told it is bone on bone. Going down stairs is very painful for her. She has to modify her movement to avoid the worst pain, putting more of her weight into her shoulder, and putting more strain on the right leg. If a set of stairs doesn't have a railing, she cannot go down them without assistance. She was told eventually she would need a knee replacement. She notes that the pain is at a 4-5 on the pain scale. She does not want to have surgery yet.     Hammer Toe  Patient has a hammer toe and bunion. The toe cramps and it causes pain. The podiatrist she saw was not sure about surgery for her. The pain has become more painful since she was last seen by the podiatrist, and she is ready to consider surgery to correct the bunion and hammertoe. She would want to wait until after February as she is doing a lot of traveling.     Shoulder Pain  She has an injured rotator cuff, and is unable to do some of her weight exercises because of the pain in her right shoulder. This pain is not that severe, and only occurs with certain exercises. She does do some taping on her shoulder and that helps it some.       Fall risk:  Fallen 2 or more times in the past year?: No  Any fall with injury in the past year?: No    COGNITIVE SCREEN  1) Repeat 3 items (Leader, Season, Table)    2) Clock draw: NORMAL  3) 3 item recall: Recalls 3 objects  Results: 3 items recalled: COGNITIVE IMPAIRMENT LESS LIKELY    Mini-CogTM Copyright RONDA Bonner. Licensed by the author for use in [employer]; reprinted with permission (max@.[facility]). All rights reserved.      Reviewed and updated as  needed this visit by clinical staff  Tobacco  Allergies  Meds  Problems  Med Hx  Surg Hx  Fam Hx  Soc Hx        Reviewed and updated as needed this visit by Provider  Allergies  Meds  Problems        Social History   Substance Use Topics     Smoking status: Former Smoker     Quit date: 1/1/1981     Smokeless tobacco: Never Used     Alcohol use Yes      Comment: 1 glass of wine per day       Alcohol Use 11/1/2018   If you drink alcohol do you typically have greater than 3 drinks per day OR greater than 7 drinks per week? No   No flowsheet data found.    Do you feel safe in your environment - Yes    Do you have a Health Care Directive?: Yes: Advance Directive has been received and scanned.    Current providers sharing in care for this patient include:   Patient Care Team:  Michelle Monet MD as PCP - General (Family Practice)  Blake Powell MD as MD (Ophthalmology)  Jackie Killian RN as   Elisabeth Estrada MD as MD (Ophthalmology)    The following health maintenance items are reviewed in Epic and correct as of today:  Health Maintenance   Topic Date Due     INFLUENZA VACCINE (1) 09/01/2018     FOOT EXAM Q1 YEAR  11/14/2018     MICROALBUMIN Q1 YEAR  11/14/2018     FALL RISK ASSESSMENT  12/19/2018     A1C Q3 MO  01/31/2019     EYE EXAM Q1 YEAR  05/16/2019     BMP Q1 YR  05/16/2019     DUSTIN QUESTIONNAIRE 1 YEAR  05/16/2019     PHQ-9 Q1YR  05/16/2019     DEXA Q2 YR  09/05/2019     TSH W/ FREE T4 REFLEX Q2 YEAR  09/22/2019     LIPID MONITORING Q1 YEAR  10/31/2019     ADVANCE DIRECTIVE PLANNING Q5 YRS  05/14/2020     TETANUS IMMUNIZATION (SYSTEM ASSIGNED)  09/30/2021     PNEUMOCOCCAL  Completed     Labs reviewed in EPIC    Pneumonia Vaccine: Completed  Mammogram Screening: Patient over age 75, has elected to stop mammography screening.  History of abnormal Pap smear: NO - age 65 - see link Cervical Cytology Screening Guidelines    Review of Systems   Constitutional:  "Negative for chills and fever.   HENT: Negative for congestion and ear pain.    Eyes: Negative for pain.   Respiratory: Negative for cough.    Cardiovascular: Negative for chest pain.   Gastrointestinal: Negative for abdominal pain, constipation, diarrhea and hematochezia.   Genitourinary: Positive for frequency. Negative for genital sores and hematuria.   Neurological: Negative for dizziness.   Psychiatric/Behavioral: The patient is not nervous/anxious.      This document serves as a record of the services and decisions personally performed by Michelle Monet MD. It was created on his/her behalf by Danisha Rdz, a trained medical scribe. The creation of this document is based on the provider's statements to the medical scribe. Danisha Rdz November 1, 2018 1:17 PM    OBJECTIVE:   /84 (BP Location: Right arm, Patient Position: Chair, Cuff Size: Adult Regular)  Pulse 66  Temp 97.7  F (36.5  C) (Oral)  Ht 1.6 m (5' 3\")  Wt 79.1 kg (174 lb 6.4 oz)  BMI 30.89 kg/m2 Estimated body mass index is 30.89 kg/(m^2) as calculated from the following:    Height as of this encounter: 1.6 m (5' 3\").    Weight as of this encounter: 79.1 kg (174 lb 6.4 oz).  Physical Exam  GENERAL: healthy, alert and no distress  RESP: lungs clear to auscultation - no rales, rhonchi or wheezes  CV: regular rate and rhythm, normal S1 S2, no S3 or S4, no murmur, click or rub, no peripheral edema and peripheral pulses strong  PSYCH: mentation appears normal, affect normal/bright    Diagnostic Test Results:  Results for orders placed or performed in visit on 10/31/18   **A1C FUTURE anytime   Result Value Ref Range    Hemoglobin A1C 5.7 (H) 0 - 5.6 %   Lipid panel reflex to direct LDL Fasting   Result Value Ref Range    Cholesterol 163 <200 mg/dL    Triglycerides 141 <150 mg/dL    HDL Cholesterol 57 >49 mg/dL    LDL Cholesterol Calculated 78 <100 mg/dL    Non HDL Cholesterol 106 <130 mg/dL       ASSESSMENT / PLAN:   (Z00.00) Routine " general medical examination at a health care facility  (primary encounter diagnosis)  Comment: Normal screening exam.   Plan: Reviewed and completed health maintenance as appropriate.   Discussed incontinence - discussed risks and benefits of treatments vs lifestyle modifications.  Patient wishes to continue with modifications at this time.    (E11.22,  N18.3) Diabetes mellitus with stage 3 chronic kidney disease (H)  Comment: Well controlled. Refills given. Due for recheck of kidney functions.  Plan: Albumin Random Urine Quantitative with Creat         Ratio, metFORMIN (GLUCOPHAGE) 500 MG tablet,         insulin pen needle (B-D U/F) 31G X 5 MM,         atorvastatin (LIPITOR) 40 MG tablet        Follow up in 6 months for recheck. Continue on current medications and treatment plan.            (E78.5) Hyperlipidemia LDL goal <100  Comment: Well controlled.   Plan: atorvastatin (LIPITOR) 40 MG tablet        Continue on current medication without change    (M15.0) Primary osteoarthritis involving multiple joints  Comment: Recommended she discuss her knee pain with an mSK provider, possible steroid injection.   Plan: ORTHO  REFERRAL        Referred to Dr. Tate to discuss knee injections.     (M85.89) Osteopenia of multiple sites  Comment: confirmed by Dexa scan on 9/5/17  Plan: Calcium Carb-Cholecalciferol (CALCIUM 500 +D)         500-400 MG-UNIT TABS        Continue with current calcium supplement therapy    (Z23) Need for prophylactic vaccination and inoculation against influenza  Comment: Patient agreeable to receive influenza vaccine today.   Plan: FLU VACCINE, INCREASED ANTIGEN, PRESV FREE, AGE        65+ [70828],      ADMIN VACCINE, FIRST [92996]        Vaccine given.     (M21.611,  M21.612) Bilateral bunions  Comment: Will refer to Dr. Rowe for a second opinion on her bunions and hammertoe.   Plan: ORTHO  REFERRAL          (M20.41) Hammer toe of right foot  Comment: Will refer to Dr. Rowe  "for a second opinion on her bunions and patricke.   Plan: ORTHO  REFERRAL          (M25.562) Left knee pain, unspecified chronicity  Comment: can refer her to a sports medicine doctor who does not do surgeries, Dr. Tate in [place] to discuss injections. She does not even want to think about surgery yet at this time - and I agree she does not need to yet.  Plan: ORTHO  REFERRAL            End of Life Planning:  Patient currently has an advanced directive: Yes.  Practitioner is supportive of decision.    COUNSELING:  Reviewed preventive health counseling, as reflected in patient instructions    BP Readings from Last 1 Encounters:   11/01/18 154/84     Estimated body mass index is 30.89 kg/(m^2) as calculated from the following:    Height as of this encounter: 1.6 m (5' 3\").    Weight as of this encounter: 79.1 kg (174 lb 6.4 oz).     reports that she quit smoking about 37 years ago. She has never used smokeless tobacco.      Appropriate preventive services were discussed with this patient, including applicable screening as appropriate for cardiovascular disease, diabetes, osteopenia/osteoporosis, and glaucoma.  As appropriate for age/gender, discussed screening for colorectal cancer, prostate cancer, breast cancer, and cervical cancer. Checklist reviewing preventive services available has been given to the patient.    Reviewed patients plan of care and provided an AVS. The Intermediate Care Plan ( asthma action plan, low back pain action plan, and migraine action plan) for Maira meets the Care Plan requirement. This Care Plan has been established and reviewed with the Patient.    Counseling Resources:  ATP IV Guidelines  Pooled Cohorts Equation Calculator  Breast Cancer Risk Calculator  FRAX Risk Assessment  ICSI Preventive Guidelines  Dietary Guidelines for Americans, 2010  USDA's MyPlate  ASA Prophylaxis  Lung CA Screening    The information in this document, created by the medical scribe, " Danisha Rdz, for me, accurately reflects the services I personally performed and the decisions made by me. I have reviewed and approved this document for accuracy prior to leaving the patient care area.    Michelle Barrios MD  [facility]    Injectable Influenza Immunization Documentation    1.  Is the person to be vaccinated sick today?   No    2. Does the person to be vaccinated have an allergy to a component   of the vaccine?   No  Egg Allergy Algorithm Link    3. Has the person to be vaccinated ever had a serious reaction   to influenza vaccine in the past?   No    4. Has the person to be vaccinated ever had Guillain-Barré syndrome?   No    Form completed by Patient

## 2018-11-01 NOTE — PATIENT INSTRUCTIONS
Preventive Health Recommendations    See your health care provider every year to    Review health changes.     Discuss preventive care.      Review your medicines if your doctor has prescribed any.      You no longer need a yearly Pap test unless you've had an abnormal Pap test in the past 10 years. If you have vaginal symptoms, such as bleeding or discharge, be sure to talk with your provider about a Pap test.      Every 1 to 2 years, have a mammogram.  If you are over 69, talk with your health care provider about whether or not you want to continue having screening mammograms.      Every 10 years, have a colonoscopy. Or, have a yearly FIT test (stool test). These exams will check for colon cancer.       Have a cholesterol test every 5 years, or more often if your doctor advises it.       Have a diabetes test (fasting glucose) every three years. If you are at risk for diabetes, you should have this test more often.       At age 65, have a bone density scan (DEXA) to check for osteoporosis (brittle bone disease).    Shots:    Get a flu shot each year.    Get a tetanus shot every 10 years.    Talk to your doctor about your pneumonia vaccines. There are now two you should receive - Pneumovax (PPSV 23) and Prevnar (PCV 13).    Talk to your pharmacist about the shingles vaccine.    Talk to your doctor about the hepatitis B vaccine.    Nutrition:     Eat at least 5 servings of fruits and vegetables each day.      Eat whole-grain bread, whole-wheat pasta and brown rice instead of white grains and rice.      Get adequate about Calcium and Vitamin D.     Lifestyle    Exercise at least 150 minutes a week (30 minutes a day, 5 days a week). This will help you control your weight and prevent disease.      Limit alcohol to one drink per day.      No smoking.       Wear sunscreen to prevent skin cancer.       See your dentist twice a year for an exam and cleaning.      See your eye doctor every 1 to 2 years to screen for  conditions such as glaucoma, macular degeneration, cataracts, etc.    Personalized Prevention Plan  You are due for the preventive services outlined below.  Your care team is available to assist you in scheduling these services.  If you have already completed any of these items, please share that information with your care team to update in your medical record.    Health Maintenance Due   Topic Date Due     Flu Vaccine (1) 09/01/2018     Diabetic Foot Exam - yearly  11/14/2018     Microalbumin Lab - yearly  11/14/2018     [facility]   Discharged by : Shlomo Lerner MA  Paper scripts provided to patient : none     If you have any questions regarding your visit please contact your care team:     Team Gold                Clinic Hours Telephone Number     Dr. Dara Larsen, CNP  Anila Sanchez, CNP 7am-7pm  Monday - Thursday   7am-5pm  Fridays  [phone]   (Appointment scheduling available 24/7)     RN Line  [phone] option 2     Urgent Care - [place] and [facility] - 11am-9pm Monday-Friday Saturday-Sunday- 9am-5pm     [place] -   5pm-9pm Monday-Friday Saturday-Sunday- 9am-5pm    [phone] - [place]    [phone] - [place]       For a Price Quote for your services, please call our Consumer Price Line at [phone].     What options do I have for visits at the clinic other than the traditional office visit?     To expand how we care for you, many of our providers are utilizing electronic visits (e-visits) and telephone visits, when medically appropriate, for interactions with their patients rather than a visit in the clinic. We also offer nurse visits for many medical concerns. Just like any other service, we will bill your insurance company for this type of visit based on time spent on the phone with your provider. Not all insurance companies cover these visits. Please check with your medical  insurance if this type of visit is covered. You will be responsible for any charges that are not paid by your insurance.   E-visits via [employer]: generally incur a $35.00 fee.     Telephone visits:  Time spent on the phone: *charged based on time that is spent on the phone in increments of 10 minutes. Estimated cost:   5-10 mins $30.00   11-20 mins. $59.00   21-30 mins. $85.00       Use [employer] (secure email communication and access to your chart) to send your primary care provider a message or make an appointment. Ask someone on your Team how to sign up for [employer].     As always, Thank you for trusting us with your health care needs!      [facility]:    Scheduling Appointments  [place]  Call: [phone]    [facility]  Call: [phone]    [facility]  Call: [phone]    For Gastroenterology referrals   [facility]   Clinics and Surgery Center, 4th Floor   [address] [place] [id number]   Appointments: [phone]    WHERE TO GO FOR CARE?  Clinic    Make an appointment if you:       Are sick (cold, cough, flu, sore throat, earache or in pain).       Have a small injury (sprain, small cut, burn or broken bone).       Need a physical exam, Pap smear, vaccine or prescription refill.       Have questions about your health or medicines.    To reach us:      Call [phone] ([phone]). Open 24 hours every day. (For counseling services, call [phone].)    Log into [employer] at [email or web address]. (Visit [email or web address] to create an account.) Hospital emergency room    An emergency is a serious or life- threatening problem that must be treated right away.    Call 265 or get to the hospital if you have:      Very bad or sudden:            - Chest pain or pressure         - Bleeding         - Head or belly pain         - Dizziness or trouble seeing, walking or                           Speaking      Problems breathing      Blood in your vomit or you are coughing up blood      A major injury (knocked out, loss of a finger or limb, rape, broken bone protruding from skin)    A mental health crisis. (Or call the Mental Health Crisis line at 1-642.981.4592 or Suicide Prevention Hotline at 1-193.982.2065.)    Open 24 hours every day. You don't need an appointment.     Urgent care    Visit urgent care for sickness or small injuries when the clinic is closed. You don't need an appointment. To check hours or find an urgent care near you, visit [email or web address]. Online care    Get online care from [facility] for more than 70 common problems, like colds, allergies and infections. Open 24 hours every day at:   [email or web address]   Need help deciding?    For advice about where to be seen, you may call your clinic and ask to speak with a nurse. We're here for you 24 hours every day.         If you are deaf or hard of hearing, please let us know. We provide many free services including sign language interpreters, oral interpreters, TTYs, telephone amplifiers, note takers and written materials.

## 2018-11-02 LAB
CREAT UR-MCNC: 56 MG/DL
MICROALBUMIN UR-MCNC: 10 MG/L
MICROALBUMIN/CREAT UR: 18.26 MG/G CR (ref 0–25)

## 2018-11-02 RX ORDER — LISINOPRIL 2.5 MG/1
TABLET ORAL
Qty: 90 TABLET | Refills: 1 | Status: SHIPPED | OUTPATIENT
Start: 2018-11-02 | End: 2019-05-13

## 2018-11-02 RX ORDER — ATORVASTATIN CALCIUM 40 MG/1
TABLET, FILM COATED ORAL
Qty: 90 TABLET | Refills: 3 | OUTPATIENT
Start: 2018-11-02

## 2018-11-02 RX ORDER — EXENATIDE 250 UG/ML
INJECTION SUBCUTANEOUS
Qty: 4.8 ML | Refills: 1 | Status: SHIPPED | OUTPATIENT
Start: 2018-11-02 | End: 2019-03-04

## 2018-11-02 NOTE — TELEPHONE ENCOUNTER
Prescription approved per [facility] Refill Protocol. Lipitor was a duplicate from yesterday.   Saira Killian RN

## 2018-11-07 ENCOUNTER — OFFICE VISIT (OUTPATIENT)
Dept: PODIATRY | Facility: CLINIC | Age: 78
End: 2018-11-07
Payer: COMMERCIAL

## 2018-11-07 ENCOUNTER — OFFICE VISIT (OUTPATIENT)
Dept: OPHTHALMOLOGY | Facility: CLINIC | Age: 78
End: 2018-11-07
Payer: COMMERCIAL

## 2018-11-07 VITALS
SYSTOLIC BLOOD PRESSURE: 124 MMHG | WEIGHT: 174.4 LBS | BODY MASS INDEX: 30.9 KG/M2 | HEIGHT: 63 IN | DIASTOLIC BLOOD PRESSURE: 74 MMHG

## 2018-11-07 DIAGNOSIS — H52.203 MYOPIC ASTIGMATISM OF BOTH EYES: ICD-10-CM

## 2018-11-07 DIAGNOSIS — E11.9 TYPE 2 DIABETES MELLITUS WITHOUT OPHTHALMIC MANIFESTATIONS (H): Primary | ICD-10-CM

## 2018-11-07 DIAGNOSIS — M20.12 VALGUS DEFORMITY OF BOTH GREAT TOES: Primary | ICD-10-CM

## 2018-11-07 DIAGNOSIS — M20.11 VALGUS DEFORMITY OF BOTH GREAT TOES: Primary | ICD-10-CM

## 2018-11-07 DIAGNOSIS — M79.672 PAIN IN BOTH FEET: ICD-10-CM

## 2018-11-07 DIAGNOSIS — M20.41 HAMMER TOES OF BOTH FEET: ICD-10-CM

## 2018-11-07 DIAGNOSIS — H43.813 PVD (POSTERIOR VITREOUS DETACHMENT), BILATERAL: ICD-10-CM

## 2018-11-07 DIAGNOSIS — M79.671 PAIN IN BOTH FEET: ICD-10-CM

## 2018-11-07 DIAGNOSIS — H52.13 MYOPIC ASTIGMATISM OF BOTH EYES: ICD-10-CM

## 2018-11-07 DIAGNOSIS — Z96.1 PSEUDOPHAKIA, BOTH EYES: ICD-10-CM

## 2018-11-07 DIAGNOSIS — M20.42 HAMMER TOES OF BOTH FEET: ICD-10-CM

## 2018-11-07 PROCEDURE — 99213 OFFICE O/P EST LOW 20 MIN: CPT | Performed by: PODIATRIST

## 2018-11-07 ASSESSMENT — VISUAL ACUITY
OS_CC: J1+
METHOD: SNELLEN - LINEAR
OD_SC+: -2
OD_CC: J1+
OS_SC+: -2
OS_SC: 20/40
OD_CC+: -2
OS_CC: 20/20
OD_CC: J1+
OD_CC: 20/20
OS_CC+: -2
OS_CC: J1+
METHOD: SNELLEN - LINEAR
OD_SC: 20/50

## 2018-11-07 ASSESSMENT — REFRACTION_MANIFEST
OS_SPHERE: -1.50
OS_AXIS: 180
OD_SPHERE: -1.25
OD_AXIS: 180
OS_CYLINDER: +1.50
OS_ADD: +2.50
OD_CYLINDER: +2.50
OD_ADD: +2.50

## 2018-11-07 ASSESSMENT — SLIT LAMP EXAM - LIDS
COMMENTS: NORMAL
COMMENTS: NORMAL

## 2018-11-07 ASSESSMENT — CONF VISUAL FIELD
OS_NORMAL: 1
OD_NORMAL: 1

## 2018-11-07 ASSESSMENT — TONOMETRY
OD_IOP_MMHG: 12
OS_IOP_MMHG: 8
IOP_METHOD: ICARE

## 2018-11-07 ASSESSMENT — CUP TO DISC RATIO
OS_RATIO: 0.25
OD_RATIO: 0.3

## 2018-11-07 ASSESSMENT — EXTERNAL EXAM - RIGHT EYE: OD_EXAM: NORMAL

## 2018-11-07 ASSESSMENT — EXTERNAL EXAM - LEFT EYE: OS_EXAM: NORMAL

## 2018-11-07 NOTE — PROGRESS NOTES
FREDI Leon is a 78 year old here for comprehensive eye exam for diabetes mellitus.  Has mild blurry vision both eyes with current glasses at times for near.  Using over the counter readers and distance driving glasses.  Denies eye pain or flashes/new floaters.  Does not have any irritation or redness.  Diabetes has been well controlled, reports a hemoglobin a1c as 5.9%.       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 Recommend yearly dilated eye exam.    (H52.203,  H52.13) Myopic astigmatism of both eyes - Both Eyes  (primary encounter diagnosis)  Comment: mild change   Plan: manifest refraction done and prescription for glasses given     (Z96.1) Pseudophakia of both eyes - Both Eyes  Comment: stable   Plan: observe     (H43.813) PVD (posterior vitreous detachment), unspecified laterality - both eyes   Comment:  Old, asymptomatic.  No retinal tears, or detachment seen on exam today.  Plan:  Natural history of posterior vitreous detachment as well as retinal tear/detachment symptoms discussed with patient.  Told to call for prompt dilated exam if these symptoms occur.    No age related macular degeneration both eyes - discussed family history, AREDS2 criteria, patient elects to stay on ocuvite    -----------------------------------------------------------------------------------       Patient disposition:    Return in about 1 year (around 11/7/2019) for Comprehensive Exam- dm, pseudophakia. and patient to call sooner as needed.      Complete documentation of historical and exam elements from today's encounter can be found in the full encounter summary report (not reduplicated in this progress note). I personally obtained the chief complaint(s) and history of present illness.  I have confirmed and edited as necessary the CC, HPI, PMH/PSH, social history, FMH, ROS, and exam/neuro findings as obtained by the technician or others. I have examined this patient myself and I personally viewed the image(s) and studies listed above and the documentation reflects my findings and interpretation.  I formulated and edited as necessary the assessment and plan and discussed the findings and management plan with the patient and family.     Elisabeth Estrada MD

## 2018-11-07 NOTE — LETTER
11/7/2018         RE: Maira Leon  [address] [facility] [address]        Dear Colleague,    Thank you for referring your patient, Maira Leon, to the [facility]. Please see a copy of my visit note below.      ASSESSMENT/PLAN:     Encounter Diagnoses   Name Primary?     Valgus deformity of both great toes Yes     Hammer toes of both feet      Pain in both feet      I discussed the cause and nature of bunions and hammer toes.    Conservative cares were reviewed including proper shoes, orthoses, anti-inflammatory measures,  padding, and the avoidance of barefoot walking.  Informational handout on bunions provided.      Surgical intervention was also briefly discussed. However, with discussion, she seems to be doing fine in life and not limited by the foot pain.  I encouraged conservative cares.  If her pain worsens, surgery is an option.  This would come with some risk, including deconditioning and bone healing problems with osteopenia. Her protective sensation is intact and there is no sign of skin breakdown. I explained that surgery is done to treat significant pain and not to prevent an assumed progression of deformity.  The exception to the pain concern is bony bumps causing ulceration in neuropathic patients.       Body mass index is 30.89 kg/(m^2).    Weight management plan: Patient was referred to their PCP to discuss a diet and exercise plan.      Salomon Rowe DPM, FACFAS, MS    [place] [facility] of Podiatry/Foot & Ankle Surgery      ____________________________________________________________________    HPI:       I was asked by Dr. Toussaint to evaluate this patient, in consultation, regarding bunions and hammer toes.     Chief Complaint: bilateral bunion and hammer toes  Onset of problem: years  Painful at times and related to foot wear; pain over the bunion bumps and with the 2nd toes.   She said that her pain is not limiting her activities of daily living.   She  inquires about surgery, before she gets older and it might not be an option. She cites having diabetes and osteopenia.  Last Hgb A1C = 5.7.    *  Patient Active Problem List   Diagnosis     Osteoarthritis     Hyperlipidemia LDL goal <100     CKD (chronic kidney disease) stage 3, GFR 30-59 ml/min (H)     Advance Care Planning     Diabetes mellitus with stage 3 chronic kidney disease (H)     Primary osteoarthritis of left knee     Lumbar spinal stenosis     Osteopenia of multiple sites     Superior glenoid labrum lesion of left shoulder     Tear of left supraspinatus tendon   *  *  Past Surgical History:   Procedure Laterality Date     APPENDECTOMY  age 4     ARTHROSCOPY KNEE Left 5/6/2016    Procedure: ARTHROSCOPY KNEE;  Surgeon: Ramin Ramirez MD;  Location: [facility] OR     BACK SURGERY  01/20/2017    Lumbar fusion     C TOTAL HIP ARTHROPLASTY Left 2017     ENT SURGERY      Tonsillectomy     GENITOURINARY SURGERY      bladder repair/sling     OPTICAL TRACKING SYSTEM FUSION SPINE POSTERIOR LUMBAR TWO LEVELS N/A 1/20/2017    Procedure: OPTICAL TRACKING SYSTEM FUSION SPINE POSTERIOR LUMBAR TWO LEVELS;  Surgeon: Buddy Davies MD;  Location: [facility] OR     PHACOEMULSIFICATION CLEAR CORNEA WITH STANDARD INTRAOCULAR LENS IMPLANT Right 2014     PHACOEMULSIFICATION CLEAR CORNEA WITH STANDARD INTRAOCULAR LENS IMPLANT Left 11/21/2017   *  *  Current Outpatient Prescriptions   Medication Sig Dispense Refill     aspirin 81 MG tablet Take 1 tablet (81 mg) by mouth daily 30 tablet 0     atorvastatin (LIPITOR) 40 MG tablet Take 1 tablet (40 mg) by mouth daily 90 tablet 3     blood glucose monitoring (NO BRAND SPECIFIED) meter device kit Use to test blood sugar daily or as directed. 1 kit 0     blood glucose monitoring (NO BRAND SPECIFIED) test strip Use to test blood sugars daily or as directed 100 strip 1     BYETTA 10 MCG PEN 10 MCG/0.04ML injection INJECT 10MCG UNDER THE SKIN TWO TIMES A DAY 4.8 mL 1     Calcium  Carb-Cholecalciferol (CALCIUM 500 +D) 500-400 MG-UNIT TABS Take 1 tablet by mouth daily 100 tablet 3     fish oil-omega-3 fatty acids (FISH OIL) 1000 MG capsule Take 4 g by mouth daily.       insulin pen needle (B-D U/F) 31G X 5 MM USE TWICE DAILY WITH BYETTA 200 each 2     lisinopril (PRINIVIL/ZESTRIL) 2.5 MG tablet TAKE ONE TABLET BY MOUTH EVERY DAY 90 tablet 1     metFORMIN (GLUCOPHAGE) 500 MG tablet Take 1 tablet (500 mg) by mouth 2 times daily (with meals) 180 tablet 3     multivitamin (OCUVITE) TABS Take 1 tablet by mouth daily. 30 each 0     multivitamin, therapeutic with minerals (MULTI-VITAMIN) TABS tablet Take 1 tablet by mouth daily       ONETOUCH DELICA LANCETS 33G MISC USE TO TEST BLOOD SUGAR DAILY OR AS DIRECTED 100 each 3     ONETOUCH ULTRA test strip USE TO TEST BLOOD SUGARS DAILY OR AS DIRECTED 100 strip 3     polyethylene glycol (MIRALAX) powder Take 17 g (1 capful) by mouth 2 times daily as needed for constipation 510 g 0       ROS:     A 10-point review of systems was performed and is positive for that noted above in the HPI and as seen below.  All other areas are negative.     Social History:   Social History     Social History     Marital status: Single     Spouse name: N/A     Number of children: N/A     Years of education: N/A     Occupational History     Not on file.     Social History Main Topics     Smoking status: Former Smoker     Quit date: 1/1/1981     Smokeless tobacco: Never Used     Alcohol use Yes      Comment: 1 glass of wine per day     Drug use: No     Sexual activity: Not Currently     Other Topics Concern     Parent/Sibling W/ Cabg, Mi Or Angioplasty Before 65f 55m? No     Social History Narrative    Dairy/d 1 servings/d.     Caffeine 1-2 servings/d    Exercise 7 x week Sweating with the oldies tape    Sunscreen used - Yes    Seatbelts used - Yes    Working smoke/CO detectors in the home - Yes    Guns stored in the home - No    Self Breast Exams - Yes    Self Testicular Exam -  "NA    Eye Exam up to date - Yes 2008    Dental Exam up to date - Yes 11/2009    Pap Smear up to date - Yes 2006 normal    Mammogram up to date - Yes 11/11/2009    PSA up to date - NA    Dexa Scan up to date - Yes 2006    Flex Sig / Colonoscopy up to date - Yes 10-31-07    Immunizations up to date - Yes last tetanus 2005    Abuse: Current or Past(Physical, Sexual or Emotional)- No    Do you feel safe in your environment - Yes    2007,2008 updated       Family history:  Family History   Problem Relation Age of Onset     Osteoporosis Mother      HEART DISEASE Father      Cerebrovascular Disease Father      Musculoskeletal Disorder Father      gout     Genitourinary Problems Maternal Grandmother      Cancer Maternal Grandfather      Cancer Paternal Grandmother      Cerebrovascular Disease Paternal Grandfather      Eye Disorder Brother      macular degeneration     Macular Degeneration Brother      Muscular Dystrophy Brother      Diabetes No family hx of      Hypertension No family hx of        EXAM:    Vitals: /74  Ht 5' 3\" (1.6 m)  Wt 174 lb 6.4 oz (79.1 kg)  BMI 30.89 kg/m2  BMI: Body mass index is 30.89 kg/(m^2).  Height: 5' 3\"    Constitutional/ general:  Pt is in no apparent distress, appears well-nourished.  Cooperative with history and physical exam.   normal DP and PT pulses, no trophic changes or ulcerative lesions and normal sensory examDiabetic foot exam: n    Vascular:  Pedal pulses are palpable bilaterally for both the DP and PT arteries.  CFT < 3 sec.  No edema.  Pedal hair growth noted.     Neuro:  Alert and oriented x 3. Coordinated gait.  Light touch sensation is intact to the L4, L5, S1 distributions. No obvious deficits.  No evidence of neurological-based weakness, spasticity, or contracture in the lower extremities.   Protective sensation is intact bilateral foot per [place] Monofil[facility] testing.    Derm: Normal texture and turgor.  No erythema, ecchymosis, or cyanosis.  No open " lesions.     Musculoskeletal:    Lower extremity muscle strength is normal.  Patient is ambulatory without an assistive device or brace .  Hallux abducto valgus deformity, bilateral foot.  Partially reducible in the transverse plane with preserved sagittal plane ROM.  No crepitus.  Digital contractures.  Most are flexible. Seconds toes with some rigid adaptation.          Salomon Rowe DPM, FACFAS, MS    [place] [facility] of Podiatry/Foot & Ankle Surgery                Again, thank you for allowing me to participate in the care of your patient.        Sincerely,        Salomon Rowe DPM

## 2018-11-07 NOTE — NURSING NOTE
No chief complaint on file.    HPI    Affected eye(s):  Both   Symptoms:     No blurred vision   No decreased vision   No difficulty with reading   No floaters   No flashes   No redness   No foreign body sensation   No tearing   No Dryness   No itching   No burning   No photophobia   No eye discharge      Duration:  10 months   Frequency:  Constant       Do you have eye pain now?:  No      Comments:  Notes that BUL are sagging; somewhat of an aggravation. Using otc readers; 3.00. Wears DVA glasses rarely for driving.  Sumaya THORNTON 8:49 AM 11/07/2018

## 2018-11-07 NOTE — MR AVS SNAPSHOT
After Visit Summary   11/7/2018    Maira Leon    MRN: [id number]           Patient Information     Date Of Birth          1940        Visit Information        Provider Department      11/7/2018 8:40 AM Elisabeth Estrada MD [facility]        Today's Diagnoses     Type 2 diabetes mellitus without ophthalmic manifestations (H) - Both Eyes    -  1    Myopic astigmatism of both eyes - Both Eyes        Pseudophakia, both eyes - Both Eyes        PVD (posterior vitreous detachment), bilateral - Both Eyes           Follow-ups after your visit        Follow-up notes from your care team     Return in about 1 year (around 11/7/2019) for Comprehensive Exam- dm, pseudophakia.      Your next 10 appointments already scheduled     Nov 07, 2018  1:15 PM CST   New Visit with Salomon Rowe DPM   [facility]    [address]  [place] [address]   [phone]            Nov 09, 2018  9:00 AM CST   New Visit with Alvaro Tate DO   [facility] [place] ([facility] [place])    [address]  [place] [address]   [phone]            May 09, 2019 12:20 PM CDT   SHORT with Michelle Monet MD   [facility] ([facility]    [address] [address] [phone]           Your Arrival times is X, We need you to be here at this time to get checked in and have the assistant get you ready for the provider, which can take about 15 minutes. Your appointmen time with your provider is at  X. Thank you              Who to contact     Please call your clinic at [phone] to:    Ask questions about your health    Make or cancel appointments    Discuss your medicines    Learn about your test results    Speak to your doctor            Additional Information About Your Visit         [employer] Information     [employer] gives you secure access to your electronic health record. If you see a primary care provider, you can also send messages to your care team and make appointments. If you have questions, please call your primary care clinic.  If you do not have a primary care provider, please call [phone] and they will assist you.      [employer] is an electronic gateway that provides easy, online access to your medical records. With [employer], you can request a clinic appointment, read your test results, renew a prescription or communicate with your care team.     To access your existing account, please contact your [facility] or call [phone] for assistance.        Care EveryWhere ID     This is your Care EveryWhere ID. This could be used by other organizations to access your [place] medical records  [id number]         Blood Pressure from Last 3 Encounters:   11/01/18 154/84   05/16/18 128/70   01/16/18 133/68    Weight from Last 3 Encounters:   11/01/18 79.1 kg (174 lb 6.4 oz)   05/16/18 77.8 kg (171 lb 9.6 oz)   01/16/18 74.8 kg (165 lb)              We Performed the Following     REFRACTION        Primary Care Provider Office Phone # Fax #    Michelle Verenice Monet [phone]       [address] [place] [address]        Equal Access to Services     CONRADO RODRÍGUEZ AH: Hadii aad ku hadasho Soomaali, waaxda luqadaha, qaybta kaalmada adeegyada, julio rice. So [facility] [phone].    ATENCIÓN: Si habla español, tiene a ellis disposición servicios gratuitos de asistencia lingüística. Llame al [phone].    We comply with applicable federal civil rights laws and [place] laws. We do not discriminate on the basis of race, color, national origin, age, disability, sex, sexual orientation, or gender identity.            Thank you!     Thank you for choosing [facility]  for your care.  Our goal is always to provide you with excellent care. Hearing back from our patients is one way we can continue to improve our services. Please take a few minutes to complete the written survey that you may receive in the mail after your visit with us. Thank you!             Your Updated Medication List - Protect others around you: Learn how to safely use, store and throw away your medicines at www.disposemymeds.org.          This list is accurate as of 11/7/18 11:11 AM.  Always use your most recent med list.                   Brand Name Dispense Instructions for use Diagnosis    aspirin 81 MG tablet     30 tablet    Take 1 tablet (81 mg) by mouth daily    S/P lumbar fusion       atorvastatin 40 MG tablet    LIPITOR    90 tablet    Take 1 tablet (40 mg) by mouth daily    Hyperlipidemia LDL goal <100, Diabetes mellitus with stage 3 chronic kidney disease (H)       blood glucose monitoring meter device kit    no brand specified    1 kit    Use to test blood sugar daily or as directed.    Diabetes mellitus with stage 3 chronic kidney disease (H)       * blood glucose monitoring test strip    no brand specified    100 strip    Use to test blood sugars daily or as directed    Diabetes mellitus with stage 3 chronic kidney disease (H)       * ONETOUCH ULTRA test strip   Generic drug:  blood glucose monitoring     100 strip    USE TO TEST BLOOD SUGARS DAILY OR AS DIRECTED    Diabetes mellitus with stage 3 chronic kidney disease (H)       BYETTA 10 MCG PEN 10 MCG/0.04ML injection   Generic drug:  exenatide     4.8 mL    INJECT 10MCG UNDER THE SKIN TWO TIMES A DAY    Diabetes mellitus with stage 3 chronic kidney disease (H)       Calcium Carb-Cholecalciferol 500-400 MG-UNIT Tabs    calcium 500 +D    100 tablet    Take 1 tablet by mouth daily    Osteopenia of multiple sites       fish oil-omega-3 fatty acids 1000 MG capsule      Take 4 g by mouth daily.        insulin pen needle 31G X 5 MM    B-D U/F    200 each    USE TWICE  DAILY WITH BYETTA    Diabetes mellitus with stage 3 chronic kidney disease (H)       lisinopril 2.5 MG tablet    PRINIVIL/Zestril    90 tablet    TAKE ONE TABLET BY MOUTH EVERY DAY    CKD (chronic kidney disease) stage 3, GFR 30-59 ml/min (H), Diabetes mellitus with stage 3 chronic kidney disease (H)       metFORMIN 500 MG tablet    GLUCOPHAGE    180 tablet    Take 1 tablet (500 mg) by mouth 2 times daily (with meals)    Diabetes mellitus with stage 3 chronic kidney disease (H)       * Multi-vitamin Tabs tablet      Take 1 tablet by mouth daily        * multivitamin Tabs tablet     30 each    Take 1 tablet by mouth daily.        ONETOUCH DELICA LANCETS 33G Misc     100 each    USE TO TEST BLOOD SUGAR DAILY OR AS DIRECTED    Diabetes mellitus with stage 3 chronic kidney disease (H)       polyethylene glycol powder    MIRALAX    510 g    Take 17 g (1 capful) by mouth 2 times daily as needed for constipation    Constipation, unspecified constipation type       * Notice:  This list has 4 medication(s) that are the same as other medications prescribed for you. Read the directions carefully, and ask your doctor or other care provider to review them with you.

## 2018-11-07 NOTE — MR AVS SNAPSHOT
After Visit Summary   11/7/2018    Maira Leon    MRN: [id number]           Patient Information     Date Of Birth          1940        Visit Information        Provider Department      11/7/2018 1:15 PM Salomon Franklin DPM [facility]        Care Instructions    Thank you for choosing [facility]!    DR. FRANKLIN'S CLINIC LOCATIONS     MONDAY - [place] WEDNESDAY - [place]   [address] [place] [address] [place] [address]   [phone] / [phone] / [phone]       THURSDAY - [place] SCHEDULE SURGERY: [phone]   [address] APPOINTMENTS: [phone]   [place] [address] BILLING QUESTIONS: [phone] / [phone]       [facility] LOCATIONS  [facility]  [address]  [place] [address]  Phone: [phone]  Fax: [phone] [facility]  [address]  [place] [address]  Phone: [phone]   Fax: [phone]   [facility]  [address]  [place] [address]  Phone: [phone]  Fax: [phone] [facility]  [address]  [place] [address]  Phone: [phone]   Fax: [phone]   [facility]   [address]  [place] [address]  Phone: [phone]   Fax: [phone] * Please call any location listed to make an appointment for a casting/fitting. Your referral was sent to their central office and they will all have the order on file.     DIABETES AND YOUR FEET  Diabetes can result in several problems in the feet including ulcers (open sores) and amputations. Two of the most important reasons why people develop foot problems when they have diabetes is : 1. Neuropathy (loss of feeling)  2. Vascular disease (loss or decrease of blood flow).    Neuropathy is a term used to describe a  "loss of nerve function.  Patients with diabetes are at risk of developing neuropathy if their sugars continue to run high and are above the normal value. One theory for neuropathy is that the \"extra\" sugar in the body enters the nerves and is broken down. These by-products build up in the nerve causing it to swell and impairing nerve function. Often times, this can be prevented by controlling your sugars, dieting and exercise.    When a person develops neuropathy, they usually begin to feel numbness or tingling in their feet and sometime in their legs.  Other symptoms may include painful burning or hot feet, tingling or feeling like insects or ants are crawling on your feet or legs.  If the diabetes is sever and the sugars run high for long periods of time, neuropathy can also occur in the hands.    Vascular disease  is a term used to describe a loss or decrease in circulation (blood flow). There is a problem in getting blood and oxygen to areas that need it. Similar to neuropathy, sugars can build up in the walls of the arteries (blood vessels) and cause them to become swollen, thickened and hardened. This decreases the amount of blood that can go to an area that needs it. Though this is common in the legs of diabetic patients, it can also affect other arteries (blood vessels) in the body such as in the heart and eyes.    In the legs, vascular disease usually results in cramping. Patients who develop leg cramps after walking the same distance every time (i.e. One block, half a mile, ect.) need to let their doctors know so that their circulation may be checked. Cramps causing severe pain in the feet and/or legs while sleeping and the cramps go away when you stand or hang your legs off the side of the bed, may also be a sign of poor blood circulation.  Occasional cramping in cold weather or on rare occasions with activity may not be due to poor circulation, but you should inform your doctor.    PREVENTION OF THESE " DISEASES  The key to prevention is good blood sugar control. Poor blood sugar control is a big reason many of these problems start. Physical activity (exercise) is a very good way to help decrease your blood sugars. Exercise can lower your blood sugar, blood pressure, and cholesterol. It also reduces your risk for heart disease and stroke, relieves stress, and strengthens your heart, muscles and bones.  In addition, regular activity helps insulin work better, improves your blood circulation, and keeps your joints flexible. If you're trying to lose weight, a combination of exercise and wise food choices can help you reach your target weight and maintain it.      PAIN MANAGEMENT  1.Blood Sugar Control - Most important  2. Medications such as:  Amytriptylline, duloxetine, gabapentin, lyrica, tramadol  3. Nutritional therapy:  Vitamin B6 (100mg daily), Vitamin B12 (75mcg daily), Vitamin D 2000 IU daily), Alpha-Lipoic Acid (600-1800mg daily), Acetyl-L-Carnitine (500-1000mg TID, L-methyl folate (1500mcg daily)    ** Metformin can block Vitamin B6 and B12 so it is important to supplement**    FOOT CARE RECOMMENDATIONS   1. Wash your feet with lukewarm water and a mild soap and then dry them thoroughly, especially between the toes.     2. Examine your feet daily looking for cuts, corns, blisters, cracks, ect, especially after wearing new shoes. Make sure to look between your toes. If you cannot see the bottom of your feet, set a mirror on the floor and hold your foot over it, or ask a spouse, friend or family member to examine your feet for you. Contact your doctor immediately if new problems are noted or if sores are not healing.     3. Immediately apply moisturizer to the tops and bottoms of your feet, avoiding areas between the toes. Hand lotion (Intesive Care, Unique, Eucerin, Neutrogena, Curel, ect) is sufficient unless your doctor prescribes a medicated lotion. Apply sunscreen to your feet when going swimming  outside.     4. Use clean comfortable shoes, wear white socks (if you have any bleeding or drainage, you will see it on white socks). Socks should not have thick seams or cut off the circulation around the leg. Break in new shoes slowly and rotate with older shoes until broken in. Check the inside of your shoes with your hand to look for areas of irritation or objects that may have fallen into your shoes.       5. Keep slippers by the side of your bed for use during the night.     6.  Shoes should be fitted by a professional and should not cause areas of irritation.  Check your feet regularly when wearing a new pair of shoes and replace them as needed.     7.  Talk to your doctor about proper exercise. Exercise and stretching stimulate blood flow to your feet and maintain proper glucose levels.     8.  Monitor your blood glucose level as instructed by your doctor. Notify your doctor immediately if your blood sugar is abnormally high or low.    9. Cut your nails straight across, but then gently round any sharp edges with a cardboard nail file. If you have neuropathy, peripheral vascular disease or cannot see that well to trim your own toenails contact [facility] ([phone]) or [facility] ([phone]).      THINGS TO AVOID DOING   1.  Do not soak your feet if you have an open sore. Use only lukewarm water and always check the temperature with your hand as hot water can easily burn your feet.       2.  Never use a hot water bottle or heating pad on your feet. Also do not apply cold compresses to your feet. With decreased sensation, you could burn or freeze your feet.       3.  Do not apply any of these to your feet:    -  Over the counter medicine for corns or warts    -  Harsh chemicals like boric acid    -  Do not self-treat corns, cuts, blisters or infections. Always consult your doctor.       4.  Do not wear sandals, slippers or walk barefoot, especially on hot sand or concrete or other harsh  surfaces.     5.  If you smoke, stop!!!              ** FYI: THE FOLLOWING INFORMATION IS PRINTED IN EVERYONE'S AFTER VISIT SUMMERY **    BODY MASS INDEX (BMI)  Many things can cause foot and ankle problems. Foot structure, activity level, foot mechanics and injuries are common causes of pain. One very important issue that often goes unmentioned, is body weight. Extra weight can cause increased stress on muscles, ligaments, bones and tendons. Sometimes just a few extra pounds is all it takes to put one over her/his threshold. Without reducing that stress, it can be difficult to alleviate pain. Some people are uncomfortable addressing this issue, but we feel it is important for you to think about it. As Foot & Ankle specialists, our job is addressing the lower extremity problem and possible causes. Regarding extra body weight, we encourage patients to discuss diet and weight management plans with their primary care doctors. It is this team approach that gives you the best opportunity for pain relief and getting you back on your feet.                Follow-ups after your visit        Your next 10 appointments already scheduled     Nov 09, 2018  9:00 AM CST   New Visit with Alvaro Tate DO   [facility] [place] ([facility] [place])    [address]  [facility] [address]   [phone]            May 09, 2019 12:20 PM CDT   [facility]et MD   [facility]    [address] [id number] [phone]           Your Arrival times is X, We need you to be here at this time to get checked in and have the assistant get you ready for the provider, which can take about 15 minutes. Your appointmen time with your provider is at  X. Thank you              Who to contact     If you have questions or need follow up information about today's clinic visit or your schedule please  "contact [facility] [place] directly at [phone].  Normal or non-critical lab and imaging results will be communicated to you by MyChart, letter or phone within 4 business days after the clinic has received the results. If you do not hear from us within 7 days, please contact the clinic through MyChart or phone. If you have a critical or abnormal lab result, we will notify you by phone as soon as possible.  Submit refill requests through [employer] or call your pharmacy and they will forward the refill request to us. Please allow 3 business days for your refill to be completed.          Additional Information About Your Visit        [employer] Information     [employer] gives you secure access to your electronic health record. If you see a primary care provider, you can also send messages to your care team and make appointments. If you have questions, please call your primary care clinic.  If you do not have a primary care provider, please call [phone] and they will assist you.        Care EveryWhere ID     This is your Care EveryWhere ID. This could be used by other organizations to access your [place] medical records  [id number]        Your Vitals Were     Height BMI (Body Mass Index)                5' 3\" (1.6 m) 30.89 kg/m2           Blood Pressure from Last 3 Encounters:   11/07/18 124/74   11/01/18 154/84   05/16/18 128/70    Weight from Last 3 Encounters:   11/07/18 174 lb 6.4 oz (79.1 kg)   11/01/18 174 lb 6.4 oz (79.1 kg)   05/16/18 171 lb 9.6 oz (77.8 kg)              Today, you had the following     No orders found for display       Primary Care Provider Office Phone # Fax #    Michelle Monet [phone]       [address] [facility] [address] [facility] [address]        Equal Access to Services     CONRADO RODRÍGUEZ AH: Hadii bret Winter, wajeimyda cynthia, qaybta kaalmada noni, julio rice. So [facility] [phone].    ATENCIÓN: Si alden steele, " tiene a ellis disposición servicios gratuitos de asistencia lingüística. Rubi sanchez [phone].    We comply with applicable federal civil rights laws and [place] laws. We do not discriminate on the basis of race, color, national origin, age, disability, sex, sexual orientation, or gender identity.            Thank you!     Thank you for choosing [facility] [place]  for your care. Our goal is always to provide you with excellent care. Hearing back from our patients is one way we can continue to improve our services. Please take a few minutes to complete the written survey that you may receive in the mail after your visit with us. Thank you!             Your Updated Medication List - Protect others around you: Learn how to safely use, store and throw away your medicines at www.disposemymeds.org.          This list is accurate as of 11/7/18  1:54 PM.  Always use your most recent med list.                   Brand Name Dispense Instructions for use Diagnosis    aspirin 81 MG tablet     30 tablet    Take 1 tablet (81 mg) by mouth daily    S/P lumbar fusion       atorvastatin 40 MG tablet    LIPITOR    90 tablet    Take 1 tablet (40 mg) by mouth daily    Hyperlipidemia LDL goal <100, Diabetes mellitus with stage 3 chronic kidney disease (H)       blood glucose monitoring meter device kit    no brand specified    1 kit    Use to test blood sugar daily or as directed.    Diabetes mellitus with stage 3 chronic kidney disease (H)       * blood glucose monitoring test strip    no brand specified    100 strip    Use to test blood sugars daily or as directed    Diabetes mellitus with stage 3 chronic kidney disease (H)       * ONETOUCH ULTRA test strip   Generic drug:  blood glucose monitoring     100 strip    USE TO TEST BLOOD SUGARS DAILY OR AS DIRECTED    Diabetes mellitus with stage 3 chronic kidney disease (H)       BYETTA 10 MCG PEN 10 MCG/0.04ML injection   Generic drug:  exenatide     4.8 mL    INJECT 10MCG UNDER THE  SKIN TWO TIMES A DAY    Diabetes mellitus with stage 3 chronic kidney disease (H)       Calcium Carb-Cholecalciferol 500-400 MG-UNIT Tabs    calcium 500 +D    100 tablet    Take 1 tablet by mouth daily    Osteopenia of multiple sites       fish oil-omega-3 fatty acids 1000 MG capsule      Take 4 g by mouth daily.        insulin pen needle 31G X 5 MM    B-D U/F    200 each    USE TWICE DAILY WITH BYETTA    Diabetes mellitus with stage 3 chronic kidney disease (H)       lisinopril 2.5 MG tablet    PRINIVIL/Zestril    90 tablet    TAKE ONE TABLET BY MOUTH EVERY DAY    CKD (chronic kidney disease) stage 3, GFR 30-59 ml/min (H), Diabetes mellitus with stage 3 chronic kidney disease (H)       metFORMIN 500 MG tablet    GLUCOPHAGE    180 tablet    Take 1 tablet (500 mg) by mouth 2 times daily (with meals)    Diabetes mellitus with stage 3 chronic kidney disease (H)       * Multi-vitamin Tabs tablet      Take 1 tablet by mouth daily        * multivitamin Tabs tablet     30 each    Take 1 tablet by mouth daily.        ONETOUCH DELICA LANCETS 33G Misc     100 each    USE TO TEST BLOOD SUGAR DAILY OR AS DIRECTED    Diabetes mellitus with stage 3 chronic kidney disease (H)       polyethylene glycol powder    MIRALAX    510 g    Take 17 g (1 capful) by mouth 2 times daily as needed for constipation    Constipation, unspecified constipation type       * Notice:  This list has 4 medication(s) that are the same as other medications prescribed for you. Read the directions carefully, and ask your doctor or other care provider to review them with you.

## 2018-11-07 NOTE — PATIENT INSTRUCTIONS
"Thank you for choosing [facility]!    DR. FRANKLIN'S CLINIC LOCATIONS     MONDAY - [place] WEDNESDAY - [place]   [address] [place] [address] [place] [address]   [phone] / [phone] / [phone]       THURSDAY - [place] SCHEDULE SURGERY: [phone]   [address] APPOINTMENTS: [phone]   [place] [address] BILLING QUESTIONS: [phone] / [phone]       [facility] LOCATIONS  [facility]  [address]  [place] [address]  Phone: [phone]  Fax: [phone] [facility]  [address]  [place] [address]  Phone: [phone]   Fax: [phone]   [facility]  [address]  [place] [address]  Phone: [phone]  Fax: [phone] [facility]  [address]  [place] [address]  Phone: [phone]   Fax: [phone]   [facility]   [address]  [place] [address]  Phone: [phone]   Fax: [phone] * Please call any location listed to make an appointment for a casting/fitting. Your referral was sent to their central office and they will all have the order on file.     DIABETES AND YOUR FEET  Diabetes can result in several problems in the feet including ulcers (open sores) and amputations. Two of the most important reasons why people develop foot problems when they have diabetes is : 1. Neuropathy (loss of feeling)  2. Vascular disease (loss or decrease of blood flow).    Neuropathy is a term used to describe a loss of nerve function.  Patients with diabetes are at risk of developing neuropathy if their sugars continue to run high and are above the normal value. One theory for neuropathy is that the \"extra\" sugar in the body enters the nerves and is broken down. These by-products build up in the nerve " causing it to swell and impairing nerve function. Often times, this can be prevented by controlling your sugars, dieting and exercise.    When a person develops neuropathy, they usually begin to feel numbness or tingling in their feet and sometime in their legs.  Other symptoms may include painful burning or hot feet, tingling or feeling like insects or ants are crawling on your feet or legs.  If the diabetes is sever and the sugars run high for long periods of time, neuropathy can also occur in the hands.    Vascular disease  is a term used to describe a loss or decrease in circulation (blood flow). There is a problem in getting blood and oxygen to areas that need it. Similar to neuropathy, sugars can build up in the walls of the arteries (blood vessels) and cause them to become swollen, thickened and hardened. This decreases the amount of blood that can go to an area that needs it. Though this is common in the legs of diabetic patients, it can also affect other arteries (blood vessels) in the body such as in the heart and eyes.    In the legs, vascular disease usually results in cramping. Patients who develop leg cramps after walking the same distance every time (i.e. One block, half a mile, ect.) need to let their doctors know so that their circulation may be checked. Cramps causing severe pain in the feet and/or legs while sleeping and the cramps go away when you stand or hang your legs off the side of the bed, may also be a sign of poor blood circulation.  Occasional cramping in cold weather or on rare occasions with activity may not be due to poor circulation, but you should inform your doctor.    PREVENTION OF THESE DISEASES  The key to prevention is good blood sugar control. Poor blood sugar control is a big reason many of these problems start. Physical activity (exercise) is a very good way to help decrease your blood sugars. Exercise can lower your blood sugar, blood pressure, and cholesterol. It also  reduces your risk for heart disease and stroke, relieves stress, and strengthens your heart, muscles and bones.  In addition, regular activity helps insulin work better, improves your blood circulation, and keeps your joints flexible. If you're trying to lose weight, a combination of exercise and wise food choices can help you reach your target weight and maintain it.      PAIN MANAGEMENT  1.Blood Sugar Control - Most important  2. Medications such as:  Amytriptylline, duloxetine, gabapentin, lyrica, tramadol  3. Nutritional therapy:  Vitamin B6 (100mg daily), Vitamin B12 (75mcg daily), Vitamin D 2000 IU daily), Alpha-Lipoic Acid (600-1800mg daily), Acetyl-L-Carnitine (500-1000mg TID, L-methyl folate (1500mcg daily)    ** Metformin can block Vitamin B6 and B12 so it is important to supplement**    FOOT CARE RECOMMENDATIONS   1. Wash your feet with lukewarm water and a mild soap and then dry them thoroughly, especially between the toes.     2. Examine your feet daily looking for cuts, corns, blisters, cracks, ect, especially after wearing new shoes. Make sure to look between your toes. If you cannot see the bottom of your feet, set a mirror on the floor and hold your foot over it, or ask a spouse, friend or family member to examine your feet for you. Contact your doctor immediately if new problems are noted or if sores are not healing.     3. Immediately apply moisturizer to the tops and bottoms of your feet, avoiding areas between the toes. Hand lotion (Intesive Care, Unique, Eucerin, Neutrogena, Curel, ect) is sufficient unless your doctor prescribes a medicated lotion. Apply sunscreen to your feet when going swimming outside.     4. Use clean comfortable shoes, wear white socks (if you have any bleeding or drainage, you will see it on white socks). Socks should not have thick seams or cut off the circulation around the leg. Break in new shoes slowly and rotate with older shoes until broken in. Check the inside of  your shoes with your hand to look for areas of irritation or objects that may have fallen into your shoes.       5. Keep slippers by the side of your bed for use during the night.     6.  Shoes should be fitted by a professional and should not cause areas of irritation.  Check your feet regularly when wearing a new pair of shoes and replace them as needed.     7.  Talk to your doctor about proper exercise. Exercise and stretching stimulate blood flow to your feet and maintain proper glucose levels.     8.  Monitor your blood glucose level as instructed by your doctor. Notify your doctor immediately if your blood sugar is abnormally high or low.    9. Cut your nails straight across, but then gently round any sharp edges with a cardboard nail file. If you have neuropathy, peripheral vascular disease or cannot see that well to trim your own toenails contact [facility] ([phone]) or [facility] ([phone]).      THINGS TO AVOID DOING   1.  Do not soak your feet if you have an open sore. Use only lukewarm water and always check the temperature with your hand as hot water can easily burn your feet.       2.  Never use a hot water bottle or heating pad on your feet. Also do not apply cold compresses to your feet. With decreased sensation, you could burn or freeze your feet.       3.  Do not apply any of these to your feet:    -  Over the counter medicine for corns or warts    -  Harsh chemicals like boric acid    -  Do not self-treat corns, cuts, blisters or infections. Always consult your doctor.       4.  Do not wear sandals, slippers or walk barefoot, especially on hot sand or concrete or other harsh surfaces.     5.  If you smoke, stop!!!              ** FYI: THE FOLLOWING INFORMATION IS PRINTED IN EVERYONE'S AFTER VISIT SUMMER **    BODY MASS INDEX (BMI)  Many things can cause foot and ankle problems. Foot structure, activity level, foot mechanics and injuries are common causes of pain. One very important  issue that often goes unmentioned, is body weight. Extra weight can cause increased stress on muscles, ligaments, bones and tendons. Sometimes just a few extra pounds is all it takes to put one over her/his threshold. Without reducing that stress, it can be difficult to alleviate pain. Some people are uncomfortable addressing this issue, but we feel it is important for you to think about it. As Foot & Ankle specialists, our job is addressing the lower extremity problem and possible causes. Regarding extra body weight, we encourage patients to discuss diet and weight management plans with their primary care doctors. It is this team approach that gives you the best opportunity for pain relief and getting you back on your feet.

## 2018-11-09 ENCOUNTER — RADIANT APPOINTMENT (OUTPATIENT)
Dept: GENERAL RADIOLOGY | Facility: CLINIC | Age: 78
End: 2018-11-09
Attending: PEDIATRICS
Payer: COMMERCIAL

## 2018-11-09 ENCOUNTER — OFFICE VISIT (OUTPATIENT)
Dept: ORTHOPEDICS | Facility: CLINIC | Age: 78
End: 2018-11-09
Payer: COMMERCIAL

## 2018-11-09 VITALS
BODY MASS INDEX: 30.83 KG/M2 | WEIGHT: 174 LBS | HEIGHT: 63 IN | SYSTOLIC BLOOD PRESSURE: 124 MMHG | DIASTOLIC BLOOD PRESSURE: 72 MMHG

## 2018-11-09 DIAGNOSIS — M17.12 PRIMARY OSTEOARTHRITIS OF LEFT KNEE: ICD-10-CM

## 2018-11-09 DIAGNOSIS — M25.562 LEFT KNEE PAIN, UNSPECIFIED CHRONICITY: Primary | ICD-10-CM

## 2018-11-09 DIAGNOSIS — M25.562 LEFT KNEE PAIN, UNSPECIFIED CHRONICITY: ICD-10-CM

## 2018-11-09 PROCEDURE — 73562 X-RAY EXAM OF KNEE 3: CPT | Performed by: PEDIATRICS

## 2018-11-09 PROCEDURE — 99203 OFFICE O/P NEW LOW 30 MIN: CPT | Performed by: PEDIATRICS

## 2018-11-09 NOTE — PROGRESS NOTES
Sports Medicine Clinic Visit    PCP: Michelle Monet RICHA Leon is a 78 year old female who is seen  in consultation at the request of  Michelle Monet M.D. presenting with left knee pain.  Patient has a history of knee pain.  Did have an injection and PT in 2016 as well as a menisectomy.  She did have improvement with the surgery.  In that time she has spinal surgery as well has an ORIF for her left hip.    Pain in knee is diffuse.  Pain with descending stairs.  Referred to have her knee reevaluated to adjust so she does not have pain in her right knee     **  Diffuse left knee pain. No pain at rest. Aggravated by stairs. Denies swelling and noise in knee. Pain improved by exercise. Has been altering gait due to Fx of left hip.       Injury: ongoing     Location of Pain: left knee diffuse   Duration of Pain: 2+ year(s)  Rating of Pain at worst: 5/10  Rating of Pain Currently: 1/10  Symptoms are better with: Elevation  Symptoms are worse with: stairs, prolonged activity   Additional Features:   Positive: instability   Negative: swelling, bruising, popping, grinding, catching, locking, paresthesias, numbness, weakness, pain in other joints and systemic symptoms  Other evaluation and/or treatments so far consists of: HX of surgery and injections in the past   Prior History of related problems: ongoing     Social History: retired     Review of Systems  Musculoskeletal: as above  Remainder of review of systems is negative including constitutional, CV, pulmonary, GI, Skin and Neurologic except as noted in HPI or medical history.    Past Medical History:   Diagnosis Date     H/O arthroscopic knee surgery left    5/6/16     History of shingles 12/6/16     Hypertension      Low back pain     & radiates down left buttock & left leg     Need for prophylactic hormone replacement therapy (postmenopausal)      Nocturia     states she gets up every 2 hours at night to urinate     Nonsenile cataract      left eye     Osteoarthrosis, unspecified whether generalized or localized, unspecified site      Other and unspecified hyperlipidemia      POSTMENOPAUSAL HORMONAL REPLACEMENT TX 5/23/2005     Type II or unspecified type diabetes mellitus without mention of complication, not stated as uncontrolled      Past Surgical History:   Procedure Laterality Date     APPENDECTOMY  age 4     ARTHROSCOPY KNEE Left 5/6/2016    Procedure: ARTHROSCOPY KNEE;  Surgeon: Ramin Ramirez MD;  Location: [facility] OR     BACK SURGERY  01/20/2017    Lumbar fusion     C TOTAL HIP ARTHROPLASTY Left 2017     ENT SURGERY      Tonsillectomy     GENITOURINARY SURGERY      bladder repair/sling     OPTICAL TRACKING SYSTEM FUSION SPINE POSTERIOR LUMBAR TWO LEVELS N/A 1/20/2017    Procedure: OPTICAL TRACKING SYSTEM FUSION SPINE POSTERIOR LUMBAR TWO LEVELS;  Surgeon: Buddy Davies MD;  Location: [facility] OR     PHACOEMULSIFICATION CLEAR CORNEA WITH STANDARD INTRAOCULAR LENS IMPLANT Right 2014     PHACOEMULSIFICATION CLEAR CORNEA WITH STANDARD INTRAOCULAR LENS IMPLANT Left 11/21/2017     Family History   Problem Relation Age of Onset     Osteoporosis Mother      HEART DISEASE Father      Cerebrovascular Disease Father      Musculoskeletal Disorder Father      gout     Genitourinary Problems Maternal Grandmother      Cancer Maternal Grandfather      Cancer Paternal Grandmother      Cerebrovascular Disease Paternal Grandfather      Eye Disorder Brother      macular degeneration     Macular Degeneration Brother      Muscular Dystrophy Brother      Diabetes No family hx of      Hypertension No family hx of      Social History     Social History     Marital status: Single     Spouse name: N/A     Number of children: N/A     Years of education: N/A     Occupational History     Not on file.     Social History Main Topics     Smoking status: Former Smoker     Quit date: 1/1/1981     Smokeless tobacco: Never Used     Alcohol use Yes      Comment: 1  "glass of wine per day     Drug use: No     Sexual activity: Not Currently     Other Topics Concern     Parent/Sibling W/ Cabg, Mi Or Angioplasty Before 65f 55m? No     Social History Narrative    Dairy/d 1 servings/d.     Caffeine 1-2 servings/d    Exercise 7 x week Sweating with the oldies tape    Sunscreen used - Yes    Seatbelts used - Yes    Working smoke/CO detectors in the home - Yes    Guns stored in the home - No    Self Breast Exams - Yes    Self Testicular Exam - NA    Eye Exam up to date - Yes 2008    Dental Exam up to date - Yes 11/2009    Pap Smear up to date - Yes 2006 normal    Mammogram up to date - Yes 11/11/2009    PSA up to date - NA    Dexa Scan up to date - Yes 2006    Flex Sig / Colonoscopy up to date - Yes 10-31-07    Immunizations up to date - Yes last tetanus 2005    Abuse: Current or Past(Physical, Sexual or Emotional)- No    Do you feel safe in your environment - Yes    2007,2008 updated     This document serves as a record of the services and decisions personally performed and made by DO AYESHA Mcleod. It was created on their behalf by Naveen Granado, a trained medical scribe. The creation of this document is based the provider's statements to the medical scribe.    Naveen Granado November 9, 2018 9:13 AM    Objective  /72  Ht 5' 3\" (1.6 m)  Wt 174 lb (78.9 kg)  BMI 30.82 kg/m2    GENERAL APPEARANCE: healthy, alert and no distress   GAIT: NORMAL  SKIN: no suspicious lesions or rashes  NEURO: Normal strength and tone, mentation intact and speech normal  PSYCH:  mentation appears normal and affect normal/bright  HEENT: no scleral icterus  CV: no extremity edema  RESP: nonlabored breathing     Left Knee exam    ROM:   Full extension with anterior knee pain  Min limitation end range flexion    Inspection:       no visible ecchymosis       mild effusion noted     Skin:       no visible deformities       well perfused       capillary refill brisk    Patellar Motion:        Crepitus " noted in the left patellofemoral joint     Tender:         medial joint line       lateral joint line    Non Tender:         remainder of knee area    Special Tests:        neg (-) anterior drawer       neg (-) posterior drawer       positive (+) varus for pain, no laxity felt         positive (+) valgus for pain, no laxity felt         Radiology  Visualized radiographs of bilateral poe, bilateral sunrise, and lateral left taken on 11/9/2018, and reviewed the images with the patient.  Impression: Bilateral Poe, bilateral sunrise, and left lateral views of the knees demonstrate severe left medial compartment narrowing, and moderate to severe right medial compartment narrowing. Remainder of the compartments in the knees appear fairly well preserved. Small left knee effusion noted. Degenerative changes in the left knee have progressed when compared to prior films from 3/28/16. Posterior left knee calcification noted on lateral view. The distal aspect of a femoral intramedullary maru is noted on two views. No clear acute osseous abnormality identified.        Assessment:  1. Left knee pain, unspecified chronicity    2. Primary osteoarthritis of left knee        Plan:  Discussed the assessment with the patient. Primarily left knee OA. She likely still has some left hip area weakness from prior injury and subsequent surgery. Incidental right knee OA as well.    Discussed nature of degenerative arthrosis of the knee. Discussed symptom treatment with over-the-counter medications, ice or heat, topical treatments, and rest if needed. Discussed use of compression or bracing for comfort. Discussed potential benefits of rehabilitation, to maintain or improve function at the knee. Discussed benefits of exercise and weight loss (if applicable) to reduce pressure at the knee. Discussed injection therapy. Also briefly discussed future consideration of referral to orthopedic surgery for further evaluation and discussion of  additional treatment options.    Radiologic images reviewed and discussed with patient today      Following discussion, plan:  Topical Treatments: Ice or Heat as needed   Over the counter medication: Patient's preferred OTC medication as needed.   Activity Modification: as discussed  Rehab: Physical Therapy: Refresher visit placed.   Will refer back to PT for focus on knee, and resume exercises for hip.  Discussed compression options. May use left knee compression prn with activities.  Continue with exercise as able, ok to remain active.  Discussed NSAIDs, avoid oral with CKD. Topical such as Diclofenac gel could be consideration however. Focus on ice/heat, rehab for now.  Future consideration for steroid injection of the left knee. Patient elected to pursue Physical Therapy at this visit. Declined injection.  Follow up: will leave as needed   Questions answered. The patient indicates understanding of these issues and agrees with the plan.    Alvaro Tate, DO, CAQ    CC: Michelle Monet        Disclaimer: This note consists of symbols derived from keyboarding, dictation and/or voice recognition software. As a result, there may be errors in the script that have gone undetected. Please consider this when interpreting information found in this chart.    The information in this document, created by the medical scribe for me, accurately reflects the services I personally performed and the decisions made by me. I have reviewed and approved this document for accuracy prior to leaving the patient care area.

## 2018-11-09 NOTE — LETTER
11/9/2018         RE: Maira Leon  [address] [facility] [id number]        Dear Colleague,    Thank you for referring your patient, Maira Leon, to the [facility]. Please see a copy of my visit note below.    Sports Medicine Clinic Visit    PCP: Michelle Monet    Maira Leon is a 78 year old female who is seen  in consultation at the request of  Michelle Monet M.D. presenting with left knee pain.  Patient has a history of knee pain.  Did have an injection and PT in 2016 as well as a menisectomy.  She did have improvement with the surgery.  In that time she has spinal surgery as well has an ORIF for her left hip.    Pain in knee is diffuse.  Pain with descending stairs.  Referred to have her knee reevaluated to adjust so she does not have pain in her right knee     **  Diffuse left knee pain. No pain at rest. Aggravated by stairs. Denies swelling and noise in knee. Pain improved by exercise. Has been altering gait due to Fx of left hip.       Injury: ongoing     Location of Pain: left knee diffuse   Duration of Pain: 2+ year(s)  Rating of Pain at worst: 5/10  Rating of Pain Currently: 1/10  Symptoms are better with: Elevation  Symptoms are worse with: stairs, prolonged activity   Additional Features:   Positive: instability   Negative: swelling, bruising, popping, grinding, catching, locking, paresthesias, numbness, weakness, pain in other joints and systemic symptoms  Other evaluation and/or treatments so far consists of: HX of surgery and injections in the past   Prior History of related problems: ongoing     Social History: retired     Review of Systems  Musculoskeletal: as above  Remainder of review of systems is negative including constitutional, CV, pulmonary, GI, Skin and Neurologic except as noted in HPI or medical history.    Past Medical History:   Diagnosis Date     H/O arthroscopic knee surgery left    5/6/16      History of shingles 12/6/16     Hypertension      Low back pain     & radiates down left buttock & left leg     Need for prophylactic hormone replacement therapy (postmenopausal)      Nocturia     states she gets up every 2 hours at night to urinate     Nonsenile cataract     left eye     Osteoarthrosis, unspecified whether generalized or localized, unspecified site      Other and unspecified hyperlipidemia      POSTMENOPAUSAL HORMONAL REPLACEMENT TX 5/23/2005     Type II or unspecified type diabetes mellitus without mention of complication, not stated as uncontrolled      Past Surgical History:   Procedure Laterality Date     APPENDECTOMY  age 4     ARTHROSCOPY KNEE Left 5/6/2016    Procedure: ARTHROSCOPY KNEE;  Surgeon: Ramin Ramirez MD;  Location: [facility]     BACK SURGERY  01/20/2017    Lumbar fusion     C TOTAL HIP ARTHROPLASTY Left 2017     ENT SURGERY      Tonsillectomy     GENITOURINARY SURGERY      bladder repair/sling     OPTICAL TRACKING SYSTEM FUSION SPINE POSTERIOR LUMBAR TWO LEVELS N/A 1/20/2017    Procedure: OPTICAL TRACKING SYSTEM FUSION SPINE POSTERIOR LUMBAR TWO LEVELS;  Surgeon: Buddy Davies MD;  Location: [facility] OR     PHACOEMULSIFICATION CLEAR CORNEA WITH STANDARD INTRAOCULAR LENS IMPLANT Right 2014     PHACOEMULSIFICATION CLEAR CORNEA WITH STANDARD INTRAOCULAR LENS IMPLANT Left 11/21/2017     Family History   Problem Relation Age of Onset     Osteoporosis Mother      HEART DISEASE Father      Cerebrovascular Disease Father      Musculoskeletal Disorder Father      gout     Genitourinary Problems Maternal Grandmother      Cancer Maternal Grandfather      Cancer Paternal Grandmother      Cerebrovascular Disease Paternal Grandfather      Eye Disorder Brother      macular degeneration     Macular Degeneration Brother      Muscular Dystrophy Brother      Diabetes No family hx of      Hypertension No family hx of      Social History     Social History     Marital status: Single      "Spouse name: N/A     Number of children: N/A     Years of education: N/A     Occupational History     Not on file.     Social History Main Topics     Smoking status: Former Smoker     Quit date: 1/1/1981     Smokeless tobacco: Never Used     Alcohol use Yes      Comment: 1 glass of wine per day     Drug use: No     Sexual activity: Not Currently     Other Topics Concern     Parent/Sibling W/ Cabg, Mi Or Angioplasty Before 65f 55m? No     Social History Narrative    Dairy/d 1 servings/d.     Caffeine 1-2 servings/d    Exercise 7 x week Sweating with the oldies tape    Sunscreen used - Yes    Seatbelts used - Yes    Working smoke/CO detectors in the home - Yes    Guns stored in the home - No    Self Breast Exams - Yes    Self Testicular Exam - NA    Eye Exam up to date - Yes 2008    Dental Exam up to date - Yes 11/2009    Pap Smear up to date - Yes 2006 normal    Mammogram up to date - Yes 11/11/2009    PSA up to date - NA    Dexa Scan up to date - Yes 2006    Flex Sig / Colonoscopy up to date - Yes 10-31-07    Immunizations up to date - Yes last tetanus 2005    Abuse: Current or Past(Physical, Sexual or Emotional)- No    Do you feel safe in your environment - Yes    2007,2008 updated     This document serves as a record of the services and decisions personally performed and made by DO AYESHA Mcleod. It was created on their behalf by Naveen Granado, a trained medical scribe. The creation of this document is based the provider's statements to the medical scribe.    Naveen Granado November 9, 2018 9:13 AM    Objective  /72  Ht 5' 3\" (1.6 m)  Wt 174 lb (78.9 kg)  BMI 30.82 kg/m2    GENERAL APPEARANCE: healthy, alert and no distress   GAIT: NORMAL  SKIN: no suspicious lesions or rashes  NEURO: Normal strength and tone, mentation intact and speech normal  PSYCH:  mentation appears normal and affect normal/bright  HEENT: no scleral icterus  CV: no extremity edema  RESP: nonlabored breathing     Left Knee " exam    ROM:   Full extension with anterior knee pain  Min limitation end range flexion    Inspection:       no visible ecchymosis       mild effusion noted     Skin:       no visible deformities       well perfused       capillary refill brisk    Patellar Motion:        Crepitus noted in the left patellofemoral joint     Tender:         medial joint line       lateral joint line    Non Tender:         remainder of knee area    Special Tests:        neg (-) anterior drawer       neg (-) posterior drawer       positive (+) varus for pain, no laxity felt         positive (+) valgus for pain, no laxity felt         Radiology  Visualized radiographs of bilateral poe, bilateral sunrise, and lateral left taken on 11/9/2018, and reviewed the images with the patient.  Impression: Bilateral Poe, bilateral sunrise, and left lateral views of the knees demonstrate severe left medial compartment narrowing, and moderate to severe right medial compartment narrowing. Remainder of the compartments in the knees appear fairly well preserved. Small left knee effusion noted. Degenerative changes in the left knee have progressed when compared to prior films from 3/28/16. Posterior left knee calcification noted on lateral view. The distal aspect of a femoral intramedullary maru is noted on two views. No clear acute osseous abnormality identified.        Assessment:  1. Left knee pain, unspecified chronicity    2. Primary osteoarthritis of left knee        Plan:  Discussed the assessment with the patient. Primarily left knee OA. She likely still has some left hip area weakness from prior injury and subsequent surgery. Incidental right knee OA as well.    Discussed nature of degenerative arthrosis of the knee. Discussed symptom treatment with over-the-counter medications, ice or heat, topical treatments, and rest if needed. Discussed use of compression or bracing for comfort. Discussed potential benefits of rehabilitation, to  maintain or improve function at the knee. Discussed benefits of exercise and weight loss (if applicable) to reduce pressure at the knee. Discussed injection therapy. Also briefly discussed future consideration of referral to orthopedic surgery for further evaluation and discussion of additional treatment options.    Radiologic images reviewed and discussed with patient today      Following discussion, plan:  Topical Treatments: Ice or Heat as needed   Over the counter medication: Patient's preferred OTC medication as needed.   Activity Modification: as discussed  Rehab: Physical Therapy: Refresher visit placed.   Will refer back to PT for focus on knee, and resume exercises for hip.  Discussed compression options. May use left knee compression prn with activities.  Continue with exercise as able, ok to remain active.  Discussed NSAIDs, avoid oral with CKD. Topical such as Diclofenac gel could be consideration however. Focus on ice/heat, rehab for now.  Future consideration for steroid injection of the left knee. Patient elected to pursue Physical Therapy at this visit. Declined injection.  Follow up: will leave as needed   Questions answered. The patient indicates understanding of these issues and agrees with the plan.    Alvaro Tate, DO, CAQ    CC: Michelle Monet        Disclaimer: This note consists of symbols derived from keyboarding, dictation and/or voice recognition software. As a result, there may be errors in the script that have gone undetected. Please consider this when interpreting information found in this chart.    The information in this document, created by the medical scribe for me, accurately reflects the services I personally performed and the decisions made by me. I have reviewed and approved this document for accuracy prior to leaving the patient care area.    Again, thank you for allowing me to participate in the care of your patient.        Sincerely,        Alvaro Cool  Bebeto, DO

## 2018-11-09 NOTE — PATIENT INSTRUCTIONS
Knee osteoarthritis discussed today:     Ice/Heat/OTC Pain Medication as needed    Activity Modification: as discussed, avoid painful activities. Still ok to remain active, exercise.    Compression can help with pain with activity.    Continue with exercise    Physical Therapy referral placed for refresher visit

## 2018-11-09 NOTE — MR AVS SNAPSHOT
After Visit Summary   11/9/2018    Maira Leon    MRN: [id number]           Patient Information     Date Of Birth          1940        Visit Information        Provider Department      11/9/2018 9:00 AM Alvaro Tate,  [facility] [place]        Today's Diagnoses     Left knee pain, unspecified chronicity    -  1    Primary osteoarthritis of left knee          Care Instructions    Knee osteoarthritis discussed today:     Ice/Heat/OTC Pain Medication as needed    Activity Modification: as discussed, avoid painful activities. Still ok to remain active, exercise.    Compression can help with pain with activity.    Continue with exercise    Physical Therapy referral placed for refresher visit          Follow-ups after your visit        Additional Services     [facility] PT, HAND, AND CHIROPRACTIC REFERRAL       Physical Therapy, Hand Therapy and Chiropractic Care are available through:  *[facility]  *Hand Therapy (Occupational Therapy or Physical Therapy)  *[facility]    Call one number to schedule at any of the above locations: [phone].    Physical therapy, Hand therapy and/or Chiropractic care has been recommended by your physician as an excellent treatment option to reduce pain and help people return to normal activities, including sports.  Therapy and/or chiropractic care services are a great complement or alternative to expensive and invasive surgery, injections, or long-term use of prescription medications. The primary goal is to identify the underlying problem and provide you the tools to manage your condition on your own.     Please be aware that coverage of these services is subject to the terms and limitations of your health insurance plan.  Call member services at your health plan with any benefit or coverage questions.      Please bring the following to your appointment:  *Your personal calendar for  scheduling future appointments  *Comfortable clothing                  Follow-up notes from your care team     Return if symptoms worsen or fail to improve.      Your next 10 appointments already scheduled     May 09, 2019 12:20 PM CDT   [facility]    [address] [place] [id number]   [phone]           Your Arrival times is X, We need you to be here at this time to get checked in and have the assistant get you ready for the provider, which can take about 15 minutes. Your appointmen time with your provider is at  X. Thank you              Future tests that were ordered for you today     Open Future Orders        Priority Expected Expires Ordered    [facility] PT, HAND, AND CHIROPRACTIC REFERRAL Routine  11/9/2019 11/9/2018            Who to contact     If you have questions or need follow up information about today's clinic visit or your schedule please contact [facility] [place] directly at [phone].  Normal or non-critical lab and imaging results will be communicated to you by Talentaghart, letter or phone within 4 business days after the clinic has received the results. If you do not hear from us within 7 days, please contact the clinic through Augmentt or phone. If you have a critical or abnormal lab result, we will notify you by phone as soon as possible.  Submit refill requests through MiCursada or call your pharmacy and they will forward the refill request to us. Please allow 3 business days for your refill to be completed.          Additional Information About Your Visit        MiCursada Information     MiCursada gives you secure access to your electronic health record. If you see a primary care provider, you can also send messages to your care team and make appointments. If you have questions, please call your primary care clinic.  If you do not have a primary care provider, please  "call [phone] and they will assist you.        Care EveryWhere ID     This is your Care EveryWhere ID. This could be used by other organizations to access your [facility] medical records  [id number]        Your Vitals Were     Height BMI (Body Mass Index)                5' 3\" (1.6 m) 30.82 kg/m2           Blood Pressure from Last 3 Encounters:   11/09/18 124/72   11/07/18 124/74   11/01/18 154/84    Weight from Last 3 Encounters:   11/09/18 174 lb (78.9 kg)   11/07/18 174 lb 6.4 oz (79.1 kg)   11/01/18 174 lb 6.4 oz (79.1 kg)               Primary Care Provider Office Phone # Fax #    Michelle Verenice Monet -[phone]       [address] [facility] [address]        Equal Access to Services     [facility] : Hadii aad ku hadasho Soomaali, waaxda luqadaha, qaybta kaalmada adeegyada, waxay idiin hayaan ramiro baezaraeleonora ariasn . So [facility] [phone].    ATENCIÓN: Si habla español, tiene a ellis disposición servicios gratuitos de asistencia lingüística. Rubi al [phone].    We comply with applicable federal civil rights laws and [place] laws. We do not discriminate on the basis of race, color, national origin, age, disability, sex, sexual orientation, or gender identity.            Thank you!     Thank you for choosing [facility]  for your care. Our goal is always to provide you with excellent care. Hearing back from our patients is one way we can continue to improve our services. Please take a few minutes to complete the written survey that you may receive in the mail after your visit with us. Thank you!             Your Updated Medication List - Protect others around you: Learn how to safely use, store and throw away your medicines at www.disposemymeds.org.          This list is accurate as of 11/9/18  9:53 AM.  Always use your most recent med list.                   Brand Name Dispense Instructions for use Diagnosis    aspirin 81 MG tablet     30 " tablet    Take 1 tablet (81 mg) by mouth daily    S/P lumbar fusion       atorvastatin 40 MG tablet    LIPITOR    90 tablet    Take 1 tablet (40 mg) by mouth daily    Hyperlipidemia LDL goal <100, Diabetes mellitus with stage 3 chronic kidney disease (H)       blood glucose monitoring meter device kit    no brand specified    1 kit    Use to test blood sugar daily or as directed.    Diabetes mellitus with stage 3 chronic kidney disease (H)       * blood glucose monitoring test strip    no brand specified    100 strip    Use to test blood sugars daily or as directed    Diabetes mellitus with stage 3 chronic kidney disease (H)       * ONETOUCH ULTRA test strip   Generic drug:  blood glucose monitoring     100 strip    USE TO TEST BLOOD SUGARS DAILY OR AS DIRECTED    Diabetes mellitus with stage 3 chronic kidney disease (H)       BYETTA 10 MCG PEN 10 MCG/0.04ML injection   Generic drug:  exenatide     4.8 mL    INJECT 10MCG UNDER THE SKIN TWO TIMES A DAY    Diabetes mellitus with stage 3 chronic kidney disease (H)       Calcium Carb-Cholecalciferol 500-400 MG-UNIT Tabs    calcium 500 +D    100 tablet    Take 1 tablet by mouth daily    Osteopenia of multiple sites       fish oil-omega-3 fatty acids 1000 MG capsule      Take 4 g by mouth daily.        insulin pen needle 31G X 5 MM    B-D U/F    200 each    USE TWICE DAILY WITH BYETTA    Diabetes mellitus with stage 3 chronic kidney disease (H)       lisinopril 2.5 MG tablet    PRINIVIL/Zestril    90 tablet    TAKE ONE TABLET BY MOUTH EVERY DAY    CKD (chronic kidney disease) stage 3, GFR 30-59 ml/min (H), Diabetes mellitus with stage 3 chronic kidney disease (H)       metFORMIN 500 MG tablet    GLUCOPHAGE    180 tablet    Take 1 tablet (500 mg) by mouth 2 times daily (with meals)    Diabetes mellitus with stage 3 chronic kidney disease (H)       * Multi-vitamin Tabs tablet      Take 1 tablet by mouth daily        * multivitamin Tabs tablet     30 each    Take 1 tablet by  mouth daily.        ONETOUCH DELICA LANCETS 33G Misc     100 each    USE TO TEST BLOOD SUGAR DAILY OR AS DIRECTED    Diabetes mellitus with stage 3 chronic kidney disease (H)       polyethylene glycol powder    MIRALAX    510 g    Take 17 g (1 capful) by mouth 2 times daily as needed for constipation    Constipation, unspecified constipation type       * Notice:  This list has 4 medication(s) that are the same as other medications prescribed for you. Read the directions carefully, and ask your doctor or other care provider to review them with you.

## 2018-11-09 NOTE — PROGRESS NOTES
ASSESSMENT/PLAN:     Encounter Diagnoses   Name Primary?     Valgus deformity of both great toes Yes     Hammer toes of both feet      Pain in both feet      I discussed the cause and nature of bunions and hammer toes.    Conservative cares were reviewed including proper shoes, orthoses, anti-inflammatory measures,  padding, and the avoidance of barefoot walking.  Informational handout on bunions provided.      Surgical intervention was also briefly discussed. However, with discussion, she seems to be doing fine in life and not limited by the foot pain.  I encouraged conservative cares.  If her pain worsens, surgery is an option.  This would come with some risk, including deconditioning and bone healing problems with osteopenia. Her protective sensation is intact and there is no sign of skin breakdown. I explained that surgery is done to treat significant pain and not to prevent an assumed progression of deformity.  The exception to the pain concern is bony bumps causing ulceration in neuropathic patients.       Body mass index is 30.89 kg/(m^2).    Weight management plan: Patient was referred to their PCP to discuss a diet and exercise plan.      Salomon Rowe DPM, FACFAS, MS    [facility]le [facility]      ____________________________________________________________________    HPI:       I was asked by Dr. Toussaint to evaluate this patient, in consultation, regarding bunions and hammer toes.     Chief Complaint: bilateral bunion and hammer toes  Onset of problem: years  Painful at times and related to foot wear; pain over the bunion bumps and with the 2nd toes.   She said that her pain is not limiting her activities of daily living.   She inquires about surgery, before she gets older and it might not be an option. She cites having diabetes and osteopenia.  Last Hgb A1C = 5.7.    *  Patient Active Problem List   Diagnosis     Osteoarthritis     Hyperlipidemia LDL goal <100     CKD  (chronic kidney disease) stage 3, GFR 30-59 ml/min (H)     Advance Care Planning     Diabetes mellitus with stage 3 chronic kidney disease (H)     Primary osteoarthritis of left knee     Lumbar spinal stenosis     Osteopenia of multiple sites     Superior glenoid labrum lesion of left shoulder     Tear of left supraspinatus tendon   *  *  Past Surgical History:   Procedure Laterality Date     APPENDECTOMY  age 4     ARTHROSCOPY KNEE Left 5/6/2016    Procedure: ARTHROSCOPY KNEE;  Surgeon: Ramin Ramirez MD;  Location: [facility] OR     BACK SURGERY  01/20/2017    Lumbar fusion     C TOTAL HIP ARTHROPLASTY Left 2017     ENT SURGERY      Tonsillectomy     GENITOURINARY SURGERY      bladder repair/sling     OPTICAL TRACKING SYSTEM FUSION SPINE POSTERIOR LUMBAR TWO LEVELS N/A 1/20/2017    Procedure: OPTICAL TRACKING SYSTEM FUSION SPINE POSTERIOR LUMBAR TWO LEVELS;  Surgeon: Buddy Davies MD;  Location: [facility] OR     PHACOEMULSIFICATION CLEAR CORNEA WITH STANDARD INTRAOCULAR LENS IMPLANT Right 2014     PHACOEMULSIFICATION CLEAR CORNEA WITH STANDARD INTRAOCULAR LENS IMPLANT Left 11/21/2017   *  *  Current Outpatient Prescriptions   Medication Sig Dispense Refill     aspirin 81 MG tablet Take 1 tablet (81 mg) by mouth daily 30 tablet 0     atorvastatin (LIPITOR) 40 MG tablet Take 1 tablet (40 mg) by mouth daily 90 tablet 3     blood glucose monitoring (NO BRAND SPECIFIED) meter device kit Use to test blood sugar daily or as directed. 1 kit 0     blood glucose monitoring (NO BRAND SPECIFIED) test strip Use to test blood sugars daily or as directed 100 strip 1     BYETTA 10 MCG PEN 10 MCG/0.04ML injection INJECT 10MCG UNDER THE SKIN TWO TIMES A DAY 4.8 mL 1     Calcium Carb-Cholecalciferol (CALCIUM 500 +D) 500-400 MG-UNIT TABS Take 1 tablet by mouth daily 100 tablet 3     fish oil-omega-3 fatty acids (FISH OIL) 1000 MG capsule Take 4 g by mouth daily.       insulin pen needle (B-D U/F) 31G X 5 MM USE TWICE DAILY WITH  BYETTA 200 each 2     lisinopril (PRINIVIL/ZESTRIL) 2.5 MG tablet TAKE ONE TABLET BY MOUTH EVERY DAY 90 tablet 1     metFORMIN (GLUCOPHAGE) 500 MG tablet Take 1 tablet (500 mg) by mouth 2 times daily (with meals) 180 tablet 3     multivitamin (OCUVITE) TABS Take 1 tablet by mouth daily. 30 each 0     multivitamin, therapeutic with minerals (MULTI-VITAMIN) TABS tablet Take 1 tablet by mouth daily       ONETOUCH DELICA LANCETS 33G MISC USE TO TEST BLOOD SUGAR DAILY OR AS DIRECTED 100 each 3     ONETOUCH ULTRA test strip USE TO TEST BLOOD SUGARS DAILY OR AS DIRECTED 100 strip 3     polyethylene glycol (MIRALAX) powder Take 17 g (1 capful) by mouth 2 times daily as needed for constipation 510 g 0       ROS:     A 10-point review of systems was performed and is positive for that noted above in the HPI and as seen below.  All other areas are negative.     Social History:   Social History     Social History     Marital status: Single     Spouse name: N/A     Number of children: N/A     Years of education: N/A     Occupational History     Not on file.     Social History Main Topics     Smoking status: Former Smoker     Quit date: 1/1/1981     Smokeless tobacco: Never Used     Alcohol use Yes      Comment: 1 glass of wine per day     Drug use: No     Sexual activity: Not Currently     Other Topics Concern     Parent/Sibling W/ Cabg, Mi Or Angioplasty Before 65f 55m? No     Social History Narrative    Dairy/d 1 servings/d.     Caffeine 1-2 servings/d    Exercise 7 x week Sweating with the oldies tape    Sunscreen used - Yes    Seatbelts used - Yes    Working smoke/CO detectors in the home - Yes    Guns stored in the home - No    Self Breast Exams - Yes    Self Testicular Exam - NA    Eye Exam up to date - Yes 2008    Dental Exam up to date - Yes 11/2009    Pap Smear up to date - Yes 2006 normal    Mammogram up to date - Yes 11/11/2009    PSA up to date - NA    Dexa Scan up to date - Yes 2006    Flex Sig / Colonoscopy up to  "date - Yes 10-31-07    Immunizations up to date - Yes last tetanus 2005    Abuse: Current or Past(Physical, Sexual or Emotional)- No    Do you feel safe in your environment - Yes    2007,2008 updated       Family history:  Family History   Problem Relation Age of Onset     Osteoporosis Mother      HEART DISEASE Father      Cerebrovascular Disease Father      Musculoskeletal Disorder Father      gout     Genitourinary Problems Maternal Grandmother      Cancer Maternal Grandfather      Cancer Paternal Grandmother      Cerebrovascular Disease Paternal Grandfather      Eye Disorder Brother      macular degeneration     Macular Degeneration Brother      Muscular Dystrophy Brother      Diabetes No family hx of      Hypertension No family hx of        EXAM:    Vitals: /74  Ht 5' 3\" (1.6 m)  Wt 174 lb 6.4 oz (79.1 kg)  BMI 30.89 kg/m2  BMI: Body mass index is 30.89 kg/(m^2).  Height: 5' 3\"    Constitutional/ general:  Pt is in no apparent distress, appears well-nourished.  Cooperative with history and physical exam.   normal DP and PT pulses, no trophic changes or ulcerative lesions and normal sensory examDiabetic foot exam: n    Vascular:  Pedal pulses are palpable bilaterally for both the DP and PT arteries.  CFT < 3 sec.  No edema.  Pedal hair growth noted.     Neuro:  Alert and oriented x 3. Coordinated gait.  Light touch sensation is intact to the L4, L5, S1 distributions. No obvious deficits.  No evidence of neurological-based weakness, spasticity, or contracture in the lower extremities.   Protective sensation is intact bilateral foot per Harvey-Amaris Monofilament testing.    Derm: Normal texture and turgor.  No erythema, ecchymosis, or cyanosis.  No open lesions.     Musculoskeletal:    Lower extremity muscle strength is normal.  Patient is ambulatory without an assistive device or brace .  Hallux abducto valgus deformity, bilateral foot.  Partially reducible in the transverse plane with preserved " sagittal plane ROM.  No crepitus.  Digital contractures.  Most are flexible. Seconds toes with some rigid adaptation.          Salomon Rowe DPM, FACFAS, MS    [facility] & Ankle Surgery

## 2018-11-12 DIAGNOSIS — M20.42 HAMMER TOE OF LEFT FOOT: ICD-10-CM

## 2018-11-12 DIAGNOSIS — M20.41 HAMMER TOE OF RIGHT FOOT: ICD-10-CM

## 2018-11-12 DIAGNOSIS — M20.12 ACQUIRED HALLUX VALGUS OF LEFT FOOT: ICD-10-CM

## 2018-11-12 DIAGNOSIS — E11.22 TYPE 2 DIABETES MELLITUS WITH ESRD (END-STAGE RENAL DISEASE) (H): Primary | ICD-10-CM

## 2018-11-12 DIAGNOSIS — M20.11 ACQUIRED HALLUX VALGUS OF RIGHT FOOT: ICD-10-CM

## 2018-11-12 DIAGNOSIS — N18.6 TYPE 2 DIABETES MELLITUS WITH ESRD (END-STAGE RENAL DISEASE) (H): Primary | ICD-10-CM

## 2018-11-23 ENCOUNTER — TELEPHONE (OUTPATIENT)
Dept: FAMILY MEDICINE | Facility: CLINIC | Age: 78
End: 2018-11-23

## 2018-11-23 NOTE — TELEPHONE ENCOUNTER
Forms received from  Ortho: Diabetic Shoe, Multi Den Insert Custom Mold for Dara Monet MD.  Forms placed in provider 'sign me' folder.  Please fax forms to [phone] after completion.    Angie Pryor,

## 2018-11-28 ENCOUNTER — MEDICAL CORRESPONDENCE (OUTPATIENT)
Dept: HEALTH INFORMATION MANAGEMENT | Facility: CLINIC | Age: 78
End: 2018-11-28

## 2018-12-03 ENCOUNTER — THERAPY VISIT (OUTPATIENT)
Dept: PHYSICAL THERAPY | Facility: CLINIC | Age: 78
End: 2018-12-03
Payer: COMMERCIAL

## 2018-12-03 DIAGNOSIS — M25.562 CHRONIC PAIN OF LEFT KNEE: Primary | ICD-10-CM

## 2018-12-03 DIAGNOSIS — G89.29 CHRONIC PAIN OF LEFT KNEE: Primary | ICD-10-CM

## 2018-12-03 DIAGNOSIS — M17.12 PRIMARY OSTEOARTHRITIS OF LEFT KNEE: ICD-10-CM

## 2018-12-03 PROCEDURE — 97110 THERAPEUTIC EXERCISES: CPT | Mod: GP | Performed by: PHYSICAL THERAPIST

## 2018-12-03 PROCEDURE — G8979 MOBILITY GOAL STATUS: HCPCS | Mod: GP | Performed by: PHYSICAL THERAPIST

## 2018-12-03 PROCEDURE — G8978 MOBILITY CURRENT STATUS: HCPCS | Mod: GP | Performed by: PHYSICAL THERAPIST

## 2018-12-03 PROCEDURE — 97161 PT EVAL LOW COMPLEX 20 MIN: CPT | Mod: GP | Performed by: PHYSICAL THERAPIST

## 2018-12-03 ASSESSMENT — ACTIVITIES OF DAILY LIVING (ADL)
GO DOWN STAIRS: ACTIVITY IS SOMEWHAT DIFFICULT
SQUAT: ACTIVITY IS VERY DIFFICULT
GO UP STAIRS: ACTIVITY IS SOMEWHAT DIFFICULT
KNEE_ACTIVITY_OF_DAILY_LIVING_SUM: 50
WALK: ACTIVITY IS MINIMALLY DIFFICULT
SWELLING: I HAVE THE SYMPTOM BUT IT DOES NOT AFFECT MY ACTIVITY
HOW_WOULD_YOU_RATE_THE_CURRENT_FUNCTION_OF_YOUR_KNEE_DURING_YOUR_USUAL_DAILY_ACTIVITIES_ON_A_SCALE_FROM_0_TO_100_WITH_100_BEING_YOUR_LEVEL_OF_KNEE_FUNCTION_PRIOR_TO_YOUR_INJURY_AND_0_BEING_THE_INABILITY_TO_PERFORM_ANY_OF_YOUR_USUAL_DAILY_ACTIVITIES?: 15
KNEE_ACTIVITY_OF_DAILY_LIVING_SCORE: 71.43
PAIN: I HAVE THE SYMPTOM BUT IT DOES NOT AFFECT MY ACTIVITY
SIT WITH YOUR KNEE BENT: ACTIVITY IS NOT DIFFICULT
RAW_SCORE: 50
STAND: ACTIVITY IS MINIMALLY DIFFICULT
AS_A_RESULT_OF_YOUR_KNEE_INJURY,_HOW_WOULD_YOU_RATE_YOUR_CURRENT_LEVEL_OF_DAILY_ACTIVITY?: NEARLY NORMAL
WEAKNESS: I HAVE THE SYMPTOM BUT IT DOES NOT AFFECT MY ACTIVITY
RISE FROM A CHAIR: ACTIVITY IS MINIMALLY DIFFICULT
LIMPING: I HAVE THE SYMPTOM BUT IT DOES NOT AFFECT MY ACTIVITY
KNEEL ON THE FRONT OF YOUR KNEE: ACTIVITY IS VERY DIFFICULT
HOW_WOULD_YOU_RATE_THE_OVERALL_FUNCTION_OF_YOUR_KNEE_DURING_YOUR_USUAL_DAILY_ACTIVITIES?: NEARLY NORMAL
STIFFNESS: I HAVE THE SYMPTOM BUT IT DOES NOT AFFECT MY ACTIVITY
GIVING WAY, BUCKLING OR SHIFTING OF KNEE: I DO NOT HAVE THE SYMPTOM

## 2018-12-03 NOTE — MR AVS SNAPSHOT
After Visit Summary   12/3/2018    Maira Leon    MRN: [id number]           Patient Information     Date Of Birth          1940        Visit Information        Provider Department      12/3/2018 12:00 PM Kaylan Rivera, [facility] [place]        Today's Diagnoses     Chronic pain of left knee    -  1    Primary osteoarthritis of left knee           Follow-ups after your visit        Your next 10 appointments already scheduled     Dec 11, 2018  3:50 PM CST   [facility] Ext[facility]ity with [facility] [place] [facility] ([facility] [place])    [address] [facility] [address]  [place] [address]   [phone]            Dec 18, 2018 11:20 AM CST   [facility] Extremity with [facility] [place] [facility] ([facility] [place])    [address] [address]  [place] [address]   [phone]            May 09, 2019 12:20 PM CDT   SHORT with Michelle Monet MD   [facility]    [address] [place] [id number] [phone]           Your Arrival times is X, We need you to be here at this time to get checked in and have the assistant get you ready for the provider, which can take about 15 minutes. Your appointmen time with your provider is at  X. Thank you              Who to contact     If you have questions or need follow up information about today's clinic visit or your schedule please contact [facility] [place] [facility] directly at [phone].  Normal or non-critical lab and imaging results will be communicated to you by MyChart, letter or phone within 4 business days after the clinic has received the results. If you do not hear from us within 7 days, please contact the clinic through MyChart or phone. If you have a critical or abnormal lab result, we will notify you by phone as soon as possible.  Submit refill  requests through Joyhound or call your pharmacy and they will forward the refill request to us. Please allow 3 business days for your refill to be completed.          Additional Information About Your Visit        Adbrainhart Information     Joyhound gives you secure access to your electronic health record. If you see a primary care provider, you can also send messages to your care team and make appointments. If you have questions, please call your primary care clinic.  If you do not have a primary care provider, please call [phone] and they will assist you.        Care EveryWhere ID     This is your Care EveryWhere ID. This could be used by other organizations to access your [facility] medical records  [id number]         Blood Pressure from Last 3 Encounters:   11/09/18 124/72   11/07/18 124/74   11/01/18 154/84    Weight from Last 3 Encounters:   11/09/18 78.9 kg (174 lb)   11/07/18 79.1 kg (174 lb 6.4 oz)   11/01/18 79.1 kg (174 lb 6.4 oz)              We Performed the Following     [facility] Inital Eval Report     [facility] PT, HAND, AND CHIROPRACTIC REFERRAL     PT Eval, Low Complexity (63207)     Therapeutic Exercises        Primary Care Provider Office Phone # Fax #    Michelle Verenice Monet [phone]       [facility] [address]        Equal Access to Services     CONRADO RODRÍGUEZ : Hadii aad ku hadasho Soomaali, waaxda luqadaha, qaybta kaalmada adeamritayada, julio rice. So [facility] [phone].    ATENCIÓN: Si habla español, tiene a ellis disposición servicios gratuitos de asistencia lingüística. Llmia al [phone].    We comply with applicable federal civil rights laws and [place] laws. We do not discriminate on the basis of race, color, national origin, age, disability, sex, sexual orientation, or gender identity.            Thank you!     Thank you for choosing [facility]  for your care. Our goal is always to provide you  with excellent care. Hearing back from our patients is one way we can continue to improve our services. Please take a few minutes to complete the written survey that you may receive in the mail after your visit with us. Thank you!             Your Updated Medication List - Protect others around you: Learn how to safely use, store and throw away your medicines at www.disposemymeds.org.          This list is accurate as of 12/3/18 11:59 PM.  Always use your most recent med list.                   Brand Name Dispense Instructions for use Diagnosis    aspirin 81 MG tablet    ASA    30 tablet    Take 1 tablet (81 mg) by mouth daily    S/P lumbar fusion       atorvastatin 40 MG tablet    LIPITOR    90 tablet    Take 1 tablet (40 mg) by mouth daily    Hyperlipidemia LDL goal <100, Diabetes mellitus with stage 3 chronic kidney disease (H)       blood glucose monitoring meter device kit    NO BRAND SPECIFIED    1 kit    Use to test blood sugar daily or as directed.    Diabetes mellitus with stage 3 chronic kidney disease (H)       * blood glucose monitoring test strip    NO BRAND SPECIFIED    100 strip    Use to test blood sugars daily or as directed    Diabetes mellitus with stage 3 chronic kidney disease (H)       * ONETOUCH ULTRA test strip   Generic drug:  blood glucose monitoring     100 strip    USE TO TEST BLOOD SUGARS DAILY OR AS DIRECTED    Diabetes mellitus with stage 3 chronic kidney disease (H)       BYETTA 10 MCG PEN 10 MCG/0.04ML pen   Generic drug:  exenatide     4.8 mL    INJECT 10MCG UNDER THE SKIN TWO TIMES A DAY    Diabetes mellitus with stage 3 chronic kidney disease (H)       Calcium Carb-Cholecalciferol 500-400 MG-UNIT Tabs    calcium 500 +D    100 tablet    Take 1 tablet by mouth daily    Osteopenia of multiple sites       fish oil-omega-3 fatty acids 1000 MG capsule      Take 4 g by mouth daily.        insulin pen needle 31G X 5 MM miscellaneous    B-D U/F    200 each    USE TWICE DAILY WITH BYETTA     Diabetes mellitus with stage 3 chronic kidney disease (H)       lisinopril 2.5 MG tablet    PRINIVIL/Zestril    90 tablet    TAKE ONE TABLET BY MOUTH EVERY DAY    CKD (chronic kidney disease) stage 3, GFR 30-59 ml/min (H), Diabetes mellitus with stage 3 chronic kidney disease (H)       metFORMIN 500 MG tablet    GLUCOPHAGE    180 tablet    Take 1 tablet (500 mg) by mouth 2 times daily (with meals)    Diabetes mellitus with stage 3 chronic kidney disease (H)       * Multi-vitamin tablet      Take 1 tablet by mouth daily        * multivitamin Tabs tablet     30 each    Take 1 tablet by mouth daily.        ONETOUCH DELICA LANCETS 33G Misc     100 each    USE TO TEST BLOOD SUGAR DAILY OR AS DIRECTED    Diabetes mellitus with stage 3 chronic kidney disease (H)       polyethylene glycol powder    MIRALAX    510 g    Take 17 g (1 capful) by mouth 2 times daily as needed for constipation    Constipation, unspecified constipation type       * Notice:  This list has 4 medication(s) that are the same as other medications prescribed for you. Read the directions carefully, and ask your doctor or other care provider to review them with you.

## 2018-12-03 NOTE — PROGRESS NOTES
"[facility] Initial Evaluation  Subjective:  Patient is a 78 year old female presenting with rehab left knee hpi. The history is provided by the patient. No  was used.     Condition occurred with:  Insidious onset.  Condition occurred: for unknown reasons.  This is a chronic condition  Patient had arthroscopic surgery on L knee in 2016 and states that it is now \"bone on bone in part of knee\". Patient had a back fusion in January 2017 for stenosis and an ORIF on L hip in June 2017 following a fall.  Patient referred to PT for the L knee on 11/9/18. Patient reports pain:  Medial and lateral.  Radiates to:  Thigh (\"might be from the hip\").  Pain is described as aching and is intermittent and reported as 3/10 (during stairs).  Associated symptoms:  Loss of strength. Pain is worse during the day.  Symptoms are exacerbated by walking, ascending stairs and descending stairs (one step at a time) and relieved by rest.  Since onset symptoms are gradually worsening.  Special tests:  X-ray (11/9/18 - degenerative changes in L knee).  Previous treatment includes physical therapy (following menisectomy and hip surgery).  There was significant improvement following previous treatment.    Pertinent medical history includes:  Numbness/tingling, kidney disease and rheumatoid arthritis.    Other surgeries include:  Orthopedic surgery (menisectomy, hip, back).  Current medications:  Meds to increase bone density.  Current occupation is Retired teacher.            Red flags:  Pain at night/rest.   .                                            Barriers include:  None as reported by the patient.                            Objective:    Gait:    Gait Type:  Antalgic   Assistive Devices:  None                                                   Hip Evaluation  HIP AROM:  AROM:   Left Hip:     Normal    Right Hip:                      Hip Strength:    Flexion:   Left: 4/5   Pain:  Right: 4/5   Pain:       "                Abduction:  Left: 3-/5     Pain:Right: 3/5    Pain:                           Knee Evaluation:  ROM:    AROM      Extension:  Left: 5*    Right:  0  Flexion: Left: 115    Right: 125  PROM      Extension: Left: 0*   Right:   Flexion: Left: 125   Right:       Strength:     Extension:  Left: 4/5   Pain:      Right: 5/5   Pain:  Flexion:  Left: 5/5   Pain:      Right: 5/5   Pain:    Quad Set Left: Fair and good    Pain:   Quad Set Right: Good    Pain:  Ligament Testing:    Varus 0:  Left:  Neg     Varus 30:  Left:  Neg      Valgus 30:  Left:  Neg      Anterior Drawer:  Left:  Neg      Posterior Drawer: Left:  Neg        Palpation:    Left knee tenderness present at:  Medial Joint Line and Lateral Joint Line  Left knee tenderness not present at:  Patellar Tendon; Patellar Medial; Patellar Lateral; Patellar Superior and Patellar Inferior    Edema:  Edema of the knee: mild effusion in L knee compared to R.    Mobility Testing:      Patellofemoral Medial:  Left: normal      Patellofemoral Lateral:  Left: normal      Patellofemoral Superior:  Left: normal      Patellofemoral Inferior:  Left: normal            General     ROS    Assessment/Plan:    Patient is a 78 year old female with left side knee complaints.    Patient has the following significant findings with corresponding treatment plan.                Diagnosis 1:  L Knee Pain    Pain -  hot/cold therapy and education  Decreased ROM/flexibility - manual therapy and therapeutic exercise  Decreased strength - therapeutic exercise and therapeutic activities  Edema - vasopneumatics and cold therapy  Impaired gait - gait training  Decreased function - therapeutic activities    Therapy Evaluation Codes:   1) History comprised of:   Personal factors that impact the plan of care:      Age.    Comorbidity factors that impact the plan of care are:      Diabetes and Rheumatoid arthritis.     Medications impacting care: Bone density.  2) Examination of Body Systems  comprised of:   Body structures and functions that impact the plan of care:      Hip and Knee.   Activity limitations that impact the plan of care are:      Stairs, Standing and Walking.  3) Clinical presentation characteristics are:   Stable/Uncomplicated.  4) Decision-Making    Low complexity using standardized patient assessment instrument and/or measureable assessment of functional outcome.  Cumulative Therapy Evaluation is: Low complexity.    Previous and current functional limitations:  (See Goal Flow Sheet for this information)    Short term and Long term goals: (See Goal Flow Sheet for this information)     Communication ability:  Patient appears to be able to clearly communicate and understand verbal and written communication and follow directions correctly.  Treatment Explanation - The following has been discussed with the patient:   RX ordered/plan of care  Anticipated outcomes  Possible risks and side effects  This patient would benefit from PT intervention to resume normal activities.   Rehab potential is good.    Frequency:  1 X week, once daily with emphasis on home exercise program per MD order  Duration:  for 4 weeks  Discharge Plan:  Achieve all LTG.  Independent in home treatment program.  Reach maximal therapeutic benefit.    Please refer to the daily flowsheet for treatment today, total treatment time and time spent performing 1:1 timed codes.

## 2018-12-11 ENCOUNTER — THERAPY VISIT (OUTPATIENT)
Dept: PHYSICAL THERAPY | Facility: CLINIC | Age: 78
End: 2018-12-11
Payer: COMMERCIAL

## 2018-12-11 DIAGNOSIS — M25.562 CHRONIC PAIN OF LEFT KNEE: ICD-10-CM

## 2018-12-11 DIAGNOSIS — G89.29 CHRONIC PAIN OF LEFT KNEE: ICD-10-CM

## 2018-12-11 PROCEDURE — 97110 THERAPEUTIC EXERCISES: CPT | Mod: GP | Performed by: PHYSICAL THERAPIST

## 2018-12-11 PROCEDURE — 97112 NEUROMUSCULAR REEDUCATION: CPT | Mod: GP | Performed by: PHYSICAL THERAPIST

## 2019-01-15 ENCOUNTER — THERAPY VISIT (OUTPATIENT)
Dept: PHYSICAL THERAPY | Facility: CLINIC | Age: 79
End: 2019-01-15
Payer: COMMERCIAL

## 2019-01-15 DIAGNOSIS — M25.562 CHRONIC PAIN OF LEFT KNEE: ICD-10-CM

## 2019-01-15 DIAGNOSIS — G89.29 CHRONIC PAIN OF LEFT KNEE: ICD-10-CM

## 2019-01-15 PROCEDURE — 97530 THERAPEUTIC ACTIVITIES: CPT | Mod: GP | Performed by: PHYSICAL THERAPIST

## 2019-01-15 PROCEDURE — 97110 THERAPEUTIC EXERCISES: CPT | Mod: GP | Performed by: PHYSICAL THERAPIST

## 2019-01-15 PROCEDURE — 97112 NEUROMUSCULAR REEDUCATION: CPT | Mod: GP | Performed by: PHYSICAL THERAPIST

## 2019-02-21 ENCOUNTER — THERAPY VISIT (OUTPATIENT)
Dept: PHYSICAL THERAPY | Facility: CLINIC | Age: 79
End: 2019-02-21
Payer: COMMERCIAL

## 2019-02-21 DIAGNOSIS — G89.29 CHRONIC PAIN OF LEFT KNEE: ICD-10-CM

## 2019-02-21 DIAGNOSIS — M25.562 CHRONIC PAIN OF LEFT KNEE: ICD-10-CM

## 2019-02-21 PROCEDURE — 97110 THERAPEUTIC EXERCISES: CPT | Mod: GP | Performed by: PHYSICAL THERAPIST

## 2019-02-21 PROCEDURE — 97112 NEUROMUSCULAR REEDUCATION: CPT | Mod: GP | Performed by: PHYSICAL THERAPIST

## 2019-02-21 ASSESSMENT — ACTIVITIES OF DAILY LIVING (ADL)
STIFFNESS: I HAVE THE SYMPTOM BUT IT DOES NOT AFFECT MY ACTIVITY
HOW_WOULD_YOU_RATE_THE_CURRENT_FUNCTION_OF_YOUR_KNEE_DURING_YOUR_USUAL_DAILY_ACTIVITIES_ON_A_SCALE_FROM_0_TO_100_WITH_100_BEING_YOUR_LEVEL_OF_KNEE_FUNCTION_PRIOR_TO_YOUR_INJURY_AND_0_BEING_THE_INABILITY_TO_PERFORM_ANY_OF_YOUR_USUAL_DAILY_ACTIVITIES?: 75
SIT WITH YOUR KNEE BENT: ACTIVITY IS NOT DIFFICULT
LIMPING: I HAVE THE SYMPTOM BUT IT DOES NOT AFFECT MY ACTIVITY
WEAKNESS: THE SYMPTOM AFFECTS MY ACTIVITY SLIGHTLY
RAW_SCORE: 51
AS_A_RESULT_OF_YOUR_KNEE_INJURY,_HOW_WOULD_YOU_RATE_YOUR_CURRENT_LEVEL_OF_DAILY_ACTIVITY?: NEARLY NORMAL
WALK: ACTIVITY IS MINIMALLY DIFFICULT
SQUAT: ACTIVITY IS VERY DIFFICULT
RISE FROM A CHAIR: ACTIVITY IS MINIMALLY DIFFICULT
GO DOWN STAIRS: ACTIVITY IS MINIMALLY DIFFICULT
GIVING WAY, BUCKLING OR SHIFTING OF KNEE: I DO NOT HAVE THE SYMPTOM
KNEE_ACTIVITY_OF_DAILY_LIVING_SUM: 51
GO UP STAIRS: ACTIVITY IS SOMEWHAT DIFFICULT
STAND: ACTIVITY IS MINIMALLY DIFFICULT
KNEE_ACTIVITY_OF_DAILY_LIVING_SCORE: 72.86
KNEEL ON THE FRONT OF YOUR KNEE: ACTIVITY IS VERY DIFFICULT
PAIN: I HAVE THE SYMPTOM BUT IT DOES NOT AFFECT MY ACTIVITY
HOW_WOULD_YOU_RATE_THE_OVERALL_FUNCTION_OF_YOUR_KNEE_DURING_YOUR_USUAL_DAILY_ACTIVITIES?: NEARLY NORMAL
SWELLING: I DO NOT HAVE THE SYMPTOM

## 2019-02-21 NOTE — PROGRESS NOTES
Subjective:  HPI                    Objective:  System    Physical Exam    General     ROS    Assessment/Plan:    DISCHARGE REPORT    Progress reporting period is from 12/03/19 to 2/21/19.       SUBJECTIVE  Patient reports significant improvement in her knee pain since the start of therapy.  She feels her knee mobility is better and she can now negotiate stairs in a reciprocal manner with less pain.  Recently played golf in [place] and tolerated just fine. Used a golf cart but still walked about 5 miles over the course of 18 holes.     Current pain level is 1/10  .     Initial Pain level: 3/10.   Changes in function:  Yes (See Goal flowsheet attached for changes in current functional level)  Adverse reaction to treatment or activity: None    OBJECTIVE  AROM L Knee:  0-  PROM L Knee:  0-5-135    Left LE Strength:  Hip flexors 4/5, hip abductors 3/5, quads 5/5, HS 5/5, ankle DF 5/5  Gait:  Ambulates with decreased left knee extension at heel strike    ASSESSMENT/PLAN  STG/LTGs have been met or progress has been made towards goals:  Yes (See Goal flow sheet completed today.)  Assessment of Progress: The patient's condition is improving.  Patient is meeting short term goals and is progressing towards long term goals.  Self Management Plans:  Patient  has been instructed in self management of symptoms.  PT intervention is no longer required to meet STG/LTG.    Recommendations:  This patient is ready to be discharged from therapy and continue their home treatment program. Encouraged patient to call if she has any questions or concerns.    Please refer to the daily flowsheet for treatment today, total treatment time and time spent performing 1:1 timed codes.

## 2019-03-04 ENCOUNTER — ANCILLARY PROCEDURE (OUTPATIENT)
Dept: MAMMOGRAPHY | Facility: CLINIC | Age: 79
End: 2019-03-04
Attending: FAMILY MEDICINE
Payer: COMMERCIAL

## 2019-03-04 DIAGNOSIS — E11.22 DIABETES MELLITUS WITH STAGE 3 CHRONIC KIDNEY DISEASE (H): ICD-10-CM

## 2019-03-04 DIAGNOSIS — Z12.31 VISIT FOR SCREENING MAMMOGRAM: ICD-10-CM

## 2019-03-04 DIAGNOSIS — N18.30 DIABETES MELLITUS WITH STAGE 3 CHRONIC KIDNEY DISEASE (H): ICD-10-CM

## 2019-03-04 PROCEDURE — 77067 SCR MAMMO BI INCL CAD: CPT | Mod: TC

## 2019-03-05 RX ORDER — EXENATIDE 250 UG/ML
INJECTION SUBCUTANEOUS
Qty: 4.8 ML | Refills: 1 | Status: SHIPPED | OUTPATIENT
Start: 2019-03-05 | End: 2019-05-29

## 2019-03-05 NOTE — TELEPHONE ENCOUNTER
"Requested Prescriptions   Pending Prescriptions Disp Refills     BYETTA 10 MCG PEN 10 MCG/0.04ML pen [Pharmacy Med Name: BYETTA 10 MCG PEN 10 SOPN]  Last Written Prescription Date:  11/2/2019  Last Fill Quantity: 4.8 ml,  # refills: 1   Last office visit: 11/1/2018 with prescribing provider:  Chiki   Future Office Visit:   Next 5 appointments (look out 90 days)    May 29, 2019  1:20 PM CDT  SHORT with Michelle Monet MD  [facility] [address] [place] [id number]  [phone]          4.8 mL 1     Sig: INJECT 10MCG UNDER THE SKIN TWO TIMES A DAY    GLP-1 Agonists Protocol Passed - 3/4/2019  7:29 PM       Passed - Blood pressure less than 140/90 in past 6 months    BP Readings from Last 3 Encounters:   11/09/18 124/72   11/07/18 124/74   11/01/18 154/84                Passed - LDL on file in past 12 months    Recent Labs   Lab Test 10/31/18  0713   LDL 78            Passed - Microalbumin on file in past 12 months    Recent Labs   Lab Test 11/01/18  1352   MICROL 10   UMALCR 18.26            Passed - HgbA1C in past 3 or 6 months    If HgbA1C is 8 or greater, it needs to be on file within the past 3 months.  If less than 8, must be on file within the past 6 months.     Recent Labs   Lab Test 10/31/18  0713   A1C 5.7*            Passed - Medication is active on med list       Passed - Patient is age 18 or older       Passed - No active pregnancy on record       Passed - Normal serum creatinine on file in past 12 months    Recent Labs   Lab Test 05/16/18  1308   CR 0.87            Passed - No positive pregnancy test in past 12 months       Passed - Recent (6 mo) or future (30 days) visit within the authorizing provider's specialty    Patient had office visit in the last 6 months or has a visit in the next 30 days with authorizing provider.  See \"Patient Info\" tab in inbasket, or \"Choose Columns\" in Meds & Orders section of the refill " encounter.

## 2019-03-06 NOTE — TELEPHONE ENCOUNTER
Prescription approved per [facility].  Per last visit note, patient due for appt in May and is scheduled.    Riddhi Cannon RN

## 2019-03-25 ENCOUNTER — THERAPY VISIT (OUTPATIENT)
Dept: PHYSICAL THERAPY | Facility: CLINIC | Age: 79
End: 2019-03-25
Payer: COMMERCIAL

## 2019-03-25 DIAGNOSIS — M25.552 HIP PAIN, LEFT: ICD-10-CM

## 2019-03-25 PROCEDURE — 97110 THERAPEUTIC EXERCISES: CPT | Mod: GP | Performed by: PHYSICAL THERAPIST

## 2019-03-25 PROCEDURE — 97164 PT RE-EVAL EST PLAN CARE: CPT | Mod: GP | Performed by: PHYSICAL THERAPIST

## 2019-03-29 ENCOUNTER — ANCILLARY PROCEDURE (OUTPATIENT)
Dept: GENERAL RADIOLOGY | Facility: CLINIC | Age: 79
End: 2019-03-29
Attending: PEDIATRICS
Payer: COMMERCIAL

## 2019-03-29 ENCOUNTER — OFFICE VISIT (OUTPATIENT)
Dept: ORTHOPEDICS | Facility: CLINIC | Age: 79
End: 2019-03-29
Payer: COMMERCIAL

## 2019-03-29 VITALS
SYSTOLIC BLOOD PRESSURE: 122 MMHG | BODY MASS INDEX: 31.18 KG/M2 | HEIGHT: 63 IN | DIASTOLIC BLOOD PRESSURE: 70 MMHG | WEIGHT: 176 LBS

## 2019-03-29 DIAGNOSIS — M25.552 LEFT HIP PAIN: Primary | ICD-10-CM

## 2019-03-29 DIAGNOSIS — Z98.890 HISTORY OF HIP SURGERY: ICD-10-CM

## 2019-03-29 DIAGNOSIS — M25.552 LEFT HIP PAIN: ICD-10-CM

## 2019-03-29 PROCEDURE — 99214 OFFICE O/P EST MOD 30 MIN: CPT | Performed by: PEDIATRICS

## 2019-03-29 PROCEDURE — 73502 X-RAY EXAM HIP UNI 2-3 VIEWS: CPT

## 2019-03-29 ASSESSMENT — MIFFLIN-ST. JEOR: SCORE: 1242.46

## 2019-03-29 NOTE — LETTER
3/29/2019         RE: Maira Leon  [address] [id number]        Dear Colleague,    Thank you for referring your patient, Maira Leon, to the [facility]. Please see a copy of my visit note below.    Sports Medicine Clinic Visit    PCP: Michelle Monet    Maira Leon is a 79 year old female who is seen  as a self referral presenting with left hip pain.  Does have a history of a left hip ORIF 8/16/2017.  6 months prior to this she had back fusion surgery.  She states she has had pain in her hip since that time.  Was doing PT for her lumbar spine when she injured her hip.  Completed PT for her knee recently, and does feel this was helpful.      Wants to know if there is more she can do for her hip.  She states her hip is painful all the time.  It wakes her up at night.  Difficulty turning over in bed.  Pain in the groin and lateral hip.    **  Her hip fracture occurred a year and a half ago, she is wondering if her pain and strength will ever get better, or if she is just going to have to live with her current level of pain. She has some pain associated with her posture caused by her previous back surgeries. She had PT for her knee OA in 2016 and that helped her immensely.     Injury: HX of ORIF    Location of Pain: left hip  Duration of Pain: 2 year(s)  Rating of Pain at worst: 5/10  Rating of Pain Currently: 1/10  Symptoms are better with: Rest  Symptoms are worse with: movement, laying down, at night   Additional Features:   Positive:    Negative: swelling, bruising, popping, grinding, catching, locking, instability, paresthesias, numbness, weakness, pain in other joints and systemic symptoms  Other evaluation and/or treatments so far consists of: Lumbar and hip surgery   Prior History of related problems: ongoing     Social History: retired     Review of Systems  Musculoskeletal: as above  Remainder of review of systems is  negative including constitutional, CV, pulmonary, GI, Skin and Neurologic except as noted in HPI or medical history.    This document serves as a record of the services and decisions personally performed and made by Alvaro Tate DO, CAQ. It was created on his behalf by Danisha Rdz, a trained medical scribe. The creation of this document is based the provider's statements to the medical scribe.  Danisha Rdz March 29, 2019 8:29 AM       Past Medical History:   Diagnosis Date     H/O arthroscopic knee surgery left    5/6/16     History of shingles 12/6/16     Hypertension      Low back pain     & radiates down left buttock & left leg     Need for prophylactic hormone replacement therapy (postmenopausal)      Nocturia     states she gets up every 2 hours at night to urinate     Nonsenile cataract     left eye     Osteoarthrosis, unspecified whether generalized or localized, unspecified site      Other and unspecified hyperlipidemia      POSTMENOPAUSAL HORMONAL REPLACEMENT TX 5/23/2005     Type II or unspecified type diabetes mellitus without mention of complication, not stated as uncontrolled      Past Surgical History:   Procedure Laterality Date     APPENDECTOMY  age 4     ARTHROSCOPY KNEE Left 5/6/2016    Procedure: ARTHROSCOPY KNEE;  Surgeon: Ramin Ramirez MD;  Location:  OR     BACK SURGERY  01/20/2017    Lumbar fusion     C TOTAL HIP ARTHROPLASTY Left 2017     ENT SURGERY      Tonsillectomy     GENITOURINARY SURGERY      bladder repair/sling     OPTICAL TRACKING SYSTEM FUSION SPINE POSTERIOR LUMBAR TWO LEVELS N/A 1/20/2017    Procedure: OPTICAL TRACKING SYSTEM FUSION SPINE POSTERIOR LUMBAR TWO LEVELS;  Surgeon: Buddy Davies MD;  Location:  OR     PHACOEMULSIFICATION CLEAR CORNEA WITH STANDARD INTRAOCULAR LENS IMPLANT Right 2014     PHACOEMULSIFICATION CLEAR CORNEA WITH STANDARD INTRAOCULAR LENS IMPLANT Left 11/21/2017     Family History   Problem Relation Age of Onset     Osteoporosis  Mother      Heart Disease Father      Cerebrovascular Disease Father      Musculoskeletal Disorder Father         gout     Genitourinary Problems Maternal Grandmother      Cancer Maternal Grandfather      Cancer Paternal Grandmother      Cerebrovascular Disease Paternal Grandfather      Eye Disorder Brother         macular degeneration     Macular Degeneration Brother      Muscular Dystrophy Brother      Diabetes No family hx of      Hypertension No family hx of      Social History     Socioeconomic History     Marital status: Single     Spouse name: Not on file     Number of children: Not on file     Years of education: Not on file     Highest education level: Not on file   Occupational History     Not on file   Social Needs     Financial resource strain: Not on file     Food insecurity:     Worry: Not on file     Inability: Not on file     Transportation needs:     Medical: Not on file     Non-medical: Not on file   Tobacco Use     Smoking status: Former Smoker     Last attempt to quit: 1981     Years since quittin.2     Smokeless tobacco: Never Used   Substance and Sexual Activity     Alcohol use: Yes     Comment: 1 glass of wine per day     Drug use: No     Sexual activity: Not Currently   Lifestyle     Physical activity:     Days per week: Not on file     Minutes per session: Not on file     Stress: Not on file   Relationships     Social connections:     Talks on phone: Not on file     Gets together: Not on file     Attends Hoahaoism service: Not on file     Active member of club or organization: Not on file     Attends meetings of clubs or organizations: Not on file     Relationship status: Not on file     Intimate partner violence:     Fear of current or ex partner: Not on file     Emotionally abused: Not on file     Physically abused: Not on file     Forced sexual activity: Not on file   Other Topics Concern     Parent/sibling w/ CABG, MI or angioplasty before 65F 55M? No   Social History Narrative  "   Dairy/d 1 servings/d.     Caffeine 1-2 servings/d    Exercise 7 x week Sweating with the oldies tape    Sunscreen used - Yes    Seatbelts used - Yes    Working smoke/CO detectors in the home - Yes    Guns stored in the home - No    Self Breast Exams - Yes    Self Testicular Exam - NA    Eye Exam up to date - Yes 2008    Dental Exam up to date - Yes 11/2009    Pap Smear up to date - Yes 2006 normal    Mammogram up to date - Yes 11/11/2009    PSA up to date - NA    Dexa Scan up to date - Yes 2006    Flex Sig / Colonoscopy up to date - Yes 10-31-07    Immunizations up to date - Yes last tetanus 2005    Abuse: Current or Past(Physical, Sexual or Emotional)- No    Do you feel safe in your environment - Yes    2007,2008 updated       Objective  /70   Ht 1.6 m (5' 3\")   Wt 79.8 kg (176 lb)   BMI 31.18 kg/m       GENERAL APPEARANCE: healthy, alert and no distress   GAIT: Ambulates independently in exam room  SKIN: no suspicious lesions or rashes  NEURO: Normal strength and tone, mentation intact and speech normal  PSYCH:  mentation appears normal and affect normal/bright  HEENT: no scleral icterus  CV: no extremity edema  RESP: nonlabored breathing    Left hip exam    ROM:        internal rotation reduced on left side by pain    Strength:        flexion 4/5       abduction 3/5    Tender:        greater trochanter       TFL  Anterior hip    Non Tender:        remainder of hip area    Sensation:        grossly intact in hip and thigh    Skin:       well perfused    Special Tests:   No pain with logrolling.   FADIR with some pain      Radiology  Visualized radiographs of Pelvis, and reviewed the images with the patient.  Impression: mild degenerative changes in bilateral hip joints, possible calcification inferior to the left femoral neck, hardware in place without evidence of loosening.   IMPRESSION:  Postop changes lower lumbar spine and left hip. Hardware  appears intact. No acute appearing abnormality. Mild " spurring right  acetabulum.      Assessment:  1. Left hip pain    2. History of hip surgery        Plan:  Discussed the assessment with the patient. Offered the following interventions for consideration: physical therapy, further imaging: CT or bone density scan, steroid injection.   Rehab: Physical Therapy: [facility] - [phone]  She would like to see her previous therapist Kaylan at [facility] again.   **    Reviewed her plain films.  Reviewed her course.  Prior fracture with subsequent surgery.  Some limitation in hip range of motion, with reduced strength on that left side.    We discussed considerations of rehab, additional imaging, and trial of injection for her pain.  Following discussion, plan rehab next.  Goal is to improve comfort level, strength and mobility.  Pending course, we discussed considerations of CT scan or bone scan to evaluate the hip area further, particularly if there are any concerns about hardware.  Additional consideration may be to see orthopedic surgery, particularly if any hardware concerns.  Follow-up: 1 month if not improving with physical therapy, sooner if needed.  Questions answered. The patient indicates understanding of these issues and agrees with the plan.    Alvaro Tate DO, CAQ        Disclaimer: This note consists of symbols derived from keyboarding, dictation and/or voice recognition software. As a result, there may be errors in the script that have gone undetected. Please consider this when interpreting information found in this chart.        Again, thank you for allowing me to participate in the care of your patient.        Sincerely,        Alvaro Tate DO

## 2019-03-29 NOTE — PROGRESS NOTES
Sports Medicine Clinic Visit    PCP: Michelle Monet RICHA Leon is a 79 year old female who is seen  as a self referral presenting with left hip pain.  Does have a history of a left hip ORIF 8/16/2017.  6 months prior to this she had back fusion surgery.  She states she has had pain in her hip since that time.  Was doing PT for her lumbar spine when she injured her hip.  Completed PT for her knee recently, and does feel this was helpful.      Wants to know if there is more she can do for her hip.  She states her hip is painful all the time.  It wakes her up at night.  Difficulty turning over in bed.  Pain in the groin and lateral hip.    **  Her hip fracture occurred a year and a half ago, she is wondering if her pain and strength will ever get better, or if she is just going to have to live with her current level of pain. She has some pain associated with her posture caused by her previous back surgeries. She had PT for her knee OA in 2016 and that helped her immensely.     Injury: HX of ORIF    Location of Pain: left hip  Duration of Pain: 2 year(s)  Rating of Pain at worst: 5/10  Rating of Pain Currently: 1/10  Symptoms are better with: Rest  Symptoms are worse with: movement, laying down, at night   Additional Features:   Positive:    Negative: swelling, bruising, popping, grinding, catching, locking, instability, paresthesias, numbness, weakness, pain in other joints and systemic symptoms  Other evaluation and/or treatments so far consists of: Lumbar and hip surgery   Prior History of related problems: ongoing     Social History: retired     Review of Systems  Musculoskeletal: as above  Remainder of review of systems is negative including constitutional, CV, pulmonary, GI, Skin and Neurologic except as noted in HPI or medical history.    This document serves as a record of the services and decisions personally performed and made by Alvaro Tate DO, CAQ. It was created on his behalf by  Danisha Rdz, a trained medical scribe. The creation of this document is based the provider's statements to the medical scribe.  Danisha Kajal March 29, 2019 8:29 AM       Past Medical History:   Diagnosis Date     H/O arthroscopic knee surgery left    5/6/16     History of shingles 12/6/16     Hypertension      Low back pain     & radiates down left buttock & left leg     Need for prophylactic hormone replacement therapy (postmenopausal)      Nocturia     states she gets up every 2 hours at night to urinate     Nonsenile cataract     left eye     Osteoarthrosis, unspecified whether generalized or localized, unspecified site      Other and unspecified hyperlipidemia      POSTMENOPAUSAL HORMONAL REPLACEMENT TX 5/23/2005     Type II or unspecified type diabetes mellitus without mention of complication, not stated as uncontrolled      Past Surgical History:   Procedure Laterality Date     APPENDECTOMY  age 4     ARTHROSCOPY KNEE Left 5/6/2016    Procedure: ARTHROSCOPY KNEE;  Surgeon: Ramin Ramirez MD;  Location:  OR     BACK SURGERY  01/20/2017    Lumbar fusion     C TOTAL HIP ARTHROPLASTY Left 2017     ENT SURGERY      Tonsillectomy     GENITOURINARY SURGERY      bladder repair/sling     OPTICAL TRACKING SYSTEM FUSION SPINE POSTERIOR LUMBAR TWO LEVELS N/A 1/20/2017    Procedure: OPTICAL TRACKING SYSTEM FUSION SPINE POSTERIOR LUMBAR TWO LEVELS;  Surgeon: Buddy Davies MD;  Location: [facility] OR     PHACOEMULSIFICATION CLEAR CORNEA WITH STANDARD INTRAOCULAR LENS IMPLANT Right 2014     PHACOEMULSIFICATION CLEAR CORNEA WITH STANDARD INTRAOCULAR LENS IMPLANT Left 11/21/2017     Family History   Problem Relation Age of Onset     Osteoporosis Mother      Heart Disease Father      Cerebrovascular Disease Father      Musculoskeletal Disorder Father         gout     Genitourinary Problems Maternal Grandmother      Cancer Maternal Grandfather      Cancer Paternal Grandmother      Cerebrovascular Disease Paternal  Grandfather      Eye Disorder Brother         macular degeneration     Macular Degeneration Brother      Muscular Dystrophy Brother      Diabetes No family hx of      Hypertension No family hx of      Social History     Socioeconomic History     Marital status: Single     Spouse name: Not on file     Number of children: Not on file     Years of education: Not on file     Highest education level: Not on file   Occupational History     Not on file   Social Needs     Financial resource strain: Not on file     Food insecurity:     Worry: Not on file     Inability: Not on file     Transportation needs:     Medical: Not on file     Non-medical: Not on file   Tobacco Use     Smoking status: Former Smoker     Last attempt to quit: 1981     Years since quittin.2     Smokeless tobacco: Never Used   Substance and Sexual Activity     Alcohol use: Yes     Comment: 1 glass of wine per day     Drug use: No     Sexual activity: Not Currently   Lifestyle     Physical activity:     Days per week: Not on file     Minutes per session: Not on file     Stress: Not on file   Relationships     Social connections:     Talks on phone: Not on file     Gets together: Not on file     Attends [employer] service: Not on file     Active member of club or organization: Not on file     Attends meetings of clubs or organizations: Not on file     Relationship status: Not on file     Intimate partner violence:     Fear of current or ex partner: Not on file     Emotionally abused: Not on file     Physically abused: Not on file     Forced sexual activity: Not on file   Other Topics Concern     Parent/sibling w/ CABG, MI or angioplasty before 65F 55M? No   Social History Narrative    Dairy/d 1 servings/d.     Caffeine 1-2 servings/d    Exercise 7 x week Sweating with the oldies tape    Sunscreen used - Yes    Seatbelts used - Yes    Working smoke/CO detectors in the home - Yes    Guns stored in the home - No    Self Breast Exams - Yes    Self  "Testicular Exam - NA    Eye Exam up to date - Yes 2008    Dental Exam up to date - Yes 11/2009    Pap Smear up to date - Yes 2006 normal    Mammogram up to date - Yes 11/11/2009    PSA up to date - NA    Dexa Scan up to date - Yes 2006    Flex Sig / Colonoscopy up to date - Yes 10-31-07    Immunizations up to date - Yes last tetanus 2005    Abuse: Current or Past(Physical, Sexual or Emotional)- No    Do you feel safe in your environment - Yes    2007,2008 updated       Objective  /70   Ht 1.6 m (5' 3\")   Wt 79.8 kg (176 lb)   BMI 31.18 kg/m      GENERAL APPEARANCE: healthy, alert and no distress   GAIT: Ambulates independently in exam room  SKIN: no suspicious lesions or rashes  NEURO: Normal strength and tone, mentation intact and speech normal  PSYCH:  mentation appears normal and affect normal/bright  HEENT: no scleral icterus  CV: no extremity edema  RESP: nonlabored breathing    Left hip exam    ROM:        internal rotation reduced on left side by pain    Strength:        flexion 4/5       abduction 3/5    Tender:        greater trochanter       TFL  Anterior hip    Non Tender:        remainder of hip area    Sensation:        grossly intact in hip and thigh    Skin:       well perfused    Special Tests:   No pain with logrolling.   FADIR with some pain      Radiology  Visualized radiographs of Pelvis, and reviewed the images with the patient.  Impression: mild degenerative changes in bilateral hip joints, possible calcification inferior to the left femoral neck, hardware in place without evidence of loosening.   IMPRESSION:  Postop changes lower lumbar spine and left hip. Hardware  appears intact. No acute appearing abnormality. Mild spurring right  acetabulum.      Assessment:  1. Left hip pain    2. History of hip surgery        Plan:  Discussed the assessment with the patient. Offered the following interventions for consideration: physical therapy, further imaging: CT or bone density scan, steroid " injection.   Rehab: Physical Therapy: [facility] - [phone]  She would like to see her previous therapist Kaylan at [facility] again.   **    Reviewed her plain films.  Reviewed her course.  Prior fracture with subsequent surgery.  Some limitation in hip range of motion, with reduced strength on that left side.    We discussed considerations of rehab, additional imaging, and trial of injection for her pain.  Following discussion, plan rehab next.  Goal is to improve comfort level, strength and mobility.  Pending course, we discussed considerations of CT scan or bone scan to evaluate the hip area further, particularly if there are any concerns about hardware.  Additional consideration may be to see orthopedic surgery, particularly if any hardware concerns.  Follow-up: 1 month if not improving with physical therapy, sooner if needed.  Questions answered. The patient indicates understanding of these issues and agrees with the plan.    Alvaro Tate DO, CAQ        Disclaimer: This note consists of symbols derived from keyboarding, dictation and/or voice recognition software. As a result, there may be errors in the script that have gone undetected. Please consider this when interpreting information found in this chart.

## 2019-04-01 ENCOUNTER — THERAPY VISIT (OUTPATIENT)
Dept: PHYSICAL THERAPY | Facility: CLINIC | Age: 79
End: 2019-04-01
Attending: PEDIATRICS
Payer: COMMERCIAL

## 2019-04-01 DIAGNOSIS — M25.552 LEFT HIP PAIN: ICD-10-CM

## 2019-04-01 DIAGNOSIS — Z98.890 HISTORY OF HIP SURGERY: ICD-10-CM

## 2019-04-01 PROCEDURE — 97110 THERAPEUTIC EXERCISES: CPT | Mod: GP | Performed by: PHYSICAL THERAPIST

## 2019-04-01 PROCEDURE — 97161 PT EVAL LOW COMPLEX 20 MIN: CPT | Mod: GP | Performed by: PHYSICAL THERAPIST

## 2019-04-01 PROCEDURE — 97140 MANUAL THERAPY 1/> REGIONS: CPT | Mod: GP | Performed by: PHYSICAL THERAPIST

## 2019-04-01 ASSESSMENT — ACTIVITIES OF DAILY LIVING (ADL)
HOS_ADL_COUNT: 15
SITTING_FOR_15_MINUTES: NO DIFFICULTY AT ALL
HOS_ADL_ITEM_SCORE_TOTAL: 45
HOS_ADL_SCORE(%): 75
PUTTING_ON_SOCKS_AND_SHOES: SLIGHT DIFFICULTY
DEEP_SQUATTING: SLIGHT DIFFICULTY
WALKING_15_MINUTES_OR_GREATER: SLIGHT DIFFICULTY
GOING_UP_1_FLIGHT_OF_STAIRS: SLIGHT DIFFICULTY
HEAVY_WORK: MODERATE DIFFICULTY
GOING_DOWN_1_FLIGHT_OF_STAIRS: NO DIFFICULTY AT ALL
RECREATIONAL_ACTIVITIES: SLIGHT DIFFICULTY
STANDING_FOR_15_MINUTES: SLIGHT DIFFICULTY
HOS_ADL_HIGHEST_POTENTIAL_SCORE: 60
WALKING_INITIALLY: NO DIFFICULTY AT ALL
GETTING_INTO_AND_OUT_OF_AN_AVERAGE_CAR: SLIGHT DIFFICULTY
STEPPING_UP_AND_DOWN_CURBS: MODERATE DIFFICULTY
GETTING_INTO_AND_OUT_OF_A_BATHTUB: NO DIFFICULTY AT ALL
WALKING_APPROXIMATELY_10_MINUTES: NO DIFFICULTY AT ALL
LIGHT_TO_MODERATE_WORK: SLIGHT DIFFICULTY
ROLLING_OVER_IN_BED: MODERATE DIFFICULTY
TWISTING/PIVOTING_ON_INVOLVED_LEG: MODERATE DIFFICULTY
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
## 2019-04-11 ENCOUNTER — THERAPY VISIT (OUTPATIENT)
Dept: PHYSICAL THERAPY | Facility: CLINIC | Age: 79
End: 2019-04-11
Payer: COMMERCIAL

## 2019-04-11 DIAGNOSIS — M25.552 HIP PAIN, LEFT: Primary | ICD-10-CM

## 2019-04-11 PROCEDURE — 97110 THERAPEUTIC EXERCISES: CPT | Mod: GP | Performed by: PHYSICAL THERAPIST

## 2019-04-11 PROCEDURE — 97112 NEUROMUSCULAR REEDUCATION: CPT | Mod: GP | Performed by: PHYSICAL THERAPIST

## 2019-04-11 PROCEDURE — 97140 MANUAL THERAPY 1/> REGIONS: CPT | Mod: GP | Performed by: PHYSICAL THERAPIST

## 2019-04-16 ENCOUNTER — THERAPY VISIT (OUTPATIENT)
Dept: PHYSICAL THERAPY | Facility: CLINIC | Age: 79
End: 2019-04-16
Payer: COMMERCIAL

## 2019-04-16 DIAGNOSIS — M25.552 HIP PAIN, LEFT: Primary | ICD-10-CM

## 2019-04-16 PROCEDURE — 97140 MANUAL THERAPY 1/> REGIONS: CPT | Mod: GP | Performed by: PHYSICAL THERAPIST

## 2019-04-16 PROCEDURE — 97112 NEUROMUSCULAR REEDUCATION: CPT | Mod: GP | Performed by: PHYSICAL THERAPIST

## 2019-04-16 PROCEDURE — 97110 THERAPEUTIC EXERCISES: CPT | Mod: GP | Performed by: PHYSICAL THERAPIST

## 2019-04-23 ENCOUNTER — THERAPY VISIT (OUTPATIENT)
Dept: PHYSICAL THERAPY | Facility: CLINIC | Age: 79
End: 2019-04-23
Payer: COMMERCIAL

## 2019-04-23 DIAGNOSIS — M25.552 HIP PAIN, LEFT: Primary | ICD-10-CM

## 2019-04-23 PROCEDURE — 97112 NEUROMUSCULAR REEDUCATION: CPT | Mod: GP | Performed by: PHYSICAL THERAPIST

## 2019-04-23 PROCEDURE — 97110 THERAPEUTIC EXERCISES: CPT | Mod: GP | Performed by: PHYSICAL THERAPIST

## 2019-04-23 PROCEDURE — 97140 MANUAL THERAPY 1/> REGIONS: CPT | Mod: GP | Performed by: PHYSICAL THERAPIST

## 2019-05-23 PROBLEM — M75.112 INCOMPLETE TEAR OF LEFT ROTATOR CUFF: Status: ACTIVE | Noted: 2017-10-25

## 2019-05-23 PROBLEM — M19.012 ARTHRITIS OF LEFT ACROMIOCLAVICULAR JOINT: Status: ACTIVE | Noted: 2017-10-25

## 2019-05-29 ENCOUNTER — OFFICE VISIT (OUTPATIENT)
Dept: FAMILY MEDICINE | Facility: CLINIC | Age: 79
End: 2019-05-29
Payer: COMMERCIAL

## 2019-05-29 ENCOUNTER — OFFICE VISIT (OUTPATIENT)
Dept: OPHTHALMOLOGY | Facility: CLINIC | Age: 79
End: 2019-05-29
Payer: COMMERCIAL

## 2019-05-29 VITALS
WEIGHT: 173.8 LBS | SYSTOLIC BLOOD PRESSURE: 104 MMHG | BODY MASS INDEX: 30.79 KG/M2 | DIASTOLIC BLOOD PRESSURE: 74 MMHG | TEMPERATURE: 98 F | HEART RATE: 72 BPM | HEIGHT: 63 IN

## 2019-05-29 DIAGNOSIS — N18.30 DIABETES MELLITUS WITH STAGE 3 CHRONIC KIDNEY DISEASE (H): Primary | ICD-10-CM

## 2019-05-29 DIAGNOSIS — H52.203 MYOPIC ASTIGMATISM OF BOTH EYES: ICD-10-CM

## 2019-05-29 DIAGNOSIS — Z96.1 PSEUDOPHAKIA, BOTH EYES: Primary | ICD-10-CM

## 2019-05-29 DIAGNOSIS — H52.13 MYOPIC ASTIGMATISM OF BOTH EYES: ICD-10-CM

## 2019-05-29 DIAGNOSIS — H02.834 DERMATOCHALASIS OF BOTH UPPER EYELIDS: ICD-10-CM

## 2019-05-29 DIAGNOSIS — E11.22 DIABETES MELLITUS WITH STAGE 3 CHRONIC KIDNEY DISEASE (H): Primary | ICD-10-CM

## 2019-05-29 DIAGNOSIS — E11.9 TYPE 2 DIABETES MELLITUS WITHOUT OPHTHALMIC MANIFESTATIONS (H): ICD-10-CM

## 2019-05-29 DIAGNOSIS — H43.813 PVD (POSTERIOR VITREOUS DETACHMENT), BILATERAL: ICD-10-CM

## 2019-05-29 DIAGNOSIS — M25.552 HIP PAIN, LEFT: ICD-10-CM

## 2019-05-29 DIAGNOSIS — H02.831 DERMATOCHALASIS OF BOTH UPPER EYELIDS: ICD-10-CM

## 2019-05-29 LAB — HBA1C MFR BLD: 5.8 % (ref 0–5.6)

## 2019-05-29 PROCEDURE — 36415 COLL VENOUS BLD VENIPUNCTURE: CPT | Performed by: FAMILY MEDICINE

## 2019-05-29 PROCEDURE — 83036 HEMOGLOBIN GLYCOSYLATED A1C: CPT | Performed by: FAMILY MEDICINE

## 2019-05-29 PROCEDURE — 80048 BASIC METABOLIC PNL TOTAL CA: CPT | Performed by: FAMILY MEDICINE

## 2019-05-29 PROCEDURE — 99214 OFFICE O/P EST MOD 30 MIN: CPT | Performed by: FAMILY MEDICINE

## 2019-05-29 ASSESSMENT — VISUAL ACUITY
OD_SC+: +1
OS_SC: 20/50
OS_PH_SC+: +2
OS_CC+: +2
OD_SC: 20/50
METHOD: SNELLEN - LINEAR
OD_CC: 20/20
CORRECTION_TYPE: GLASSES
OS_PH_SC: 20/30
OD_PH_SC: 20/30
OD_CC+: -2
OS_SC+: -1
OS_CC: 20/25

## 2019-05-29 ASSESSMENT — REFRACTION_WEARINGRX
OS_SPHERE: +3.00
OD_SPHERE: +3.00

## 2019-05-29 ASSESSMENT — CUP TO DISC RATIO
OD_RATIO: 0.25
OS_RATIO: 0.2

## 2019-05-29 ASSESSMENT — MIFFLIN-ST. JEOR: SCORE: 1232.48

## 2019-05-29 ASSESSMENT — CONF VISUAL FIELD
OD_NORMAL: 1
OS_NORMAL: 1

## 2019-05-29 ASSESSMENT — EXTERNAL EXAM - LEFT EYE: OS_EXAM: MILD DERMATOCHALASIS

## 2019-05-29 ASSESSMENT — EXTERNAL EXAM - RIGHT EYE: OD_EXAM: MILD DERMATOCHALASIS

## 2019-05-29 ASSESSMENT — TONOMETRY
IOP_METHOD: ICARE
OS_IOP_MMHG: 15
OD_IOP_MMHG: 13

## 2019-05-29 NOTE — PROGRESS NOTES
Subjective     Maira Leon is a 79 year old female who presents to clinic today for the following health issues:    HPI   Diabetes Follow-up      How often are you checking your blood sugar? Rarely, only when patient it not feeling well.     What time of day are you checking your blood sugars (select all that apply)? Patient rarely checks    Have you had any blood sugars above 200?  No    Have you had any blood sugars below 70?  No    What symptoms do you notice when your blood sugar is low?  None    What concerns do you have today about your diabetes? None     Do you have any of these symptoms? (Select all that apply)  No numbness or tingling in feet.  No redness, sores or blisters on feet.  No complaints of excessive thirst.  No reports of blurry vision.  No significant changes to weight.     Have you had a diabetic eye exam in the last 12 months? Yes- Date of last eye exam: 05/29/19    Diabetes Management Resources    Hyperlipidemia Follow-Up      Are you having any of the following symptoms? (Select all that apply)  No complaints of shortness of breath, chest pain or pressure.  No increased sweating or nausea with activity.  No left-sided neck or arm pain.  No complaints of pain in calves when walking 1-2 blocks.    Are you regularly taking any medication or supplement to lower your cholesterol?   Yes- Atorvastatin 40 mg    Are you having muscle aches or other side effects that you think could be caused by your cholesterol lowering medication?  No    Chronic Kidney Disease Follow-up      Current NSAID use?  No    BP Readings from Last 2 Encounters:   03/29/19 122/70   11/09/18 124/72     Hemoglobin A1C (%)   Date Value   05/29/2019 5.8 (H)   10/31/2018 5.7 (H)     LDL Cholesterol Calculated (mg/dL)   Date Value   10/31/2018 78   09/22/2017 48       Amount of exercise or physical activity: 6-7 days/week for an average of 15-30 minutes    Problems taking medications regularly: No    Medication side effects:  none    Diet: regular (no restrictions)    Patient wants to discuss PT and it has not been helping.   Last time we talked about hip, knee, foot.  Podiatrist didn't recommend surgery yet.  Also saw Dr. Tate.  Left  knee with significant OA - but PT was helping.  Then left hip began hurting.  So started PT specific for hip.  Feels like her leg is straighter when she stands.  But then started hurting more after PT - about 3-4 weeks ago            Recent Labs   Lab Test 05/29/19  1310 10/31/18  0713 05/16/18  1308  09/22/17  1039 01/22/17  0618  04/28/16  1240  04/09/15  1045  10/17/12  1140   A1C 5.8* 5.7* 5.8*   < > 5.2  --    < > 6.0   < > 6.1*   < > 6.0   LDL  --  78  --   --  48  --   --  58  --  57   < >  --    HDL  --  57  --   --  54  --   --  60  --  52   < >  --    TRIG  --  141  --   --  101  --   --  110  --  93   < >  --    ALT  --   --   --   --   --   --   --   --   --   --   --  <6   CR  --   --  0.87  --   --  0.79   < > 0.85  --  0.91   < >  --    GFRESTIMATED  --   --  63  --   --  71   < > 65  --  60*   < >  --    GFRESTBLACK  --   --  77  --   --  86   < > 79  --  73   < >  --    POTASSIUM  --   --  4.1  --   --  3.6   < > 4.0  --  4.0   < >  --    TSH  --   --   --   --  1.35  --   --   --   --  2.41   < >  --     < > = values in this interval not displayed.        Reviewed and updated as needed this visit by Provider  Allergies  Meds  Problems         Review of Systems   ROS COMP: Constitutional, HEENT, cardiovascular, pulmonary, gi and gu systems are negative, except as otherwise noted.      Objective    There were no vitals taken for this visit.  There is no height or weight on file to calculate BMI.  Physical Exam   GENERAL: healthy, alert and no distress  RESP: lungs clear to auscultation - no rales, rhonchi or wheezes  CV: regular rate and rhythm, normal S1 S2, no S3 or S4, no murmur, click or rub, no peripheral edema and peripheral pulses strong  SKIN: no suspicious lesions or  "rashes  PSYCH: mentation appears normal, affect normal/bright    Diagnostic Test Results:  Labs reviewed in Epic  Results for orders placed or performed in visit on 05/29/19 (from the past 24 hour(s))   HEMOGLOBIN A1C   Result Value Ref Range    Hemoglobin A1C 5.8 (H) 0 - 5.6 %           Assessment & Plan     (E11.22,  N18.3) Diabetes mellitus with stage 3 chronic kidney disease (H)  (primary encounter diagnosis)  Comment: well controlled  Plan: HEMOGLOBIN A1C, BASIC METABOLIC PANEL,         exenatide (BYETTA 10 MCG PEN) 10 MCG/0.04ML pen        Continue current medication      (M25.552) Hip pain, left  Comment: initially improved with PT, but now worse.  Plan: plans to go back to PT tomorrow  And see if that will help calm pain down.  Discussed that PT will likely message Dr. Tate if pain is not improving, and his note suggests CT may be next step.  If she does not feel PT is helping and is concerned, she can also message myself or Dr. Tate.     BMI:   Estimated body mass index is 30.79 kg/m  as calculated from the following:    Height as of this encounter: 1.6 m (5' 3\").    Weight as of this encounter: 78.8 kg (173 lb 12.8 oz).   Weight management plan: Discussed healthy diet and exercise guidelines        Patient Instructions     [facility]   Discharged by : Erick Camacho CMA on 5/29/2019 at 2:13 PM    Paper scripts provided to patient :      If you have any questions regarding your visit please contact your care team:     Team Gold                Clinic Hours Telephone Number     Dr. Dara Sanchez, CNP 7am-7pm  Monday - Thursday   7am-5pm  Fridays  [phone]   (Appointment scheduling available 24/7)     RN Line  [phone] option 2     Urgent Care - [place] - 11am-9pm Monday-Friday Saturday-Sunday- 9am-5pm     [place] -   5pm-9pm Monday-Friday Saturday-Sunday- 9am-5pm    [phone] - Joyce [place]    [phone] [facility]     For a Price Quote for your services, please call our Consumer Price Line at [phone].     What options do I have for visits at the clinic other than the traditional office visit?     To expand how we care for you, many of our providers are utilizing electronic visits (e-visits) and telephone visits, when medically appropriate, for interactions with their patients rather than a visit in the clinic. We also offer nurse visits for many medical concerns. Just like any other service, we will bill your insurance company for this type of visit based on time spent on the phone with your provider. Not all insurance companies cover these visits. Please check with your medical insurance if this type of visit is covered. You will be responsible for any charges that are not paid by your insurance.   E-visits via [employer]: generally incur a $45.00 fee.     Telephone visits:  Time spent on the phone: *charged based on time that is spent on the phone in increments of 10 minutes. Estimated cost:   5-10 mins $30.00   11-20 mins. $59.00   21-30 mins. $85.00     Use [employer] (secure email communication and access to your chart) to send your primary care provider a message or make an appointment. Ask someone on your Team how to sign up for [employer].     As always, Thank you for trusting us with your health care needs!    [facility]:    Scheduling Appointments  [facility]  Call: [phone]    [facility]  Call: [phone]    [facility]  Call: [phone]    For Gastroenterology referrals   [facility]   Clinics and Surgery Center, 4th Floor   [address] [place] [id number]   Appointments: [phone]    WHERE TO GO FOR CARE?  Clinic    Make an appointment if you:       Are sick (cold, cough, flu, sore throat, earache or in pain).       Have a small injury (sprain, small cut,  burn or broken bone).       Need a physical exam, Pap smear, vaccine or prescription refill.       Have questions about your health or medicines.    To reach us:      Call [phone] ([phone]). Open 24 hours every day. (For counseling services, call [phone].)    Log into [employer] at [employer]. (Visit [employer]" to create an account.) Hospital emergency room    An emergency is a serious or life- threatening problem that must be treated right away.    Call 127 or get to the hospital if you have:      Very bad or sudden:            - Chest pain or pressure         - Bleeding         - Head or belly pain         - Dizziness or trouble seeing, walking or                          Speaking      Problems breathing      Blood in your vomit or you are coughing up blood      A major injury (knocked out, loss of a finger or limb, rape, broken bone protruding from skin)    A mental health crisis. (Or call the Mental Health Crisis line at 1-520.131.7204 or Suicide Prevention Hotline at 1-220.444.4628.)    Open 24 hours every day. You don't need an appointment.     Urgent care    Visit urgent care for sickness or small injuries when the clinic is closed. You don't need an appointment. To check hours or find an urgent care near you, visit [email or web address]. Online care    Get online care from [facility] for more than 70 common problems, like colds, allergies and infections. Open 24 hours every day at:   [email or web address]   Need help deciding?    For advice about where to be seen, you may call your clinic and ask to speak with a nurse. We're here for you 24 hours every day.         If you are deaf or hard of hearing, please let us know. We provide many free services including sign language interpreters, oral interpreters, TTYs, telephone amplifiers, note takers and written materials.               Return in about 6 months (around 11/29/2019) for Annual Wellness Visit, Diabetes.    Michelle Barrios,  MD  [facility]

## 2019-05-29 NOTE — PATIENT INSTRUCTIONS
[facility]   Discharged by : Erick Camacho CMA on 5/29/2019 at 2:13 PM    Paper scripts provided to patient :      If you have any questions regarding your visit please contact your care team:     Team Gold                Clinic Hours Telephone Number     Dr. Dara Sanchez, CNP 7am-7pm  Monday - Thursday   7am-5pm  Fridays  [phone]   (Appointment scheduling available 24/7)     RN Line  [phone] option 2     Urgent Care - [place] - 11am-9pm Monday-Friday Saturday-Sunday- 9am-5pm     [place] -   5pm-9pm Monday-Friday Saturday-Sunday- 9am-5pm    [phone] - [place] Marcial    [phone] - [place]     For a Price Quote for your services, please call our Consumer Price Line at [phone].     What options do I have for visits at the clinic other than the traditional office visit?     To expand how we care for you, many of our providers are utilizing electronic visits (e-visits) and telephone visits, when medically appropriate, for interactions with their patients rather than a visit in the clinic. We also offer nurse visits for many medical concerns. Just like any other service, we will bill your insurance company for this type of visit based on time spent on the phone with your provider. Not all insurance companies cover these visits. Please check with your medical insurance if this type of visit is covered. You will be responsible for any charges that are not paid by your insurance.   E-visits via [employer]: generally incur a $45.00 fee.     Telephone visits:  Time spent on the phone: *charged based on time that is spent on the phone in increments of 10 minutes. Estimated cost:   5-10 mins $30.00   11-20 mins. $59.00   21-30 mins. $85.00     Use [employer] (secure email communication and access to your chart) to send your primary care provider a message or make an appointment. Ask someone on your Team how to sign  up for [employer].     As always, Thank you for trusting us with your health care needs!    [facility]:    Scheduling Appointments  [facility]  Call: [phone]    Leonard Camacho [facility]  Call: [phone]    [facility]  Call: [phone]    For Gastroenterology referrals   [facility]   Clinics and Surgery Center, 4th Floor   [address] [place] [address]   Appointments: [phone]    WHERE TO GO FOR CARE?  Clinic    Make an appointment if you:       Are sick (cold, cough, flu, sore throat, earache or in pain).       Have a small injury (sprain, small cut, burn or broken bone).       Need a physical exam, Pap smear, vaccine or prescription refill.       Have questions about your health or medicines.    To reach us:      Call [phone] ([phone]). Open 24 hours every day. (For counseling services, call [phone].)    Log into [employer] at [email or web address]. (Visit [email or web address] to create an account.) Hospital emergency room    An emergency is a serious or life- threatening problem that must be treated right away.    Call 576 or get to the hospital if you have:      Very bad or sudden:            - Chest pain or pressure         - Bleeding         - Head or belly pain         - Dizziness or trouble seeing, walking or                          Speaking      Problems breathing      Blood in your vomit or you are coughing up blood      A major injury (knocked out, loss of a finger or limb, rape, broken bone protruding from skin)    A mental health crisis. (Or call the Mental Health Crisis line at 1-293.765.2256 or Suicide Prevention Hotline at 1-269.774.8763.)    Open 24 hours every day. You don't need an appointment.     Urgent care    Visit urgent care for sickness or small injuries when the clinic is closed. You don't need an appointment. To check hours or find an urgent care near  you, visit [email or web address]. Online care    Get online care from [facility] for more than 70 common problems, like colds, allergies and infections. Open 24 hours every day at:   [email or web address]   Need help deciding?    For advice about where to be seen, you may call your clinic and ask to speak with a nurse. We're here for you 24 hours every day.         If you are deaf or hard of hearing, please let us know. We provide many free services including sign language interpreters, oral interpreters, TTYs, telephone amplifiers, note takers and written materials.

## 2019-05-29 NOTE — PROGRESS NOTES
HPI  Maira Leon is a 79 year old here for follow-up dilated fundus exam. Feels her lids drooping when she reads, she eventually feels that she can't see well and she often falls asleep after reading.  Diabetes has been well controlled, reports a hemoglobin a1c as 5.7%.  No diurnal changes in eyelid position or feeling like her lids are heavy except when sitting and reading.  No new floaters.     PMH: diabetes mellitus 2, hypertension, hyperlipidemia   POH: Glasses for myopia/presbyopia, cataract extraction posterior chamber intraocular lens (PCIOL) both eyes 2014, 2017,   Oc Meds: ocuvite  FH: Denies any glaucoma, brother with age related macular degeneration, no other known eye diseases         Assessment & Plan        (Z96.1) Pseudophakia, both eyes - Both Eyes  (primary encounter diagnosis)  Comment: no significant posterior capsular opacity (PCO)   Plan: follow    (H43.813) PVD (posterior vitreous detachment), bilateral - Both Eyes  Comment: stable   Plan: reviewed signs and symptoms of flashes/floaters and to call with changes    (E11.9) Type 2 diabetes mellitus without ophthalmic manifestations (H) - Both Eyes  Comment: Diabetes Mellitus 2 w/o retinopathy   Great A1C   Plan:   Discussed the importance of tight blood glucose, blood pressure, and cholesterol  control in the prevention of diabetic retinopathy. Recommend yearly dilated eye exam.    (H52.203,  H52.13) Myopic astigmatism of both eyes - Both Eyes  Comment: didn't fill prescription from last year yet  Plan: encouraged to fill as needed     (H02.831,  H02.834) Dermatochalasis of both upper eyelids - Both Eyes  Comment: mild not visually significant, small involutional ptosis but likely not enough of either to have surgery  Plan: offered oculolplastics consult, patient declines, will call with worsening  Increase light when reading      -----------------------------------------------------------------------------------       Patient disposition:    Return in about 1 year (around 5/29/2020) for Comprehensive Exam. and patient to call sooner as needed.      Complete documentation of historical and exam elements from today's encounter can be found in the full encounter summary report (not reduplicated in this progress note). I personally obtained the chief complaint(s) and history of present illness.  I have confirmed and edited as necessary the CC, HPI, PMH/PSH, social history, FMH, ROS, and exam/neuro findings as obtained by the technician or others. I have examined this patient myself and I personally viewed the image(s) and studies listed above and the documentation reflects my findings and interpretation.  I formulated and edited as necessary the assessment and plan and discussed the findings and management plan with the patient and family.     Elisabeth Estrada MD

## 2019-05-30 ENCOUNTER — TELEPHONE (OUTPATIENT)
Dept: OPHTHALMOLOGY | Facility: CLINIC | Age: 79
End: 2019-05-30

## 2019-05-30 ENCOUNTER — THERAPY VISIT (OUTPATIENT)
Dept: PHYSICAL THERAPY | Facility: CLINIC | Age: 79
End: 2019-05-30
Payer: COMMERCIAL

## 2019-05-30 DIAGNOSIS — M25.552 HIP PAIN, LEFT: Primary | ICD-10-CM

## 2019-05-30 LAB
ANION GAP SERPL CALCULATED.3IONS-SCNC: 9 MMOL/L (ref 3–14)
BUN SERPL-MCNC: 26 MG/DL (ref 7–30)
CALCIUM SERPL-MCNC: 9.4 MG/DL (ref 8.5–10.1)
CHLORIDE SERPL-SCNC: 108 MMOL/L (ref 94–109)
CO2 SERPL-SCNC: 26 MMOL/L (ref 20–32)
CREAT SERPL-MCNC: 0.83 MG/DL (ref 0.52–1.04)
GFR SERPL CREATININE-BSD FRML MDRD: 67 ML/MIN/{1.73_M2}
GLUCOSE SERPL-MCNC: 91 MG/DL (ref 70–99)
POTASSIUM SERPL-SCNC: 3.9 MMOL/L (ref 3.4–5.3)
SODIUM SERPL-SCNC: 143 MMOL/L (ref 133–144)

## 2019-05-30 PROCEDURE — 97112 NEUROMUSCULAR REEDUCATION: CPT | Mod: GP | Performed by: PHYSICAL THERAPIST

## 2019-05-30 PROCEDURE — 97110 THERAPEUTIC EXERCISES: CPT | Mod: GP | Performed by: PHYSICAL THERAPIST

## 2019-05-30 NOTE — TELEPHONE ENCOUNTER
Health Call Center    Phone Message    May a detailed message be left on voicemail: yes    Reason for Call:  pt lost her eye glasses prescription that she just got yesterday  Please call pt to discuss further. She would like it to be sent to her email? Please call her first.    Action Taken: Message routed to:  [facility] ([facility] Eye clinic

## 2019-06-27 ENCOUNTER — THERAPY VISIT (OUTPATIENT)
Dept: PHYSICAL THERAPY | Facility: CLINIC | Age: 79
End: 2019-06-27
Payer: COMMERCIAL

## 2019-06-27 DIAGNOSIS — M25.552 HIP PAIN, LEFT: Primary | ICD-10-CM

## 2019-06-27 PROCEDURE — 97112 NEUROMUSCULAR REEDUCATION: CPT | Mod: GP | Performed by: PHYSICAL THERAPIST

## 2019-06-27 PROCEDURE — 97110 THERAPEUTIC EXERCISES: CPT | Mod: GP | Performed by: PHYSICAL THERAPIST

## 2019-06-27 PROCEDURE — 97530 THERAPEUTIC ACTIVITIES: CPT | Mod: GP | Performed by: PHYSICAL THERAPIST

## 2019-06-27 ASSESSMENT — ACTIVITIES OF DAILY LIVING (ADL)
GETTING_INTO_AND_OUT_OF_A_BATHTUB: NO DIFFICULTY AT ALL
TWISTING/PIVOTING_ON_INVOLVED_LEG: NO DIFFICULTY AT ALL
STANDING_FOR_15_MINUTES: SLIGHT DIFFICULTY
PUTTING_ON_SOCKS_AND_SHOES: SLIGHT DIFFICULTY
WALKING_DOWN_STEEP_HILLS: SLIGHT DIFFICULTY
GOING_UP_1_FLIGHT_OF_STAIRS: NO DIFFICULTY AT ALL
STEPPING_UP_AND_DOWN_CURBS: NO DIFFICULTY AT ALL
WALKING_UP_STEEP_HILLS: SLIGHT DIFFICULTY
LIGHT_TO_MODERATE_WORK: NO DIFFICULTY AT ALL
RECREATIONAL_ACTIVITIES: NO DIFFICULTY AT ALL
WALKING_APPROXIMATELY_10_MINUTES: NO DIFFICULTY AT ALL
GOING_DOWN_1_FLIGHT_OF_STAIRS: NO DIFFICULTY AT ALL
HOS_ADL_COUNT: 17
WALKING_INITIALLY: NO DIFFICULTY AT ALL
WALKING_15_MINUTES_OR_GREATER: NO DIFFICULTY AT ALL
ROLLING_OVER_IN_BED: SLIGHT DIFFICULTY
HOS_ADL_ITEM_SCORE_TOTAL: 61
GETTING_INTO_AND_OUT_OF_AN_AVERAGE_CAR: SLIGHT DIFFICULTY
DEEP_SQUATTING: SLIGHT DIFFICULTY
HOS_ADL_HIGHEST_POTENTIAL_SCORE: 68
HEAVY_WORK: SLIGHT DIFFICULTY
HOS_ADL_SCORE(%): 89.71
HOW_WOULD_YOU_RATE_YOUR_CURRENT_LEVEL_OF_FUNCTION_DURING_YOUR_USUAL_ACTIVITIES_OF_DAILY_LIVING_FROM_0_TO_100_WITH_100_BEING_YOUR_LEVEL_OF_FUNCTION_PRIOR_TO_YOUR_HIP_PROBLEM_AND_0_BEING_THE_INABILITY_TO_PERFORM_ANY_OF_YOUR_USUAL_DAILY_ACTIVITIES?: 95
SITTING_FOR_15_MINUTES: NO DIFFICULTY AT ALL

## 2019-06-27 NOTE — PROGRESS NOTES
"Subjective:  HPI                    Objective:  System    Physical Exam    General     ROS    Assessment/Plan:    PROGRESS  REPORT    Progress reporting period is from 4/01/19 to 6/27/19.       SUBJECTIVE  Patient reports moderate to significant improvement in her left hip pain since the start of therapy.  She notices increased flexibility and strength, but feels her hip is about \"as good as it's going to get\" because of the maru in her femur.  Has been able to golf twice a week without much of a problem.  Now able to walk at a more normal pace for 20-30 minutes versus 10.         Current pain level is 1/10  .     Previous pain level was  1/10  .   Changes in function:  As noted above  Adverse reaction to treatment or activity: None    OBJECTIVE  Left Hip AROM/PROM:  Flexion 100/112, Abd 21/23, ER 20/24, IR 22/39  Left Hip Strength: Flexion 4+/5, ER 4/5,  IR 3+/5.  Glute firing remains delayed on left.  Gait: ambulates at an increased keanu with a forward flexed trunk    Flexibility: exhibits ongoing tightness in adductors, quads, and hip flexors.    ASSESSMENT/PLAN  STG/LTGs have been met or progress has been made towards goals:  Yes (See Goal flow sheet completed today.)  Assessment of Progress: The patient's progress has plateaued.  Self Management Plans:  Patient has been instructed in a home treatment program.  PT intervention is no longer required to meet STG/LTG.    Recommendations:  This patient is ready to be discharged from therapy and continue their home treatment program.    Please refer to the daily flowsheet for treatment today, total treatment time and time spent performing 1:1 timed codes.          "

## 2019-09-29 ENCOUNTER — HEALTH MAINTENANCE LETTER (OUTPATIENT)
Age: 79
End: 2019-09-29

## 2019-10-29 DIAGNOSIS — E11.22 DIABETES MELLITUS WITH STAGE 3 CHRONIC KIDNEY DISEASE (H): ICD-10-CM

## 2019-10-29 DIAGNOSIS — N18.30 DIABETES MELLITUS WITH STAGE 3 CHRONIC KIDNEY DISEASE (H): ICD-10-CM

## 2019-10-29 NOTE — TELEPHONE ENCOUNTER
"Requested Prescriptions   Pending Prescriptions Disp Refills     BYETTA 10 MCG PEN 10 MCG/0.04ML pen [Pharmacy Med Name: BYETTA 10 MCG PEN 10 SOPN]  Last Written Prescription Date:  5/29/2019  Last Fill Quantity: 4.8.mL,  # refills: 1   Last office visit: 5/29/2019 with prescribing provider:  KATE Monet   Future Office Visit:   Next 5 appointments (look out 90 days)    Nov 26, 2019  7:40 AM CST  PHYSICAL with Michelle [facility]et MD  [facility] [address] [place] [id number] [phone]        4.8 mL 1     Sig: INJECT 10MCG UNDER THE SKIN TWO TIMES A DAY       GLP-1 Agonists Protocol Passed - 10/29/2019 11:36 AM        Passed - Blood pressure less than 140/90 in past 6 months     BP Readings from Last 3 Encounters:   05/29/19 104/74   03/29/19 122/70   11/09/18 124/72             Passed - LDL on file in past 12 months     Recent Labs   Lab Test 10/31/18  0713   LDL 78             Passed - Microalbumin on file in past 12 months     Recent Labs   Lab Test 11/01/18  1352   MICROL 10   UMALCR 18.26             Passed - HgbA1C in past 3 or 6 months     If HgbA1C is 8 or greater, it needs to be on file within the past 3 months.  If less than 8, must be on file within the past 6 months.     Recent Labs   Lab Test 05/29/19  1310   A1C 5.8*             Passed - Medication is active on med list        Passed - Patient is age 18 or older        Passed - No active pregnancy on record        Passed - Normal serum creatinine on file in past 12 months     Recent Labs   Lab Test 05/29/19  1310   CR 0.83             Passed - No positive pregnancy test in past 12 months        Passed - Recent (6 mo) or future (30 days) visit within the authorizing provider's specialty     Patient had office visit in the last 6 months or has a visit in the next 30 days with authorizing provider.  See \"Patient Info\" tab in inbasket, or \"Choose Columns\" in Meds & Orders section " of the refill encounter.

## 2019-10-30 RX ORDER — EXENATIDE 250 UG/ML
INJECTION SUBCUTANEOUS
Qty: 4.8 ML | Refills: 1 | Status: SHIPPED | OUTPATIENT
Start: 2019-10-30 | End: 2019-12-04 | Stop reason: ALTCHOICE

## 2019-10-30 NOTE — TELEPHONE ENCOUNTER
Prescription approved per [facility] Refill Protocol.    Belkys Foley, RN, BSN, PHN  [facility]

## 2019-11-22 DIAGNOSIS — E11.22 DIABETES MELLITUS WITH STAGE 3 CHRONIC KIDNEY DISEASE (H): ICD-10-CM

## 2019-11-22 DIAGNOSIS — N18.30 CKD (CHRONIC KIDNEY DISEASE) STAGE 3, GFR 30-59 ML/MIN (H): ICD-10-CM

## 2019-11-22 DIAGNOSIS — N18.30 DIABETES MELLITUS WITH STAGE 3 CHRONIC KIDNEY DISEASE (H): ICD-10-CM

## 2019-11-22 DIAGNOSIS — E78.5 HYPERLIPIDEMIA LDL GOAL <100: ICD-10-CM

## 2019-11-22 RX ORDER — ATORVASTATIN CALCIUM 40 MG/1
TABLET, FILM COATED ORAL
Qty: 30 TABLET | Refills: 0 | Status: SHIPPED | OUTPATIENT
Start: 2019-11-22 | End: 2019-11-26

## 2019-11-22 RX ORDER — LISINOPRIL 2.5 MG/1
TABLET ORAL
Qty: 90 TABLET | Refills: 1 | Status: SHIPPED | OUTPATIENT
Start: 2019-11-22 | End: 2019-11-26

## 2019-11-22 NOTE — TELEPHONE ENCOUNTER
"Requested Prescriptions   Pending Prescriptions Disp Refills     atorvastatin (LIPITOR) 40 MG tablet [Pharmacy Med Name: ATORVASTATIN CALCIUM 40MG TABS]  Last Written Prescription Date:  11/1/2018  Last Fill Quantity: 90 tablet,  # refills: 3   Last office visit: 5/29/2019 with prescribing provider:  KATE Monet   Future Office Visit:   Next 5 appointments (look out 90 days)    Nov 26, 2019  7:40 AM CST  PHYSICAL with Michelle Monet MD  [facility] ([address] [address] [phone]        90 tablet 3     Sig: TAKE ONE TABLET BY MOUTH EVERY DAY       Statins Protocol Failed - 11/22/2019  8:50 AM        Failed - LDL on file in past 12 months     Recent Labs   Lab Test 10/31/18  0713   LDL 78             Passed - No abnormal creatine kinase in past 12 months     No lab results found.             Passed - Recent (12 mo) or future (30 days) visit within the authorizing provider's specialty     Patient has had an office visit with the authorizing provider or a provider within the authorizing providers department within the previous 12 mos or has a future within next 30 days. See \"Patient Info\" tab in inbasket, or \"Choose Columns\" in Meds & Orders section of the refill encounter.              Passed - Medication is active on med list        Passed - Patient is age 18 or older        Passed - No active pregnancy on record        Passed - No positive pregnancy test in past 12 months                blood glucose (ONETOUCH ULTRA) test strip [Pharmacy Med Name: ONETOUCH ULTRA BLUE  STRP]  Last Written Prescription Date:  12/5/2017  Last Fill Quantity: 100 strip,  # refills: 3   Last office visit: 5/29/2019 with prescribing provider:  KATE Monet   Future Office Visit:   Next 5 appointments (look out 90 days)    Nov 26, 2019  7:40 AM CST  PHYSICAL with Michelle Monet MD  [facility] ([facility]" "[address] [address]  [phone]        100 each 1     Sig: USE TO TEST BLOOD SUGAR DAILY OR AS DIRECTED       Diabetic Supplies Protocol Passed - 11/22/2019  8:50 AM        Passed - Medication is active on med list        Passed - Patient is 18 years of age or older        Passed - Recent (6 mo) or future (30 days) visit within the authorizing provider's specialty     Patient had office visit in the last 6 months or has a visit in the next 30 days with authorizing provider.  See \"Patient Info\" tab in inbasket, or \"Choose Columns\" in Meds & Orders section of the refill encounter.                    insulin pen needle (B-D U/F) 31G X 5 MM miscellaneous [Pharmacy Med Name: BD PEN NEEDLE MINI  31G X 5 MM MISC]  Last Written Prescription Date:  11/1/2018  Last Fill Quantity: 200 each,  # refills: 2   Last office visit: 5/29/2019 with prescribing provider:  KATE Monet   Future Office Visit:   Next 5 appointments (look out 90 days)    Nov 26, 2019  7:40 AM CST  PHYSICAL with Michelle Monet MD  [facility] ([address] [address]  [phone]        200 each 2     Sig: USE TWICE DAILY WITH BYETTA       Diabetic Supplies Protocol Passed - 11/22/2019  8:50 AM        Passed - Medication is active on med list        Passed - Patient is 18 years of age or older        Passed - Recent (6 mo) or future (30 days) visit within the authorizing provider's specialty     Patient had office visit in the last 6 months or has a visit in the next 30 days with authorizing provider.  See \"Patient Info\" tab in inbasket, or \"Choose Columns\" in Meds & Orders section of the refill encounter.                    lisinopril (PRINIVIL/ZESTRIL) 2.5 MG tablet [Pharmacy Med Name: LISINOPRIL 2.5MG TABS]  Last Written Prescription Date:  5/14/2019  Last Fill Quantity: 90 tablet,  # refills: 1   Last office visit: 5/29/2019 with " "prescribing provider:  KATE Monet   Future Office Visit:   Next 5 appointments (look out 90 days)    Nov 26, 2019  7:40 AM CST  PHYSICAL with Michelle Monet MD  [facility] ([address] [address]  [phone]        90 tablet 1     Sig: TAKE ONE TABLET BY MOUTH ONCE DAILY       ACE Inhibitors (Including Combos) Protocol Passed - 11/22/2019  8:50 AM        Passed - Blood pressure under 140/90 in past 12 months     BP Readings from Last 3 Encounters:   05/29/19 104/74   03/29/19 122/70   11/09/18 124/72             Passed - Recent (12 mo) or future (30 days) visit within the authorizing provider's specialty     Patient has had an office visit with the authorizing provider or a provider within the authorizing providers department within the previous 12 mos or has a future within next 30 days. See \"Patient Info\" tab in inbasket, or \"Choose Columns\" in Meds & Orders section of the refill encounter.              Passed - Medication is active on med list        Passed - Patient is age 18 or older        Passed - No active pregnancy on record        Passed - Normal serum creatinine on file in past 12 months     Recent Labs   Lab Test 05/29/19  1310   CR 0.83             Passed - Normal serum potassium on file in past 12 months     Recent Labs   Lab Test 05/29/19  1310   POTASSIUM 3.9             Passed - No positive pregnancy test within past 12 months                metFORMIN (GLUCOPHAGE) 500 MG tablet [Pharmacy Med Name: METFORMIN HCL 500MG TABS]  Last Written Prescription Date:  11/1/2018  Last Fill Quantity: 180 tablet,  # refills: 3   Last office visit: 5/29/2019 with prescribing provider:  KATE Monet   Future Office Visit:   Next 5 appointments (look out 90 days)    Nov 26, 2019  7:40 AM CST  PHYSICAL with Michelle Monet MD  [facility] ([facility]) [address] [place] [address]" "[place] [address]  [phone]        180 tablet 3     Sig: TAKE ONE TABLET BY MOUTH TWICE A DAY WITH MEALS       Biguanide Agents Failed - 11/22/2019  8:50 AM        Failed - Patient has documented LDL within the past 12 mos.     Recent Labs   Lab Test 10/31/18  0713   LDL 78             Passed - Blood pressure less than 140/90 in past 6 months     BP Readings from Last 3 Encounters:   05/29/19 104/74   03/29/19 122/70   11/09/18 124/72                 Passed - Patient has had a Microalbumin in the past 15 mos.     Recent Labs   Lab Test 11/01/18  1352   MICROL 10   UMALCR 18.26             Passed - Patient is age 10 or older        Passed - Patient has documented A1c within the specified period of time.     If HgbA1C is 8 or greater, it needs to be on file within the past 3 months.  If less than 8, must be on file within the past 6 months.     Recent Labs   Lab Test 05/29/19  1310   A1C 5.8*             Passed - Patient's CR is NOT>1.4 OR Patient's EGFR is NOT<45 within past 12 mos.     Recent Labs   Lab Test 05/29/19  1310   GFRESTIMATED 67   GFRESTBLACK 77       Recent Labs   Lab Test 05/29/19  1310   CR 0.83             Passed - Patient does NOT have a diagnosis of CHF.        Passed - Medication is active on med list        Passed - Patient is not pregnant        Passed - Patient has not had a positive pregnancy test within the past 12 mos.         Passed - Recent (6 mo) or future (30 days) visit within the authorizing provider's specialty     Patient had office visit in the last 6 months or has a visit in the next 30 days with authorizing provider or within the authorizing provider's specialty.  See \"Patient Info\" tab in inbasket, or \"Choose Columns\" in Meds & Orders section of the refill encounter.            "

## 2019-11-22 NOTE — TELEPHONE ENCOUNTER
Medication is being filled for 1 time refill only due to:  has a physical scheduled for next week     Katiuska Downs RN  [facility]

## 2019-11-26 ENCOUNTER — OFFICE VISIT (OUTPATIENT)
Dept: FAMILY MEDICINE | Facility: CLINIC | Age: 79
End: 2019-11-26
Payer: COMMERCIAL

## 2019-11-26 VITALS
WEIGHT: 175 LBS | TEMPERATURE: 97.4 F | DIASTOLIC BLOOD PRESSURE: 74 MMHG | SYSTOLIC BLOOD PRESSURE: 118 MMHG | BODY MASS INDEX: 31 KG/M2

## 2019-11-26 DIAGNOSIS — Z00.00 ENCOUNTER FOR MEDICARE ANNUAL WELLNESS EXAM: Primary | ICD-10-CM

## 2019-11-26 DIAGNOSIS — N18.30 DIABETES MELLITUS WITH STAGE 3 CHRONIC KIDNEY DISEASE (H): ICD-10-CM

## 2019-11-26 DIAGNOSIS — N18.30 CKD (CHRONIC KIDNEY DISEASE) STAGE 3, GFR 30-59 ML/MIN (H): ICD-10-CM

## 2019-11-26 DIAGNOSIS — M85.89 OSTEOPENIA OF MULTIPLE SITES: ICD-10-CM

## 2019-11-26 DIAGNOSIS — E78.5 HYPERLIPIDEMIA LDL GOAL <100: ICD-10-CM

## 2019-11-26 DIAGNOSIS — E11.22 DIABETES MELLITUS WITH STAGE 3 CHRONIC KIDNEY DISEASE (H): ICD-10-CM

## 2019-11-26 DIAGNOSIS — Z78.0 ASYMPTOMATIC POSTMENOPAUSAL STATUS: ICD-10-CM

## 2019-11-26 LAB
CHOLEST SERPL-MCNC: 148 MG/DL
HBA1C MFR BLD: 5.9 % (ref 0–5.6)
HDLC SERPL-MCNC: 52 MG/DL
LDLC SERPL CALC-MCNC: 70 MG/DL
NONHDLC SERPL-MCNC: 96 MG/DL
TRIGL SERPL-MCNC: 131 MG/DL
TSH SERPL DL<=0.005 MIU/L-ACNC: 2.96 MU/L (ref 0.4–4)

## 2019-11-26 PROCEDURE — 99397 PER PM REEVAL EST PAT 65+ YR: CPT | Performed by: FAMILY MEDICINE

## 2019-11-26 PROCEDURE — 82043 UR ALBUMIN QUANTITATIVE: CPT | Performed by: FAMILY MEDICINE

## 2019-11-26 PROCEDURE — 99213 OFFICE O/P EST LOW 20 MIN: CPT | Mod: 25 | Performed by: FAMILY MEDICINE

## 2019-11-26 PROCEDURE — 99207 C FOOT EXAM  NO CHARGE: CPT | Mod: 25 | Performed by: FAMILY MEDICINE

## 2019-11-26 PROCEDURE — 80061 LIPID PANEL: CPT | Performed by: FAMILY MEDICINE

## 2019-11-26 PROCEDURE — 83036 HEMOGLOBIN GLYCOSYLATED A1C: CPT | Performed by: FAMILY MEDICINE

## 2019-11-26 PROCEDURE — 36415 COLL VENOUS BLD VENIPUNCTURE: CPT | Performed by: FAMILY MEDICINE

## 2019-11-26 PROCEDURE — 84443 ASSAY THYROID STIM HORMONE: CPT | Performed by: FAMILY MEDICINE

## 2019-11-26 RX ORDER — ATORVASTATIN CALCIUM 40 MG/1
40 TABLET, FILM COATED ORAL DAILY
Qty: 90 TABLET | Refills: 3 | Status: SHIPPED | OUTPATIENT
Start: 2019-11-26 | End: 2020-11-24

## 2019-11-26 RX ORDER — LISINOPRIL 2.5 MG/1
2.5 TABLET ORAL DAILY
Qty: 90 TABLET | Refills: 3 | Status: SHIPPED | OUTPATIENT
Start: 2019-11-26 | End: 2020-06-25

## 2019-11-26 ASSESSMENT — ANXIETY QUESTIONNAIRES
IF YOU CHECKED OFF ANY PROBLEMS ON THIS QUESTIONNAIRE, HOW DIFFICULT HAVE THESE PROBLEMS MADE IT FOR YOU TO DO YOUR WORK, TAKE CARE OF THINGS AT HOME, OR GET ALONG WITH OTHER PEOPLE: NOT DIFFICULT AT ALL
7. FEELING AFRAID AS IF SOMETHING AWFUL MIGHT HAPPEN: NOT AT ALL
3. WORRYING TOO MUCH ABOUT DIFFERENT THINGS: NOT AT ALL
GAD7 TOTAL SCORE: 0
1. FEELING NERVOUS, ANXIOUS, OR ON EDGE: NOT AT ALL
6. BECOMING EASILY ANNOYED OR IRRITABLE: NOT AT ALL
5. BEING SO RESTLESS THAT IT IS HARD TO SIT STILL: NOT AT ALL
2. NOT BEING ABLE TO STOP OR CONTROL WORRYING: NOT AT ALL

## 2019-11-26 ASSESSMENT — ACTIVITIES OF DAILY LIVING (ADL): CURRENT_FUNCTION: NO ASSISTANCE NEEDED

## 2019-11-26 ASSESSMENT — PATIENT HEALTH QUESTIONNAIRE - PHQ9
5. POOR APPETITE OR OVEREATING: NOT AT ALL
SUM OF ALL RESPONSES TO PHQ QUESTIONS 1-9: 0

## 2019-11-26 NOTE — PROGRESS NOTES
SUBJECTIVE:   Maira Leon is a 79 year old female who presents for Preventive Visit.    Are you in the first 12 months of your Medicare coverage?  No    Healthy Habits:    In general, how would you rate your overall health?  Good    Frequency of exercise:  6-7 days/week    Duration of exercise:  15-30 minutes    Taking medications regularly:  Yes    Barriers to taking medications:  None    Medication side effects:  None    Ability to successfully perform activities of daily living:  No assistance needed    Home Safety:  No safety concerns identified    Hearing Impairment:  Difficulty following a conversation in a noisy restaurant or crowded room and difficulty understanding soft or whispered speech (wears hearing aids)    In the past 6 months, have you been bothered by leaking of urine? Yes (started wearing a pad)    In general, how would you rate your overall mental or emotional health?  Very good      PHQ-2 Total Score:    Do you feel safe in your environment? Yes    Have you ever done Advance Care Planning? (For example, a Health Directive, POLST, or a discussion with a medical provider or your loved ones about your wishes): Yes, advance care planning is on file.      Fall risk  Fallen 2 or more times in the past year?: No  Any fall with injury in the past year?: No    Cognitive Screening   1) Repeat 3 items (Leader, Season, Table)    2) Clock draw: NORMAL  3) 3 item recall: Recalls 2 objects   Results: NORMAL clock, 1-2 items recalled: COGNITIVE IMPAIRMENT LESS LIKELY    Mini-CogTM Copyright RONDA Bonner. Licensed by the author for use in [facility]; reprinted with permission (max@.[facility]). All rights reserved.      Do you have sleep apnea, excessive snoring or daytime drowsiness?: no    Reviewed and updated as needed this visit by clinical staff  Tobacco  Allergies  Meds  Med Hx  Surg Hx  Fam Hx  Soc Hx        Reviewed and updated as needed this visit by Provider  Allergies  Meds   Problems        Social History     Tobacco Use     Smoking status: Former Smoker     Last attempt to quit: 1981     Years since quittin.9     Smokeless tobacco: Never Used   Substance Use Topics     Alcohol use: Yes     Comment: less than 7 a week.     If you drink alcohol do you typically have >3 drinks per day or >7 drinks per week? No    No flowsheet data found.          Diabetes Follow-up      How often are you checking your blood sugar? don't check them regularly. Only when she feels like she has a problem.     What concerns do you have today about your diabetes? None     Do you have any of these symptoms? (Select all that apply)  No numbness or tingling in feet.  No redness, sores or blisters on feet.  No complaints of excessive thirst.  No reports of blurry vision.  No significant changes to weight. pain due to hammer toe. Does use a splint     Have you had a diabetic eye exam in the last 12 months? Yes- Date of last eye exam: 2019    Diabetes Management Resources    Hyperlipidemia Follow-Up      Are you regularly taking any medication or supplement to lower your cholesterol?   Yes- Atorvastatin 40mg    Are you having muscle aches or other side effects that you think could be caused by your cholesterol lowering medication?  No      BP Readings from Last 2 Encounters:   19 118/74   19 104/74     Hemoglobin A1C (%)   Date Value   2019 5.9 (H)   2019 5.8 (H)     LDL Cholesterol Calculated (mg/dL)   Date Value   10/31/2018 78   2017 48       Went for more PT.  Feels like she is standing straighter and her  Left hip feels better.  Is adjusting to different movements due to rods in her leg.    Has supports in her shoes.  Feels these do help her balance and movement.     Adalid - was told by podiatrist not to have surgery unless it hurts or causes her pain.  Does have some cramping at night. Wears a splint during the day. But overall does not feel she has much ongoing  "pain with this.    Two questions:  Asked about calcium supplementation, and best amount.    Asked about biking trip in [place] - planned.  Has been prepping to go on a bike trip.  Wondering about this for next June?    Has some overflow urinary leakage. Wearing a pad helps.      Current providers sharing in care for this patient include:   Patient Care Team:  Michelle Monet MD as PCP - General (Family Practice)  Michelle Monet MD as Assigned PCP  Jackie Killian RN as   Elisabeth Estrada MD as MD (Ophthalmology)    The following health maintenance items are reviewed in Epic and correct as of today:  Health Maintenance   Topic Date Due     DUSTIN ASSESSMENT  05/16/2019     PHQ-9  05/16/2019     DEXA  09/05/2019     TSH W/FREE T4 REFLEX  09/22/2019     LIPID  10/31/2019     MICROALBUMIN  11/01/2019     DIABETIC FOOT EXAM  11/07/2019     MEDICARE ANNUAL WELLNESS VISIT  11/01/2019     ADVANCE CARE PLANNING  05/14/2020     A1C  05/26/2020     BMP  05/29/2020     EYE EXAM  05/29/2020     FALL RISK ASSESSMENT  05/29/2020     DTAP/TDAP/TD IMMUNIZATION (4 - Td) 09/30/2021     INFLUENZA VACCINE  Completed     PNEUMOCOCCAL IMMUNIZATION 65+ LOW/MEDIUM RISK  Completed     ZOSTER IMMUNIZATION  Completed     IPV IMMUNIZATION  Aged Out     MENINGITIS IMMUNIZATION  Aged Out     Lab work is in process      Review of Systems  Constitutional, HEENT, cardiovascular, pulmonary, gi and gu systems are negative, except as otherwise noted.    OBJECTIVE:   /74 (Patient Position: Sitting, Cuff Size: Adult Regular)   Temp 97.4  F (36.3  C) (Oral)   Wt 79.4 kg (175 lb)   BMI 31.00 kg/m   Estimated body mass index is 31 kg/m  as calculated from the following:    Height as of 5/29/19: 1.6 m (5' 3\").    Weight as of this encounter: 79.4 kg (175 lb).  Physical Exam  GENERAL: healthy, alert and no distress  EYES: Eyes grossly normal to inspection, PERRL and conjunctivae and sclerae normal  HENT: " ear canals and TM's normal, nose and mouth without ulcers or lesions  NECK: no adenopathy, no asymmetry, masses, or scars and thyroid normal to palpation  RESP: lungs clear to auscultation - no rales, rhonchi or wheezes  BREAST: normal without masses, tenderness or nipple discharge and no palpable axillary masses or adenopathy  CV: regular rate and rhythm, normal S1 S2, no S3 or S4, no murmur, click or rub, no peripheral edema and peripheral pulses strong  ABDOMEN: soft, nontender, no hepatosplenomegaly, no masses and bowel sounds normal  MS: no gross musculoskeletal defects noted, no edema  PSYCH: mentation appears normal, affect normal/bright  Diabetic foot exam: normal DP and PT pulses, no trophic changes or ulcerative lesions, normal sensory exam and normal monofilament exam    Diagnostic Test Results:  Results for orders placed or performed in visit on 11/26/19 (from the past 24 hour(s))   HEMOGLOBIN A1C   Result Value Ref Range    Hemoglobin A1C 5.9 (H) 0 - 5.6 %       ASSESSMENT / PLAN:   (Z00.00) Encounter for Medicare annual wellness exam  (primary encounter diagnosis)  Comment: stable health.  Generally active and well  Plan:  Other health care maintenance up to date per chart or patient report.  Desires to continue mammography q 2 years.    (E11.22,  N18.3) Diabetes mellitus with stage 3 chronic kidney disease (H)  Comment: well controlled, although may not need quite such tight control  Plan: TSH WITH FREE T4 REFLEX, Lipid panel reflex to         direct LDL Fasting, Albumin Random Urine         Quantitative with Creat Ratio, HEMOGLOBIN A1C,         FOOT EXAM, atorvastatin (LIPITOR) 40 MG tablet,        lisinopril (PRINIVIL/ZESTRIL) 2.5 MG tablet,         metFORMIN (GLUCOPHAGE) 500 MG tablet,         AMBULATORY ADULT DIABETES EDUCATOR REFERRAL        Discussed seeing diabetes ed to help wean off medication.  Consider weaning down on byetta first - as is the more expensive medication.    (E78.5)  "Hyperlipidemia LDL goal <100  Comment: tolerating medication  Plan: Lipid panel reflex to direct LDL Fasting,         atorvastatin (LIPITOR) 40 MG tablet        adjust therapy based on labs      (M85.89) Osteopenia of multiple sites  Comment: noted at time of injury in 2017  Plan: DEXA HIP/PELVIS/SPINE - Future        adjust therapy based on results      (Z78.0) Asymptomatic postmenopausal status  Comment:   Plan: DEXA HIP/PELVIS/SPINE - Future        As above    (N18.3) CKD (chronic kidney disease) stage 3, GFR 30-59 ml/min (H)  Comment:   Plan: lisinopril (PRINIVIL/ZESTRIL) 2.5 MG tablet        Continue current medication        COUNSELING:  Reviewed preventive health counseling, as reflected in patient instructions    Estimated body mass index is 31 kg/m  as calculated from the following:    Height as of 5/29/19: 1.6 m (5' 3\").    Weight as of this encounter: 79.4 kg (175 lb).    Weight management plan: Discussed healthy diet and exercise guidelines     reports that she quit smoking about 38 years ago. She has never used smokeless tobacco.      Appropriate preventive services were discussed with this patient, including applicable screening as appropriate for cardiovascular disease, diabetes, osteopenia/osteoporosis, and glaucoma.  As appropriate for age/gender, discussed screening for colorectal cancer, prostate cancer, breast cancer, and cervical cancer. Checklist reviewing preventive services available has been given to the patient.    Reviewed patients plan of care and provided an AVS. The Intermediate Care Plan ( asthma action plan, low back pain action plan, and migraine action plan) for Maira meets the Care Plan requirement. This Care Plan has been established and reviewed with the Patient.    Counseling Resources:  ATP IV Guidelines  Pooled Cohorts Equation Calculator  Breast Cancer Risk Calculator  FRAX Risk Assessment  ICSI Preventive Guidelines  Dietary Guidelines for Americans, 2010  USDA's " MyPlate  ASA Prophylaxis  Lung CA Screening    Michelle Barrios MD  [facility]    Identified Health Risks:

## 2019-11-26 NOTE — PATIENT INSTRUCTIONS
I will ask our Diabetes Educator team to call to schedule you an appointment so we can see if we can wean you down off some medication.    Patient Education   Personalized Prevention Plan  You are due for the preventive services outlined below.  Your care team is available to assist you in scheduling these services.  If you have already completed any of these items, please share that information with your care team to update in your medical record.  Health Maintenance Due   Topic Date Due                       Osteoporosis Screening  09/05/2019

## 2019-11-27 ENCOUNTER — TELEPHONE (OUTPATIENT)
Dept: FAMILY MEDICINE | Facility: CLINIC | Age: 79
End: 2019-11-27

## 2019-11-27 LAB
CREAT UR-MCNC: 101 MG/DL
MICROALBUMIN UR-MCNC: 7 MG/L
MICROALBUMIN/CREAT UR: 6.86 MG/G CR (ref 0–25)

## 2019-11-27 ASSESSMENT — ANXIETY QUESTIONNAIRES: GAD7 TOTAL SCORE: 0

## 2019-11-27 NOTE — TELEPHONE ENCOUNTER
Diabetes Education Scheduling Outreach #1:    Call to patient to schedule. Left message with phone number to call to schedule.    Plan for 2nd outreach attempt within 1 week.    Britni Ortiz  [place] [facility]  Diabetes and Nutrition Scheduling

## 2019-12-04 ENCOUNTER — ALLIED HEALTH/NURSE VISIT (OUTPATIENT)
Dept: EDUCATION SERVICES | Facility: CLINIC | Age: 79
End: 2019-12-04
Payer: COMMERCIAL

## 2019-12-04 ENCOUNTER — TELEPHONE (OUTPATIENT)
Dept: EDUCATION SERVICES | Facility: CLINIC | Age: 79
End: 2019-12-04

## 2019-12-04 DIAGNOSIS — N18.30 DIABETES MELLITUS WITH STAGE 3 CHRONIC KIDNEY DISEASE (H): Primary | ICD-10-CM

## 2019-12-04 DIAGNOSIS — E11.22 DIABETES MELLITUS WITH STAGE 3 CHRONIC KIDNEY DISEASE (H): Primary | ICD-10-CM

## 2019-12-04 PROCEDURE — G0108 DIAB MANAGE TRN  PER INDIV: HCPCS

## 2019-12-04 NOTE — PROGRESS NOTES
"Diabetes Self-Management Education & Support    Diabetes Education Self Management & Training    SUBJECTIVE/OBJECTIVE:  Presents for: Individual review  Accompanied by: Self  Diabetes education in the past 24mo: No  Focus of Visit: Taking Medication  Diabetes type: Type 1, Type 2  Date of diagnosis: 2000  Disease course: Improving  How confident are you filling out medical forms by yourself:: Extremely  Diabetes management related comments/concerns: no.  Says A1C has been going down into the 5's.  Says not sure why- has not lost weight.   Transportation concerns: No  Other concerns:: Glasses  Cultural Influences/Ethnic Background:  American      Diabetes Symptoms & Complications  Blurred vision: No  Fatigue: No  Neuropathy: No  Foot ulcerations: No  Polydipsia: No  Polyphagia: No  Polyuria: No  Visual change: No  Weakness: No  Weight loss: No  Slow healing wounds: No  Recent Infection(s): No  Weight trend: Decreasing steadily  Autonomic neuropathy: Yes  CVA: No  Heart disease: No  Nephropathy: Yes  Peripheral neuropathy: Yes  Peripheral Vascular Disease: No  Retinopathy: No  Sexual dysfunction: No    Patient Problem List and Family Medical History reviewed for relevant medical history, current medical status, and diabetes risk factors.    Vitals:  There were no vitals taken for this visit.  Estimated body mass index is 31 kg/m  as calculated from the following:    Height as of 5/29/19: 1.6 m (5' 3\").    Weight as of 11/26/19: 79.4 kg (175 lb).   Last 3 BP:   BP Readings from Last 3 Encounters:   11/26/19 118/74   05/29/19 104/74   03/29/19 122/70       History   Smoking Status     Former Smoker     Quit date: 1/1/1981   Smokeless Tobacco     Never Used       Labs:  Lab Results   Component Value Date    A1C 5.9 11/26/2019     Lab Results   Component Value Date    GLC 91 05/29/2019     Lab Results   Component Value Date    LDL 70 11/26/2019     HDL Cholesterol   Date Value Ref Range Status   11/26/2019 52 >49 mg/dL " Final   ]  GFR Estimate   Date Value Ref Range Status   05/29/2019 67 >60 mL/min/[1.73_m2] Final     Comment:     Non  GFR Calc  Starting 12/18/2018, serum creatinine based estimated GFR (eGFR) will be   calculated using the Chronic Kidney Disease Epidemiology Collaboration   (CKD-EPI) equation.       GFR Estimate If Black   Date Value Ref Range Status   05/29/2019 77 >60 mL/min/[1.73_m2] Final     Comment:      GFR Calc  Starting 12/18/2018, serum creatinine based estimated GFR (eGFR) will be   calculated using the Chronic Kidney Disease Epidemiology Collaboration   (CKD-EPI) equation.       Lab Results   Component Value Date    CR 0.83 05/29/2019     No results found for: MICROALBUMIN    Healthy Eating  Healthy Eating Assessed Today: Yes  Cultural/Holiness diet restrictions?: No  Patient on a regular basis: Eats 3 meals a day, Eats out more than once a week(Eats out 2-3 times a week)  Meal planning: Smaller portions  Meals include: Breakfast, Lunch, Dinner, Snacks  Breakfast: 2 cups coffee, wheat toast with PB and SF marmalade, 10 raspberries OR 1 cup juice and coffee and 10 almonds OR coffee, yogurt with 8 grapes and granola(Wakes: 6am. B: 7-8am:)  Lunch: diet [place] cranberry juice, crackers, veggies and cream cheese OR Minestrone soup, salad, 1 Tbsp raspberry vinaigrette, blueberry muffin with butter and water OR 10 grapes and crackers(L: 12-1pm: )  Dinner: 1/2 cup red wine, Egyptian salad with shrimp and noodles with veggies and spring roll with peanut sauce OR 4 crackers with cream cheese and veggies, 2 carrots and celery, [place] soup, cranberries and whipped cream OR 7 small meatballs, leftover couscouls, dressing, almonds, 4 carrots, 2 celery, 1/2 cup applesauce and cranberries with whipped cream  Snacks: rare- sometimes crackers   Beverages: Water, Coffee, Alcohol(Wine 1 glass a day)  Has patient met with a dietitian in the past?: No    Being Active  Being Active Assessed  Today: Yes  Exercise:: Yes  Days per week of moderate to strenuous exercise (like a brisk walk): 6(Biking 5 miles in 25 minutes on resistance at 7)  On average, minutes per day of exercise at this level: 20(Plans to build time to 1-2 hours a day.  Doing a bike and ride tour)  How intense was your typical exercise? : Moderate (like brisk walking)  Exercise Minutes per Week: 120  Barrier to exercise: None    Monitoring  Monitoring Assessed Today: Yes  Did patient bring glucose meter to appointment? : Yes  Blood Glucose Meter: One Touch(Ultra 2)  Home Glucose (Sugar) Monitoring: Never  Blood glucose trend: Decreasing steadily  Low Glucose Range (mg/dL):   High Glucose Range (mg/dL): 130-140    BG off meter (time is set in May):  12/1: 136/101  12/2: 139, -/93, -  12/3: 117  12/4: 128    Taking Medications  Diabetes Medication(s)     Biguanides       metFORMIN (GLUCOPHAGE) 500 MG tablet    Take 1 tablet (500 mg) by mouth 2 times daily (with meals)    Incretin Mimetic Agents (GLP-1 Receptor Agonists)       BYETTA 10 MCG PEN 10 MCG/0.04ML pen    INJECT 10MCG UNDER THE SKIN TWO TIMES A DAY          Taking Medication Assessed Today: Yes  Current Treatments: Non-insulin Injectables, Oral Agent (monotherapy)  Problems taking diabetes medications regularly?: No  Diabetes medication side effects?: No  Treatment Compliance: All of the time    Problem Solving  Problem Solving Assessed Today: Yes  Hypoglycemia Frequency: Rarely  Medical alert: No  Severe weather/disaster plan for diabetes management?: No  DKA prevention plan?: No  Sick day plan for diabetes management?: (P) No    Hypoglycemia symptoms  Confusion: No  Dizziness or Light-Headedness: No  Headaches: No  Hunger: No  Mood changes: No  Nervousness/Anxiety: No  Sleepiness: No  Sweats: No  Tremors: Yes    Hypoglycemia Complications  Blackouts: No  Hospitalization: No  Nocturnal hypoglycemia: No  Required assistance: No  Required glucagon injection: No  Seizures:  No    Reducing Risks  Reducing Risks Assessed Today: No  Has dilated eye exam at least once a year?: Yes  Sees dentist every 6 months?: Yes  Sees podiatrist (foot doctor)?: No(Not routinely)    Healthy Coping  Healthy Coping Assessed Today: No  Informal Support system:: Other  Difficulty affording diabetes management supplies?: No  Patient Activation Measure Survey Score:  MARCIANO Score (Last Two) 3/8/2011 5/9/2013   MARCIANO Raw Score 48 45   Activation Score 80 73.1   MARCIANO Level 4 4       ASSESSMENT:  Nadya has checked her blood glucose the past 3 days and says has been awhile since she checked prior to this; hard to tell since date set to May 2020 but helped with getting meter to correct date and time today.  Denies feeling symptoms of hypoglycemia but says PCP concerned with her lower A1C so here to discuss reducing medications and also interested on reviewing healthy eating.    After reviewing her blood glucose, fasting blood glucose high was only <130 mg/dL 50% of the time the past 4 mornings.  Post-meal blood glucose was on the lower side with 90-low 100's.  Would recommend continuing Metformin right now since helps more with pre-meal blood glucose control but suggested reducing Byetta to only 5 mcg instead of 10 mcg.  Asked Nadya how the Byetta is working for her.  Says she normally takes right before eating when recommended to take 15-60 minutes before eating.  Discussed newer GLP-1 RA's and Nadya open to trying the once weekly injection to simplify her routine if covered.  Looks like her insurance may prefer Ozempic so instructed on pen use, priming and timing.  She is aware of side effects (same as Byetta) and was encouraged to reach out if any issues tolerating.    Plan is to take final dose of 10 mcg Byetta at breakfast the day before starting Ozempic (no dinner dose) and then start 0.25 mg Ozempic subcutaneous once weekly.  After 4 weeks, if tolerating, will have her increase to 0.5 mg subcutaneous once weekly.  No  change to Metformin at this time- continue 500 mg with breakfast and 500 mg with dinner. Please see telephone encounter from 12/4/19 for MD's reply.     Not much time remaining after Ozempic instruction to discuss food but provided sheet on carbohydrate amounts in food and revisited carbohydrate consistency at meal.  Suggested Nadya try to limit carbohydrates to <60 gm at meals.  Will plan to discuss more at follow up. Commended her on her activity and encouraged her to keep it going. Pt verbalized understanding of concepts discussed and recommendations provided.           Patient's most recent   Lab Results   Component Value Date    A1C 5.9 11/26/2019    is meeting goal of <7.0    INTERVENTION:   Diabetes knowledge and skills assessment:     Patient is knowledgeable in diabetes management concepts related to: Healthy Eating, Being Active, Monitoring, Taking Medication, Problem Solving, Reducing Risks and Healthy Coping    Patient needs further education on the following diabetes management concepts: Healthy Eating, Monitoring and Taking Medication    Based on learning assessment above, most appropriate setting for further diabetes education would be: Group class or Individual setting.    Education provided today on:  AADE Self-Care Behaviors:  Healthy Eating: carbohydrate counting and consistency in amount, composition, and timing of food intake  Being Active: relationship to blood glucose  Monitoring: purpose, log and interpret results, individual blood glucose targets, frequency of monitoring and proper sharps disposal  Taking Medication: action of prescribed medication, drawing up, administering and storing injectable diabetes medications, proper site selection and rotation for injections, side effects of prescribed medications and when to take medications  GLP-1 administration technique taught today. Patient verbalized understanding and was able to perform an accurate return demonstration of administration  technique. Side effects were discussed, if patient has any abdominal pain, with or without nausea and/or vomiting, stop medication, call provider, clinic or go to the emergency room.    Opportunities for ongoing education and support in diabetes-self management were discussed.    Pt verbalized understanding of concepts discussed and recommendations provided today.       Education Materials Provided:  Safe Disposal Options for Needles & Syringes, Carbohydrate Counting Sheet and Medication Information on Ozempic    PLAN:  See Patient Instructions for co-developed, patient-stated behavior change goals.  AVS printed and provided to patient today. See Follow-Up section for recommended follow-up.    Jane Killian RD, LD, CDE   Time Spent: 65 minutes  Encounter Type: Individual    Any diabetes medication dose changes were made via the CDE Protocol and Collaborative Practice Agreement with the patient's referring provider. A copy of this encounter was shared with the provider.

## 2019-12-04 NOTE — PATIENT INSTRUCTIONS
"1. Prime each new pen - the \"dashed\" line (want to clear out air pockets).  After 1st prime, pen is ready to use for each dose until next pen.    2. Take 0.25 mg Ozempic dose on the same day every week for 4 weeks.  At week 5, increase to 0.50 mg dose and continue to inject subcutaneously once a week.    3. Keep unused Ozempic pens in the refrigerator. After first priming dose and starting to use pen, can keep at room temperature for 56 days. Throw pen away after 56 days, even if there is still medicine left in the pen.      4. Call your doctor if you experience abdominal pain that is severe and will not go away while using Ozempic.    5. Pen needles and lancets in a hard plastic container or sharps container. Do not throw away in the trash.    6. On the day you start Ozempic weekly (Sunday), stop Byetta the day before after the morning dose (Saturday morning only).  Do not take the evening dose on Saturday (do not want both in your system).     FOLLOW UP: Wednesday, January 8th at 2:30pm at [facility]    Jane Killian RD, LD, CDE   [phone]  "

## 2019-12-04 NOTE — TELEPHONE ENCOUNTER
Hi Dr. Monet,    After reviewing Nadya's blood glucose from the past 3-4 days, suspect she needs help form Metformin right now since fasting blood glucose only in target 2 of the past 4 morning.  Her post-prandial blood glucose is <110 mg/dL both times checked so would recommend less GLP-1.  Had asked how Bykarmen is working for her and discussed less time intensive, newer GLP-1 RA's to market and she is interested in trying the once weekly injection.  Looks like [place] may prefer Ozempic so will try this one first.    If you agree, would recommend Nadya continue Byetta until plans to start once weekly Ozempic.  She would take her final dose of 10 mcg Byetta with breakfast the day before she plans to start Ozempic and then stop taking Byetta.  Would recommend starting with 0.25 mg Ozempic subcutaneous once weekly for 4 weeks and then if tolerating, go up to 0.5 mg on week 5.    Please let me know if you approve and please sign the pending Ozempic prescription.  I'd be happy to let Nadya know when this was sent to the pharmacy.    Thanks,  Jane Killian RD, LD, CDE

## 2019-12-05 ENCOUNTER — MYC MEDICAL ADVICE (OUTPATIENT)
Dept: EDUCATION SERVICES | Facility: CLINIC | Age: 79
End: 2019-12-05

## 2019-12-05 NOTE — TELEPHONE ENCOUNTER
Patient notified by [employer] that changes approved and Ozempic prescription sent to pharmacy through [employer] message.    Jane Killian RD, LD, CDE

## 2019-12-17 NOTE — TELEPHONE ENCOUNTER
Saw patient had not read message so called home phone to follow up if received and started Ozempic.  Was able to reach Nadya at home and she verifies she picked up her medication and started Wednesday of last week.  Says feels fine but has not checked blood glucose yet.  Plans to take next 0.25 mg weekly dose tomorrow.  She denies any questions or concerns.    Jane Killian RD, LD, CDE

## 2020-01-08 ENCOUNTER — ALLIED HEALTH/NURSE VISIT (OUTPATIENT)
Dept: EDUCATION SERVICES | Facility: CLINIC | Age: 80
End: 2020-01-08
Payer: COMMERCIAL

## 2020-01-08 DIAGNOSIS — N18.30 DIABETES MELLITUS WITH STAGE 3 CHRONIC KIDNEY DISEASE (H): ICD-10-CM

## 2020-01-08 DIAGNOSIS — E11.22 DIABETES MELLITUS WITH STAGE 3 CHRONIC KIDNEY DISEASE (H): ICD-10-CM

## 2020-01-08 PROCEDURE — G0108 DIAB MANAGE TRN  PER INDIV: HCPCS

## 2020-01-08 NOTE — PROGRESS NOTES
Diabetes Self-Management Education & Support    Diabetes Education Self Management & Training    SUBJECTIVE/OBJECTIVE:  Presents for: Follow-up  Accompanied by: Self  Diabetes education in the past 24mo: Yes  Focus of Visit: Taking Medication  Diabetes type: Type 1, Type 2  Date of diagnosis: 2000  Disease course: Improving  How confident are you filling out medical forms by yourself:: Extremely  Diabetes management related comments/concerns: Wanted to know if needs to take Ozempic with food or same time of the day.   Says feels like not have to carry that pen along or if forgets it.  Loves the new medication to only have to take 1 time a week vs 14 injections a week.     Was not sure how often she needed to prime the pen and has been doing with each new needle (would use the needle twice).     Says has been feeling ok on Ozempic - at 0.25 mg.  Says not checking blood glucose with lower dose of Ozempic and feeling ok. Was supposed to go up today but forgot.  After looking over planner, realized last week should have been at higher dose. Plans to check blood glucose more on higher amount to ensure things look ok.    Says when on Byetta- took a couple weeks to get used to it- caused nausea and vomiting.     Says feels sheet from last time was helpful with carbohydrate and feels she knows about how much to eat with meals.     Transportation concerns: No  Other concerns:: Glasses  Cultural Influences/Ethnic Background:  American    Diabetes Symptoms & Complications  Blurred vision: No  Fatigue: No  Neuropathy: No  Foot ulcerations: No  Polydipsia: No  Polyphagia: No  Polyuria: No  Visual change: No  Weakness: No  Weight loss: No  Slow healing wounds: No  Recent Infection(s): No  Weight trend: Decreasing steadily  Autonomic neuropathy: Yes  CVA: No  Heart disease: No  Nephropathy: Yes  Peripheral neuropathy: Yes  Peripheral Vascular Disease: No  Retinopathy: No  Sexual dysfunction: No    Patient Problem List and Family  "Medical History reviewed for relevant medical history, current medical status, and diabetes risk factors.    Vitals:  There were no vitals taken for this visit.  Estimated body mass index is 31 kg/m  as calculated from the following:    Height as of 5/29/19: 1.6 m (5' 3\").    Weight as of 11/26/19: 79.4 kg (175 lb).   Last 3 BP:   BP Readings from Last 3 Encounters:   11/26/19 118/74   05/29/19 104/74   03/29/19 122/70       History   Smoking Status     Former Smoker     Quit date: 1/1/1981   Smokeless Tobacco     Never Used       Labs:  Lab Results   Component Value Date    A1C 5.9 11/26/2019     Lab Results   Component Value Date    GLC 91 05/29/2019     Lab Results   Component Value Date    LDL 70 11/26/2019     HDL Cholesterol   Date Value Ref Range Status   11/26/2019 52 >49 mg/dL Final   ]  GFR Estimate   Date Value Ref Range Status   05/29/2019 67 >60 mL/min/[1.73_m2] Final     Comment:     Non  GFR Calc  Starting 12/18/2018, serum creatinine based estimated GFR (eGFR) will be   calculated using the Chronic Kidney Disease Epidemiology Collaboration   (CKD-EPI) equation.       GFR Estimate If Black   Date Value Ref Range Status   05/29/2019 77 >60 mL/min/[1.73_m2] Final     Comment:      GFR Calc  Starting 12/18/2018, serum creatinine based estimated GFR (eGFR) will be   calculated using the Chronic Kidney Disease Epidemiology Collaboration   (CKD-EPI) equation.       Lab Results   Component Value Date    CR 0.83 05/29/2019     No results found for: MICROALBUMIN    Healthy Eating  Healthy Eating Assessed Today: Yes  Cultural/Pentecostal diet restrictions?: No  Meal planning: Smaller portions  Meals include: Breakfast, Lunch, Dinner, Snacks  Breakfast: 1/2 cup yogurt and 1/2 cup fruit soup (dried fruit rehydrated) and bran muffin OR egg and 1 slice toast with butter with sugar-free marmalade OR egg and 1 slice bread with ketchup OR none if plans to go out for Brunch and 2-3 cups " coffee(Wakes: 6am. B: 7-8am:)  Lunch: none OR 3/4 cup black bean soup, swiss cheese and carrot sticks OR 1/2 Flatbread and water(L: 12-1pm: )  Dinner: country style pork ribs with Asian seasonings, broccoli and squash and 2 cookies or ice-cream with berries/fruit soup OR 1/2 Flatbread, veggies with water or wine (6oz).  If eats pasta tries to keep to 1/3 cup and if potatoes, 1/2 cup   Snacks: rare- sometimes crackers (rice crackers for appetizer) OR pretzels OR almonds/peanuts OR cheese stick/nuts/popcorn/pretzels if driving   Beverages: Water, Coffee, Alcohol(Wine 1 glass a day)  Has patient met with a dietitian in the past?: No    Being Active  Being Active Assessed Today: Yes  Exercise:: Yes  Days per week of moderate to strenuous exercise (like a brisk walk): 6(Biking 5 miles in 25 minutes on resistance at 7)  On average, minutes per day of exercise at this level: 20(Plans to build time to 1-2 hours a day.  Doing a bike and ride tour)  How intense was your typical exercise? : Moderate (like brisk walking)  Exercise Minutes per Week: 120  Barrier to exercise: None    Monitoring  Monitoring Assessed Today: Yes  Did patient bring glucose meter to appointment? : Yes  Blood Glucose Meter: One Touch(Ultra 2)  Home Glucose (Sugar) Monitoring: Never  Blood glucose trend: Decreasing steadily  Low Glucose Range (mg/dL):   High Glucose Range (mg/dL): 130-140    BG off Ultra 2 since last visit:   1/8: -, -/158  12/21: 171    Taking Medications  Diabetes Medication(s)     Biguanides       metFORMIN (GLUCOPHAGE) 500 MG tablet    Take 1 tablet (500 mg) by mouth 2 times daily (with meals)    Incretin Mimetic Agents (GLP-1 Receptor Agonists)       Semaglutide,0.25 or 0.5MG/DOS, 2 MG/1.5ML SOPN    Inject 0.5 mg Subcutaneous every 7 days          Taking Medication Assessed Today: Yes  Current Treatments: Non-insulin Injectables, Oral Agent (monotherapy)  Problems taking diabetes medications regularly?: No  Diabetes  medication side effects?: No  Treatment Compliance: All of the time    Problem Solving  Problem Solving Assessed Today: Yes  Hypoglycemia Frequency: Rarely  Medical alert: No  Severe weather/disaster plan for diabetes management?: No  DKA prevention plan?: No    Hypoglycemia symptoms  Confusion: No  Dizziness or Light-Headedness: No  Headaches: No  Hunger: No  Mood changes: No  Nervousness/Anxiety: No  Sleepiness: No  Sweats: No  Tremors: Yes    Hypoglycemia Complications  Blackouts: No  Hospitalization: No  Nocturnal hypoglycemia: No  Required assistance: No  Required glucagon injection: No  Seizures: No    Reducing Risks  Reducing Risks Assessed Today: No  Has dilated eye exam at least once a year?: Yes  Sees dentist every 6 months?: Yes  Sees podiatrist (foot doctor)?: No(Not routinely)    Healthy Coping  Healthy Coping Assessed Today: No  Informal Support system:: Other  Difficulty affording diabetes management supplies?: No  Patient Activation Measure Survey Score:  MARCIANO Score (Last Two) 3/8/2011 5/9/2013   MARCIANO Raw Score 48 45   Activation Score 80 73.1   MARCIANO Level 4 4       ASSESSMENT:  Nadya has not checked blood glucose often since starting 0.25 mg Ozempic once weekly- had 1 elevated fasting blood glucose and 1 post-lunch blood glucose in target today but no others since appointment on 12/4. She says she was supposed to increase to 0.5 mg today but had forgotten.  When reviewing records, she was supposed to go up last week but reassured her this is not a problem and can either do another 0.25 mg injection today or start 0.5 mg next week.  Nadya says she will do the other 0.25 mg injection today.  Says tolerating Ozempic fine and really enjoying being able to only inject 1 time a week instead of 14 times a week.    Reviewed Ozempic pen use and some of her misconceptions. She thought she was supposed to use the needle twice so was encouraged to remove after each use and use new needle every time.  She also had been  priming with each pen needle and was told only needs to prime the first time pen is used; no priming after first time.  She verbalizes understanding. She plans to check blood glucose more often with higher Ozempic dose and suggested she check morning and evening, along with some post-meal blood glucose.  Reviewed target pre and post-meal blood glucose goals with her today.     Since ran short on time at last visit, asked if she had any questions on carbohydrate counting or information provided. She feels she is doing pretty well in general and limits pasta to 1/3 cup and potatoes to 1/2 cup. Admits she has dessert most nights. Found Carbohydrate Sheet at last visit helpful.  Offered to review diet recall and discussed a few places carbohydrate intake may be high such as when eating fruit soup (adds water to dried fruit to rehydrate) and other carbs at meal since 1/2 cup fruit soup likely 30 gm carbohydrate.  She indicates this is only seasonally  Discussed lower-carbohydrate snack ideas for entertaining/traveling and if eating pretzels or cracker, try to limit to 1-2 small handfuls at most.  Suggested including some post-meal blood glucose checks if higher in carbs to see how well tolerating.  Pt verbalized understanding of concepts discussed and recommendations provided.         Patient's most recent   Lab Results   Component Value Date    A1C 5.9 11/26/2019    is meeting goal of <7.0    INTERVENTION:   Diabetes knowledge and skills assessment:     Patient is knowledgeable in diabetes management concepts related to: Healthy Eating, Being Active, Monitoring, Taking Medication, Problem Solving, Reducing Risks and Healthy Coping    Patient needs further education on the following diabetes management concepts: Monitoring and Taking Medication    Based on learning assessment above, most appropriate setting for further diabetes education would be: Group class or Individual setting.    Education provided today on:  DEIDRA  Self-Care Behaviors:  Healthy Eating: carbohydrate counting, consistency in amount, composition, and timing of food intake, weight reduction and portion control  Being Active: relationship to blood glucose  Monitoring: purpose, individual blood glucose targets and frequency of monitoring  Taking Medication: action of prescribed medication, drawing up, administering and storing injectable diabetes medications, side effects of prescribed medications and when to take medications    Opportunities for ongoing education and support in diabetes-self management were discussed.    Pt verbalized understanding of concepts discussed and recommendations provided today.       Education Materials Provided:  No new materials provided today    PLAN:  See Patient Instructions for co-developed, patient-stated behavior change goals.  AVS printed and provided to patient today. See Follow-Up section for recommended follow-up.  Follow up: 2/21/20 to see how 0.5 mg subcutaneous once weekly Ozempic tolerated and check blood glucose control.  Will be coming back from [place] vacation this week.    Jane Killian RD, LD, CDE   Time Spent: 60 minutes  Encounter Type: Individual    Any diabetes medication dose changes were made via the CDE Protocol and Collaborative Practice Agreement with the patient's referring provider. A copy of this encounter was shared with the provider.

## 2020-01-08 NOTE — PATIENT INSTRUCTIONS
1. New needle each time (remove needle after each use).    2. ONLY prime first time using pen (NOT before each new needle)    3. Try to check blood glucose 2 time a week- morning and evening.     FOLLOW UP: Friday, February 21st at 8am at [facility]    Jane Killian RD, LD, CDE   [phone]

## 2020-02-12 ENCOUNTER — PATIENT OUTREACH (OUTPATIENT)
Dept: EDUCATION SERVICES | Facility: CLINIC | Age: 80
End: 2020-02-12

## 2020-02-12 NOTE — PROGRESS NOTES
Patient initially called [facility] with Ozempic questions.    Called patient back - states that she is on her 2nd pen of Ozempic, and took 4 doses of 0.5 units. She still has 2 needles left in her pack, and is wondering why she cannot even get the pen to wind up to the 0.5 unit dose anymore.     Explained that once she is on the 0.5 unit dose, it will only administer 4 injections, although she may have 6 needles in her prescription (in case she was increasing from the starter 0.25 dose). Patient is out of town right now, and needs a new prescription to her vacation location. Encouraged her to call her prescribing MD to give her a one time fill of her Ozempic where she is currently located to allow her to do an injection this week and next week (not returning home until 2.23). Patient verbalized understanding,    Argenis Luther RD LD CDE

## 2020-02-21 ENCOUNTER — ALLIED HEALTH/NURSE VISIT (OUTPATIENT)
Dept: NUTRITION | Facility: CLINIC | Age: 80
End: 2020-02-21
Payer: COMMERCIAL

## 2020-02-21 VITALS — BODY MASS INDEX: 31 KG/M2 | WEIGHT: 175 LBS

## 2020-02-21 DIAGNOSIS — E11.22 DIABETES MELLITUS WITH STAGE 3 CHRONIC KIDNEY DISEASE (H): Primary | ICD-10-CM

## 2020-02-21 DIAGNOSIS — N18.30 DIABETES MELLITUS WITH STAGE 3 CHRONIC KIDNEY DISEASE (H): Primary | ICD-10-CM

## 2020-02-21 PROCEDURE — 97802 MEDICAL NUTRITION INDIV IN: CPT

## 2020-02-21 NOTE — PROGRESS NOTES
Medical Nutrition Therapy for Diabetes  Visit Type:Initial assessment and intervention    Maira Leon presents today for MNT and education related to type 2 diabetes and weight management.   She is accompanied by self.     ASSESSMENT:   Patient comments/concerns relating to nutrition: Says had to get a prescription while she was on vacation.  Still really enjoying the once weekly Ozempic instead of twice daily byetta.    NUTRITION HISTORY:    Breakfast: egg, sausage and fruit OR oatmeal with fruit  Lunch: nuts, apple OR chips and Polish sausage OR protein bar  Dinner: meat, veggies/salad  Snacks: hard cinnamon candy OR key lime pie   Beverages: Alcohol 1x, 3-4x/week, Coffee 1x/day and Water all day    Misses meals? none  Eats out:  frequently - was on vacation.  While at home, 2x/week.    Previous diet education:  Yes     Food allergies/intolerances: none noted    Diet is high in: sodium  Diet is low in: calcium and fruits    EXERCISE: Golfed 18 holes daily while on vacation and walked much of the course.  Plans to start biking again - resistance is 8 now and goes 5 miles in 20 minutes.    SOCIO/ECONOMIC:   Lives with: self but has a friend living with her right now    BLOOD GLUCOSE MONITORING:  Patient glucose self monitoring as follows: 0-3 times a week.   BG meter: One Touch Ultra 2 meter  BG results:            BG values are: In goal  Patient's most recent   Lab Results   Component Value Date    A1C 5.9 11/26/2019    is meeting goal of <7.0    MEDICATIONS:  Current Outpatient Medications   Medication     aspirin 81 MG tablet     atorvastatin (LIPITOR) 40 MG tablet     blood glucose (ONETOUCH ULTRA) test strip     blood glucose monitoring (NO BRAND SPECIFIED) meter device kit     Calcium Carb-Cholecalciferol (CALCIUM 500 +D) 500-400 MG-UNIT TABS     fish oil-omega-3 fatty acids (FISH OIL) 1000 MG capsule     insulin pen needle (B-D U/F) 31G X 5 MM miscellaneous     lisinopril (PRINIVIL/ZESTRIL) 2.5 MG  tablet     metFORMIN (GLUCOPHAGE) 500 MG tablet     multivitamin (OCUVITE) TABS     multivitamin, therapeutic with minerals (MULTI-VITAMIN) TABS tablet     ONETOUCH DELICA LANCETS 33G MISC     ONETOUCH ULTRA test strip     polyethylene glycol (MIRALAX) powder     Semaglutide,0.25 or 0.5MG/DOS, 2 MG/1.5ML SOPN     No current facility-administered medications for this visit.        LABS:  Lab Results   Component Value Date    A1C 5.9 11/26/2019     Lab Results   Component Value Date    GLC 91 05/29/2019     Lab Results   Component Value Date    LDL 70 11/26/2019     HDL Cholesterol   Date Value Ref Range Status   11/26/2019 52 >49 mg/dL Final   ]  GFR Estimate   Date Value Ref Range Status   05/29/2019 67 >60 mL/min/[1.73_m2] Final     Comment:     Non  GFR Calc  Starting 12/18/2018, serum creatinine based estimated GFR (eGFR) will be   calculated using the Chronic Kidney Disease Epidemiology Collaboration   (CKD-EPI) equation.       GFR Estimate If Black   Date Value Ref Range Status   05/29/2019 77 >60 mL/min/[1.73_m2] Final     Comment:      GFR Calc  Starting 12/18/2018, serum creatinine based estimated GFR (eGFR) will be   calculated using the Chronic Kidney Disease Epidemiology Collaboration   (CKD-EPI) equation.       Lab Results   Component Value Date    CR 0.83 05/29/2019     No results found for: MICROALBUMIN    ANTHROPOMETRICS:  Vitals: Wt 79.4 kg (175 lb)   BMI 31.00 kg/m    Body mass index is 31 kg/m .      Wt Readings from Last 5 Encounters:   11/26/19 79.4 kg (175 lb)   05/29/19 78.8 kg (173 lb 12.8 oz)   03/29/19 79.8 kg (176 lb)   11/09/18 78.9 kg (174 lb)   11/07/18 79.1 kg (174 lb 6.4 oz)       Weight Change: No change    ESTIMATED KCAL REQUIREMENTS:  1488 kcal per day    NUTRITION DIAGNOSIS: Impaired nutrient utilization related to diabetes as evidenced by elevated blood glucose on occasion and needing medication to control blood glucose.     NUTRITION  INTERVENTION:  Nutrition Prescription: Carbohydrate Intake: 30-45 grams/meal and 15-30 grams/snacks  Education given to support: general nutrition guidelines, weight reduction, sodium, fat modification, exercise, dining out/special occasions, behavior modification, portion control and heart healthy diet  Motivational Interviewing    Discussion: In the past month, 50% fasting blood glucose and 100% post-dinner blood glucose in target.  No changes recommended to medication at this time since only had 6 values to review and was on vacation and eating out more, which may have contributed to elevated values.  Nadya is worried higher Ozempic dose of 1.0 mg subcutaneous once weekly may cause lower blood glucose so will plan to stay at 0.5 mg subcutaneous once weekly until next A1C. If it is >7%, can increase to the higher dose.      Nadya mentions wanting to lose weight so weight loss ideas discussed today.  Reassured Nadya that small portions of something sweet or chips on occasion is ok and to continue to eat a lot of fruits and vegetables as currently is doing.  Discussed ways to modify exercise to help with weight loss such as adding resistance. She feels this is doable and had done physical therapy in the past. Commended her on maintaining weight over the holidays.    PATIENT'S BEHAVIOR CHANGE GOALS:   See Patient Instructions for patient stated behavior change goals. AVS was printed and given to patient at today's appointment.    MONITOR / EVALUATE:  RD will monitor/evaluate:  Blood Glucose / A1c  Weight change    FOLLOW-UP:  Call RD with questions/concerns.   Follow-up appointment scheduled on 6/5/20.     Jane Killian RD, LD, CDE   Time spent in minutes: 50 minutes  Encounter: Individual

## 2020-02-21 NOTE — PATIENT INSTRUCTIONS
1. Try to check blood glucose 2-3 times a week to get better idea of control in the morning and mid-afternoon.    2. Continue 0.5 mg subcutaneous once weekly of Ozempic    3. Include some upper body exercise/muscle building.     FOLLOW UP: Friday, June 5th at 8am at [facility]  If you cannot see  before my appointment, ask for labs the day before    Jane Killian RD, LD, CDE   [phone]

## 2020-02-26 ENCOUNTER — ANCILLARY PROCEDURE (OUTPATIENT)
Dept: BONE DENSITY | Facility: CLINIC | Age: 80
End: 2020-02-26
Payer: COMMERCIAL

## 2020-02-26 ENCOUNTER — ANCILLARY PROCEDURE (OUTPATIENT)
Dept: BONE DENSITY | Facility: CLINIC | Age: 80
End: 2020-02-26
Attending: FAMILY MEDICINE
Payer: COMMERCIAL

## 2020-02-26 DIAGNOSIS — Z78.0 ASYMPTOMATIC POSTMENOPAUSAL STATUS: ICD-10-CM

## 2020-02-26 DIAGNOSIS — M85.89 OSTEOPENIA OF MULTIPLE SITES: ICD-10-CM

## 2020-02-26 PROCEDURE — 77081 DXA BONE DENSITY APPENDICULR: CPT | Performed by: INTERNAL MEDICINE

## 2020-02-26 PROCEDURE — 77080 DXA BONE DENSITY AXIAL: CPT | Mod: XU | Performed by: INTERNAL MEDICINE

## 2020-03-11 ENCOUNTER — ANCILLARY PROCEDURE (OUTPATIENT)
Dept: MAMMOGRAPHY | Facility: CLINIC | Age: 80
End: 2020-03-11
Attending: FAMILY MEDICINE
Payer: COMMERCIAL

## 2020-03-11 DIAGNOSIS — Z12.31 VISIT FOR SCREENING MAMMOGRAM: ICD-10-CM

## 2020-03-11 PROCEDURE — 77067 SCR MAMMO BI INCL CAD: CPT | Mod: TC

## 2020-05-21 ENCOUNTER — OFFICE VISIT (OUTPATIENT)
Dept: FAMILY MEDICINE | Facility: CLINIC | Age: 80
End: 2020-05-21
Payer: COMMERCIAL

## 2020-05-21 VITALS
SYSTOLIC BLOOD PRESSURE: 131 MMHG | DIASTOLIC BLOOD PRESSURE: 81 MMHG | HEART RATE: 86 BPM | BODY MASS INDEX: 29.76 KG/M2 | WEIGHT: 168 LBS

## 2020-05-21 DIAGNOSIS — M85.89 OSTEOPENIA OF MULTIPLE SITES: ICD-10-CM

## 2020-05-21 DIAGNOSIS — E11.22 DIABETES MELLITUS WITH STAGE 3 CHRONIC KIDNEY DISEASE (H): Primary | ICD-10-CM

## 2020-05-21 DIAGNOSIS — M16.12 OSTEOARTHRITIS OF LEFT HIP, UNSPECIFIED OSTEOARTHRITIS TYPE: ICD-10-CM

## 2020-05-21 DIAGNOSIS — N18.30 DIABETES MELLITUS WITH STAGE 3 CHRONIC KIDNEY DISEASE (H): ICD-10-CM

## 2020-05-21 DIAGNOSIS — N18.30 DIABETES MELLITUS WITH STAGE 3 CHRONIC KIDNEY DISEASE (H): Primary | ICD-10-CM

## 2020-05-21 DIAGNOSIS — E11.22 DIABETES MELLITUS WITH STAGE 3 CHRONIC KIDNEY DISEASE (H): ICD-10-CM

## 2020-05-21 LAB
ANION GAP SERPL CALCULATED.3IONS-SCNC: 9 MMOL/L (ref 3–14)
BUN SERPL-MCNC: 25 MG/DL (ref 7–30)
CALCIUM SERPL-MCNC: 9.5 MG/DL (ref 8.5–10.1)
CHLORIDE SERPL-SCNC: 107 MMOL/L (ref 94–109)
CO2 SERPL-SCNC: 23 MMOL/L (ref 20–32)
CREAT SERPL-MCNC: 0.93 MG/DL (ref 0.52–1.04)
GFR SERPL CREATININE-BSD FRML MDRD: 58 ML/MIN/{1.73_M2}
GLUCOSE SERPL-MCNC: 135 MG/DL (ref 70–99)
HBA1C MFR BLD: 5.8 % (ref 0–5.6)
POTASSIUM SERPL-SCNC: 3.8 MMOL/L (ref 3.4–5.3)
SODIUM SERPL-SCNC: 139 MMOL/L (ref 133–144)

## 2020-05-21 PROCEDURE — 36415 COLL VENOUS BLD VENIPUNCTURE: CPT | Performed by: FAMILY MEDICINE

## 2020-05-21 PROCEDURE — 80048 BASIC METABOLIC PNL TOTAL CA: CPT | Performed by: FAMILY MEDICINE

## 2020-05-21 PROCEDURE — 99214 OFFICE O/P EST MOD 30 MIN: CPT | Performed by: FAMILY MEDICINE

## 2020-05-21 PROCEDURE — 83036 HEMOGLOBIN GLYCOSYLATED A1C: CPT | Performed by: FAMILY MEDICINE

## 2020-05-21 NOTE — PROGRESS NOTES
Subjective     Maira Leon is a 80 year old female who presents to clinic today for the following health issues:    Neo going up to her left hip is hurting more.  Mostly when she rolls over on to it at night.  Can walk [place] without significant pain.    But just wanted to mention.  Did see Sports med for this - and was told the neo was in good position.  Pain not significantly worse now.    HPI   Diabetes Follow-up      How often are you checking your blood sugar? Twice a day    What concerns do you have today about your diabetes? None     Do you have any of these symptoms? (Select all that apply)  In stages of getting neuropathy     Have you had a diabetic eye exam in the last 12 months?  She is due this month and will have it done with [facility]    Will be seeing Jane Killian soon.  Has not decreased her ozempic yet.    Blood sugars am fasting - 110-120's  Pm sugars - 100-120    BP Readings from Last 2 Encounters:   05/21/20 131/81   11/26/19 118/74     Hemoglobin A1C (%)   Date Value   05/21/2020 5.8 (H)   11/26/2019 5.9 (H)     LDL Cholesterol Calculated (mg/dL)   Date Value   11/26/2019 70   10/31/2018 78         Hyperlipidemia Follow-Up      Are you regularly taking any medication or supplement to lower your cholesterol?   Yes- Atorvastatin 40mg    Are you having muscle aches or other side effects that you think could be caused by your cholesterol lowering medication?  No    Chronic Kidney Disease Follow-up      Do you take any over the counter pain medicine?: No      How many servings of fruits and vegetables do you eat daily?  At least 4    On average, how many sweetened beverages do you drink each day (Examples: soda, juice, sweet tea, etc.  Do NOT count diet or artificially sweetened beverages)?   0    How many days per week do you miss taking your medication? 0          Patient Active Problem List   Diagnosis     Osteoarthritis     Hyperlipidemia LDL goal <100     CKD (chronic  kidney disease) stage 3, GFR 30-59 ml/min (H)     Advance Care Planning     Diabetes mellitus with stage 3 chronic kidney disease (H)     Primary osteoarthritis of left knee     Lumbar spinal stenosis     Osteopenia of multiple sites     Superior glenoid labrum lesion of left shoulder     Tear of left supraspinatus tendon     Arthritis of left acromioclavicular joint     Incomplete tear of left rotator cuff     Osteoarthritis of left hip, unspecified osteoarthritis type     Past Surgical History:   Procedure Laterality Date     APPENDECTOMY  age 4     ARTHROSCOPY KNEE Left 2016    Procedure: ARTHROSCOPY KNEE;  Surgeon: Ramin Ramirez MD;  Location: [facility] OR     BACK SURGERY  2017    Lumbar fusion     C TOTAL HIP ARTHROPLASTY Left 2017     CATARACT IOL, RT/LT       ENT SURGERY      Tonsillectomy     GENITOURINARY SURGERY      bladder repair/sling     OPTICAL TRACKING SYSTEM FUSION SPINE POSTERIOR LUMBAR TWO LEVELS N/A 2017    Procedure: OPTICAL TRACKING SYSTEM FUSION SPINE POSTERIOR LUMBAR TWO LEVELS;  Surgeon: Buddy Davies MD;  Location: [facility] OR     PHACOEMULSIFICATION CLEAR CORNEA WITH STANDARD INTRAOCULAR LENS IMPLANT Right      PHACOEMULSIFICATION CLEAR CORNEA WITH STANDARD INTRAOCULAR LENS IMPLANT Left 2017       Social History     Tobacco Use     Smoking status: Former Smoker     Last attempt to quit: 1981     Years since quittin.4     Smokeless tobacco: Never Used   Substance Use Topics     Alcohol use: Yes     Comment: 3-4 drink per week     Family History   Problem Relation Age of Onset     Osteoporosis Mother      Heart Disease Father      Cerebrovascular Disease Father      Musculoskeletal Disorder Father         gout     Genitourinary Problems Maternal Grandmother      Cancer Maternal Grandfather      Cancer Paternal Grandmother      Cerebrovascular Disease Paternal Grandfather      Eye Disorder Brother         macular degeneration     Macular Degeneration  "Brother      Muscular Dystrophy Brother      Diabetes No family hx of      Hypertension No family hx of            Reviewed and updated as needed this visit by Provider  Allergies  Meds  Problems         Review of Systems   Constitutional, HEENT, cardiovascular, pulmonary, gi and gu systems are negative, except as otherwise noted.      Objective    /81 (BP Location: Left arm, Patient Position: Sitting, Cuff Size: Adult Regular)   Pulse 86   Wt 76.2 kg (168 lb)   BMI 29.76 kg/m    Body mass index is 29.76 kg/m .  Physical Exam   GENERAL: healthy, alert and no distress  RESP: lungs clear to auscultation - no rales, rhonchi or wheezes  CV: regular rate and rhythm, normal S1 S2, no S3 or S4, no murmur, click or rub, no peripheral edema and peripheral pulses strong  PSYCH: mentation appears normal, affect normal/bright    Diagnostic Test Results:   Labs reviewed in Epic  Previous left hip xray reviewed.        Assessment & Plan     1. Diabetes mellitus with stage 3 chronic kidney disease (H)  Well controlled.  Advised further discussion with diabetes Ed about further titration down on medication.  - Hemoglobin A1c  - Basic metabolic panel  - Semaglutide,0.25 or 0.5MG/DOS, 2 MG/1.5ML SOPN; Inject 0.5 mg Subcutaneous every 7 days  Dispense: 1.5 mL; Refill: 3    2. Osteopenia of multiple sites  Discussed recent DEXA results - patient declines additional medication therapy at this time.    3. Osteoarthritis of left hip, unspecified osteoarthritis type  Discussed previous xray findings, as likely cause of aching pain.    Reassurance provided that she can still do all of the activities she likes.         BMI:   Estimated body mass index is 29.76 kg/m  as calculated from the following:    Height as of 5/29/19: 1.6 m (5' 3\").    Weight as of this encounter: 76.2 kg (168 lb).           Follow up with diabetes ed in next 1-2 months.  See Patient Instructions    Return in about 6 months (around 11/21/2020) for " Preventive Health Visit/Physical.    Michelle Barrios MD  [facility]

## 2020-05-26 RX ORDER — SEMAGLUTIDE 1.34 MG/ML
INJECTION, SOLUTION SUBCUTANEOUS
Qty: 1.5 ML | Refills: 2 | OUTPATIENT
Start: 2020-05-26

## 2020-06-01 ENCOUNTER — DOCUMENTATION ONLY (OUTPATIENT)
Dept: OTHER | Facility: CLINIC | Age: 80
End: 2020-06-01

## 2020-06-05 ENCOUNTER — ALLIED HEALTH/NURSE VISIT (OUTPATIENT)
Dept: EDUCATION SERVICES | Facility: CLINIC | Age: 80
End: 2020-06-05
Payer: COMMERCIAL

## 2020-06-05 DIAGNOSIS — E11.22 DIABETES MELLITUS WITH STAGE 3 CHRONIC KIDNEY DISEASE (H): Primary | ICD-10-CM

## 2020-06-05 DIAGNOSIS — N18.30 DIABETES MELLITUS WITH STAGE 3 CHRONIC KIDNEY DISEASE (H): Primary | ICD-10-CM

## 2020-06-05 PROCEDURE — G0108 DIAB MANAGE TRN  PER INDIV: HCPCS

## 2020-06-05 NOTE — Clinical Note
Hi Dr. Monet,   Spoke with Nadya today and we will wean off Ozempic. She is 1/2 way through her pen so will have her lower to 0.25 mg Ozempic subcutaneous once weekly for the next 4 weeks until gone.  If she sees blood glucose above target on the lower dose, she will call as we discussed ability to try more Metformin since eGFR>45.      We have a follow up visit for 7/13/20 to review blood glucose about 2 weeks after Ozempic completely stopped. Again, could try working Metformin up to 4 tablets a day and see if this helps keep blood glucose to target if rise seen from no Ozempic.    Thanks,  Jane Killian,  RDN, LD, [facility]

## 2020-06-05 NOTE — PROGRESS NOTES
"Diabetes Self-Management Education & Support    Patient verbally consented to the telephone visit service today: yes     Presents for: Follow-up    SUBJECTIVE/OBJECTIVE:  Presents for: Follow-up  Accompanied by: Self by phone  Diabetes education in the past 24mo: Yes  Focus of Visit: Taking Medication  Diabetes type: Type 2  Date of diagnosis: 2000  Disease course: Improving  How confident are you filling out medical forms by yourself:: Extremely  Diabetes management related comments/concerns: Says has BG to share.  Says weight has gone down about 5 lbs with her scale at home.    Says after 6/18, would need a new prescription of Ozempic-she is on her last pen.    Says has not tried 4 tablets Metformin    Transportation concerns: No  Difficulty affording diabetes medication?: No  Difficulty affording diabetes testing supplies?: No  Other concerns:: Glasses  Cultural Influences/Ethnic Background:  American    Diabetes Symptoms & Complications:  Fatigue: No  Neuropathy: No  Polydipsia: No  Polyphagia: No  Polyuria: No  Visual change: No  Slow healing wounds: No  Complications assessed today?: Yes  Autonomic neuropathy: Yes  CVA: No  Heart disease: No  Nephropathy: Yes  Peripheral neuropathy: Yes  Foot ulcerations: No  Peripheral Vascular Disease: No  Retinopathy: No  Sexual dysfunction: No    Patient Problem List and Family Medical History reviewed for relevant medical history, current medical status, and diabetes risk factors.    Vitals:  There were no vitals taken for this visit.  Estimated body mass index is 29.76 kg/m  as calculated from the following:    Height as of 5/29/19: 1.6 m (5' 3\").    Weight as of 5/21/20: 76.2 kg (168 lb).   Last 3 BP:   BP Readings from Last 3 Encounters:   05/21/20 131/81   11/26/19 118/74   05/29/19 104/74       History   Smoking Status     Former Smoker     Quit date: 1/1/1981   Smokeless Tobacco     Never Used       Labs:  Lab Results   Component Value Date    A1C 5.8 05/21/2020 "     Lab Results   Component Value Date     05/21/2020     Lab Results   Component Value Date    LDL 70 11/26/2019     HDL Cholesterol   Date Value Ref Range Status   11/26/2019 52 >49 mg/dL Final   ]  GFR Estimate   Date Value Ref Range Status   05/21/2020 58 (L) >60 mL/min/[1.73_m2] Final     Comment:     Non  GFR Calc  Starting 12/18/2018, serum creatinine based estimated GFR (eGFR) will be   calculated using the Chronic Kidney Disease Epidemiology Collaboration   (CKD-EPI) equation.       GFR Estimate If Black   Date Value Ref Range Status   05/21/2020 67 >60 mL/min/[1.73_m2] Final     Comment:      GFR Calc  Starting 12/18/2018, serum creatinine based estimated GFR (eGFR) will be   calculated using the Chronic Kidney Disease Epidemiology Collaboration   (CKD-EPI) equation.       Lab Results   Component Value Date    CR 0.93 05/21/2020     No results found for: MICROALBUMIN    Healthy Eating:  Healthy Eating Assessed Today: No  Cultural/Buddhism diet restrictions?: No  Meal planning/habits: Smaller portions  Meals include: Breakfast, Lunch, Dinner  Breakfast: 1/2 cup yogurt and 1/2 cup fruit soup (dried fruit rehydrated) and bran muffin OR egg and 1 slice toast with butter with sugar-free marmalade OR egg and 1 slice bread with ketchup OR none if plans to go out for Brunch and 2-3 cups coffee(Wakes: 6am. B: 7-8am:)  Lunch: none OR 3/4 cup black bean soup, swiss cheese and carrot sticks OR 1/2 Flatbread and water(L: 12-1pm: )  Dinner: country style pork ribs with Asian seasonings, broccoli and squash and 2 cookies or ice-cream with berries/fruit soup OR 1/2 Flatbread, veggies with water or wine (6oz).  If eats pasta tries to keep to 1/3 cup and if potatoes, 1/2 cup   Snacks: rare- sometimes crackers (rice crackers for appetizer) OR pretzels OR almonds/peanuts OR cheese stick/nuts/popcorn/pretzels if driving   Beverages: Water, Coffee, Alcohol(Wine 1 glass a day)  Has patient  met with a dietitian in the past?: No    Being Active:  Being Active Assessed Today: No  Exercise:: Yes  Days per week of moderate to strenuous exercise (like a brisk walk): 6(Biking 5 miles in 25 minutes on resistance at 7)  On average, minutes per day of exercise at this level: 20(Plans to build time to 1-2 hours a day.  Doing a bike and ride tour)  How intense was your typical exercise? : Moderate (like brisk walking)  Exercise Minutes per Week: 120  Barrier to exercise: None    Monitoring:  Monitoring Assessed Today: Yes  Did patient bring glucose meter to appointment? : Yes  Blood Glucose Meter: One Touch(Ultra 2)  Times checking blood sugar at home (number): Never    Blood glucose:   1/8: -, -, -/209  1/9: 141  1/16: 126, -, -/160  1/17: 127, -, -/148  1/26: 152, -, -/136  1/27: 124, -, -/148  2/10: 150  2/11: 150  2/12: 141  2/20: 131, -, -/134  2/21: 123, -, -/133  3/4: 129  3/9: 130/117  3/10: 130  3/21: 127, -, -/123  3/22: 105, -, -/102  3/23: 146, -, -/123  3/24: 128, -, -/123  4/3: 125, -, -/132  4/6: -, -, -, 89 (felt shaky)  4/7: 117-, -/111  4/8: 117, -, -/105  4/9: 119, -, -/162  4/10: 125, -, -/105  4/13: 92 (random - felt off)  5/18: 131, -, -/117  5/19: 124, -, -/111  5/20: 123  5/21: 126      Taking Medications:  Diabetes Medication(s)     Biguanides       metFORMIN (GLUCOPHAGE) 500 MG tablet    Take 1 tablet (500 mg) by mouth 2 times daily (with meals)    Incretin Mimetic Agents (GLP-1 Receptor Agonists)       Semaglutide,0.25 or 0.5MG/DOS, 2 MG/1.5ML SOPN    Inject 0.5 mg Subcutaneous every 7 days          Taking Medication Assessed Today: Yes  Problems taking diabetes medications regularly?: No  Diabetes medication side effects?: No    Problem Solving:  Problem Solving Assessed Today: Yes  Hypoglycemia Frequency: Rarely  Medical ID: No  Does patient have DKA prevention plan?: No  Does patient have severe weather/disaster plan for diabetes management?: No    Hypoglycemia symptoms  Confusion:  No  Dizziness or Light-Headedness: No  Headaches: No  Hunger: No  Mood changes: No  Nervousness/Anxiety: No  Sleepiness: No  Sweats: No  Feeling shaky: Yes    Hypoglycemia Complications  Blackouts: No  Hospitalization: No  Nocturnal hypoglycemia: No  Required assistance: No  Required glucagon injection: No  Seizures: No    Reducing Risks:  Reducing Risks Assessed Today: No  Has dilated eye exam at least once a year?: Yes  Sees dentist every 6 months?: Yes  Feet checked by healthcare provider in the last year?: No(Not routinely)    Healthy Coping:  Healthy Coping Assessed Today: Yes  Emotional response to diabetes: Ready to learn, Acceptance  Informal Support system:: Other  Stage of change: ACTION (Actively working towards change)  Patient Activation Measure Survey Score:  MARCIANO Score (Last Two) 3/8/2011 5/9/2013   MARCIANO Raw Score 48 45   Activation Score 80 73.1   MARCIANO Level 4 4       Diabetes knowledge and skills assessment:   Patient is knowledgeable in diabetes management concepts related to: Healthy Eating, Being Active, Monitoring, Taking Medication, Problem Solving, Reducing Risks and Healthy Coping    Patient needs further education on the following diabetes management concepts: Monitoring, Taking Medication and Problem Solving    Based on learning assessment above, most appropriate setting for further diabetes education would be: Group class or Individual setting.      INTERVENTIONS:    Education provided today on:  AADE Self-Care Behaviors:  Monitoring: purpose, log and interpret results, individual blood glucose targets and frequency of monitoring  Taking Medication: action of prescribed medication, side effects of prescribed medications and when to take medications  Problem Solving: low blood glucose - causes, signs/symptoms, treatment and prevention, carrying a carbohydrate source at all times and when to call health care provider    Opportunities for ongoing education and support in diabetes-self management  were discussed.    Pt verbalized understanding of concepts discussed and recommendations provided today.       Education Materials Provided:  No new materials provided today      ASSESSMENT:  Received message from PCP to look at reducing medications as A1C was at 5.8%.  Nadya is seeing 71% fasting but 100% bedtime blood glucose in target.  Noticed some symptoms of low blood glucose in April but ws above 80 mg/L both times she checked.  Denies symptoms of hypoglycemia during the day.    Nadya does not want to waster her medication and already took 0.5 mg Ozempic for the past 2 Thursdays.  Suggested we start by lowering Ozempic to 0.25 mg subcutaneous once weekly for the next 4 weeks to use up current pen and hold on a refill incase able to wean off Ozempic.  She is agreeable to this plan.      Asked if she has ever been on more Metformin (4 tablets daily) but she has not.  When weaning off Ozempic, the higher cost medication, could try increasing Metformin since eGFR is >45  (58 on 5/21/20) but would want to check renal function in 3 months to ensure tolerating ok.  She will reach out in 2 weeks if weaning Ozempic to 0.25 mg weekly causes blood glucose to rise above target more frequently otherwise, will follow up on 7/13/20 to review blood glucose and need for more Metformin.  Pt verbalized understanding of concepts discussed and recommendations provided.       Will hold on adjusting medication until see if Ozempic is needed or able to control blood glucose with Metformin alone.      Patient's most recent   Lab Results   Component Value Date    A1C 5.8 05/21/2020    is meeting goal of <7.0    PLAN  See Patient Instructions for co-developed, patient-stated behavior change goals.  AVS printed and provided to patient today. See Follow-Up section for recommended follow-up.    Jane Killian RDN, LD, [facility]   Time Spent: 51 minutes  Encounter Type: Individual TELEPHONE visit    Any diabetes medication dose changes were made  via the CDE Protocol and Collaborative Practice Agreement with the patient's referring provider. A copy of this encounter was shared with the provider.

## 2020-06-05 NOTE — PATIENT INSTRUCTIONS
1. Try the 0.25 mg dose of Ozempic subcutaneous once weekly for the next 4 weeks to use up your medication.  If blood glucose rises high in the morning or above 180 mg/dL after dinner, call us in 2 weeks.  We can try to build the Metformin dose.    2. If blood glucose looking good at lower Ozempic dose after 4 weeks, stop the Ozempic. You will now only be on Metformin.  If your blood glucose is rising higher in the morning or above 180 after dinner, call us after 1-2 weeks and we can add more Metformin.  No follow up is needed is blood glucose stay mostly below 130 mg/dL in the morning and  Below 180 mg/dL after dinner when reducing or when stopping Ozempic.    3. Try setting a phone alarm or other timer to go off after dinner.     4. Wait to order another box of Ozempic.  We can try more Metformin at meals if blood glucose go up with higher blood glucose.   Our goal it to try to get you off Ozempic and try more Metformin if needed.    FOLLOW UP: We will call you again on Monday, July 13th at 1pm    Jane Killian RDN, LD, [facility]   [phone]

## 2020-07-13 ENCOUNTER — PATIENT OUTREACH (OUTPATIENT)
Dept: EDUCATION SERVICES | Facility: CLINIC | Age: 80
End: 2020-07-13
Payer: COMMERCIAL

## 2020-07-13 NOTE — PATIENT INSTRUCTIONS
1. No Ozempic.  Will remove this from your medications.  Please continue to take 1 tablet (500 mg) of metformin with breakfast and 1 tablet (500 mg) with dinner.    2. Check blood glucose in the morning and evening 2x/day but every other day.      FOLLOW UP: Will call you around 11am on Wednesday, August 12th.    Jane Killian RDN, LD, [facility]   [phone]

## 2020-07-13 NOTE — PROGRESS NOTES
Diabetes Follow-up  Patient verbally consented to the telephone visit service today: yes    Subjective/Objective:    Maira Leon sent in blood glucose log for review. Last date of communication was: 20.    Patient concerns: Says last 0.25 injection was last Tuesday on . Was on 0.25 mg for 1 month. Still doing Metformin 2x/day. Wanted to know how often to check her blood glucose and if think ok to continue to stop Ozempic.      Diabetes is being managed with   Diabetes Medications   Diabetes Medication(s)     Biguanides       metFORMIN (GLUCOPHAGE) 500 MG tablet    Take 1 tablet (500 mg) by mouth 2 times daily (with meals)    Incretin Mimetic Agents (GLP-1 Receptor Agonists)       Semaglutide,0.25 or 0.5MG/DOS, 2 MG/1.5ML SOPN    Inject 0.5 mg Subcutaneous every 7 days          Blood glucose Lo/9: 122, -, -/90  7/10: 110, -, -/100  : 121,-, -/139  : 124, -, -/111  : 128    Assessment:    Fasting blood glucose: 100% in target.  After dinner glucose: 100% in target.    Average past 7 days: 115 mg/dL.  Says average past 14 days 121 mg/dL.      Plan/Response:  Continue to stop Ozempic and only take Metformin. Will discontinue it from her medication list since in the past week, 1 week after stopping Ozempic completely on 20, patient has seen 100% blood glucose in target.    Discussed checking blood glucose every other day since including a morning and evening blood glucose.  Will plan to keep closer watch this month but if blood glucose continue to be mostly in target, can then cut back on checking blood glucose to 2-3x/week. Pt verbalized understanding of concepts discussed and recommendations provided.       Follow up: 20    Jane Killian RDN, JESSICA, LAKISHA   Time Spent: 15 minutes  Individual TELEPHONE visit    Any diabetes medication dose changes were made via the CDE Protocol and Collaborative Practice Agreement with the patient's referring provider. A copy of this encounter  was shared with the provider.

## 2020-08-12 ENCOUNTER — NURSE TRIAGE (OUTPATIENT)
Dept: NURSING | Facility: CLINIC | Age: 80
End: 2020-08-12

## 2020-08-12 ENCOUNTER — PATIENT OUTREACH (OUTPATIENT)
Dept: EDUCATION SERVICES | Facility: CLINIC | Age: 80
End: 2020-08-12
Payer: COMMERCIAL

## 2020-08-12 DIAGNOSIS — N18.30 DIABETES MELLITUS WITH STAGE 3 CHRONIC KIDNEY DISEASE (H): ICD-10-CM

## 2020-08-12 DIAGNOSIS — E11.22 DIABETES MELLITUS WITH STAGE 3 CHRONIC KIDNEY DISEASE (H): ICD-10-CM

## 2020-08-12 PROCEDURE — 98968 PH1 ASSMT&MGMT NQHP 21-30: CPT

## 2020-08-12 NOTE — TELEPHONE ENCOUNTER
Pt requesting to have Covid test  [facility] and had contact with a lot of people  Un able to use on care on line  Transferred to  to see the DR  COVID 19 Nurse Triage Plan/Patient Instructions    Please be aware that novel coronavirus (COVID-19) may be circulating in the community. If you develop symptoms such as fever, cough, or SOB or if you have concerns about the presence of another infection including coronavirus (COVID-19), please contact your health care provider or visit [email or web address].     Disposition/Instructions    In-Person Visit with provider recommended. Reference Visit Selection Guide.    Thank you for taking steps to prevent the spread of this virus.  o Limit your contact with others.  o Wear a simple mask to cover your cough.  o Wash your hands well and often.    Resources    [facility]: About COVID-19: [email or web address]    CDC: What to Do If You're Sick: www.cdc.gov/coronavirus/2019-ncov/about/steps-when-sick.html    CDC: Ending Home Isolation: www.cdc.gov/coronavirus/2019-ncov/hcp/disposition-in-home-patients.html     CDC: Caring for Someone: www.cdc.gov/coronavirus/2019-ncov/if-you-are-sick/care-for-someone.html     [facility]: Interim Guidance for Hospital Discharge to Home: www.[email or web address].[email or web address]/coronavirus/hcp/hospdischarge.pdf    [facility] clinical trials (COVID-19 research studies): [email or web address]-clinical-trials     Below are the COVID-19 hotlines at the [facility] ([facility]). Interpreters are available.   o For health questions: Call 511-915-3945 or 1-447.556.7859 (7 a.m. to 7 p.m.)  o For questions about schools and childcare: Call 881-424-8845 or 1-458.167.8690 (7 a.m. to 7 p.m.)                     Reason for Disposition    [1] COVID-19 EXPOSURE (Close Contact) AND [2] within last 14 days BUT [3] NO symptoms    Additional Information    Negative: COVID-19 has been diagnosed by a healthcare  provider (HCP)    Negative: COVID-19 lab test positive    Negative: [1] Symptoms of COVID-19 (e.g., cough, fever, SOB, or others) AND [2] lives in an area with community spread    Negative: [1] Symptoms of COVID-19 (e.g., cough, fever, SOB, or others) AND [2] within 14 days of EXPOSURE (close contact) with diagnosed or suspected COVID-19 patient    Negative: [1] Symptoms of COVID-19 (e.g., cough, fever, SOB, or others) AND [2] within 14 days of travel from high-risk area for COVID-19 community spread (identified by CDC)    Negative: [1] Difficulty breathing (shortness of breath) occurs AND [2] onset > 14 days after COVID-19 EXPOSURE (Close Contact) AND [3] no community spread where patient lives    Negative: [1] Dry cough occurs AND [2] onset > 14 days after COVID-19 EXPOSURE AND [3] no community spread where patient lives    Negative: [1] Wet cough (i.e., white-yellow, yellow, green, or merced colored sputum) AND [2] onset > 14 days after COVID-19 EXPOSURE AND [3] no community spread where patient lives    Negative: [1] Common cold symptoms AND [2] onset > 14 days after COVID-19 EXPOSURE AND [3] no community spread where patient lives    Negative: [1] COVID-19 EXPOSURE (Close Contact) within last 14 days AND [2] needs COVID-19 lab test to return to work AND [3] NO symptoms    Negative: [1] COVID-19 EXPOSURE (Close Contact) within last 14 days AND [2] exposed person is a healthcare worker who was NOT using all recommended personal protective equipment (i.e., a respirator-N95 mask, eye protection, gloves, and gown) AND [3] NO symptoms    Protocols used: CORONAVIRUS (COVID-19) EXPOSURE-A- 5.16.20

## 2020-08-12 NOTE — PROGRESS NOTES
Diabetes Self-Management Education & Support  Patient verbally consented to the telephone visit service today: yes    Audio only visit done, as patient does not have access to audio-visual technology.    Individual visit provided, given no group classes are available for 2 months.     Presents for: Follow-up    SUBJECTIVE/OBJECTIVE:  Presents for: Follow-up  Accompanied by: Self  Diabetes education in the past 24mo: Yes  Focus of Visit: Taking Medication  Diabetes type: Type 2  Date of diagnosis: 2000  Disease course: Improving  How confident are you filling out medical forms by yourself:: Extremely  Diabetes management related comments/concerns: Says not sure if she has increased her activity.  Says when she is with friends, eating more. Says plans to check more consistently.    Says she worked the [employer] yesterday and wore a mask and kept her distance but wondering if she should be worried about getting Covid-19 or if she can be checked for it.  Suggested she call the clinic to discuss with RN about risk and recommendations.     Transportation concerns: No  Difficulty affording diabetes medication?: No  Difficulty affording diabetes testing supplies?: No  Other concerns:: Glasses  Cultural Influences/Ethnic Background:  American    Diabetes Symptoms & Complications:  Fatigue: No  Neuropathy: No  Polydipsia: No  Polyphagia: No  Polyuria: No  Visual change: No  Slow healing wounds: No  Complications assessed today?: Yes  Autonomic neuropathy: Yes  CVA: No  Heart disease: No  Nephropathy: Yes  Peripheral neuropathy: Yes  Foot ulcerations: No  Peripheral Vascular Disease: No  Retinopathy: No  Sexual dysfunction: No    Patient Problem List and Family Medical History reviewed for relevant medical history, current medical status, and diabetes risk factors.    Vitals:  There were no vitals taken for this visit.  Estimated body mass index is 29.76 kg/m  as calculated from the following:    Height as of 5/29/19: 1.6 m  "(5' 3\").    Weight as of 5/21/20: 76.2 kg (168 lb).   Last 3 BP:   BP Readings from Last 3 Encounters:   05/21/20 131/81   11/26/19 118/74   05/29/19 104/74       History   Smoking Status     Former Smoker     Quit date: 1/1/1981   Smokeless Tobacco     Never Used       Labs:  Lab Results   Component Value Date    A1C 5.8 05/21/2020     Lab Results   Component Value Date     05/21/2020     Lab Results   Component Value Date    LDL 70 11/26/2019     HDL Cholesterol   Date Value Ref Range Status   11/26/2019 52 >49 mg/dL Final   ]  GFR Estimate   Date Value Ref Range Status   05/21/2020 58 (L) >60 mL/min/[1.73_m2] Final     Comment:     Non  GFR Calc  Starting 12/18/2018, serum creatinine based estimated GFR (eGFR) will be   calculated using the Chronic Kidney Disease Epidemiology Collaboration   (CKD-EPI) equation.       GFR Estimate If Black   Date Value Ref Range Status   05/21/2020 67 >60 mL/min/[1.73_m2] Final     Comment:      GFR Calc  Starting 12/18/2018, serum creatinine based estimated GFR (eGFR) will be   calculated using the Chronic Kidney Disease Epidemiology Collaboration   (CKD-EPI) equation.       Lab Results   Component Value Date    CR 0.93 05/21/2020     No results found for: MICROALBUMIN    Healthy Eating:  Healthy Eating Assessed Today: Yes  Cultural/Methodist diet restrictions?: No  Meal planning/habits: Smaller portions  Meals include: Breakfast, Lunch, Dinner  Breakfast: 1/2 cup yogurt and 1/2 cup fruit soup (dried fruit rehydrated) and bran muffin OR sweet roll(Wakes: 6am. B: 7-8am:)  Lunch: carrots celery, with peanut butter and an apple(L: 12-1pm: )  Dinner: 2 pieces pizza & a gr salad with 2 glasses of wine OR ground turkey michael, salad, gr beans, one piece of Dove chocolate & 1 glass of wine OR pork chop, gr salad, beets, 2 small dov snaps and had one glass of wine  Snacks: rare- nuts or wine  Other: HS: none  Beverages: Water, Coffee, Alcohol(Wine " 1 glass a day)  Has patient met with a dietitian in the past?: No    Being Active:  Being Active Assessed Today: Yes  Exercise:: Yes  Days per week of moderate to strenuous exercise (like a brisk walk): 6(Biking 5 miles in 25 minutes on resistance at 7)  On average, minutes per day of exercise at this level: 20(Plans to build time to 1-2 hours a day.  Doing a bike and ride tour)  How intense was your typical exercise? : Moderate (like brisk walking)  Exercise Minutes per Week: 120  Barrier to exercise: None    Monitoring:  Monitoring Assessed Today: Yes  Did patient bring glucose meter to appointment? : Yes  Blood Glucose Meter: One Touch(Ultra 2)  Times checking blood sugar at home (number): 2  Times checking blood sugar at home (per): Day    Copying my message messed up layout---basically date / a.m. reading / 2 hrs after supper /day of the wk.   7/9 122 90 Th 8/2 140 124 Yoder   7/10 110 100 F 8/3 142 118 M   7/11 129 139 S 8/4 137 148 T   7/12   124 111 Yoder 8/5 143 140 W   7/13 128 125 M 8/6 111 100 Th   7/14 118 121 T 8/7 136 138 F    7/15 127 142 W 8/8 125 133 S   7/16 forgot 8/9 137   7/17 139 99 F   7/18 124 120 S   7/19 135 125 Yoder   7/20 139 111 M   7/21 118 173 T 2 hours after --- a pork chop, gr salad, beets, 2 small dov snaps - had one glass of wine (what I ate was not unusual)   7/22 145 173 W            I didn t finish the bone in pork chop---chewed on the rest of it---had a ground turkey michael, salad, gr beans, one piece of Dove chocolate--& 1 glass of wine   7/23 129 116 Th   7/24 135 130 F   7/25 140 135 S   7/26 152 203 Yoder   *In the morning I had a few sips of coffee before I tested---I didn t write what I had---jotted down a day later--did not have yogurt---1 lg juvenal roll----Lunch was carrots celery, with peanut butter and an apple---I think I had some peanuts with my before dinner wine---supper was 2 pieces pizza & a gr salad---I had 2 glasses of wine-- we had company (freaked out--almost called  when it was over 200)   7/27 132 130 M   7/28 131 124 T   7/29 117 214 W   7/30 149 144 Th   7/31 157 144 F After the 157 in the morning I checked at 11:15 a.m. before lunch it was 194-----2 hrs after dinner it was 157---I don t understand?   8/1 135 123 S The 1-4th of August I drove with my Niece &  to [place], [place] & home on that Wed---ate fairly normal---1 glass of wine 1 night     Taking Medications:  Diabetes Medication(s)     Biguanides       metFORMIN (GLUCOPHAGE) 500 MG tablet    Take 1 tablet (500 mg) by mouth 2 times daily (with meals)          Taking Medication Assessed Today: Yes  Current Treatments: Oral Medication (taken by mouth)  Problems taking diabetes medications regularly?: No  Diabetes medication side effects?: No    Problem Solving:  Problem Solving Assessed Today: Yes  Is the patient at risk for hypoglycemia?: No  Hypoglycemia Frequency: Rarely  Medical ID: No  Does patient have DKA prevention plan?: No  Does patient have severe weather/disaster plan for diabetes management?: No    Hypoglycemia symptoms  Confusion: No  Dizziness or Light-Headedness: No  Headaches: No  Hunger: No  Mood changes: No  Nervousness/Anxiety: No  Sleepiness: No  Sweats: No  Feeling shaky: Yes    Hypoglycemia Complications  Blackouts: No  Hospitalization: No  Nocturnal hypoglycemia: No  Required assistance: No  Required glucagon injection: No  Seizures: No    Reducing Risks:  Reducing Risks Assessed Today: No  Has dilated eye exam at least once a year?: Yes  Sees dentist every 6 months?: Yes  Feet checked by healthcare provider in the last year?: No(Not routinely)    Healthy Coping:  Healthy Coping Assessed Today: Yes  Emotional response to diabetes: Ready to learn, Acceptance  Informal Support system:: Other  Stage of change: ACTION (Actively working towards change)  Patient Activation Measure Survey Score:  MARCIANO Score (Last Two) 3/8/2011 5/9/2013   MARCIANO Raw Score 48 45   Activation Score 80 73.1   MARCIANO Level 4 4        Diabetes knowledge and skills assessment:   Patient is knowledgeable in diabetes management concepts related to: Healthy Eating, Being Active, Monitoring, Taking Medication, Problem Solving, Reducing Risks and Healthy Coping    Patient needs further education on the following diabetes management concepts: Healthy Eating and Taking Medication    Based on learning assessment above, most appropriate setting for further diabetes education would be: Group class or Individual setting.      INTERVENTIONS:    Education provided today on:  ANNAE Self-Care Behaviors:  Healthy Eating: carbohydrate counting, consistency in amount, composition, and timing of food intake, heart healthy diet and portion control  Being Active: relationship to blood glucose and describe appropriate activity program  Monitoring: purpose, log and interpret results, individual blood glucose targets and frequency of monitoring  Taking Medication: action of prescribed medication and when to take medications    Opportunities for ongoing education and support in diabetes-self management were discussed.    Pt verbalized understanding of concepts discussed and recommendations provided today.       Education Materials Provided:  No new materials provided today      ASSESSMENT:  In the past month, Nadya is seeing 42% fasting blood glucose in target and 93% post-dinner blood glucose in target.  She mentions needing to watch food more so discussed if she felt like eating more after dinner or if food contributing to rising fasting blood glucose but she says normally she does not eat before bed.  Her post-dinner blood glucose are often in goal so suspect blood glucose rising overnight from shirin phenomenon.  Nadya is open to trying 2 tablets (1000 mg) metformin at dinner and will continue 1 tablet at breakfast (500 mg) to see if this improves her fasting blood glucose.     Addressed her concerns about elevated blood glucose and suggested she check again to confirm  when seeing high blood glucose, especially if feeling ok, since sometimes meter error if not enough blood may give false high/low blood glucose.  Told her ok to be above 200 mg/dL but if >250 mg/dL for 2 checks, then to call clinic to discuss.  Otherwise, if notices something sweet or wine causing higher blood glucose, suggested trying to walk after dinner to see if this helps.  Pt verbalized understanding of concepts discussed and recommendations provided.           Patient's most recent   Lab Results   Component Value Date    A1C 5.8 05/21/2020    is meeting goal of <7.0    PLAN  See Patient Instructions for co-developed, patient-stated behavior change goals.  AVS sent by [employer] reply to patient today. See Follow-Up section for recommended follow-up.    Jane Killian RDN, JESSICA, LAKISHA   Time Spent: 21 minutes  Encounter Type: Individual TELEPHONE visit    Any diabetes medication dose changes were made via the CDE Protocol and Collaborative Practice Agreement with the patient's referring provider. A copy of this encounter was shared with the provider.

## 2020-08-12 NOTE — PROGRESS NOTES
"Maira Leon is a 80 year old female who is being evaluated via a billable telephone visit.    *CONSENT AND PRE-APPT INFO VERIFIED ON 08/12- OK TO CALL*- OSEAS  The patient has been notified of following:     \"This telephone visit will be conducted via a call between you and your physician/provider. We have found that certain health care needs can be provided without the need for a physical exam.  This service lets us provide the care you need with a short phone conversation.  If a prescription is necessary we can send it directly to your pharmacy.  If lab work is needed we can place an order for that and you can then stop by our lab to have the test done at a later time.    Telephone visits are billed at different rates depending on your insurance coverage. During this emergency period, for some insurers they may be billed the same as an in-person visit.  Please reach out to your insurance provider with any questions.    If during the course of the call the physician/provider feels a telephone visit is not appropriate, you will not be charged for this service.\"  scr  Patient has given verbal consent for Telephone visit?  Yes    What phone number would you like to be contacted at? [phone] or [phone]    How would you like to obtain your AVS? Sandy    Subjective     Maira Leon is a 80 year old female who presents via phone visit today for the following health issues:    HPI  Asymptomatic.  Pt requesting to have Covid test.   She was [facility] and had contact with over 200 people 8/11/20. She wants to be tested as soon as possible as she has a roommate who is battling cancer and she has been staying at other houses since Nadya's exposure to the crowd. She is unable to use on care online. She has been taking temperature and does not have a fever.    Oseas Melendez CMA      COVID 19 Nurse Triage Plan/Patient Instructions     Please be aware that novel coronavirus (COVID-19) may be " circulating in the community. If you develop symptoms such as fever, cough, or SOB or if you have concerns about the presence of another infection including coronavirus (COVID-19), please contact your health care provider or visit [email or web address].     Rochelle Melendez CMA    Patient Active Problem List   Diagnosis     Osteoarthritis     Hyperlipidemia LDL goal <100     CKD (chronic kidney disease) stage 3, GFR 30-59 ml/min (H)     Diabetes mellitus with stage 3 chronic kidney disease (H)     Primary osteoarthritis of left knee     Lumbar spinal stenosis     Osteopenia of multiple sites     Superior glenoid labrum lesion of left shoulder     Tear of left supraspinatus tendon     Arthritis of left acromioclavicular joint     Incomplete tear of left rotator cuff     Osteoarthritis of left hip, unspecified osteoarthritis type     Past Surgical History:   Procedure Laterality Date     APPENDECTOMY  age 4     ARTHROSCOPY KNEE Left 2016    Procedure: ARTHROSCOPY KNEE;  Surgeon: Ramin Ramirez MD;  Location:  OR     BACK SURGERY  2017    Lumbar fusion     C TOTAL HIP ARTHROPLASTY Left      CATARACT IOL, RT/LT       ENT SURGERY      Tonsillectomy     GENITOURINARY SURGERY      bladder repair/sling     OPTICAL TRACKING SYSTEM FUSION SPINE POSTERIOR LUMBAR TWO LEVELS N/A 2017    Procedure: OPTICAL TRACKING SYSTEM FUSION SPINE POSTERIOR LUMBAR TWO LEVELS;  Surgeon: Buddy Davies MD;  Location: [facility] OR     PHACOEMULSIFICATION CLEAR CORNEA WITH STANDARD INTRAOCULAR LENS IMPLANT Right      PHACOEMULSIFICATION CLEAR CORNEA WITH STANDARD INTRAOCULAR LENS IMPLANT Left 2017       Social History     Tobacco Use     Smoking status: Former Smoker     Last attempt to quit: 1981     Years since quittin.6     Smokeless tobacco: Never Used   Substance Use Topics     Alcohol use: Yes     Comment: 3-4 drink per week     Family History   Problem Relation Age of Onset     Osteoporosis Mother       Heart Disease Father      Cerebrovascular Disease Father      Musculoskeletal Disorder Father         gout     Genitourinary Problems Maternal Grandmother      Cancer Maternal Grandfather      Cancer Paternal Grandmother      Cerebrovascular Disease Paternal Grandfather      Eye Disorder Brother         macular degeneration     Macular Degeneration Brother      Muscular Dystrophy Brother      Diabetes No family hx of      Hypertension No family hx of          Current Outpatient Medications   Medication Sig Dispense Refill     aspirin 81 MG tablet Take 1 tablet (81 mg) by mouth daily 30 tablet 0     atorvastatin (LIPITOR) 40 MG tablet Take 1 tablet (40 mg) by mouth daily 90 tablet 3     blood glucose (ONETOUCH ULTRA) test strip USE TO TEST BLOOD SUGAR DAILY OR AS DIRECTED 100 each 1     blood glucose monitoring (NO BRAND SPECIFIED) meter device kit Use to test blood sugar daily or as directed. 1 kit 0     Calcium Carb-Cholecalciferol (CALCIUM 500 +D) 500-400 MG-UNIT TABS Take 1 tablet by mouth daily 100 tablet 3     fish oil-omega-3 fatty acids (FISH OIL) 1000 MG capsule Take 4 g by mouth daily.       insulin pen needle (B-D U/F) 31G X 5 MM miscellaneous USE TWICE DAILY WITH BYETTA 200 each 2     lisinopril (ZESTRIL) 2.5 MG tablet TAKE ONE TABLET BY MOUTH ONCE DAILY 90 tablet 1     metFORMIN (GLUCOPHAGE) 500 MG tablet Take 1 tablet (500 mg) by mouth at breakfast and 2 tablets (1000 mg) at dinner. 270 tablet 3     multivitamin (OCUVITE) TABS Take 1 tablet by mouth daily. 30 each 0     multivitamin, therapeutic with minerals (MULTI-VITAMIN) TABS tablet Take 1 tablet by mouth daily       ONETOUCH DELICA LANCETS 33G MISC USE TO TEST BLOOD SUGAR DAILY OR AS DIRECTED 100 each 3     ONETOUCH ULTRA test strip USE TO TEST BLOOD SUGARS DAILY OR AS DIRECTED 100 strip 3     polyethylene glycol (MIRALAX) powder Take 17 g (1 capful) by mouth 2 times daily as needed for constipation 510 g 0     Allergies   Allergen Reactions      No Known Drug Allergies        Reviewed and updated as needed this visit by Provider  Tobacco  Allergies  Meds  Problems  Med Hx  Surg Hx  Fam Hx         Review of Systems   Constitutional: Negative for chills, fatigue and fever.   HENT: Negative for congestion and sore throat.    Respiratory: Negative for cough and shortness of breath.    Cardiovascular: Negative for chest pain.   Gastrointestinal: Negative for abdominal pain.   Skin: Negative for rash.   Neurological: Negative for headaches.   Psychiatric/Behavioral: The patient is not nervous/anxious.              Objective          Vitals:  No vitals were obtained today due to virtual visit.    healthy, alert and no distress  PSYCH: Alert and oriented times 3; coherent speech, normal   rate and volume, able to articulate logical thoughts, able   to abstract reason, no tangential thoughts, no hallucinations   or delusions  Her affect is normal  RESP: No cough, no audible wheezing, able to talk in full sentences  Remainder of exam unable to be completed due to telephone visits    Diagnostic Test Results:  Labs reviewed in Epic  COVID-19 PCR pending         Assessment/Plan:    Assessment & Plan     1. Encounter for screening for other viral diseases  Patient was exposed to large crowd 2 days ago and lives with roommate who has malignancy and is undergoing treatment.  Additionally, patient has risk factors herself.  Will screen for COVID-19.  Discussed that screening may not show positive COVID-19 2 days from possible exposure.  Discussed the importance of self isolation and symptoms of COVID-19.  Recommended follow-up if patient has questions/concerns or develop symptoms.  - Asymptomatic COVID-19 Virus (Coronavirus) by PCR; Future       See Patient Instructions    Return in about 1 week (around 8/20/2020), or if symptoms worsen or fail to improve.    Kelli Daniels PA-C  [facility]    Phone call duration:  9 minutes

## 2020-08-13 ENCOUNTER — OFFICE VISIT (OUTPATIENT)
Dept: FAMILY MEDICINE | Facility: CLINIC | Age: 80
End: 2020-08-13
Payer: COMMERCIAL

## 2020-08-13 DIAGNOSIS — Z11.59 ENCOUNTER FOR SCREENING FOR OTHER VIRAL DISEASES: Primary | ICD-10-CM

## 2020-08-13 PROCEDURE — 99213 OFFICE O/P EST LOW 20 MIN: CPT | Mod: 95 | Performed by: PHYSICIAN ASSISTANT

## 2020-08-13 ASSESSMENT — ENCOUNTER SYMPTOMS
FATIGUE: 0
SHORTNESS OF BREATH: 0
HEADACHES: 0
SORE THROAT: 0
COUGH: 0
FEVER: 0
CHILLS: 0
NERVOUS/ANXIOUS: 0
ABDOMINAL PAIN: 0

## 2020-08-13 NOTE — PATIENT INSTRUCTIONS
Gray C[place],    We have ordered COVID19 testing for you. You will be called in the next 24 hours to schedule your appointment. Until you have test results back, or if you have symptoms, please self-isolate. If you develop symptoms including cough, sore throat, shortness of breath, chest tightness, or have questions or concerns, please reach out to clinic.    Take care,  Kelli Daniels PA-C

## 2020-08-15 DIAGNOSIS — Z11.59 ENCOUNTER FOR SCREENING FOR OTHER VIRAL DISEASES: ICD-10-CM

## 2020-08-15 PROCEDURE — U0003 INFECTIOUS AGENT DETECTION BY NUCLEIC ACID (DNA OR RNA); SEVERE ACUTE RESPIRATORY SYNDROME CORONAVIRUS 2 (SARS-COV-2) (CORONAVIRUS DISEASE [COVID-19]), AMPLIFIED PROBE TECHNIQUE, MAKING USE OF HIGH THROUGHPUT TECHNOLOGIES AS DESCRIBED BY CMS-2020-01-R: HCPCS | Performed by: PHYSICIAN ASSISTANT

## 2020-08-16 LAB
SARS-COV-2 RNA SPEC QL NAA+PROBE: NOT DETECTED
SPECIMEN SOURCE: NORMAL

## 2020-09-14 DIAGNOSIS — N18.30 DIABETES MELLITUS WITH STAGE 3 CHRONIC KIDNEY DISEASE (H): ICD-10-CM

## 2020-09-14 DIAGNOSIS — E11.22 DIABETES MELLITUS WITH STAGE 3 CHRONIC KIDNEY DISEASE (H): ICD-10-CM

## 2020-09-14 RX ORDER — BLOOD SUGAR DIAGNOSTIC
STRIP MISCELLANEOUS
Qty: 100 EACH | Refills: 3 | Status: SHIPPED | OUTPATIENT
Start: 2020-09-14 | End: 2020-09-22

## 2020-09-14 NOTE — TELEPHONE ENCOUNTER
"Requested Prescriptions   Pending Prescriptions Disp Refills     blood glucose (ONETOUCH ULTRA) test strip 100 each 3     Sig: Use to test blood sugar 2 times daily or as directed.       Diabetic Supplies Protocol Passed - 9/14/2020 11:11 AM        Passed - Medication is active on med list        Passed - Patient is 18 years of age or older        Passed - Recent (6 mo) or future (30 days) visit within the authorizing provider's specialty     Patient had office visit in the last 6 months or has a visit in the next 30 days with authorizing provider.  See \"Patient Info\" tab in inbasket, or \"Choose Columns\" in Meds & Orders section of the refill encounter.               Prescription approved per [facility] [place].    Belkys Foley, RN, BSN, PHN  [facility] [place]    "

## 2020-09-14 NOTE — TELEPHONE ENCOUNTER
Dr. Monet,    Per patient her dose for one touch test strips changed to testing twice daily. This change was made by the diabetic educator. Can [facility] [place] [place]  I have set this up below.    Rena Johnson PharmD, [facility]  Pharmacist Manager   [facility]  [phone]

## 2020-09-22 ENCOUNTER — OFFICE VISIT (OUTPATIENT)
Dept: FAMILY MEDICINE | Facility: CLINIC | Age: 80
End: 2020-09-22
Payer: COMMERCIAL

## 2020-09-22 ENCOUNTER — ANCILLARY PROCEDURE (OUTPATIENT)
Dept: GENERAL RADIOLOGY | Facility: CLINIC | Age: 80
End: 2020-09-22
Attending: FAMILY MEDICINE
Payer: COMMERCIAL

## 2020-09-22 VITALS
BODY MASS INDEX: 30.12 KG/M2 | HEART RATE: 128 BPM | SYSTOLIC BLOOD PRESSURE: 120 MMHG | OXYGEN SATURATION: 96 % | WEIGHT: 170 LBS | HEIGHT: 63 IN | TEMPERATURE: 99.4 F | RESPIRATION RATE: 18 BRPM | DIASTOLIC BLOOD PRESSURE: 74 MMHG

## 2020-09-22 DIAGNOSIS — M25.551 HIP PAIN, RIGHT: ICD-10-CM

## 2020-09-22 DIAGNOSIS — M54.50 ACUTE RIGHT-SIDED LOW BACK PAIN WITHOUT SCIATICA: ICD-10-CM

## 2020-09-22 DIAGNOSIS — M48.061 SPINAL STENOSIS OF LUMBAR REGION, UNSPECIFIED WHETHER NEUROGENIC CLAUDICATION PRESENT: ICD-10-CM

## 2020-09-22 DIAGNOSIS — Z23 NEED FOR PROPHYLACTIC VACCINATION AND INOCULATION AGAINST INFLUENZA: ICD-10-CM

## 2020-09-22 LAB
ALBUMIN UR-MCNC: NEGATIVE MG/DL
APPEARANCE UR: CLEAR
BACTERIA #/AREA URNS HPF: ABNORMAL /HPF
BILIRUB UR QL STRIP: NEGATIVE
COLOR UR AUTO: YELLOW
GLUCOSE UR STRIP-MCNC: 250 MG/DL
HGB UR QL STRIP: ABNORMAL
KETONES UR STRIP-MCNC: NEGATIVE MG/DL
LEUKOCYTE ESTERASE UR QL STRIP: ABNORMAL
NITRATE UR QL: POSITIVE
PH UR STRIP: 6 PH (ref 5–7)
RBC #/AREA URNS AUTO: ABNORMAL /HPF
SOURCE: ABNORMAL
SP GR UR STRIP: 1.02 (ref 1–1.03)
UROBILINOGEN UR STRIP-ACNC: 0.2 EU/DL (ref 0.2–1)
WBC #/AREA URNS AUTO: ABNORMAL /HPF

## 2020-09-22 PROCEDURE — 87086 URINE CULTURE/COLONY COUNT: CPT | Performed by: FAMILY MEDICINE

## 2020-09-22 PROCEDURE — G0008 ADMIN INFLUENZA VIRUS VAC: HCPCS | Performed by: FAMILY MEDICINE

## 2020-09-22 PROCEDURE — 73502 X-RAY EXAM HIP UNI 2-3 VIEWS: CPT

## 2020-09-22 PROCEDURE — 72100 X-RAY EXAM L-S SPINE 2/3 VWS: CPT

## 2020-09-22 PROCEDURE — 99214 OFFICE O/P EST MOD 30 MIN: CPT | Mod: 25 | Performed by: FAMILY MEDICINE

## 2020-09-22 PROCEDURE — 90662 IIV NO PRSV INCREASED AG IM: CPT | Performed by: FAMILY MEDICINE

## 2020-09-22 PROCEDURE — 81001 URINALYSIS AUTO W/SCOPE: CPT | Performed by: FAMILY MEDICINE

## 2020-09-22 RX ORDER — TRAMADOL HYDROCHLORIDE 50 MG/1
50 TABLET ORAL EVERY 6 HOURS PRN
Qty: 10 TABLET | Refills: 0 | Status: SHIPPED | OUTPATIENT
Start: 2020-09-22 | End: 2020-09-25

## 2020-09-22 ASSESSMENT — MIFFLIN-ST. JEOR: SCORE: 1210.24

## 2020-09-22 NOTE — PATIENT INSTRUCTIONS
Please schedule Physical Therapy  We have sent medicines to your pharmacy  Sincerely,  Violeta Frey MD

## 2020-09-22 NOTE — PROGRESS NOTES
"Subjective     Maira Leon is a 80 year old female who presents to clinic today for the following health issues:    HPI       Back Pain/Right Hip pain  Onset/Duration: 2 days  Description:   Location of pain: low back right  Character of pain: sharp  Pain radiation: radiates into the right buttocks and radiates into the right leg  New numbness or weakness in legs, not attributed to pain: no   Intensity: Currently 7/10  Progression of Symptoms: worsening  History:   Specific cause: none  Pain interferes with job: not applicable  History of back problems: previous back surgery  Any previous MRI or X-rays:   Sees a specialist for back pain:   Alleviating factors:   Improved by: nothing    Precipitating factors:  Worsened by: Bending, Standing, Sitting and Lying Flat  Therapies tried and outcome: NSAIDs    Accompanying Signs & Symptoms:  Risk of Fracture: None  Risk of Cauda Equina: None  Risk of Infection: None  Risk of Cancer: None  Risk of Ankylosing Spondylitis: Onset at age <35, male, AND morning back stiffness  no      Review of Systems   CONSTITUTIONAL: NEGATIVE for fever, chills, change in weight  GI: NEGATIVE for nausea, abdominal pain, heartburn, or change in bowel habits  : as above  MUSCULOSKELETAL: as above  NEURO: NEGATIVE for weakness, dizziness or paresthesias  PSYCHIATRIC: NEGATIVE for changes in mood or affect      Objective    /74   Pulse 128   Temp 99.4  F (37.4  C) (Oral)   Resp 18   Ht 1.6 m (5' 3\")   Wt 77.1 kg (170 lb)   LMP  (LMP Unknown)   SpO2 96%   Breastfeeding No   BMI 30.11 kg/m    Body mass index is 30.11 kg/m .  Physical Exam   GENERAL: healthy, alert and no distress  ABDOMEN: soft, nontender, no hepatosplenomegaly, no masses and bowel sounds normal  MS: no gross musculoskeletal defects noted, no edema  SKIN: no suspicious lesions or rashes  NEURO: Normal strength and tone, mentation intact and speech normal  Comprehensive back pain exam:  Tenderness of right " lower back, Pain limits the following motions: with any movement has pain Right Lower back, Lower extremity reflexes within normal limits bilaterally and Straight leg raise negative bilaterally  PSYCH: mentation appears normal    Results for orders placed or performed in visit on 09/22/20 (from the past 24 hour(s))   *UA reflex to Microscopic and Culture ([facility] (except [place] and [place])    Specimen: Midstream Urine   Result Value Ref Range    Color Urine Yellow     Appearance Urine Clear     Glucose Urine 250 (A) NEG^Negative mg/dL    Bilirubin Urine Negative NEG^Negative    Ketones Urine Negative NEG^Negative mg/dL    Specific Gravity Urine 1.025 1.003 - 1.035    Blood Urine Small (A) NEG^Negative    pH Urine 6.0 5.0 - 7.0 pH    Protein Albumin Urine Negative NEG^Negative mg/dL    Urobilinogen Urine 0.2 0.2 - 1.0 EU/dL    Nitrite Urine Positive (A) NEG^Negative    Leukocyte Esterase Urine Small (A) NEG^Negative    Source Midstream Urine    Urine Microscopic   Result Value Ref Range    WBC Urine 0 - 5 OTO5^0 - 5 /HPF    RBC Urine 2-5 (A) OTO2^O - 2 /HPF    Bacteria Urine Few (A) NEG^Negative /HPF           Assessment & Plan     Acute right-sided low back pain without sciatica  Musculoskeletal vs due ? Stone as some Blood in URINe  Await final labs  Consider CT scan  - *UA reflex to Microscopic and Culture ([facility] (except [place] and [place])  - XR Lumbar Spine 2/3 Views; Future  - [facility] PT, HAND, AND CHIROPRACTIC REFERRAL; Future  - traMADol (ULTRAM) 50 MG tablet; Take 1 tablet (50 mg) by mouth every 6 hours as needed for severe pain  - Urine Microscopic  - Urine Culture Aerobic Bacterial  Pt has History of Lumbar spinal stenosis  This seems More Like musculoskeletal pain  Hip pain, right    - XR Pelvis and Hip Right 1 View; Future  - [facility] PT, HAND, AND CHIROPRACTIC REFERRAL; Future  - traMADol (ULTRAM) 50 MG tablet; Take 1 tablet (50 mg) by mouth every 6 hours as  needed for severe pain  - Urine Culture Aerobic Bacterial    Need for prophylactic vaccination and inoculation against influenza  Advised   - FLUZONE HIGH DOSE 65+  [75571]  - ADMIN INFLUENZA (For MEDICARE Patients ONLY) []           Return in about 2 weeks (around 10/6/2020) for recheck.    Violeta Frey MD  [facility]

## 2020-09-23 ENCOUNTER — PATIENT OUTREACH (OUTPATIENT)
Dept: NUTRITION | Facility: CLINIC | Age: 80
End: 2020-09-23
Payer: COMMERCIAL

## 2020-09-23 DIAGNOSIS — E11.22 DIABETES MELLITUS WITH STAGE 3 CHRONIC KIDNEY DISEASE (H): Primary | ICD-10-CM

## 2020-09-23 DIAGNOSIS — N18.30 DIABETES MELLITUS WITH STAGE 3 CHRONIC KIDNEY DISEASE (H): Primary | ICD-10-CM

## 2020-09-23 PROCEDURE — 97803 MED NUTRITION INDIV SUBSEQ: CPT | Mod: 95

## 2020-09-23 NOTE — PATIENT INSTRUCTIONS
1. Looks like seeing higher fasting blood glucose after a late dinner or higher fat dinner or one with something sweet.     2. Pain may be causing recent high blood glucose. Let me know if blood glucose not improve again as your back feels better.    3. See if you see a higher morning blood glucose the days after you are less active. Activity helps with better blood glucose utilization.    4. Ok to reduce checking blood glucose to 1 time a day (4-7 times a week), but vary times between your morning (fasting) and after-dinner blood glucose.    5. Ensure some higher fiber carbohydrates (fruit, soup or potatoes) with dinner and see if this helps.  Low carbohydrate dinner may also cause liver to make more sugar overnight.     FOLLOW UP: Call if your blood glucose starts to rise above 140 mg/dL in the morning more often or if you start to see blood glucose >180 mg/dL after dinner.    Will wait for next A1C to better determine if more Metformin may be needed or blood glucose control looking good on the 3 tablets daily.     Jane Killian,  WALI, LD, [facility]   [phone]

## 2020-09-23 NOTE — PROGRESS NOTES
Medical Nutrition Therapy for Diabetes  Patient verbally consented to the telephone visit service today: yes    Audio only visit done, as patient does not have access to audio-visual technology.    Visit Type:Reassessment and intervention    Maira Leon presents today for MNT and education related to type 2 diabetes and weight management.   She is accompanied by self.     ASSESSMENT:   Patient comments/concerns relating to nutrition: Says she hurt her back and is now on opioids. Says sent [employer] message with BG 2 days ago but nothing was received and not find attached to an older message.  She will try to send through email.  Says saw higher blood glucose after social events.  Says wine is only right before or with dinner. Usually not drink it after dinner.     Back pain started on Sunday. Dinner is between 7-8pm and then after she checks after 2 hours.    Wanted to know if wine is ok or seems to affect her blood glucose.    NUTRITION HISTORY:    Breakfast: yogurt with grapes and some granola   Lunch: Apple and protein bar with nuts OR tuna on 1 slice toast and fruit and veggies (celery, carrots and cucumber)  PM: pretzel rods OR crackers   Dinner: veggies, meat and low carbohydrate OR 1/2 chicken breast, greens beans/spinach and slice zucchini bread OR turkey, onion and peppers OR black bean soup, chicken and green beans  Snacks: none  Beverages: Alcohol 0-2 servings/day, Coffee 2-3 cups/day, Iced-tea 0-1 drink and ater all day    Misses meals? Sometimes lunch  Eats out:  2 meals/per week     Previous diet education:  Yes     Food allergies/intolerances: none noted    Diet is high in: sodium at some sittings  Diet is low in: calcium and carbs at some dinners    EXERCISE: 4227-1311 steps a day.  Golfs 1 time a week and walking with friends    SOCIO/ECONOMIC:   Lives with: self    BLOOD GLUCOSE MONITORING:  Patient glucose self monitoring as follows: 2 times daily.   BG meter: One Touch Ultra 2 meter  Food  and BG results:      9/22: After dinner: 121.  Forgot her morning pills   9/23: 148    BG values are: In goal after dinner but partially in goal in the morning.  Patient's most recent   Lab Results   Component Value Date    A1C 5.8 05/21/2020    is meeting goal of <7.0    MEDICATIONS:  Current Outpatient Medications   Medication     aspirin 81 MG tablet     atorvastatin (LIPITOR) 40 MG tablet     blood glucose monitoring (NO BRAND SPECIFIED) meter device kit     Calcium Carb-Cholecalciferol (CALCIUM 500 +D) 500-400 MG-UNIT TABS     fish oil-omega-3 fatty acids (FISH OIL) 1000 MG capsule     insulin pen needle (B-D U/F) 31G X 5 MM miscellaneous     lisinopril (ZESTRIL) 2.5 MG tablet     metFORMIN (GLUCOPHAGE) 500 MG tablet     multivitamin (OCUVITE) TABS     multivitamin, therapeutic with minerals (MULTI-VITAMIN) TABS tablet     ONETOUCH DELICA LANCETS 33G MISC     ONETOUCH ULTRA test strip     polyethylene glycol (MIRALAX) powder     traMADol (ULTRAM) 50 MG tablet     No current facility-administered medications for this visit.        LABS:  Lab Results   Component Value Date    A1C 5.8 05/21/2020     Lab Results   Component Value Date     05/21/2020     Lab Results   Component Value Date    LDL 70 11/26/2019     HDL Cholesterol   Date Value Ref Range Status   11/26/2019 52 >49 mg/dL Final   ]  GFR Estimate   Date Value Ref Range Status   05/21/2020 58 (L) >60 mL/min/[1.73_m2] Final     Comment:     Non  GFR Calc  Starting 12/18/2018, serum creatinine based estimated GFR (eGFR) will be   calculated using the Chronic Kidney Disease Epidemiology Collaboration   (CKD-EPI) equation.       GFR Estimate If Black   Date Value Ref Range Status   05/21/2020 67 >60 mL/min/[1.73_m2] Final     Comment:      GFR Calc  Starting 12/18/2018, serum creatinine based estimated GFR (eGFR) will be   calculated using the Chronic Kidney Disease Epidemiology Collaboration   (CKD-EPI) equation.    "    Lab Results   Component Value Date    CR 0.93 05/21/2020     No results found for: MICROALBUMIN    ANTHROPOMETRICS:  Vitals: LMP  (LMP Unknown)   Estimated body mass index is 30.11 kg/m  as calculated from the following:    Height as of 9/22/20: 1.6 m (5' 3\").    Weight as of 9/22/20: 77.1 kg (170 lb).      Wt Readings from Last 5 Encounters:   09/22/20 77.1 kg (170 lb)   05/21/20 76.2 kg (168 lb)   02/21/20 79.4 kg (175 lb)   11/26/19 79.4 kg (175 lb)   05/29/19 78.8 kg (173 lb 12.8 oz)       Weight Change: Gained 2 lbs in the past 4 months.    ESTIMATED KCAL REQUIREMENTS:  1460 kcal per day (based on last clinic weight on 9/22/20)    NUTRITION DIAGNOSIS: Impaired nutrient utilization related to diabetes as evidenced by elevated fasting blood glucose on some mornings.    NUTRITION INTERVENTION:  Nutrition Prescription: Carbohydrate Intake: 30-45 grams/meal and 15-30 grams/snacks  Education given to support: general nutrition guidelines, weight reduction, consistent meals, carb counting, exercise, fiber, behavior modification, portion control and heart healthy diet  Motivational Interviewing    Discussion: Reviewed patient's food and blood glucose record.  Discussed trends that may be cuasing elevated blood glucose in the morning such as eating a later dinner, eating higher fat foods (pizza) or when at something sweet at night (cake/dessert). It does not appear that wine is raising her fasting blood glucose as itis normally in target the following morning.  Still encouraged wine in moderation for weight loss and since it may react with Metformin.     Nadya was also aware and not surprised when fasting blood glucose elveated day after missing a metformin.  She will try to look at patterns with activity and carbohydrate intake at dinner to see if this plays a role.  She sometimes has a low-carbohydrate dinner, which may contribute to rise in blood glucose overnight if liver making extra glucose to help with energy.  " Pt verbalized understanding of concepts discussed and recommendations provided.       Did not recommend changes to Metformin today as she is seeing 58% fasting and 97% post-dinner blood glucose in target the past 1.5 months (77% in target between the two). Will wait for A1C for better feedback on overall control before increasing Metformin to 1000 mg BID breakfast and dinner. Suspect back pain causing high blood glucose recently and reviewed with Nadya that both pain and stress may raise her blood glucose. She is to reach out if blood glucose not improve when back feels better. She asked about reducing blood glucose checks and since doing really well after dinner, told Nadya ok to reduce checking to 3-7 times a week but ensure varies times to include fasting and post-dinner. Pt verbalized understanding of concepts discussed and recommendations provided.       PATIENT'S BEHAVIOR CHANGE GOALS:   See Patient Instructions for patient stated behavior change goals. AVS was printed and given to patient at today's appointment.    MONITOR / EVALUATE:  RD will monitor/evaluate:  Blood Glucose / A1c  Food / Beverage / Nutrient intake   Weight change    FOLLOW-UP:  Follow up with RD as needed.  Call RD with questions/concerns.  Patient feels she has the education she needs and will wait for A1C to determine overall control and if any more Metformin is needed.     Jane Killian RDN, LD, [facility]   Time spent in minutes: 41 minutes  Encounter: Individual telephone visit

## 2020-09-25 ENCOUNTER — THERAPY VISIT (OUTPATIENT)
Dept: PHYSICAL THERAPY | Facility: CLINIC | Age: 80
End: 2020-09-25
Attending: FAMILY MEDICINE
Payer: COMMERCIAL

## 2020-09-25 DIAGNOSIS — M54.50 ACUTE RIGHT-SIDED LOW BACK PAIN WITHOUT SCIATICA: Primary | ICD-10-CM

## 2020-09-25 DIAGNOSIS — M25.551 HIP PAIN, RIGHT: ICD-10-CM

## 2020-09-25 PROCEDURE — 97140 MANUAL THERAPY 1/> REGIONS: CPT | Mod: GP | Performed by: PHYSICAL THERAPIST

## 2020-09-25 PROCEDURE — 97163 PT EVAL HIGH COMPLEX 45 MIN: CPT | Mod: GP | Performed by: PHYSICAL THERAPIST

## 2020-09-25 NOTE — PROGRESS NOTES
[facility] Initial Evaluation  Subjective:  The history is provided by the patient. No  was used.   Patient Health History  Maira Leon being seen for LBP.     Problem began: 9/21/2020.   Problem occurred: golfed 18 holes.  The next morning, could hardly move to get out of bed and then had to drive 3.5 hours to get home     General health as reported by patient is good.  Pertinent medical history includes: history of fractures, implanted device, incontinence, kidney disease, diabetes, numbness/tingling and other (osteopenia).   Red flags:  None as reported by patient.  Medical allergies: none.   Surgeries include:  Orthopedic surgery (Left Hip ORIF 2017, L3-5 fusion 2017).    Current medications:  Bone density, pain medication and other (diabetes and kidney meds).    Current occupation is Retired.                     Therapist Generated HPI Evaluation         Type of problem:  Lumbar.    This is a recurrent condition.  Condition occurred with:  Twisting.    Patient reports pain:  Lumbar spine right.    Pain radiates to:  Thigh right.     Associated symptoms:  Loss of strength. Symptoms are exacerbated by bending, walking and other (putting on socks/shoes; changing positions in bed)  Relieved by: positional changes.          Patient's Goals:  To not have to take pain pills                    Objective:  Standing Alignment:    Cervical/Thoracic:  Forward head and thoracic kyphosis increased                    Flexibility/Screens:   Negative screens: Hip             Gait: ambulates with forward flexed trunk and decreased stride       Lumbar/SI Evaluation  ROM:    AROM Lumbar:   Flexion:        Mild loss (right LBP)  Ext:                    Unable to achieve neutral (LBP)   Side Bend:        Left:     Right:   Rotation:           Left:     Right:   Side Glide:        Left:     Right:           Lumbar Myotomes:    T12-L3 (Hip Flex):  Left: 5    Right: 4  L2-4 (Quads):   Left:  5    Right:  5  L4 (Ankle DF):  Left:  5    Right:  5  L5 (Great Toe Ext): Left: 5    Right: 5     Lumbar DTR's:  not assessed          Neural Tension/Mobility:      Left side:SLR  negative.     Lumbar Palpation:  Palpation (lumbar): acute tenderness right lower lumbar paraspinals, QL, and glute med.                                                            General     ROS    Assessment/Plan:    Patient is an 80 year old female with low back complaints.    Patient has the following significant findings with corresponding treatment plan.                Diagnosis 1:  LBP   Pain -  manual therapy, self management, education and home program  Decreased ROM/flexibility - therapeutic exercise  Decreased strength - therapeutic exercise  Impaired gait - gait training and home program  Decreased function - therapeutic activities and home program  Impaired posture - neuro re-education    Therapy Evaluation Codes:   1) History comprised of:   Personal factors that impact the plan of care:      Age and Past/current experiences.    Comorbidity factors that impact the plan of care are:      Diabetes, Osteoarthritis, Overweight and Osteopenia, lumbar fusion, kidney disease.     Medications impacting care: Bone density, Pain and kidney meds, diabetes meds.  2) Examination of Body Systems comprised of:   Body structures and functions that impact the plan of care:      Lumbar spine.   Activity limitations that impact the plan of care are:      Bending, Dressing and Walking.  3) Clinical presentation characteristics are:   Unstable/Unpredictable.  4) Decision-Making    High complexity using standardized patient assessment instrument and/or measureable assessment of functional outcome.  Cumulative Therapy Evaluation is: High complexity.    Previous and current functional limitations:  (See Goal Flow Sheet for this information)    Short term and Long term goals: (See Goal Flow Sheet for this information)     Communication  ability:  Patient appears to be able to clearly communicate and understand verbal and written communication and follow directions correctly.  Treatment Explanation - The following has been discussed with the patient:    RX ordered/plan of care  Anticipated outcomes  Possible risks and side effects  This patient would benefit from PT intervention to resume normal activities.   Rehab potential is fair.    Frequency:  1 X week, once daily  Duration:  for 6 weeks  Discharge Plan:  Achieve all LTG.  Independent in home treatment program.  Reach maximal therapeutic benefit.    Please refer to the daily flowsheet for treatment today, total treatment time and time spent performing 1:1 timed codes.

## 2020-09-26 LAB
BACTERIA SPEC CULT: NORMAL
SPECIMEN SOURCE: NORMAL

## 2020-09-29 ENCOUNTER — DOCUMENTATION ONLY (OUTPATIENT)
Dept: LAB | Facility: CLINIC | Age: 80
End: 2020-09-29

## 2020-09-29 DIAGNOSIS — R82.90 NONSPECIFIC FINDING ON EXAMINATION OF URINE: Primary | ICD-10-CM

## 2020-09-29 DIAGNOSIS — M54.50 ACUTE RIGHT-SIDED LOW BACK PAIN WITHOUT SCIATICA: Primary | ICD-10-CM

## 2020-09-29 DIAGNOSIS — M25.551 HIP PAIN, RIGHT: ICD-10-CM

## 2020-09-29 DIAGNOSIS — R82.90 ABNORMAL URINE: ICD-10-CM

## 2020-09-29 PROCEDURE — 87186 SC STD MICRODIL/AGAR DIL: CPT | Performed by: FAMILY MEDICINE

## 2020-09-29 PROCEDURE — 87086 URINE CULTURE/COLONY COUNT: CPT | Performed by: FAMILY MEDICINE

## 2020-09-29 PROCEDURE — 87088 URINE BACTERIA CULTURE: CPT | Performed by: FAMILY MEDICINE

## 2020-09-29 NOTE — PROGRESS NOTES
Patient is returning for recollection of urine. Please review, associate diagnosis and sign laboratory orders for patient upcoming appointment on 9/29/2020. Thank you.  [facility]

## 2020-10-02 ENCOUNTER — TELEPHONE (OUTPATIENT)
Dept: FAMILY MEDICINE | Facility: CLINIC | Age: 80
End: 2020-10-02

## 2020-10-02 ENCOUNTER — THERAPY VISIT (OUTPATIENT)
Dept: PHYSICAL THERAPY | Facility: CLINIC | Age: 80
End: 2020-10-02
Payer: COMMERCIAL

## 2020-10-02 DIAGNOSIS — M54.50 ACUTE RIGHT-SIDED LOW BACK PAIN WITHOUT SCIATICA: ICD-10-CM

## 2020-10-02 DIAGNOSIS — N30.00 ACUTE CYSTITIS WITHOUT HEMATURIA: Primary | ICD-10-CM

## 2020-10-02 LAB
BACTERIA SPEC CULT: ABNORMAL
BACTERIA SPEC CULT: ABNORMAL
Lab: ABNORMAL
SPECIMEN SOURCE: ABNORMAL

## 2020-10-02 PROCEDURE — 97110 THERAPEUTIC EXERCISES: CPT | Mod: GP | Performed by: PHYSICAL THERAPIST

## 2020-10-02 PROCEDURE — 97140 MANUAL THERAPY 1/> REGIONS: CPT | Mod: GP | Performed by: PHYSICAL THERAPIST

## 2020-10-02 RX ORDER — SULFAMETHOXAZOLE/TRIMETHOPRIM 800-160 MG
1 TABLET ORAL 2 TIMES DAILY
Qty: 14 TABLET | Refills: 0 | Status: SHIPPED | OUTPATIENT
Start: 2020-10-02 | End: 2020-10-09

## 2020-10-02 NOTE — TELEPHONE ENCOUNTER
Called patient. LVM to call RN hotline ([phone]).      Violeta Frey MD  [facility] Rn Triage Pool         Call pt   ? Bladder infection-Though could be contaminated   With her symptoms recommend septra ds x 7 days   Drink lots of fluids   Follow up if not better

## 2020-10-06 ENCOUNTER — OFFICE VISIT (OUTPATIENT)
Dept: OPHTHALMOLOGY | Facility: CLINIC | Age: 80
End: 2020-10-06
Payer: COMMERCIAL

## 2020-10-06 ENCOUNTER — THERAPY VISIT (OUTPATIENT)
Dept: PHYSICAL THERAPY | Facility: CLINIC | Age: 80
End: 2020-10-06
Payer: COMMERCIAL

## 2020-10-06 DIAGNOSIS — E11.9 TYPE 2 DIABETES MELLITUS WITHOUT OPHTHALMIC MANIFESTATIONS (H): Primary | ICD-10-CM

## 2020-10-06 DIAGNOSIS — H02.834 DERMATOCHALASIS OF BOTH UPPER EYELIDS: ICD-10-CM

## 2020-10-06 DIAGNOSIS — M54.50 ACUTE RIGHT-SIDED LOW BACK PAIN WITHOUT SCIATICA: ICD-10-CM

## 2020-10-06 DIAGNOSIS — H02.831 DERMATOCHALASIS OF BOTH UPPER EYELIDS: ICD-10-CM

## 2020-10-06 DIAGNOSIS — Z96.1 PSEUDOPHAKIA, BOTH EYES: ICD-10-CM

## 2020-10-06 PROCEDURE — 97140 MANUAL THERAPY 1/> REGIONS: CPT | Mod: GP | Performed by: PHYSICAL THERAPIST

## 2020-10-06 PROCEDURE — 97110 THERAPEUTIC EXERCISES: CPT | Mod: GP | Performed by: PHYSICAL THERAPIST

## 2020-10-06 PROCEDURE — 92014 COMPRE OPH EXAM EST PT 1/>: CPT | Performed by: OPHTHALMOLOGY

## 2020-10-06 ASSESSMENT — TONOMETRY
OS_IOP_MMHG: 15
OD_IOP_MMHG: 13
IOP_METHOD: TONOPEN

## 2020-10-06 ASSESSMENT — REFRACTION_WEARINGRX
OD_AXIS: 180
OD_SPHERE: -1.00
OS_CYLINDER: +1.50
OS_SPHERE: -1.50
OD_CYLINDER: +2.50
OS_AXIS: 180
SPECS_TYPE: SVL

## 2020-10-06 ASSESSMENT — EXTERNAL EXAM - LEFT EYE: OS_EXAM: MILD DERMATOCHALASIS

## 2020-10-06 ASSESSMENT — CONF VISUAL FIELD
OS_NORMAL: 1
OD_NORMAL: 1
METHOD: COUNTING FINGERS

## 2020-10-06 ASSESSMENT — VISUAL ACUITY
OS_CC+: -2
METHOD: SNELLEN - LINEAR
OD_CC+: -1
OS_SC: 20/40 SLOW
OD_SC+: -1
OD_SC: 20/50 SLOW
OS_CC: 20/20
OD_CC: 20/25
CORRECTION_TYPE: GLASSES

## 2020-10-06 ASSESSMENT — CUP TO DISC RATIO
OS_RATIO: 0.2
OD_RATIO: 0.25

## 2020-10-06 NOTE — TELEPHONE ENCOUNTER
Called patient and notified her of result message. She verbalized understanding, will  and take Rx and f/u if not improving.

## 2020-10-06 NOTE — PROGRESS NOTES
HPI  Maira Leon is a 80 year old here for diabetic eye exam.  She feels her lids drooping when she reads, but no major fluctuation.   Diabetes has been well controlled, reports a hemoglobin a1c as 5.8%.    No new floaters.  Wears mostly over the counter readers and occasional distance glasses.      PMH: diabetes mellitus 2, hypertension, hyperlipidemia   POH: Glasses for myopia/presbyopia, cataract extraction posterior chamber intraocular lens (PCIOL) both eyes 2014, 2017,   Oc Meds: ocuvite  FH: Denies any glaucoma, brother with age related macular degeneration, no other known eye diseases         Assessment & Plan      (E11.9) Type 2 diabetes mellitus without ophthalmic manifestations (H) - Both Eyes  (primary encounter diagnosis)  Comment: Diabetes Mellitus 2 w/o retinopathy   Great A1C   Plan:   Discussed the importance of tight blood glucose, blood pressure, and cholesterol  control in the prevention of diabetic retinopathy. Recommend yearly dilated eye exam.    (Z96.1) Pseudophakia, both eyes - Both Eyes  Comment: no significant posterior capsular opacity (PCO)   Plan: follow    (H02.831,  H02.834) Dermatochalasis of both upper eyelids - Both Eyes  Comment: stable, +small involutional ptosis, not wanting referral  Plan: follow, oculoplastics when interested    -----------------------------------------------------------------------------------       Patient disposition:   Return in about 1 year (around 10/6/2021) for Comprehensive Exam. and patient to call sooner as needed.      Complete documentation of historical and exam elements from today's encounter can be found in the full encounter summary report (not reduplicated in this progress note). I personally obtained the chief complaint(s) and history of present illness.  I have confirmed and edited as necessary the CC, HPI, PMH/PSH, social history, FMH, ROS, and exam/neuro findings as obtained by the technician or others. I have examined this patient  myself and I personally viewed the image(s) and studies listed above and the documentation reflects my findings and interpretation.  I formulated and edited as necessary the assessment and plan and discussed the findings and management plan with the patient and family.     Elisabeth Estrada MD

## 2020-10-06 NOTE — LETTER
Regarding: Maira Leon    YOB: 1940    MRN: [id number]      Dear Colleague,     It was my pleasure to evaluate Maira Leon on 10/6/2020 in our eye clinic. I have the last reported hemoglobin A1c as   Lab Results   Component Value Date    A1C 5.8 05/21/2020    A1C 5.9 11/26/2019    A1C 5.8 05/29/2019    A1C 5.7 10/31/2018    A1C 5.8 05/16/2018   .    Pertinent exam findings today included:  Visual Acuity (Snellen - Linear)       Right Left    Dist sc 20/50 slow -1 20/40 slow    Dist cc 20/25  -1 20/20 -2    Correction: Glasses         Tonometry (Tonopen, 9:08 AM)       Right Left    Pressure 13 15         Main Ophthalmology Exam     External Exam       Right Left    External mild dermatochalasis, lateral canthal actinic looking lesion, dry white mild dermatochalasis          Slit Lamp Exam       Right Left    Lids/Lashes mrd 1 = 2.5mm, 2mmx4.5mm well circ. melanotic raised papule mrd 1 = 2.0mm    Conjunctiva/Sclera White and quiet White and quiet    Cornea Clear Clear    Anterior Chamber Deep and quiet Deep and quiet    Iris Dilated Dilated    Lens Centered posterior chamber intraocular lens Centered posterior chamber intraocular lens    Vitreous PVD PVD          Fundus Exam       Right Left    Disc Normal Normal    C/D Ratio 0.25 0.2    Macula Normal Normal    Vessels Normal Normal    Periphery Normal-No DR Normal-No DR                There was no diabetic retinopathy seen on dilated exam today.      I have encouraged Maira to continue working with you to maintain tight control of blood glucose and blood pressure.  Thank you for the opportunity to care for Maira, and if you would like to discuss anything further, please do not hesitate to contact me.  I have asked her to return to clinic in a year.            Best regards,          Elisabeth Estrada MD        Comprehensive Ophthalmology                [facility]        [facility]

## 2020-10-06 NOTE — NURSING NOTE
Chief Complaint(s) and History of Present Illness(es)     Diabetic Eye Exam     Associated symptoms include floaters (Intermittent floater in vision x years. No new floaters. ).  Negative for blurred vision, double vision, tearing, redness, itching and flashes.  Diabetes characteristics include Type 2 and taking oral medications.  Blood sugar level is controlled.  Pain was noted as 0/10.              Comments     Pt is here for a yearly diabetic eye exam. Pt notes when she reads at night she subconsiously closes her left eye x the 1-2 years. Pt notes she does not wear her glasses often, she only wears them when going to concerts, movies, etc. Everyday she only wears her OTC readers for near vision work.  this AM. Pt c/o her eyes don't open as wide as they use to be x 2-3 years.     Lab Results       Component                Value               Date                       A1C                      5.8                 05/21/2020                 A1C                      5.9                 11/26/2019                 A1C                      5.8                 05/29/2019                 A1C                      5.7                 10/31/2018                 A1C                      5.8                 05/16/2018             Ocular meds: None    OSIEL Munguia 8:52 AM October 6, 2020

## 2020-10-06 NOTE — Clinical Note
10/6/2020       RE: Maira Leon  [address]  [facility] [address]     Dear Colleague,    Thank you for referring your patient, Maira Leon, to the [facility]PH[facility] [place] [facility]. Please see a copy of my visit note below.    HPI  Maira Leon is a 79 year old here for follow-up dilated fundus exam. Feels her lids drooping when she reads, she eventually feels that she can't see well and she often falls asleep after reading.  Diabetes has been well controlled, reports a hemoglobin a1c as 5.7%.  No diurnal changes in eyelid position or feeling like her lids are heavy except when sitting and reading.  No new floaters.    Pt notes when she reads at night she subconsiously closes her left eye x the 1-2 years. Pt notes she does not wear her glasses often, she only wears them when going to concerts, movies, etc. Everyday she only wears her OTC readers for near vision work.  this AM. Pt c/o her eyes don't open as wide as they use to be x 2-3 years.      PMH: diabetes mellitus 2, hypertension, hyperlipidemia   POH: Glasses for myopia/presbyopia, cataract extraction posterior chamber intraocular lens (PCIOL) both eyes 2014, 2017,   Oc Meds: ocuvite  FH: Denies any glaucoma, brother with age related macular degeneration, no other known eye diseases         Assessment & Plan        (Z96.1) Pseudophakia, both eyes - Both Eyes  (primary encounter diagnosis)  Comment: no significant posterior capsular opacity (PCO)   Plan: follow    (H43.813) PVD (posterior vitreous detachment), bilateral - Both Eyes  Comment: stable   Plan: reviewed signs and symptoms of flashes/floaters and to call with changes    (E11.9) Type 2 diabetes mellitus without ophthalmic manifestations (H) - Both Eyes  Comment: Diabetes Mellitus 2 w/o retinopathy   Great A1C   Plan:   Discussed the importance of tight blood glucose, blood pressure, and cholesterol   control in the prevention of diabetic retinopathy. Recommend yearly dilated eye exam.    (H52.203,  H52.13) Myopic astigmatism of both eyes - Both Eyes  Comment: didn't fill prescription from last year yet  Plan: encouraged to fill as needed     (H02.831,  H02.834) Dermatochalasis of both upper eyelids - Both Eyes  Comment: mild not visually significant, small involutional ptosis but likely not enough of either to have surgery  Plan: offered oculolplastics consult, patient declines, will call with worsening  Increase light when reading      -----------------------------------------------------------------------------------       Patient disposition:   No follow-ups on file. and patient to call sooner as needed.      Complete documentation of historical and exam elements from today's encounter can be found in the full encounter summary report (not reduplicated in this progress note). I personally obtained the chief complaint(s) and history of present illness.  I have confirmed and edited as necessary the CC, HPI, PMH/PSH, social history, FMH, ROS, and exam/neuro findings as obtained by the technician or others. I have examined this patient myself and I personally viewed the image(s) and studies listed above and the documentation reflects my findings and interpretation.  I formulated and edited as necessary the assessment and plan and discussed the findings and management plan with the patient and family.     Elisabeth Estrada MD                 [facility]  Maira Leon is a 80 year old here for diabetic eye exam.  She feels her lids drooping when she reads, but no major fluctuation.   Diabetes has been well controlled, reports a hemoglobin a1c as 5.8%.    No new floaters.  Wears mostly over the counter readers and occasional distance glasses.      PMH: diabetes mellitus 2, hypertension, hyperlipidemia   POH: Glasses for myopia/presbyopia, cataract extraction posterior chamber intraocular lens (PCIOL) both eyes  2014, 2017,   Oc Meds: ocuvite  FH: Denies any glaucoma, brother with age related macular degeneration, no other known eye diseases         Assessment & Plan      (E11.9) Type 2 diabetes mellitus without ophthalmic manifestations (H) - Both Eyes  (primary encounter diagnosis)  Comment: Diabetes Mellitus 2 w/o retinopathy   Great A1C   Plan:   Discussed the importance of tight blood glucose, blood pressure, and cholesterol  control in the prevention of diabetic retinopathy. Recommend yearly dilated eye exam.    (Z96.1) Pseudophakia, both eyes - Both Eyes  Comment: no significant posterior capsular opacity (PCO)   Plan: follow    (H02.831,  H02.834) Dermatochalasis of both upper eyelids - Both Eyes  Comment: stable, +small involutional ptosis, not wanting referral  Plan: follow, oculoplastics when interested    -----------------------------------------------------------------------------------       Patient disposition:   Return in about 1 year (around 10/6/2021) for Comprehensive Exam. and patient to call sooner as needed.      Complete documentation of historical and exam elements from today's encounter can be found in the full encounter summary report (not reduplicated in this progress note). I personally obtained the chief complaint(s) and history of present illness.  I have confirmed and edited as necessary the CC, HPI, PMH/PSH, social history, FMH, ROS, and exam/neuro findings as obtained by the technician or others. I have examined this patient myself and I personally viewed the image(s) and studies listed above and the documentation reflects my findings and interpretation.  I formulated and edited as necessary the assessment and plan and discussed the findings and management plan with the patient and family.     Elisabeth Estrada MD                   Again, thank you for allowing me to participate in the care of your patient.      Sincerely,    Elisabeth Estrada MD

## 2020-10-13 ENCOUNTER — THERAPY VISIT (OUTPATIENT)
Dept: PHYSICAL THERAPY | Facility: CLINIC | Age: 80
End: 2020-10-13
Payer: COMMERCIAL

## 2020-10-13 DIAGNOSIS — M54.50 ACUTE RIGHT-SIDED LOW BACK PAIN WITHOUT SCIATICA: ICD-10-CM

## 2020-10-13 PROCEDURE — 97110 THERAPEUTIC EXERCISES: CPT | Mod: GP | Performed by: PHYSICAL THERAPIST

## 2020-10-13 PROCEDURE — 97112 NEUROMUSCULAR REEDUCATION: CPT | Mod: GP | Performed by: PHYSICAL THERAPIST

## 2020-10-20 ENCOUNTER — THERAPY VISIT (OUTPATIENT)
Dept: PHYSICAL THERAPY | Facility: CLINIC | Age: 80
End: 2020-10-20
Payer: COMMERCIAL

## 2020-10-20 DIAGNOSIS — M54.50 ACUTE RIGHT-SIDED LOW BACK PAIN WITHOUT SCIATICA: ICD-10-CM

## 2020-10-20 PROCEDURE — 97110 THERAPEUTIC EXERCISES: CPT | Mod: GP | Performed by: PHYSICAL THERAPIST

## 2020-10-20 PROCEDURE — 97112 NEUROMUSCULAR REEDUCATION: CPT | Mod: GP | Performed by: PHYSICAL THERAPIST

## 2020-11-05 ENCOUNTER — THERAPY VISIT (OUTPATIENT)
Dept: PHYSICAL THERAPY | Facility: CLINIC | Age: 80
End: 2020-11-05
Payer: COMMERCIAL

## 2020-11-05 DIAGNOSIS — M54.50 ACUTE RIGHT-SIDED LOW BACK PAIN WITHOUT SCIATICA: ICD-10-CM

## 2020-11-05 PROCEDURE — 97110 THERAPEUTIC EXERCISES: CPT | Mod: GP | Performed by: PHYSICAL THERAPIST

## 2020-11-05 PROCEDURE — 97112 NEUROMUSCULAR REEDUCATION: CPT | Mod: GP | Performed by: PHYSICAL THERAPIST

## 2020-11-05 NOTE — PROGRESS NOTES
Subjective:  HPI  Physical Exam                    Objective:  System    Physical Exam    General     ROS    Assessment/Plan:    DISCHARGE REPORT    Progress reporting period is from 9/25/2020 to 11/05/2020.       SUBJECTIVE  Patient reports significant improvement in her LBP since the start of therapy.  Severity of pain has considerably decreased.    No longer getting shooting pains into RLE, but notes there is still some episodic tingling in R anterior thigh.  She has been sleeping much better at night and is now able to put on socks and shoes without difficulty. Finds the exercises to be helpful   Walking for less than a block continues to bother LB.    Current pain level is 2/10  .     Previous pain level was  8/10 .   Changes in function:  Yes (See Goal flowsheet attached for changes in current functional level)  Adverse reaction to treatment or activity: None    OBJECTIVE  Lumbar AROM: Flex WNL, Ext to neutral.  Gait: continues to ambulate with a forward flexed trunk and decreased stride length  Hip AROM:  Exhibits major loss of hip extension bilat coupled with decreased quad and hip flexor flexibility.  Core Strength:  Hip abductors 3/5 bilat, TrAbdominis 2/5, glute max 3-/5 bilat.  Improved glute activation on right.          ASSESSMENT/PLAN  Updated problem list and treatment plan: Diagnosis 1:  LBP    Pain -  self management, education and home program  Decreased ROM/flexibility - manual therapy and therapeutic exercise  Decreased joint mobility - manual therapy and therapeutic exercise  Decreased strength - therapeutic exercise  Impaired gait - gait training  Decreased function - therapeutic activities and home program  Impaired posture - neuro re-education    STG/LTGs have been met or progress has been made towards goals:  Yes (See Goal flow sheet completed today.)  Assessment of Progress: The patient's condition is improving.  Patient is meeting short term goals and progressing towards long term  goals.  Self Management Plans:  Patient has been instructed in a home treatment program.  I have re-evaluated this patient and find that the nature, scope, duration and intensity of the therapy is appropriate for the medical condition of the patient.  Maira continues to require the following intervention to meet STG and LTG's:  PT    Recommendations:  This patient would benefit from continued therapy to further decrease pain and improve function.  Frequency:  2 X a month, once daily  Duration:  for 2 months        Please refer to the daily flowsheet for treatment today, total treatment time and time spent performing 1:1 timed codes.

## 2020-11-06 ENCOUNTER — MYC MEDICAL ADVICE (OUTPATIENT)
Dept: FAMILY MEDICINE | Facility: CLINIC | Age: 80
End: 2020-11-06

## 2020-11-09 ENCOUNTER — VIRTUAL VISIT (OUTPATIENT)
Dept: FAMILY MEDICINE | Facility: CLINIC | Age: 80
End: 2020-11-09
Payer: COMMERCIAL

## 2020-11-09 DIAGNOSIS — Z20.822 SUSPECTED COVID-19 VIRUS INFECTION: Primary | ICD-10-CM

## 2020-11-09 PROCEDURE — 99213 OFFICE O/P EST LOW 20 MIN: CPT | Mod: 95 | Performed by: NURSE PRACTITIONER

## 2020-11-09 NOTE — PATIENT INSTRUCTIONS
Patient Education     Coronavirus Disease 2019 (COVID-19): Caring for Yourself or Others   If you or a household member have symptoms of COVID-19, follow the guidelines below. This will help you manage symptoms and keep the virus from spreading.  If you have symptoms of COVID-19    Stay home and contact your care team. They will tell you what to do.    Don t panic. Keep in mind that other illnesses can cause similar symptoms.    Stay away from work, school, and public places.    Limit physical contact with others in your home. Limit visitors. No kissing.  Clean surfaces you touch with disinfectant.  If you need to cough or sneeze, do it into a tissue. Then throw the tissue into the trash. If you don't have tissues, cough or sneeze into the bend of your elbow.  Don t share food or personal items with people in your household. This includes items like eating and drinking utensils, towels, and bedding.  Wear a cloth face mask around other people. During a public health emergency, medical face masks may be reserved for healthcare workers. You may need to make a cloth face mask of your own. You can do this using a bandana, T-shirt, or other cloth. The CDC has instructions on how to make a face mask. Wear the mask so that it covers both your nose and mouth.  If you need to go to a hospital or clinic, call ahead to let them know. Expect the care team to wear masks, gowns, gloves, and eye protection. You may be put in a separate room.  Follow all instructions from your care team.    If you ve been diagnosed with COVID-19    Stay home and away from others, including other people in your home. (This is called self-isolation.) Don t leave home unless you need to get medical care. Don t go to work, school, or public places. Don t use buses, taxis, or other public transportation.    Follow all instructions from your care team.    If you need to go to a hospital or clinic, call ahead to let them know. Expect the care team to wear  masks, gowns, gloves, and eye protection. You may be put in a separate room.    Wear a face mask over your nose and mouth. This is to protect others from your germs. If you can t wear a mask, your caregivers should wear one. You may need to make your own mask using a bandana, T-shirt, or other cloth. See the [facility] s instructions on how to make a face mask.    Have no contact with pets and other animals.    Don t share food or personal items with people in your household. This includes items like eating and drinking utensils, towels, and bedding.    If you need to cough or sneeze, do it into a tissue. Then throw the tissue into the trash. If you don't have tissues, cough or sneeze into the bend of your elbow.    Wash your hands often.    Self-care at home   At this time, there is no medicine approved to prevent or treat COVID-19. Experts are testing different medicines, trying to find one that works.  So far, the most proven treatment is to support your body while it fights the virus.    Getting extra rest.    Drink extra fluids 6 to 8 glasses of liquids each day), unless a doctor has told you not to. Ask your care team which fluids are best for you. Avoid fluids that contain caffeine or alcohol.    Taking over-the-counter (OTC) medicine to reduce pain and fever. Your care team will tell you which medicine to use.  If you ve been in the hospital for COVID-19, follow your care team s instructions. They will tell you when to stop self-isolation. They may also have you change positions to help your breathing (such as lying on your belly).  If a test showed that you have COVID-19, you may be asked to donate plasma after you ve recovered. (This is called COVID-19 convalescent plasma donation.) The plasma may contain antibodies to help fight the virus in others. Scientists are testing whether this might be a treatment in the future. For more information, talk to your care team.  Caring for a sick person     Follow all  instructions from the care team.    Wash your hands often.    Wear protective clothing as advised.    Make sure the sick person wears a mask. If they can't wear a mask, don t stay in the same room with them. If you must be in the same room, wear a face mask. Make sure the mask covers both the nose and mouth.    Keep track of the sick person s symptoms.    Clean surfaces often with disinfectant. This includes phones, kitchen counters, fridge door handles, bathroom surfaces, and others.    Don t let anyone share household items with the sick person. This includes eating and drinking tools, towels, sheets, or blankets.    Clean fabrics and laundry well.    Keep other people and pets away from the sick person.    When you can stop self-isolation  When you are sick with COVID-19, you should stay away from other people. This is called self-isolation. The rules for ending self-isolation depend on your health in general.  If you are normally healthy  You can stop self-isolation when all 3 of these are true:    You ve had no fever--and no medicine that reduces fever--for at least 24 hours. And     Your symptoms are better, such as cough or trouble breathing. And     At least 10 days have passed since your symptoms first started.  Talk with your care team before you leave home. They may tell you it s okay to leave, or they may give you different advice.  If you have a weak immune system  If you re being treated for cancer, have an immune disorder (such as HIV), or have had a transplant &$40;organ or bone marrow), follow your care team s instructions. You may be asked to stay home from 10 days to 20 days after your symptoms first started. Your care team may want to re-test you for COVID-19.  When you return to public settings  When you are well enough to go outside your home, follow the CDC s guidance on cloth face masks.    Anyone over age 2 should wear a face mask in public, especially when it's hard to socially distance.  This includes public transit, public protests and marches, and crowded stores, bars, and restaurants.    Face masks are most likely to reduce the spread of COVID-19 when they are widely used by people who are out in the public.  Certain people should not wear a face covering. These include:    Children under 2 years old    Anyone with a health, developmental, or mental health condition that can be made worse by wearing a mask    Anyone who is unconscious or unable to remove the face covering without help. See the CDC's guidance on who should not wear a face mask.  When to call your care team  Call your care team right away if a sick person has any of these:    Trouble breathing    Pain or pressure in chest  If a sick person has any of these, call 911:    Trouble breathing that gets worse    Pain or pressure in chest that gets worse    Blue tint to lips or face    Fast or irregular heartbeat    Confusion or trouble waking    Fainting or loss of consciousness    Coughing up blood  Going home from the hospital   If you have COVID-19 and were recently in the hospital:    Follow the instructions above for self-care and isolation.    Follow the hospital care team s instructions.    Ask questions if anything is unclear to you. Write down answers so you remember them.  Date last modified: 10/13/2020  Marielle last reviewed this educational content on 4/1/2020  This information has been modified by your health care provider with permission from the publisher.    9787-4612 [employer]. [address] [place] [address], [place] [address]. All rights reserved. This information is not intended as a substitute for professional medical care. Always follow your healthcare professional's instructions.

## 2020-11-09 NOTE — PROGRESS NOTES
"Maira Leon is a 80 year old female who is being evaluated via a billable telephone visit.      The patient has been notified of following:     \"This telephone visit will be conducted via a call between you and your physician/provider. We have found that certain health care needs can be provided without the need for a physical exam.  This service lets us provide the care you need with a short phone conversation.  If a prescription is necessary we can send it directly to your pharmacy.  If lab work is needed we can place an order for that and you can then stop by our lab to have the test done at a later time.    Telephone visits are billed at different rates depending on your insurance coverage. During this emergency period, for some insurers they may be billed the same as an in-person visit.  Please reach out to your insurance provider with any questions.    If during the course of the call the physician/provider feels a telephone visit is not appropriate, you will not be charged for this service.\"    Patient has given verbal consent for Telephone visit?  Yes    What phone number would you like to be contacted at? [phone]    How would you like to obtain your AVS? Sandy Plummer     Maira Leon is a 80 year old female who presents via phone visit today for the following health issues:    HPI       Concern for COVID-19  About how many days ago did these symptoms start? 5 days ago   Is this your first visit for this illness? Yes    How would you describe your symptoms since your last visit? My symptoms have stayed the same  In the 14 days before your symptoms started, have you had close contact with someone with COVID-19 (Coronavirus)? I do not know. Worked the election.   Do you have a fever or chills? hot flashes.  Are you having new or worsening difficulty breathing? No  Do you have new or worsening cough? Yes, I am coughing up mucus. and sneezing.   Have you had any new or unexplained body " "aches? No    Have you experienced any of the following NEW symptoms?    Headache: YES not constant     Sore throat: YES     Loss of taste or smell: No    Chest pain: No    Diarrhea: No    Rash: No   What treatments have you tried? Cough drop  Who do you live with? 2 people live in the household currently   Are you, or a household member, a healthcare worker or a ? No   Do you live in a nursing home, group home, or shelter? No  Do you have a way to get food/medications if quarantined? Yes, I have a friend or family member who can help me.          shes not sure if she is just overly anxious about her symptoms and the pandemic or not. Symptoms are unchanged since they started. She is currently going through PT due to back pain. She has sore muscles which she believes to be related to her back pain and PT. Pain isnt bad enough to take any medications for. Back pain is improving. She also reports some discomfort to her upper thigh which has been going on for awhile now and is thought to be related to her back pain. She denies skin color changes or swelling to this leg. Denies injury to leg. She states over 1 month ago she was golfing 18 holes and then the back pain started. She reports having mild wheezing, cough and runny nose the past few days. Denies shortness of breath. wheezing resolves on its own through-out the morning. She denies MONROY with going up stairs. 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 She rates her pain from a 2-5 on 0-10 scale. Blood sugars she states her blood sugars have, \"gone whacko.\" this morning her blood sugar was 154, yesterday was 133, she states its averaging 130s.    Review of Systems   Constitutional, cardiovascular, gi and gu systems are negative, except cough, headache, sore throat, runny nose, wheezing        Objective          Vitals:  No vitals were obtained today due to virtual visit.    alert and no distress  PSYCH: Alert and oriented times 3; " coherent speech, normal   rate and volume, able to articulate logical thoughts, able   to abstract reason, no tangential thoughts, no hallucinations   or delusions  Her affect is normal  RESP: No cough, no audible wheezing, able to talk in full sentences  Remainder of exam unable to be completed due to telephone visits        Assessment/Plan:    Assessment & Plan     Suspected COVID-19 virus infection  Mild symptoms at this time. Will test for covid-19. Encouraged pt to take tylenol as needed for pain. Encouraged rest and hydration. No audible wheezing or shortness of breath at this time. If she develops shortness of breath, worsening wheezing or chest pain she should be re-evaluated right away for lung/heart assessment. Handout provided. Discussed self-quarantine. Encouraged pt to continue to monitor blood sugars, it they continue to be elevated she should notify the clinic.   - Symptomatic COVID-19 Virus (Coronavirus) by PCR; Future      Return in about 1 week (around 11/16/2020) for If symptoms worsen or fail to improve.    Jennifer Lemos NP  [facility]    Phone call duration:  13 minutes

## 2020-11-10 ENCOUNTER — NURSE TRIAGE (OUTPATIENT)
Dept: NURSING | Facility: CLINIC | Age: 80
End: 2020-11-10

## 2020-11-10 NOTE — TELEPHONE ENCOUNTER
FNA triage call : No Exposure known or travel or testing but mild symptoms of sneezing and more frequent than usual  yellowish - brown cough daily on awakening .   Presenting problem : Pt called. Currently : 1&0 , eating and activity are normal , sleeping more up to 9 hours ,  Liquid stools for last 24 hours, and BM x 3 in the last 8 hours,   Guideline used : Covid concern / suspected A AH.   Disposition and recommendations : Pt advised to do self isolation and waiting for scheduling to call to covid PCR test for Pt . Pt insisting on making a Covid PCR test appt and sent to .   Caller verbalizes understanding and denies further questions and will call back if further symptoms to triage or questions  . Ashlee Bonner RN  - [facility] Nurse Advisor     Reason for Disposition    HIGH RISK patient (e.g., age > 64 years, diabetes, heart or lung disease, weak immune system) (Exception: Has already been evaluated by healthcare provider and has no new or worsening symptoms)    Additional Information    Negative: SEVERE difficulty breathing (e.g., struggling for each breath, speaks in single words)    Negative: Difficult to awaken or acting confused (e.g., disoriented, slurred speech)    Negative: Bluish (or gray) lips or face now    Negative: Shock suspected (e.g., cold/pale/clammy skin, too weak to stand, low BP, rapid pulse)    Negative: Sounds like a life-threatening emergency to the triager    Negative: [1] COVID-19 exposure AND [2] no symptoms    Negative: COVID-19 and Breastfeeding, questions about    Negative: [1] Adult with possible COVID-19 symptoms AND [2] triager concerned about severity of symptoms or other causes    Negative: SEVERE or constant chest pain or pressure (Exception: mild central chest pain, present only when coughing)    Negative: MODERATE difficulty breathing (e.g., speaks in phrases, SOB even at rest, pulse 100-120)    Negative: Patient sounds very sick or weak to the triager    Negative:  MILD difficulty breathing (e.g., minimal/no SOB at rest, SOB with walking, pulse <100)    Negative: Chest pain or pressure    Negative: Fever > 103 F (39.4 C)    Negative: [1] Fever > 101 F (38.3 C) AND [2] age > 60    Negative: [1] Fever > 100.0 F (37.8 C) AND [2] bedridden (e.g., nursing home patient, CVA, chronic illness, recovering from surgery)    [facility]  - [facility] Patient Instructions  COVID 19 Nurse Triage Plan/Patient Instructions    Please be aware that novel coronavirus (COVID-19) may be circulating in the community. If you develop symptoms such as fever, cough, or SOB or if you have concerns about the presence of another infection including coronavirus (COVID-19), please contact your health care provider or visit [email or web address].       Home care recommended. Follow home care protocol based instructions.    Thank you for taking steps to prevent the spread of this virus.  Limit your contact with others.  Wear a simple mask to cover your cough.  Wash your hands well and often.    Resources  [facility]: About COVID-19: [email or web address]  CDC: What to Do If You're Sick: www.cdc.gov/coronavirus/2019-ncov/about/steps-when-sick.html  CDC: Ending Home Isolation: www.cdc.gov/coronavirus/2019-ncov/hcp/disposition-in-home-patients.html   CDC: Caring for Someone: www.cdc.gov/coronavirus/2019-ncov/if-you-are-sick/care-for-someone.html   [facility]: Interim Guidance for Hospital Discharge to Home: www.health.[facility].[place]/diseases/coronavirus/hcp/hospdischarge.pdf  [facility] clinical trials (COVID-19 research studies): clinicalaffairs.[facility]/umn-clinical-trials   Below are the COVID-19 hotlines at the [place] Department of Health ([facility]). Interpreters are available.   For health questions: Call 123-131-9213 or 1-524.953.5015 (7 a.m. to 7 p.m.)  For questions about schools and childcare: Call 679-348-6540 or 1-992.178.6824 (7 a.m. to 7  p.m.)    Protocols used: CORONAVIRUS (COVID-19) DIAGNOSED OR NFTDUDQOD-B-US 8.4.20

## 2020-11-11 DIAGNOSIS — Z20.822 SUSPECTED COVID-19 VIRUS INFECTION: ICD-10-CM

## 2020-11-11 PROCEDURE — U0003 INFECTIOUS AGENT DETECTION BY NUCLEIC ACID (DNA OR RNA); SEVERE ACUTE RESPIRATORY SYNDROME CORONAVIRUS 2 (SARS-COV-2) (CORONAVIRUS DISEASE [COVID-19]), AMPLIFIED PROBE TECHNIQUE, MAKING USE OF HIGH THROUGHPUT TECHNOLOGIES AS DESCRIBED BY CMS-2020-01-R: HCPCS | Performed by: NURSE PRACTITIONER

## 2020-11-12 LAB
SARS-COV-2 RNA SPEC QL NAA+PROBE: NOT DETECTED
SPECIMEN SOURCE: NORMAL

## 2020-11-19 ENCOUNTER — THERAPY VISIT (OUTPATIENT)
Dept: PHYSICAL THERAPY | Facility: CLINIC | Age: 80
End: 2020-11-19
Payer: COMMERCIAL

## 2020-11-19 DIAGNOSIS — M54.50 ACUTE RIGHT-SIDED LOW BACK PAIN WITHOUT SCIATICA: ICD-10-CM

## 2020-11-19 PROCEDURE — 97110 THERAPEUTIC EXERCISES: CPT | Mod: GP | Performed by: PHYSICAL THERAPIST

## 2020-11-19 PROCEDURE — 97112 NEUROMUSCULAR REEDUCATION: CPT | Mod: GP | Performed by: PHYSICAL THERAPIST

## 2020-11-19 PROCEDURE — 97140 MANUAL THERAPY 1/> REGIONS: CPT | Mod: GP | Performed by: PHYSICAL THERAPIST

## 2020-11-24 ENCOUNTER — OFFICE VISIT (OUTPATIENT)
Dept: FAMILY MEDICINE | Facility: CLINIC | Age: 80
End: 2020-11-24
Payer: COMMERCIAL

## 2020-11-24 VITALS
BODY MASS INDEX: 29.77 KG/M2 | WEIGHT: 168 LBS | DIASTOLIC BLOOD PRESSURE: 72 MMHG | SYSTOLIC BLOOD PRESSURE: 120 MMHG | HEIGHT: 63 IN | HEART RATE: 75 BPM | OXYGEN SATURATION: 96 %

## 2020-11-24 DIAGNOSIS — N18.30 DIABETES MELLITUS WITH STAGE 3 CHRONIC KIDNEY DISEASE (H): ICD-10-CM

## 2020-11-24 DIAGNOSIS — L98.9 SKIN LESION: ICD-10-CM

## 2020-11-24 DIAGNOSIS — E78.5 HYPERLIPIDEMIA LDL GOAL <100: ICD-10-CM

## 2020-11-24 DIAGNOSIS — Z00.00 ENCOUNTER FOR MEDICARE ANNUAL WELLNESS EXAM: Primary | ICD-10-CM

## 2020-11-24 DIAGNOSIS — E11.22 DIABETES MELLITUS WITH STAGE 3 CHRONIC KIDNEY DISEASE (H): ICD-10-CM

## 2020-11-24 DIAGNOSIS — M54.50 ACUTE RIGHT-SIDED LOW BACK PAIN WITHOUT SCIATICA: ICD-10-CM

## 2020-11-24 DIAGNOSIS — R89.9 ABNORMAL LABORATORY TEST: ICD-10-CM

## 2020-11-24 LAB — HBA1C MFR BLD: 6.1 % (ref 0–5.6)

## 2020-11-24 PROCEDURE — 36415 COLL VENOUS BLD VENIPUNCTURE: CPT | Performed by: FAMILY MEDICINE

## 2020-11-24 PROCEDURE — 99397 PER PM REEVAL EST PAT 65+ YR: CPT | Performed by: FAMILY MEDICINE

## 2020-11-24 PROCEDURE — 83036 HEMOGLOBIN GLYCOSYLATED A1C: CPT | Performed by: FAMILY MEDICINE

## 2020-11-24 PROCEDURE — 80048 BASIC METABOLIC PNL TOTAL CA: CPT | Performed by: FAMILY MEDICINE

## 2020-11-24 PROCEDURE — 80061 LIPID PANEL: CPT | Performed by: FAMILY MEDICINE

## 2020-11-24 PROCEDURE — 82043 UR ALBUMIN QUANTITATIVE: CPT | Performed by: FAMILY MEDICINE

## 2020-11-24 PROCEDURE — 99213 OFFICE O/P EST LOW 20 MIN: CPT | Mod: 25 | Performed by: FAMILY MEDICINE

## 2020-11-24 RX ORDER — ATORVASTATIN CALCIUM 40 MG/1
40 TABLET, FILM COATED ORAL DAILY
Qty: 90 TABLET | Refills: 3 | Status: SHIPPED | OUTPATIENT
Start: 2020-11-24 | End: 2021-12-14

## 2020-11-24 RX ORDER — VITS A,C,E/LUTEIN/MINERALS 300MCG-200
1 TABLET ORAL DAILY
COMMUNITY
End: 2020-11-24

## 2020-11-24 ASSESSMENT — ENCOUNTER SYMPTOMS
FEVER: 0
NAUSEA: 0
PALPITATIONS: 0
DIZZINESS: 0
SHORTNESS OF BREATH: 0
MYALGIAS: 1
NERVOUS/ANXIOUS: 0
HEMATURIA: 0
FREQUENCY: 1
DYSURIA: 0
BREAST MASS: 0
PARESTHESIAS: 0
COUGH: 0
CHILLS: 0
CONSTIPATION: 0
WEAKNESS: 0
ABDOMINAL PAIN: 0
EYE PAIN: 0
HEARTBURN: 0
ARTHRALGIAS: 1
DIARRHEA: 0
HEADACHES: 0
SORE THROAT: 0
HEMATOCHEZIA: 0
JOINT SWELLING: 0

## 2020-11-24 ASSESSMENT — ACTIVITIES OF DAILY LIVING (ADL): CURRENT_FUNCTION: NO ASSISTANCE NEEDED

## 2020-11-24 ASSESSMENT — MIFFLIN-ST. JEOR: SCORE: 1201.17

## 2020-11-24 NOTE — PATIENT INSTRUCTIONS
Patient Education   Personalized Prevention Plan  You are due for the preventive services outlined below.  Your care team is available to assist you in scheduling these services.  If you have already completed any of these items, please share that information with your care team to update in your medical record.  Health Maintenance Due   Topic Date Due                                         For your back pain consider taking tylenol.  You can safely take 500-1000 mg of tylenol every 8 hours.    Consider taking your blood sugar right before breakfast, instead of right when you wake up, to see if it's any lower.    Please consider seeing the dermatologist for the spots on your face.    Please message or call Jane if you have questions about your blood sugar.

## 2020-11-24 NOTE — PROGRESS NOTES
"SUBJECTIVE:   Maira Leon is a 80 year old female who presents for Preventive Visit.      Patient has been advised of split billing requirements and indicates understanding: Yes   Are you in the first 12 months of your Medicare coverage?  No    Healthy Habits:     In general, how would you rate your overall health?  Good    Frequency of exercise:  6-7 days/week    Duration of exercise:  Less than 15 minutes    Do you usually eat at least 4 servings of fruit and vegetables a day, include whole grains    & fiber and avoid regularly eating high fat or \"junk\" foods?  Yes    Taking medications regularly:  Yes    Medication side effects:  None    Ability to successfully perform activities of daily living:  No assistance needed    Home Safety:  No safety concerns identified    Hearing Impairment:  Difficulty following a conversation in a noisy restaurant or crowded room and difficulty understanding soft or whispered speech    In the past 6 months, have you been bothered by leaking of urine? Yes    In general, how would you rate your overall mental or emotional health?  Good      PHQ-2 Total Score: 0    Additional concerns today:  Yes    Do you feel safe in your environment? Yes    Have you ever done Advance Care Planning? (For example, a Health Directive, POLST, or a discussion with a medical provider or your loved ones about your wishes): Yes, advance care planning is on file.      Fall risk  Fallen 2 or more times in the past year?: No  Any fall with injury in the past year?: No    Cognitive Screening   1) Repeat 3 items (Leader, Season, Table)    2) Clock draw: Normal  3) 3 item recall: Recalls 2 objects   Results: cognitive impairment less likely    Mini-CogTM Copyright RONDA Bonner. Licensed by the author for use in [facility]; reprinted with permission (max@.[facility]). All rights reserved.      Do you have sleep apnea, excessive snoring or daytime drowsiness?: no    Reviewed and updated as needed this " visit by clinical staff   Allergies  Meds  Problems             Reviewed and updated as needed this visit by Provider   Allergies  Meds  Problems            Social History     Tobacco Use     Smoking status: Former Smoker     Quit date: 1981     Years since quittin.9     Smokeless tobacco: Never Used   Substance Use Topics     Alcohol use: Yes     Comment: 3-4 drink per week     If you drink alcohol do you typically have >3 drinks per day or >7 drinks per week? No    Alcohol Use 2020   Prescreen: >3 drinks/day or >7 drinks/week? No   Prescreen: >3 drinks/day or >7 drinks/week? -           Diabetes Follow-up      How often are you checking your blood sugar? Once a day currently. Checks this in the morning, occ 2 hours after supper     What concerns do you have today about your diabetes? High #'s for patient but not above 200     Do you have any of these symptoms? (Select all that apply)  No numbness or tingling in feet.  No redness, sores or blisters on feet.  No complaints of excessive thirst.  No reports of blurry vision.  No significant changes to weight.        Hyperlipidemia Follow-Up      Are you regularly taking any medication or supplement to lower your cholesterol?   Yes- Atorvastatin 40mg    Are you having muscle aches or other side effects that you think could be caused by your cholesterol lowering medication?  No        BP Readings from Last 2 Encounters:   20 120/72   20 120/74     Hemoglobin A1C (%)   Date Value   2020 6.1 (H)   2020 5.8 (H)     LDL Cholesterol Calculated (mg/dL)   Date Value   2019 70   10/31/2018 78     Chronic Kidney Disease Follow-up      Do you take any over the counter pain medicine?: No    Wonders what OTC pain medication she can use for her back.  Tylenol hasn't been helpful for her in the past when she has had injuries.  Has not tried it lately.  Is working with PT.  Is a bit harder to go up and down stairs; uses the  railing.  Cannot walk very far - perhaps up to 1/2 mile.  Does do her home PT exercises twice daily.  Is more sedentary    Previously was able to did online Silver Sneakers exercises.  Used to be able to walk further.    Discuss Diabetes   Sugars are higher in the morning,but lower in the evening.  Sugars don't go below 100 typically.  Has no noticeable low sugars.    Gotten a little bump under her left eye on her cheek.  Spot on her right cheek as well.    Current providers sharing in care for this patient include:   Patient Care Team:  Michelle Monet MD as PCP - General (Family Practice)  Jackie Killian, RN as   Elisabeth Estrada MD as MD (Ophthalmology)  Jane Killian RD as Diabetes Educator (Dietitian, Registered)  Michelle Monet MD as Assigned PCP  Elisabeth Estrada MD as Assigned Surgical Provider    The following health maintenance items are reviewed in Epic and correct as of today:  Health Maintenance   Topic Date Due     MICROALBUMIN  11/26/2020     DIABETIC FOOT EXAM  11/26/2020     BMP  02/24/2021     A1C  05/24/2021     DTAP/TDAP/TD IMMUNIZATION (4 - Td) 09/30/2021     EYE EXAM  10/06/2021     FALL RISK ASSESSMENT  10/06/2021     MEDICARE ANNUAL WELLNESS VISIT  11/24/2021     LIPID  11/24/2021     DEXA  02/26/2022     ADVANCE CARE PLANNING  11/24/2025     PHQ-2  Completed     INFLUENZA VACCINE  Completed     Pneumococcal Vaccine: Pediatrics (0 to 5 Years) and At-Risk Patients (6 to 64 Years)  Completed     Pneumococcal Vaccine: 65+ Years  Completed     ZOSTER IMMUNIZATION  Completed     IPV IMMUNIZATION  Aged Out     MENINGITIS IMMUNIZATION  Aged Out     Lab work is in process      Review of Systems   Constitutional: Negative for chills and fever.   HENT: Negative for congestion, ear pain, hearing loss and sore throat.    Eyes: Negative for pain and visual disturbance.   Respiratory: Negative for cough and shortness of breath.    Cardiovascular:  "Negative for chest pain, palpitations and peripheral edema.   Gastrointestinal: Negative for abdominal pain, constipation, diarrhea, heartburn, hematochezia and nausea.   Breasts:  Negative for tenderness, breast mass and discharge.   Genitourinary: Positive for frequency. Negative for dysuria, genital sores, hematuria, pelvic pain, urgency, vaginal bleeding and vaginal discharge.   Musculoskeletal: Positive for arthralgias and myalgias. Negative for joint swelling.   Skin: Negative for rash.   Neurological: Negative for dizziness, weakness, headaches and paresthesias.   Psychiatric/Behavioral: Negative for mood changes. The patient is not nervous/anxious.      Does have some urinary leakage, wears a pad all the time now.  If she empties her bladder as soon as she notices she needs to go, she is fine.  Or if she empties her bladder every two hours, she tends to be fine.    OBJECTIVE:   /72 (Cuff Size: Adult Regular)   Pulse 75   Ht 1.6 m (5' 3\")   Wt 76.2 kg (168 lb)   LMP  (LMP Unknown)   SpO2 96%   BMI 29.76 kg/m   Estimated body mass index is 29.76 kg/m  as calculated from the following:    Height as of this encounter: 1.6 m (5' 3\").    Weight as of this encounter: 76.2 kg (168 lb).  Physical Exam  GENERAL: healthy, alert and no distress  RESP: lungs clear to auscultation - no rales, rhonchi or wheezes  CV: regular rate and rhythm, normal S1 S2, no S3 or S4, no murmur, click or rub, no peripheral edema and peripheral pulses strong  MS: no gross musculoskeletal defects noted, no edema  SKIN: right upper cheek near lateral edge of eye - slightly scaly skin, left cheek underneath eye - small skin colored papule  PSYCH: mentation appears normal, affect normal/bright  Diabetic foot exam: normal DP and PT pulses, no trophic changes or ulcerative lesions and normal sensory exam    Diagnostic Test Results:  Labs reviewed in Epic    ASSESSMENT / PLAN:   (Z00.00) Encounter for Medicare annual wellness exam  " "(primary encounter diagnosis)  Comment: stable health  Plan:  Other health care maintenance up to date per chart or patient report.      (E78.5) Hyperlipidemia LDL goal <100  Comment:   Plan: atorvastatin (LIPITOR) 40 MG tablet        adjust therapy based on labs      (E11.22,  N18.30) Diabetes mellitus with stage 3 chronic kidney disease (H)  Comment: is now just on metformin  Plan: BASIC METABOLIC PANEL, HEMOGLOBIN A1C, Lipid         panel reflex to direct LDL Fasting, Albumin         Random Urine Quantitative with Creat Ratio,         atorvastatin (LIPITOR) 40 MG tablet        Continue current medication      (M54.5) Acute right-sided low back pain without sciatica  Comment: OA  Plan: working with PT.  Encouraged more movement throughout her day.    (L98.9) Skin lesion  Comment:   Plan: DERMATOLOGY ADULT REFERRAL        Advised skin check.      Patient has been advised of split billing requirements and indicates understanding: Yes  COUNSELING:  Reviewed preventive health counseling, as reflected in patient instructions    Estimated body mass index is 29.76 kg/m  as calculated from the following:    Height as of this encounter: 1.6 m (5' 3\").    Weight as of this encounter: 76.2 kg (168 lb).        She reports that she quit smoking about 39 years ago. She has never used smokeless tobacco.      Appropriate preventive services were discussed with this patient, including applicable screening as appropriate for cardiovascular disease, diabetes, osteopenia/osteoporosis, and glaucoma.  As appropriate for age/gender, discussed screening for colorectal cancer, prostate cancer, breast cancer, and cervical cancer. Checklist reviewing preventive services available has been given to the patient.    Reviewed patients plan of care and provided an AVS. The Intermediate Care Plan ( asthma action plan, low back pain action plan, and migraine action plan) for Maira meets the Care Plan requirement. This Care Plan has been " established and reviewed with the Patient.    Counseling Resources:  ATP IV Guidelines  Pooled Cohorts Equation Calculator  Breast Cancer Risk Calculator  Breast Cancer: Medication to Reduce Risk  FRAX Risk Assessment  ICSI Preventive Guidelines  Dietary Guidelines for Americans, 2010  USDA's MyPlate  ASA Prophylaxis  Lung CA Screening    Michelle Barrios MD  [facility]    Identified Health Risks:

## 2020-11-25 ENCOUNTER — TELEPHONE (OUTPATIENT)
Dept: FAMILY MEDICINE | Facility: CLINIC | Age: 80
End: 2020-11-25

## 2020-11-25 LAB
ANION GAP SERPL CALCULATED.3IONS-SCNC: 3 MMOL/L (ref 3–14)
BUN SERPL-MCNC: 26 MG/DL (ref 7–30)
CALCIUM SERPL-MCNC: 10.7 MG/DL (ref 8.5–10.1)
CHLORIDE SERPL-SCNC: 106 MMOL/L (ref 94–109)
CHOLEST SERPL-MCNC: 145 MG/DL
CO2 SERPL-SCNC: 29 MMOL/L (ref 20–32)
CREAT SERPL-MCNC: 0.95 MG/DL (ref 0.52–1.04)
CREAT UR-MCNC: 66 MG/DL
GFR SERPL CREATININE-BSD FRML MDRD: 56 ML/MIN/{1.73_M2}
GLUCOSE SERPL-MCNC: 84 MG/DL (ref 70–99)
HDLC SERPL-MCNC: 56 MG/DL
LDLC SERPL CALC-MCNC: 69 MG/DL
MICROALBUMIN UR-MCNC: 8 MG/L
MICROALBUMIN/CREAT UR: 11.8 MG/G CR (ref 0–25)
NONHDLC SERPL-MCNC: 89 MG/DL
POTASSIUM SERPL-SCNC: 4.1 MMOL/L (ref 3.4–5.3)
SODIUM SERPL-SCNC: 138 MMOL/L (ref 133–144)
TRIGL SERPL-MCNC: 101 MG/DL

## 2020-11-25 NOTE — TELEPHONE ENCOUNTER
Michelle Monet MD  P Ne Team [facility]             Please reach out to schedule her diabetes follow up with me in 6months      Copied from CC chart.    Roxy Lauren

## 2020-12-03 ENCOUNTER — THERAPY VISIT (OUTPATIENT)
Dept: PHYSICAL THERAPY | Facility: CLINIC | Age: 80
End: 2020-12-03
Payer: COMMERCIAL

## 2020-12-03 DIAGNOSIS — M54.50 ACUTE RIGHT-SIDED LOW BACK PAIN WITHOUT SCIATICA: ICD-10-CM

## 2020-12-03 PROCEDURE — 97140 MANUAL THERAPY 1/> REGIONS: CPT | Mod: GP | Performed by: PHYSICAL THERAPIST

## 2020-12-03 PROCEDURE — 97112 NEUROMUSCULAR REEDUCATION: CPT | Mod: GP | Performed by: PHYSICAL THERAPIST

## 2020-12-03 PROCEDURE — 97110 THERAPEUTIC EXERCISES: CPT | Mod: GP | Performed by: PHYSICAL THERAPIST

## 2020-12-17 ENCOUNTER — THERAPY VISIT (OUTPATIENT)
Dept: PHYSICAL THERAPY | Facility: CLINIC | Age: 80
End: 2020-12-17
Payer: COMMERCIAL

## 2020-12-17 DIAGNOSIS — M54.50 ACUTE RIGHT-SIDED LOW BACK PAIN WITHOUT SCIATICA: ICD-10-CM

## 2020-12-17 PROCEDURE — 97112 NEUROMUSCULAR REEDUCATION: CPT | Mod: GP | Performed by: PHYSICAL THERAPIST

## 2020-12-17 PROCEDURE — 97110 THERAPEUTIC EXERCISES: CPT | Mod: GP | Performed by: PHYSICAL THERAPIST

## 2020-12-29 ENCOUNTER — THERAPY VISIT (OUTPATIENT)
Dept: PHYSICAL THERAPY | Facility: CLINIC | Age: 80
End: 2020-12-29
Payer: COMMERCIAL

## 2020-12-29 DIAGNOSIS — M54.50 ACUTE RIGHT-SIDED LOW BACK PAIN WITHOUT SCIATICA: ICD-10-CM

## 2020-12-29 PROCEDURE — 97112 NEUROMUSCULAR REEDUCATION: CPT | Mod: GP | Performed by: PHYSICAL THERAPIST

## 2020-12-29 PROCEDURE — 97110 THERAPEUTIC EXERCISES: CPT | Mod: GP | Performed by: PHYSICAL THERAPIST

## 2020-12-29 NOTE — PROGRESS NOTES
"Subjective:  HPI  Physical Exam                    Objective:  System    Physical Exam    General     ROS    Assessment/Plan:    DISCHARGE REPORT    Progress reporting period is from 11/05/2020 to 12/29/2020.       SUBJECTIVE  Patient reports significant improvement in her LBP since the start of therapy.  Tingling in right anterior thigh has diminished \"quite a bit\" and is no longer waking her up at night.  She continues to experience low back pain and stiffness in the mornings, but once she is up and moving around, \"things seem to loosen up.\"  Walking tolerance has increased to about 20 minutes before right sided LBP starts to increase.    Current pain level is 0/10  .     Previous pain level was  2/10  .   Changes in function:  Yes (See Goal flowsheet attached for changes in current functional level)  Adverse reaction to treatment or activity: None    OBJECTIVE  Lumbar AROM: Flex WNL, Ext to neutral.  Gait: continues to ambulate with a forward flexed trunk  Flexibility in bilateral quads and hip flexors has improved, although hip extension mobility remains decreased  Core Strength:  Hip abductors 3+/5 bilat, TrAbdominis 2/5, glute max 3/5 bilat.        ASSESSMENT/PLAN  STG/LTGs have been met or progress has been made towards goals:  Yes (See Goal flow sheet completed today.)  Assessment of Progress: Patient is meeting short term goals and is progressing towards long term goals.  Self Management Plans:  Patient  has been instructed in self management of symptoms.  PT intervention is no longer required to meet STG/LTG.    Recommendations:  This patient is ready to be discharged from therapy and continue their home treatment program. Encouraged patient to call if she has any questions or concerns.      Please refer to the daily flowsheet for treatment today, total treatment time and time spent performing 1:1 timed codes.          "

## 2021-01-28 ENCOUNTER — OFFICE VISIT (OUTPATIENT)
Dept: DERMATOLOGY | Facility: CLINIC | Age: 81
End: 2021-01-28
Attending: FAMILY MEDICINE
Payer: COMMERCIAL

## 2021-01-28 ENCOUNTER — IMMUNIZATION (OUTPATIENT)
Dept: NURSING | Facility: CLINIC | Age: 81
End: 2021-01-28
Payer: COMMERCIAL

## 2021-01-28 DIAGNOSIS — D48.5 NEOPLASM OF UNCERTAIN BEHAVIOR OF SKIN: Primary | ICD-10-CM

## 2021-01-28 DIAGNOSIS — L57.0 ACTINIC KERATOSES: ICD-10-CM

## 2021-01-28 DIAGNOSIS — D22.9 MULTIPLE BENIGN NEVI: ICD-10-CM

## 2021-01-28 DIAGNOSIS — D36.10 NEUROFIBROMA: ICD-10-CM

## 2021-01-28 DIAGNOSIS — L82.1 SEBORRHEIC KERATOSES: ICD-10-CM

## 2021-01-28 DIAGNOSIS — D18.01 CHERRY ANGIOMA: ICD-10-CM

## 2021-01-28 DIAGNOSIS — L81.4 SOLAR LENTIGO: ICD-10-CM

## 2021-01-28 PROCEDURE — 99203 OFFICE O/P NEW LOW 30 MIN: CPT | Mod: 25 | Performed by: DERMATOLOGY

## 2021-01-28 PROCEDURE — 17003 DESTRUCT PREMALG LES 2-14: CPT | Performed by: DERMATOLOGY

## 2021-01-28 PROCEDURE — 91300 PR COVID VAC PFIZER DIL RECON 30 MCG/0.3 ML IM: CPT

## 2021-01-28 PROCEDURE — 0001A PR COVID VAC PFIZER DIL RECON 30 MCG/0.3 ML IM: CPT

## 2021-01-28 PROCEDURE — 17000 DESTRUCT PREMALG LESION: CPT | Mod: 59 | Performed by: DERMATOLOGY

## 2021-01-28 PROCEDURE — 88305 TISSUE EXAM BY PATHOLOGIST: CPT | Performed by: DERMATOLOGY

## 2021-01-28 PROCEDURE — 11102 TANGNTL BX SKIN SINGLE LES: CPT | Performed by: DERMATOLOGY

## 2021-01-28 NOTE — LETTER
1/28/2021         RE: Maira Leon  [address] [address]        Dear Colleague,    Thank you for referring your patient, Maira Leon, to the [facility]. Please see a copy of my visit note below.    [facility] Dermatology Note  Encounter Date: Jan 28, 2021  Office Visit     Dermatology Problem List:  1. AK s/p cryo  2. 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  Family History: Negative for skin cancer.  ____________________________________________    Assessment & Plan:     # Sun damaged skin with solar lentigines: Chronic, stable.  - Recommend sunscreens SPF #30 or greater, protective clothing and avoidance of tanning beds.    # Benign skin findings including: seborrheic keratoses, cherry angioma, neurofibroma - minor, self limited problem  - No further intervention required. Patient to report changes.   - Patient reassured of the benign nature of these lesions.    # Multiple clinically benign nevi: Chronic, stable  - No further intervention required. Patient to report changes.   - Patient reassured of the benign nature of these lesions.      # Actinic keratosis. Discussed precancerous nature of these lesions. Recommended treatment to prevent progression to a squamous cell carcinoma. Advised patient to call for follow up if not resolved in 1 month.    - See procedure note.   - *Reviewed referral note from PCP on 11/24/20.    # Neoplasm of uncertain behavior on the right medial chest. The differential diagnosis includes AK vs SCC vs SK.   - See procedure note.       Procedures Performed:   -Cryotherapy procedure note: After verbal consent and discussion of risks and benefits including but no limited to dyspigmentation/scar, blister, and pain, 4 AKs was(were) treated with 1-2mm freeze border for 2 cycles with liquid nitrogen. Post cryotherapy  instructions were provided.   -Shave biopsy: Location(s) right medial chest.  After discussion of benefits and risks including but not limited to bleeding/bruising, pain/swelling, infection, scar, incomplete removal, nerve damage/numbness, recurrence, and non-diagnostic biopsy, written consent, verbal consent and photographs were obtained. Time-out was performed. The area was cleaned with isopropyl alcohol. 0.5mL of 1% lidocaine with epinephrine was injected to obtain adequate anesthesia of each lesion. Shave biopsy was performed. Hemostasis was achieved with aluminium chloride. Vaseline and a sterile dressing were applied. The patient tolerated the procedure and no complications were noted. The patient was provided with verbal and written post care instructions.       Follow-up: 1 year(s) in-person, or earlier for new or changing lesions    Staff and Scribe:     Scribe Disclosure:   I, Arjun Hurtado, am serving as a scribe to document services personally performed by this physician, Dr. Tere Sutton, based on data collection and the provider's statements to me.     Provider Disclosure:   The documentation recorded by the scribe accurately reflects the services I personally performed and the decisions made by me.    Tere Sutton MD    Department of Dermatology  [facility]: Phone: [phone], Fax:[phone]  [facility]: Phone: [phone], Fax: [phone]    ____________________________________________    CC: Skin Check (FBSE, no personal or family hx of NMSC, area of concern: rough spot near right eye x10-15 years, not immunosuppressed)    HPI:  Ms. Maira Leon is a(n) 81 year old female who presents today as a new patient for a skin check.    She is new to our department. She is unsure if she's seen a dermatologist before. She has no history of skin cancer, atypical  "moles, or precancerous lesions to her knowledge.    Referred by PCP for \"skin lesion\" on 11/24/20. Note reviewed. Bump under the left eye. Spot on the right cheek. No treatment or diagnosis rendered. Referred to derm for evaluation and skin check.    Today, patient presents for evaluation of the spots her PCP noted. She also is interested in a full body skin check. She may have picked at a spot on her right chest, and is unable to recall how long this spot has been present.Denies personal or family history of skin cancer.    Patient is otherwise feeling well, without additional concerns.     Labs:  NA    Physical Exam:  Vitals: LMP  (LMP Unknown)   SKIN: Total skin excluding the undergarment areas was performed. The exam included the head/face, neck, both arms, chest, back, abdomen, both legs, digits and/or nails.   - Love Type II  - Neurofibroma on the left mid back  - There are dome shaped bright red papules on the trunk and extremities.   - Multiple regular brown pigmented macules and papules are identified on the trunk and extremities.   - Scattered brown macules on sun exposed areas.  - There are waxy stuck on tan to brown papules on the trunk and extremities.   - There are erythematous macules with overyling adherent scale on the nose x2, left cheek and right cheek.   - Right medial chest: 1 cm pink papule with some dotted vessels   - No other lesions of concern on areas examined.     Medications:  Current Outpatient Medications   Medication     aspirin 81 MG tablet     atorvastatin (LIPITOR) 40 MG tablet     blood glucose monitoring (NO BRAND SPECIFIED) meter device kit     Calcium Carb-Cholecalciferol (CALCIUM 500 +D) 500-400 MG-UNIT TABS     fish oil-omega-3 fatty acids (FISH OIL) 1000 MG capsule     lisinopril (ZESTRIL) 2.5 MG tablet     metFORMIN (GLUCOPHAGE) 500 MG tablet     multivitamin (OCUVITE) TABS     multivitamin, therapeutic with minerals (MULTI-VITAMIN) TABS tablet     ONETOUCH DELICA " LANCETS 33G MISC     ONETOUCH ULTRA test strip     polyethylene glycol (MIRALAX) powder     No current facility-administered medications for this visit.       Past Medical History:   Patient Active Problem List   Diagnosis     Osteoarthritis     Hyperlipidemia LDL goal <100     CKD (chronic kidney disease) stage 3, GFR 30-59 ml/min     Diabetes mellitus with stage 3 chronic kidney disease (H)     Primary osteoarthritis of left knee     Lumbar spinal stenosis     Osteopenia of multiple sites     Superior glenoid labrum lesion of left shoulder     Tear of left supraspinatus tendon     Arthritis of left acromioclavicular joint     Incomplete tear of left rotator cuff     Osteoarthritis of left hip, unspecified osteoarthritis type     Past Medical History:   Diagnosis Date     H/O arthroscopic knee surgery left    5/6/16     History of shingles 12/6/16     Hypertension      Low back pain     & radiates down left buttock & left leg     Need for prophylactic hormone replacement therapy (postmenopausal)      Nocturia     states she gets up every 2 hours at night to urinate     Osteoarthrosis, unspecified whether generalized or localized, unspecified site      Other and unspecified hyperlipidemia      POSTMENOPAUSAL HORMONAL REPLACEMENT TX 5/23/2005     Type II or unspecified type diabetes mellitus without mention of complication, not stated as uncontrolled        CC Michelle Monet MD  [address] [place] [address] on close of this encounter.        Again, thank you for allowing me to participate in the care of your patient.        Sincerely,        Tere Sutton MD

## 2021-01-28 NOTE — NURSING NOTE
Maira Leon's goals for this visit include:   Chief Complaint   Patient presents with     Skin Check     FBSE, no personal or family hx of NMSC, area of concern: rough spot near right eye x10-15 years, not immunosuppressed     She requests these members of her care team be copied on today's visit information: Yes    PCP: Michelle Monet    Referring Provider:  Michelle Monet MD  [address] [place] [address]    LMP  (LMP Unknown)     Do you need any medication refills at today's visit? No    Jennifer Wynn LPN

## 2021-01-28 NOTE — PROGRESS NOTES
[facility] Dermatology Note  Encounter Date: Jan 28, 2021  Office Visit     Dermatology Problem List:  1. AK s/p cryo  2. 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  Family History: Negative for skin cancer.  ____________________________________________    Assessment & Plan:     # Sun damaged skin with solar lentigines: Chronic, stable.  - Recommend sunscreens SPF #30 or greater, protective clothing and avoidance of tanning beds.    # Benign skin findings including: seborrheic keratoses, cherry angioma, neurofibroma - minor, self limited problem  - No further intervention required. Patient to report changes.   - Patient reassured of the benign nature of these lesions.    # Multiple clinically benign nevi: Chronic, stable  - No further intervention required. Patient to report changes.   - Patient reassured of the benign nature of these lesions.      # Actinic keratosis. Discussed precancerous nature of these lesions. Recommended treatment to prevent progression to a squamous cell carcinoma. Advised patient to call for follow up if not resolved in 1 month.    - See procedure note.   - *Reviewed referral note from PCP on 11/24/20.    # Neoplasm of uncertain behavior on the right medial chest. The differential diagnosis includes AK vs SCC vs SK.   - See procedure note.       Procedures Performed:   -Cryotherapy procedure note: After verbal consent and discussion of risks and benefits including but no limited to dyspigmentation/scar, blister, and pain, 4 AKs was(were) treated with 1-2mm freeze border for 2 cycles with liquid nitrogen. Post cryotherapy instructions were provided.   -Shave biopsy: Location(s) right medial chest.  After discussion of benefits and risks including but not limited to bleeding/bruising, pain/swelling, infection, scar, incomplete removal, nerve damage/numbness, recurrence, and  "non-diagnostic biopsy, written consent, verbal consent and photographs were obtained. Time-out was performed. The area was cleaned with isopropyl alcohol. 0.5mL of 1% lidocaine with epinephrine was injected to obtain adequate anesthesia of each lesion. Shave biopsy was performed. Hemostasis was achieved with aluminium chloride. Vaseline and a sterile dressing were applied. The patient tolerated the procedure and no complications were noted. The patient was provided with verbal and written post care instructions.       Follow-up: 1 year(s) in-person, or earlier for new or changing lesions    Staff and Scribe:     Scribe Disclosure:   I, Arjun Hurtado, am serving as a scribe to document services personally performed by this physician, Dr. Tere Sutton, based on data collection and the provider's statements to me.     Provider Disclosure:   The documentation recorded by the scribe accurately reflects the services I personally performed and the decisions made by me.    Tere Sutton MD    Department of Dermatology  [facility]: Phone: [phone], Fax:[phone]  [facility]: Phone: [phone], Fax: [phone]    ____________________________________________    CC: Skin Check (FBSE, no personal or family hx of NMSC, area of concern: rough spot near right eye x10-15 years, not immunosuppressed)    HPI:  Ms. Maira Leon is a(n) 81 year old female who presents today as a new patient for a skin check.    She is new to our department. She is unsure if she's seen a dermatologist before. She has no history of skin cancer, atypical moles, or precancerous lesions to her knowledge.    Referred by PCP for \"skin lesion\" on 11/24/20. Note reviewed. Bump under the left eye. Spot on the right cheek. No treatment or diagnosis rendered. Referred to derm for evaluation and skin " check.    Today, patient presents for evaluation of the spots her PCP noted. She also is interested in a full body skin check. She may have picked at a spot on her right chest, and is unable to recall how long this spot has been present.Denies personal or family history of skin cancer.    Patient is otherwise feeling well, without additional concerns.     Labs:  NA    Physical Exam:  Vitals: LMP  (LMP Unknown)   SKIN: Total skin excluding the undergarment areas was performed. The exam included the head/face, neck, both arms, chest, back, abdomen, both legs, digits and/or nails.   - Love Type II  - Neurofibroma on the left mid back  - There are dome shaped bright red papules on the trunk and extremities.   - Multiple regular brown pigmented macules and papules are identified on the trunk and extremities.   - Scattered brown macules on sun exposed areas.  - There are waxy stuck on tan to brown papules on the trunk and extremities.   - There are erythematous macules with overyling adherent scale on the nose x2, left cheek and right cheek.   - Right medial chest: 1 cm pink papule with some dotted vessels   - No other lesions of concern on areas examined.     Medications:  Current Outpatient Medications   Medication     aspirin 81 MG tablet     atorvastatin (LIPITOR) 40 MG tablet     blood glucose monitoring (NO BRAND SPECIFIED) meter device kit     Calcium Carb-Cholecalciferol (CALCIUM 500 +D) 500-400 MG-UNIT TABS     fish oil-omega-3 fatty acids (FISH OIL) 1000 MG capsule     lisinopril (ZESTRIL) 2.5 MG tablet     metFORMIN (GLUCOPHAGE) 500 MG tablet     multivitamin (OCUVITE) TABS     multivitamin, therapeutic with minerals (MULTI-VITAMIN) TABS tablet     ONETOUCH DELICA LANCETS 33G MISC     ONETOUCH ULTRA test strip     polyethylene glycol (MIRALAX) powder     No current facility-administered medications for this visit.       Past Medical History:   Patient Active Problem List   Diagnosis     Osteoarthritis      Hyperlipidemia LDL goal <100     CKD (chronic kidney disease) stage 3, GFR 30-59 ml/min     Diabetes mellitus with stage 3 chronic kidney disease (H)     Primary osteoarthritis of left knee     Lumbar spinal stenosis     Osteopenia of multiple sites     Superior glenoid labrum lesion of left shoulder     Tear of left supraspinatus tendon     Arthritis of left acromioclavicular joint     Incomplete tear of left rotator cuff     Osteoarthritis of left hip, unspecified osteoarthritis type     Past Medical History:   Diagnosis Date     H/O arthroscopic knee surgery left    5/6/16     History of shingles 12/6/16     Hypertension      Low back pain     & radiates down left buttock & left leg     Need for prophylactic hormone replacement therapy (postmenopausal)      Nocturia     states she gets up every 2 hours at night to urinate     Osteoarthrosis, unspecified whether generalized or localized, unspecified site      Other and unspecified hyperlipidemia      POSTMENOPAUSAL HORMONAL REPLACEMENT TX 5/23/2005     Type II or unspecified type diabetes mellitus without mention of complication, not stated as uncontrolled        CC Michelle Monet MD  [address] [place] [address] on close of this encounter.

## 2021-01-28 NOTE — PATIENT INSTRUCTIONS
Wound Care After a Biopsy    What is a skin biopsy?  A skin biopsy allows the doctor to examine a very small piece of tissue under the microscope to determine the diagnosis and the best treatment for the skin condition. A local anesthetic (numbing medicine)  is injected with a very small needle into the skin area to be tested. A small piece of skin is taken from the area. Sometimes a suture (stitch) is used.     What are the risks of a skin biopsy?  I will experience scar, bleeding, swelling, pain, crusting and redness. I may experience incomplete removal or recurrence. Risks of this procedure are excessive bleeding, bruising, infection, nerve damage, numbness, thick (hypertrophic or keloidal) scar and non-diagnostic biopsy.    How should I care for my wound for the first 24 hours?    Keep the wound dry and covered for 24 hours    If it bleeds, hold direct pressure on the area for 15 minutes. If bleeding does not stop then go to the emergency room    Avoid strenuous exercise the first 1-2 days or as your doctor instructs you    How should I care for the wound after 24 hours?    After 24 hours, remove the bandage    You may bathe or shower as normal    If you had a scalp biopsy, you can shampoo as usual and can use shower water to clean the biopsy site daily    Clean the wound twice a day with gentle soap and water    Do not scrub, be gentle    Apply white petroleum/Vaseline after cleaning the wound with a cotton swab or a clean finger, and keep the site covered with a Bandaid /bandage. Bandages are not necessary with a scalp biopsy    If you are unable to cover the site with a Bandaid /bandage, re-apply ointment 2-3 times a day to keep the site moist. Moisture will help with healing    Avoid strenuous activity for first 1-2 days    Avoid lakes, rivers, pools, and oceans until the stitches are removed or the site is healed    How do I clean my wound?    Wash hands thoroughly with soap or use hand  before all  wound care    Clean the wound with gentle soap and water    Apply white petroleum/Vaseline  to wound after it is clean    Replace the Bandaid /bandage to keep the wound covered for the first few days or as instructed by your doctor    If you had a scalp biopsy, warm shower water to the area on a daily basis should suffice    What should I use to clean my wound?     Cotton-tipped applicators (Qtips )    White petroleum jelly (Vaseline ). Use a clean new container and use Q-tips to apply.    Bandaids   as needed    Gentle soap     How should I care for my wound long term?    Do not get your wound dirty    Keep up with wound care for one week or until the area is healed.    A small scab will form and fall off by itself when the area is completely healed. The area will be red and will become pink in color as it heals. Sun protection is very important for how your scar will turn out. Sunscreen with an SPF 30 or greater is recommended once the area is healed.    You should have some soreness but it should be mild and slowly go away over several days. Talk to your doctor about using tylenol for pain,    When should I call my doctor?  If you have increased:     Pain or swelling    Pus or drainage (clear or slightly yellow drainage is ok)    Temperature over 100F    Spreading redness or warmth around wound    When will I hear about my results?  The biopsy results can take 2-3 weeks to come back. The clinic will call you with the results, send you a [facility] message, or have you schedule a follow-up clinic or phone time to discuss the results. Contact our clinics if you do not hear from us in 3 weeks.     Who should I call with questions?    [facility]: [phone]     [facility]: [phone]    For urgent needs outside of business hours call the [facility] at [phone] and ask for the dermatology resident on call      Cryotherapy    What is it?    Use  of a very cold liquid, such as liquid nitrogen, to freeze and destroy abnormal skin cells that need to be removed    What should I expect?    Tenderness and redness    A small blister that might grow and fill with dark purple blood. There may be crusting.    More than one treatment may be needed if the lesions do not go away.    How do I care for the treated area?    Gently wash the area with your hands when bathing.    Use a thin layer of Vaseline to help with healing. You may use a Band-Aid.     The area should heal within 7-10 days and may leave behind a pink or lighter color.     Do not use an antibiotic or Neosporin ointment.     You may take acetaminophen (Tylenol) for pain.     Call your Doctor if you have:    Severe pain    Signs of infection (warmth, redness, cloudy yellow drainage, and or a bad smell)    Questions or concerns    Who should I call with questions?       [facility]: [phone]       [facility]: [phone]       For urgent needs outside of business hours call the [facility] at [phone]        and ask for the dermatology resident on call

## 2021-02-01 LAB — COPATH REPORT: NORMAL

## 2021-02-02 ENCOUNTER — OFFICE VISIT (OUTPATIENT)
Dept: DERMATOLOGY | Facility: CLINIC | Age: 81
End: 2021-02-02
Payer: COMMERCIAL

## 2021-02-02 DIAGNOSIS — D04.5 SQUAMOUS CELL CARCINOMA IN SITU (SCCIS) OF SKIN OF TORSO: Primary | ICD-10-CM

## 2021-02-02 PROCEDURE — 17262 DSTRJ MAL LES T/A/L 1.1-2.0: CPT | Mod: 79 | Performed by: DERMATOLOGY

## 2021-02-02 ASSESSMENT — PAIN SCALES - GENERAL: PAINLEVEL: NO PAIN (0)

## 2021-02-02 NOTE — NURSING NOTE
Maira Leon's goals for this visit include:   Chief Complaint   Patient presents with     Procedure     ED&C - sccis right medial chest      She requests these members of her care team be copied on today's visit information:     PCP: Michelle Monet    Referring Provider:  No referring provider defined for this encounter.    LMP  (LMP Unknown)     Do you need any medication refills at today's visit? No    Shanta Muir LPN

## 2021-02-02 NOTE — PATIENT INSTRUCTIONS
Wound Care:  Electrodesiccation and Curettage     I will experience scar, altered skin color, bleeding, swelling, pain, crusting and redness. I may experience altered sensation. Risks are excessive bleeding, infection, muscle weakness, thick (hypertrophic or keloidal) scar, and recurrence,. A second procedure may be recommended to obtain the best cosmetic or functional result.    What is electrodesiccation and curettage ?    Scraping off tissue (curettage)    Destroy tissue using electric current or cautery (electrodessication)    How do I perform wound care?    Keep dressing in place for two days. You may shower with the dressing in place(do not get wet)    After 2 days, wash hands and remove dressing. Clean wound with cotton-swab soaked in hydrogen peroxide to remove drainage and crust    Put on a thick layer of Vaseline on the wound using a cotton-swab     Cover the wound with a Band-AidTM to protect from dust and tight clothing    If wound is draining before two days, change your dressing as described above sooner    During wound care, do not allow the area to dry out or form a scab    What do I need?    Hydrogen peroxide     Cotton-swabs     Vaseline or petroleum jelly     Band-AidsTM or dressing supplies as needed     When should I call the doctor?  [facility]: [phone]  [facility]: [phone]  For urgent needs outside of business hours call the [facility] at [phone] and ask for the dermatology resident on call

## 2021-02-02 NOTE — LETTER
2/2/2021         RE: Maira Leon  [address] [facility] [address]        Dear Colleague,    Thank you for referring your patient, Maira Leon, to the [facility]. Please see a copy of my visit note below.    Dermatology Procedure Note: Electrodesiccation and Curettage    PREOPERATIVE DIAGNOSIS: SCCis    POSTOPERATIVE DIAGNOSIS: same    LOCATION: right medial chest    INITIAL SIZE: 0.6 cm     FINAL SIZE: 1.2 cm    Treatment options including electrodessiccation and curettage (ED and C), excision and topicals were reviewed.  The expected cure rates, healing times and anticipated scars of each option were discussed and the patient elects to proceed with ED and C.     The risks and benefits of the procedure were described to the patient.  These include but are not limited to bleeding, infection, scar, incomplete removal, and recurrence. Written informed consent was obtained. Time-out was performed. The above site was cleansed with and injected with 4 mL1% lidocaine with epinephrine. Once anesthesia was obtained, the site was prepped with Chlorhexidine and rinsed with sterile saline. The lesion was curetted with  in 3 directions with a 4mm margin and this was followed by electrodessication.  This process was repeated three times. The defect measured 1.2cm. Vaseline and a bandage were applied to the wound. The patient tolerated the procedure well and was given post care instructions.    Clinical Follow-up: 6 months for skin check    Staff Involved:  Staff Only    Tere Sutton MD    Department of Dermatology  [facility]: Phone: [phone], Fax:[phone]  [facility]: Phone: [phone], Fax: [phone]        The following medication was given:     MEDICATION:  Lidocaine with epinephrine 1% 1:[id number]  ROUTE: SQ  SITE: see procedure  note  DOSE: 2 cc  LOT #: -EV  : Hospira  EXPIRATION DATE: 6-1-2021  NDC#: 8714-6445-67   Was there drug waste? No  Multi-dose vial: Yes    Shanta Muir LPN  February 2, 2021          Again, thank you for allowing me to participate in the care of your patient.        Sincerely,        Tere Sutton MD

## 2021-02-18 ENCOUNTER — IMMUNIZATION (OUTPATIENT)
Dept: NURSING | Facility: CLINIC | Age: 81
End: 2021-02-18
Attending: INTERNAL MEDICINE
Payer: COMMERCIAL

## 2021-02-18 PROCEDURE — 0002A PR COVID VAC PFIZER DIL RECON 30 MCG/0.3 ML IM: CPT

## 2021-02-18 PROCEDURE — 91300 PR COVID VAC PFIZER DIL RECON 30 MCG/0.3 ML IM: CPT

## 2021-03-25 DIAGNOSIS — Z12.31 VISIT FOR SCREENING MAMMOGRAM: ICD-10-CM

## 2021-03-25 PROCEDURE — 77063 BREAST TOMOSYNTHESIS BI: CPT | Mod: TC | Performed by: RADIOLOGY

## 2021-03-25 PROCEDURE — 77067 SCR MAMMO BI INCL CAD: CPT | Mod: TC | Performed by: RADIOLOGY

## 2021-05-14 ENCOUNTER — MYC MEDICAL ADVICE (OUTPATIENT)
Dept: NUTRITION | Facility: CLINIC | Age: 81
End: 2021-05-14

## 2021-05-20 ENCOUNTER — OFFICE VISIT (OUTPATIENT)
Dept: FAMILY MEDICINE | Facility: CLINIC | Age: 81
End: 2021-05-20
Payer: COMMERCIAL

## 2021-05-20 VITALS
HEIGHT: 63 IN | HEART RATE: 64 BPM | TEMPERATURE: 97.7 F | WEIGHT: 171 LBS | SYSTOLIC BLOOD PRESSURE: 134 MMHG | BODY MASS INDEX: 30.3 KG/M2 | DIASTOLIC BLOOD PRESSURE: 72 MMHG

## 2021-05-20 DIAGNOSIS — E83.52 HYPERCALCEMIA: ICD-10-CM

## 2021-05-20 DIAGNOSIS — N18.31 STAGE 3A CHRONIC KIDNEY DISEASE (H): ICD-10-CM

## 2021-05-20 DIAGNOSIS — M54.50 RIGHT-SIDED LOW BACK PAIN WITHOUT SCIATICA, UNSPECIFIED CHRONICITY: ICD-10-CM

## 2021-05-20 DIAGNOSIS — N18.30 DIABETES MELLITUS WITH STAGE 3 CHRONIC KIDNEY DISEASE (H): Primary | ICD-10-CM

## 2021-05-20 DIAGNOSIS — E11.22 DIABETES MELLITUS WITH STAGE 3 CHRONIC KIDNEY DISEASE (H): Primary | ICD-10-CM

## 2021-05-20 LAB
ANION GAP SERPL CALCULATED.3IONS-SCNC: 7 MMOL/L (ref 3–14)
BUN SERPL-MCNC: 26 MG/DL (ref 7–30)
CALCIUM SERPL-MCNC: 10.6 MG/DL (ref 8.5–10.1)
CHLORIDE SERPL-SCNC: 104 MMOL/L (ref 94–109)
CO2 SERPL-SCNC: 27 MMOL/L (ref 20–32)
CREAT SERPL-MCNC: 0.88 MG/DL (ref 0.52–1.04)
GFR SERPL CREATININE-BSD FRML MDRD: 62 ML/MIN/{1.73_M2}
GLUCOSE SERPL-MCNC: 105 MG/DL (ref 70–99)
HBA1C MFR BLD: 6.2 % (ref 0–5.6)
POTASSIUM SERPL-SCNC: 3.8 MMOL/L (ref 3.4–5.3)
SODIUM SERPL-SCNC: 138 MMOL/L (ref 133–144)

## 2021-05-20 PROCEDURE — 36415 COLL VENOUS BLD VENIPUNCTURE: CPT | Performed by: FAMILY MEDICINE

## 2021-05-20 PROCEDURE — 99214 OFFICE O/P EST MOD 30 MIN: CPT | Performed by: FAMILY MEDICINE

## 2021-05-20 PROCEDURE — 80048 BASIC METABOLIC PNL TOTAL CA: CPT | Performed by: FAMILY MEDICINE

## 2021-05-20 PROCEDURE — 83036 HEMOGLOBIN GLYCOSYLATED A1C: CPT | Performed by: FAMILY MEDICINE

## 2021-05-20 ASSESSMENT — MIFFLIN-ST. JEOR: SCORE: 1209.78

## 2021-05-20 NOTE — PROGRESS NOTES
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 And encouraged to continue home PT exercises    56}     BMI:   Estimated body mass index is 30.29 kg/m  as calculated from the following:    Height as of this encounter: 1.6 m (5' 3\").    Weight as of this encounter: 77.6 kg (171 lb).           Return in about 6 months (around 11/20/2021) for Diabetes.    Michelle Barrios MD  [facility]    Elian Covington is a 81 year old who presents for the following health issues     Cannot play golf due to back pain  - which is very bothersome to her.  Also cannot walk as far/as much due to pain.  Back pain.  Had surgery in 2017 - back surgery and then left hip due to injury.  Pain was gone after back surgery in her back.  Does have some pain in left leg due to maru - does not find this pain bothersome.    Right sided back pain started to worsen in Fall 2020.  Still was able to walk as much as she would like to.  Saw PT this winter.  SI joint arthropathy seen on xrays.  Can now only walk about 1/2 mile until pain increases.  Is still doing home PT exercises - which she finds do help the pain.    2022 goal is a trip to the [employer].  Would like to be able to walk 2 miles by then.    HPI     Diabetes Follow-up    How often are you checking your blood sugar? One time daily  What time of day are you checking your blood sugars (select all that " apply)?  Before meals  Have you had any blood sugars above 200?  No  Have you had any blood sugars below 70?  No    What symptoms do you notice when your blood sugar is low?  None    What concerns do you have today about your diabetes? None     Do you have any of these symptoms? (Select all that apply)  No numbness or tingling in feet.  No redness, sores or blisters on feet.  No complaints of excessive thirst.  No reports of blurry vision.  No significant changes to weight.  Ave morning sugar 130.      Hyperlipidemia Follow-Up      Are you regularly taking any medication or supplement to lower your cholesterol?   Yes- Lipitor     Are you having muscle aches or other side effects that you think could be caused by your cholesterol lowering medication?  No    Hypertension Follow-up      Do you check your blood pressure regularly outside of the clinic? No     Are you following a low salt diet? Yes    Are your blood pressures ever more than 140 on the top number (systolic) OR more   than 90 on the bottom number (diastolic), for example 140/90?     BP Readings from Last 2 Encounters:   05/20/21 134/72   11/24/20 120/72     Hemoglobin A1C (%)   Date Value   05/20/2021 6.2 (H)   11/24/2020 6.1 (H)     LDL Cholesterol Calculated (mg/dL)   Date Value   11/24/2020 69   11/26/2019 70         How many servings of fruits and vegetables do you eat daily?  0-1    On average, how many sweetened beverages do you drink each day (Examples: soda, juice, sweet tea, etc.  Do NOT count diet or artificially sweetened beverages)?   0    How many days per week do you exercise enough to make your heart beat faster? 6-7    How many minutes a day do you exercise enough to make your heart beat faster? 30 - 60    How many days per week do you miss taking your medication? 0    Back Pain  Onset/Duration: over a year, getting worse lately   Description:   Location of pain: low back bilateral  Character of pain: dull ache  Pain radiation: right upper  "thigh, tingling pain that wakes patient up at nights   New numbness or weakness in legs, not attributed to pain: no   Intensity: 8/10 in the mornings   Progression of Symptoms: worsening  History:   Specific cause: none  Pain interferes with job: not applicable  History of back problems: previous spinal surgery - in 2017  Any previous MRI or X-rays: Yes- at [place].  Date a year ago   Sees a specialist for back pain: No  Alleviating factors:   Improved by: none    Precipitating factors:  Worsened by: Walking  Therapies tried and outcome: acetaminophen (Tylenol) is not helping with symptoms     Accompanying Signs & Symptoms:  Risk of Fracture: Age >64  Risk of Cauda Equina: None  Risk of Infection: None  Risk of Cancer: None  Risk of Ankylosing Spondylitis: Onset at age <35, male, AND morning back stiffness  no           Review of Systems   Constitutional, HEENT, cardiovascular, pulmonary, gi and gu systems are negative, except as otherwise noted.      Objective    /72   Pulse 64   Temp 97.7  F (36.5  C) (Oral)   Ht 1.6 m (5' 3\")   Wt 77.6 kg (171 lb)   LMP  (LMP Unknown)   BMI 30.29 kg/m    Body mass index is 30.29 kg/m .  Physical Exam   GENERAL: healthy, alert and no distress  RESP: lungs clear to auscultation - no rales, rhonchi or wheezes  CV: regular rate and rhythm, normal S1 S2, no S3 or S4, no murmur, click or rub, no peripheral edema and peripheral pulses strong  SKIN: no suspicious lesions or rashes  Comprehensive back pain exam:  Tenderness of right SI joint. and right paraspinous muscles. and rotation limited by pain  PSYCH: mentation appears normal, affect normal/bright    Results for orders placed or performed in visit on 05/20/21   BASIC METABOLIC PANEL     Status: Abnormal   Result Value Ref Range    Sodium 138 133 - 144 mmol/L    Potassium 3.8 3.4 - 5.3 mmol/L    Chloride 104 94 - 109 mmol/L    Carbon Dioxide 27 20 - 32 mmol/L    Anion Gap 7 3 - 14 mmol/L    Glucose 105 (H) 70 - 99 " mg/dL    Urea Nitrogen 26 7 - 30 mg/dL    Creatinine 0.88 0.52 - 1.04 mg/dL    GFR Estimate 62 >60 mL/min/[1.73_m2]    GFR Estimate If Black 72 >60 mL/min/[1.73_m2]    Calcium 10.6 (H) 8.5 - 10.1 mg/dL   HEMOGLOBIN A1C     Status: Abnormal   Result Value Ref Range    Hemoglobin A1C 6.2 (H) 0 - 5.6 %

## 2021-05-26 NOTE — PROGRESS NOTES
"[facility] PROCEDURE ONLY Consultation    Date of visit: 5/27/2021    Reason for consultation:    Maira Leon is a 81 year old female who is seen in consultation today at the request of her primary care physician, Michelle Monet .     Consultation and Evaluation for: right sided low back and buttock pain    Review of Electronic Chart: Today I have also reviewed available medical information in the patient's medical record at [facility] ([facility]), including relevant provider notes, laboratory work, and imaging.       Chief Complaint:    Chief Complaint   Patient presents with     Pain       Pain history:  Maira Leon is a 81 year old female with PMH of L3,4,5 fusion (2017) who presents for initial evaluation of chief pain complaint of ***.     Pain location: ***  Pain onset: ***  Pain is described as { :604129}   Pain rating: intensity ranges from { :053601} to { :063106}, and Averages { :727616} on a 0-10 scale.  Aggravating factors include: ***  Relieving factors include: ***  Activity level/function:              Daily activities:  { :627713}            Work:  { :305052}  Recent family or social stressors:  { :068985::\"none noted\"}  Red Flags: ***The patient denies bowel or bladder incontinence, parasthesias, weakness, saddle anesthesia, unintentional weight loss, or fever/chills/sweats.       Pain Treatments:  Medications:       Current pain medications:         Previous pain medications:   Opiates - { :061179::\"none\"}  Antiepileptics - { :192721::\"none\"}  Antidepressants - { :471061::\"none\"}   NSAIDs - { :881736::\"none\"}  Muscle relaxants - { :252403::\"none\"}  Topicals - { :504181::\"none\"}    Past pain treatments:   Pain Clinic:  { :823831::\"No\"}    PT:  { :192455::\"No\"}  Psychologist:  { :333066::\"No\"}  Relaxation techniques/biofeedback:  { :256959::\"No\"}  Chiropractor:  { :347142::\"No\"}  Acupuncture:  { :536379::\"No\"}  TENs Unit:  { " ":155012::\"No\"}  Injections:  { :465073::\"No\"}  Self-care:  { :455488::\"No\"}    Surgery: ***      Diagnostic tests:  MRI of *** was completed on *** was reviewed with the patient and shows:      Past Medical History:  Past Medical History:   Diagnosis Date     H/O arthroscopic knee surgery left    5/6/16     History of shingles 12/6/16     Hypertension      Low back pain     & radiates down left buttock & left leg     Need for prophylactic hormone replacement therapy (postmenopausal)      Nocturia     states she gets up every 2 hours at night to urinate     Osteoarthrosis, unspecified whether generalized or localized, unspecified site      Other and unspecified hyperlipidemia      POSTMENOPAUSAL HORMONAL REPLACEMENT TX 5/23/2005     Type II or unspecified type diabetes mellitus without mention of complication, not stated as uncontrolled        Past Surgical History:  Past Surgical History:   Procedure Laterality Date     APPENDECTOMY  age 4     ARTHROSCOPY KNEE Left 5/6/2016    Procedure: ARTHROSCOPY KNEE;  Surgeon: Ramin Ramirez MD;  Location:  OR     BACK SURGERY  01/20/2017    Lumbar fusion     C TOTAL HIP ARTHROPLASTY Left 2017     CATARACT IOL, RT/LT       ENT SURGERY      Tonsillectomy     GENITOURINARY SURGERY      bladder repair/sling     OPTICAL TRACKING SYSTEM FUSION SPINE POSTERIOR LUMBAR TWO LEVELS N/A 1/20/2017    Procedure: OPTICAL TRACKING SYSTEM FUSION SPINE POSTERIOR LUMBAR TWO LEVELS;  Surgeon: Buddy Davies MD;  Location: [facility] OR     PHACOEMULSIFICATION CLEAR CORNEA WITH STANDARD INTRAOCULAR LENS IMPLANT Right 2014     PHACOEMULSIFICATION CLEAR CORNEA WITH STANDARD INTRAOCULAR LENS IMPLANT Left 11/21/2017       Medications:  Current Outpatient Medications   Medication Sig Dispense Refill     aspirin 81 MG tablet Take 1 tablet (81 mg) by mouth daily 30 tablet 0     atorvastatin (LIPITOR) 40 MG tablet Take 1 tablet (40 mg) by mouth daily 90 tablet 3     blood glucose monitoring " "(NO BRAND SPECIFIED) meter device kit Use to test blood sugar daily or as directed. 1 kit 0     Calcium Carb-Cholecalciferol (CALCIUM 500 +D) 500-400 MG-UNIT TABS Take 1 tablet by mouth daily 100 tablet 3     fish oil-omega-3 fatty acids (FISH OIL) 1000 MG capsule Take 4 g by mouth daily.       lisinopril (ZESTRIL) 2.5 MG tablet TAKE ONE TABLET BY MOUTH ONCE DAILY 90 tablet 3     metFORMIN (GLUCOPHAGE) 500 MG tablet Take 1 tablet (500 mg) by mouth at breakfast and 2 tablets (1000 mg) at dinner. 270 tablet 3     multivitamin (OCUVITE) TABS Take 1 tablet by mouth daily. 30 each 0     multivitamin, therapeutic with minerals (MULTI-VITAMIN) TABS tablet Take 1 tablet by mouth daily       ONETOUCH DELICA LANCETS 33G MISC USE TO TEST BLOOD SUGAR DAILY OR AS DIRECTED 100 each 3     ONETOUCH ULTRA test strip USE TO TEST BLOOD SUGARS DAILY OR AS DIRECTED 100 strip 3     polyethylene glycol (MIRALAX) powder Take 17 g (1 capful) by mouth 2 times daily as needed for constipation 510 g 0       Allergies:     Allergies   Allergen Reactions     No Known Drug Allergies        Family history:  Family History   Problem Relation Age of Onset     Osteoporosis Mother      Heart Disease Father      Cerebrovascular Disease Father      Musculoskeletal Disorder Father         gout     Genitourinary Problems Maternal Grandmother      Cancer Maternal Grandfather      Cancer Paternal Grandmother      Cerebrovascular Disease Paternal Grandfather      Eye Disorder Brother         macular degeneration     Macular Degeneration Brother      Muscular Dystrophy Brother      Diabetes No family hx of      Hypertension No family hx of        ROS:  Constitutional, neuro, ENT, endocrine, pulmonary, cardiac, gastrointestinal, genitourinary, musculoskeletal, integument and psychiatric systems are negative, except as otherwise noted.    Physical Exam:  There were no vitals filed for this visit.    {video visit exam brief selected:079503::\"GENERAL: Healthy, " "alert and no distress\",\"EYES: Eyes grossly normal to inspection.  No discharge or erythema, or obvious scleral/conjunctival abnormalities.\",\"RESP: No audible wheeze, cough, or visible cyanosis.  No visible retractions or increased work of breathing.  \",\"SKIN: Visible skin clear. No significant rash, abnormal pigmentation or lesions.\",\"NEURO: Cranial nerves grossly intact.  Mentation and speech appropriate for age.\",\"PSYCH: Mentation appears normal, affect normal/bright, judgement and insight intact, normal speech and appearance well-groomed.\"}        Constitutional: Well developed, well nourished, appears stated age.  HEENT: Head atraumatic, normocephalic. Eyes without conjunctival injection or jaundice. Oropharynx clear. Neck supple. No obvious neck masses.  Cardiovascular: Regular rate/rhythm; no murmurs/rubs/gallops appreciated.  Respiratory: Lungs clear to auscultation bilaterally, no wheezing/rales/rhonchi.   Gastrointestinal: Normoactive bowel sounds. Abdomen soft, non-tender, without guarding/rebound.  Skin: No rash, lesions, or petechiae of exposed skin.   Extremities: Peripheral pulses intact. No clubbing, cyanosis, or edema.  Psychiatric/mental status: Alert, without lethargy or stupor. Speech fluent. Appropriate affect. Mood normal. Able to follow commands without difficulty.     Musculoskeletal exam:  Gait/Station/Posture: ***  Normal stance, arm swing, and stride; no antalgia or Trendelenburg  Normal bulk and tone. Unremarkable spinal curvature. ASIS heights even. ***    Cervical spine:  Range of motion within normal limits ***  Myofascial tenderness: ***  No focal spinous process tenderness.   Spurling's negative bilaterally.      Lumbar spine:  Range of motion within normal limits ***   Rotation/ext to right: {PAINFUL/PAIN FREE:557404}   Rotation/ext to left: {PAINFUL/PAIN FREE:389913}  Myofascial tenderness:  ***  Focal tenderness: No SI joint, gluteal, piriformis, GT, or IT tenderness  SLR: " ***  Johnie's maneuver: ***    Hip exam:   normal internal and external range of motion bilaterally. KAE negative. Scour negative.     Shoulder exam: ***  No shoulder girdle wasting or scapular dyskinesis. No palmar muscle wasting. No tenderness of bicipital groove, AC, GH, or SC joints. Shoulder range of motion within normal limits. Mccarthy', Neers, and empty-can are negative.     Carpal Tunnel tests: ***  Tinel's negative  Phalen's negative     Neurologic exam:  CN:  Cranial nerves 2-12 are grossly intact  Motor Strength:  5/5 symmetric UE and LE strength, except for ***  Shoulder shrug (C4)  Shoulder abduction (deltoid C5,6)  Elbow flexion (bicepts C5,6)   Elbow extension (tricepts C7)  Finger abduction (C8)  Thumb flexion (C8)     Hip flexion (Iliosoas L1,2,3)  Hip extension (gluteus maxiumus L5, S1,2)   Hip abduction (gluteus medius L4,5, S1)  Knee flexion (hamstrings L5, S1-2)  Knee extension (quadricepts L2,3,4)  Ankle dorsiflexion (tibialis anterior L4,5)  Ankle plantarflexion (gastrocnemius, soleus S1-2)  Ankle eversion (peronei L5, S1)  Big toe extension (ext. Gibson lungus L5,S1)    Reflexes:     Biceps C5/6:     R:  ***/4 L: ***/4   Brachioradialis  C6: R:  ***/4 L: ***/4   Triceps C7/8:  R:  ***/4 L: ***/4   Patella L2,3,4:  R:  ***/4 L: ***/4   Achilles S1:  R:  ***/4 L: ***/4  Other reflexes:  Toes downgoing***   No ankle clonus  Sensory:  (upper and lower extremities):   Light touch: normal ***   Allodynia: absent ***   Hyperalgesia: absent ***      ASSESSMENT/PLAN:  The following recommendations were given to the patient. Diagnosis, treatment options, risks, benefits, and alternatives were discussed, and all questions were answered. The patient expressed understanding of the plan for management.     We discussed the comprehensive pain program and I am recommending a multidisciplinary treatment plan to help this patient better manage her pain.  This includes:     ***Maira Leon is a 81 year  old female is not a candidate for a comprehensive pain management program at this time. I believe the most urgent needed intervention is better management of her mental health. Multidisciplinary care can be reconsidered at a later date.           Deconditioning & fear avoidance  Maladaptive coping strategies - chemical coper  Mental Health - the patient's mental health concerns, specifically ***, affect her experience of pain and contribute to her clinically significant distress.  Physical Therapy: ***  Clinical Health Pain Psychologist: ***   1. This type of therapy can help reduce physical and psychosocial triggers or reinforcers of pain by adapting thoughts, feelings and behaviors to reduce symptoms and increase quality of life.  Evidence indicates that the practice of relaxation, meditation, and mindfulness techniques can significantly affect pain levels and overall well being.  self Care Recommendations: Alonso progressive exercise that does not increase pain - gradually increase daily walking program.  Take mini breaks - 5 minutes of mindfullness a couple times a day.   Diagnostic Studies: ***  Medication Management:   2. We discussed the use of chronic opioids for chronic pain and why I do not think they are indicated.  Specifically we talked about the development of tolerance over time, opioid induced hyperalgesia, sedation and cognitive impairment, sleep disordered breathing and risk of unintentional overdose and death.  I am recommending *** and this was discussed with the patient.   Urine toxicology screen today - ***  Opioid agreement signed - ***  procedures recommended: ***  Referrals: ***  Release of information: ***        MEDICATIONS:   {FOLLOW UP PLANS:413810}           FOLLOW UP: ***    Phone call contact time:   Call started at ***  Call ended at ***      BILLING TIME DOCUMENTATION:   The total TIME spent on this patient on the date of the encounter/appointment was *** minutes.      TOTAL TIME  includes:   Time spent preparing to see the patient (reviewing records and tests) - *** min  Time spent face to face (or over the phone) with the patient - *** min  Time spent ordering tests, medications, procedures and referrals - *** min  Time spent Referring and communicating with other healthcare professionals - *** min  Time spent documenting clinical information in Epic - *** min       WAYNE TOMLIN MD   Pain Management & Addiction Medicine

## 2021-05-27 ENCOUNTER — OFFICE VISIT (OUTPATIENT)
Dept: PALLIATIVE MEDICINE | Facility: CLINIC | Age: 81
End: 2021-05-27
Payer: COMMERCIAL

## 2021-05-27 VITALS
HEART RATE: 75 BPM | WEIGHT: 171 LBS | BODY MASS INDEX: 30.29 KG/M2 | SYSTOLIC BLOOD PRESSURE: 149 MMHG | DIASTOLIC BLOOD PRESSURE: 80 MMHG | OXYGEN SATURATION: 96 %

## 2021-05-27 DIAGNOSIS — M46.1 SACROILIITIS (H): Primary | ICD-10-CM

## 2021-05-27 DIAGNOSIS — M54.16 LUMBAR RADICULOPATHY: ICD-10-CM

## 2021-05-27 DIAGNOSIS — M54.50 RIGHT-SIDED LOW BACK PAIN WITHOUT SCIATICA, UNSPECIFIED CHRONICITY: ICD-10-CM

## 2021-05-27 PROCEDURE — 99203 OFFICE O/P NEW LOW 30 MIN: CPT | Performed by: ANESTHESIOLOGY

## 2021-05-27 ASSESSMENT — PAIN SCALES - GENERAL: PAINLEVEL: MODERATE PAIN (5)

## 2021-05-27 NOTE — PROGRESS NOTES
[facility] PROCEDURE ONLY Consultation    Date of visit: 5/27/2021    Reason for consultation:    Maira Leno is a 81 year old female who is seen in consultation today at the request of her primary care physician, Michelle Monet .     Consultation and Evaluation for: right sided low back and buttock pain    Review of Electronic Chart: Today I have also reviewed available medical information in the patient's medical record at [place] ([facility]), including relevant provider notes, laboratory work, and imaging.       Chief Complaint:    Chief Complaint   Patient presents with     Pain       Pain history:  Maira Leon is a 81 year old female with PMH of L3,4,5 fusion with decompressive laminectomy at L3-4 (2017, Dr. Buddy Davies) who presents for initial evaluation of chief pain complaint of low back pain and right leg pain.    Patient reports a history of axial low back pain consistent with neurogenic claudication between 2016-17, for which she went decompressive surgery with complete resolution of axial and radicular symptoms for 1-2 years. Unfortunately, over the last year, she has developed right lower back pain localized to the SI joint region as well as anteromedial thigh paresthesias as well as burning pain. She reports that each pain is roughly 50% of her pain, and describes being constant 4-5/10 aching, sharp pain in the low back with 4-5/10 neuropathic pain into the leg. Previously each of these pain seems to be intermittent, although they have turned into constant pains over the last 6-8 months. Additionally, patient has noticed some weakness into the right proximal leg, but denies any falls. Symptoms worsen when patient is standing upright and walking. Gradually she has lost her ability to ambulate, and can only ambulate one half of a mile even using a rest breaks prior to needing to stop completely. Patient was previously very  active and has goals of walking up to 2 miles by next summer, when she plans to visit [employer] countries.    Past Medical History:  Past Medical History:   Diagnosis Date     H/O arthroscopic knee surgery left    5/6/16     History of shingles 12/6/16     Hypertension      Low back pain     & radiates down left buttock & left leg     Need for prophylactic hormone replacement therapy (postmenopausal)      Nocturia     states she gets up every 2 hours at night to urinate     Osteoarthrosis, unspecified whether generalized or localized, unspecified site      Other and unspecified hyperlipidemia      POSTMENOPAUSAL HORMONAL REPLACEMENT TX 5/23/2005     Type II or unspecified type diabetes mellitus without mention of complication, not stated as uncontrolled        Past Surgical History:  Past Surgical History:   Procedure Laterality Date     APPENDECTOMY  age 4     ARTHROSCOPY KNEE Left 5/6/2016    Procedure: ARTHROSCOPY KNEE;  Surgeon: Ramin Ramirez MD;  Location:  OR     BACK SURGERY  01/20/2017    Lumbar fusion     C TOTAL HIP ARTHROPLASTY Left 2017     CATARACT IOL, RT/LT       ENT SURGERY      Tonsillectomy     GENITOURINARY SURGERY      bladder repair/sling     OPTICAL TRACKING SYSTEM FUSION SPINE POSTERIOR LUMBAR TWO LEVELS N/A 1/20/2017    Procedure: OPTICAL TRACKING SYSTEM FUSION SPINE POSTERIOR LUMBAR TWO LEVELS;  Surgeon: Buddy Davies MD;  Location: [facility] OR     PHACOEMULSIFICATION CLEAR CORNEA WITH STANDARD INTRAOCULAR LENS IMPLANT Right 2014     PHACOEMULSIFICATION CLEAR CORNEA WITH STANDARD INTRAOCULAR LENS IMPLANT Left 11/21/2017       Medications:  Current Outpatient Medications   Medication Sig Dispense Refill     aspirin 81 MG tablet Take 1 tablet (81 mg) by mouth daily 30 tablet 0     atorvastatin (LIPITOR) 40 MG tablet Take 1 tablet (40 mg) by mouth daily 90 tablet 3     blood glucose monitoring (NO BRAND SPECIFIED) meter device kit Use to test blood sugar daily or as  directed. 1 kit 0     Calcium Carb-Cholecalciferol (CALCIUM 500 +D) 500-400 MG-UNIT TABS Take 1 tablet by mouth daily 100 tablet 3     fish oil-omega-3 fatty acids (FISH OIL) 1000 MG capsule Take 4 g by mouth daily.       lisinopril (ZESTRIL) 2.5 MG tablet TAKE ONE TABLET BY MOUTH ONCE DAILY 90 tablet 3     metFORMIN (GLUCOPHAGE) 500 MG tablet Take 1 tablet (500 mg) by mouth at breakfast and 2 tablets (1000 mg) at dinner. 270 tablet 3     multivitamin (OCUVITE) TABS Take 1 tablet by mouth daily. 30 each 0     multivitamin, therapeutic with minerals (MULTI-VITAMIN) TABS tablet Take 1 tablet by mouth daily       ONETOUCH DELICA LANCETS 33G MISC USE TO TEST BLOOD SUGAR DAILY OR AS DIRECTED 100 each 3     ONETOUCH ULTRA test strip USE TO TEST BLOOD SUGARS DAILY OR AS DIRECTED 100 strip 3     polyethylene glycol (MIRALAX) powder Take 17 g (1 capful) by mouth 2 times daily as needed for constipation 510 g 0       Allergies:     Allergies   Allergen Reactions     No Known Drug Allergies        Family history:  Family History   Problem Relation Age of Onset     Osteoporosis Mother      Heart Disease Father      Cerebrovascular Disease Father      Musculoskeletal Disorder Father         gout     Genitourinary Problems Maternal Grandmother      Cancer Maternal Grandfather      Cancer Paternal Grandmother      Cerebrovascular Disease Paternal Grandfather      Eye Disorder Brother         macular degeneration     Macular Degeneration Brother      Muscular Dystrophy Brother      Diabetes No family hx of      Hypertension No family hx of        ROS:  Constitutional, neuro, ENT, endocrine, pulmonary, cardiac, gastrointestinal, genitourinary, musculoskeletal, integument and psychiatric systems are negative, except as otherwise noted.    Physical Exam:  Vitals:    05/27/21 1136   BP: (!) 149/80   Pulse: 75   SpO2: 96%   Weight: 77.6 kg (171 lb)     GEN:  NAD  CV:  Breathing room air comfortably  ABD:  Nontender, nondistended    EXT:  trace edema in bilateral lower extremities  SKIN:  No bruises, rashes, breakdown or visualized  PSYCH: Appropriate mood and congruent affect  NEURO/MSK: Alert and oriented, pleasant and logical in conversation. Strength grossly diminished in right hip flexion although preserved in knee extension, ankle dorsiflexion and plantarflexion. No left-sided strength deficit elicited. Reflexes 2/4 in the bilateral patella and Achilles. No clonus. Negative Babinski bilaterally.    Pain specific exam:  -Negative straight leg raise on the right. Positive reverse straight leg raise on the right. Negative straight leg raise on the left.  -Positive SI joint compression, distraction. Pain localized to the right SI joint and reproduced with palpation.  -No piriformis tenderness bilaterally.  -Prior midline lumbar scar well-healed. Hypertonic paraspinal musculature on the right more so than left. Nontender to palpation.    ASSESSMENT:  Maira Leon is a 81 year old female with PMH of L3,4,5 fusion with decompressive laminectomy at L3-4 (2017, Dr. Buddy Davies) who presents pain within the right SI joint region as well as chronic right L2-3 radiculopathy. As patient does not have an updated MRI since lumbar fusion in 2017, we will update imaging in order for interlaminar epidural steroid injection procedural preparation. In the meantime, patient would benefit from a right SI joint injection with corticosteroid. She was amenable to the plan at this time.    PLAN:  -Ordered right SI joint injection to be completed at next available  -Ordered MRI lumbar spine for updated epidural space evaluation at L2-3  -Will consider L2-3 interlaminar epidural steroid injection vs caudal AMANDA once MRI imaging is reviewed  -Consider referral back to neurosurgery Dr. Antonio Davies if imaging or lack of response to AMANDA in the future         --------------------------------------------------------  Júnior Silva, DO on 5/27/2021 12:32 PM    Pain Medicine Fellow     BILLING TIME DOCUMENTATION:   The total TIME spent on this patient on the date of the encounter/appointment was 32 minutes.      TOTAL TIME includes:   Time spent preparing to see the patient (reviewing records and tests) - 2 min  Time spent face to face (or over the phone) with the patient - 24 min  Time spent ordering tests, medications, procedures and referrals - 2 min  Time spent Referring and communicating with other healthcare professionals - 0 min  Time spent documenting clinical information in Epic - 4 min       WAYNE TOMLIN MD   Pain Management & Addiction Medicine

## 2021-05-27 NOTE — PATIENT INSTRUCTIONS
I ordered a RIGHT SI joint injection - they will call you to schedule this.     I ordered a lumbar MRI and I will call you with the results.  We may do a lumbar epidural steroid injection above your fusion or refer you back to Dr. Davies and neurosurgery.    Lelo Batista MD

## 2021-05-28 ENCOUNTER — TELEPHONE (OUTPATIENT)
Dept: PALLIATIVE MEDICINE | Facility: CLINIC | Age: 81
End: 2021-05-28

## 2021-05-28 NOTE — TELEPHONE ENCOUNTER
Screening Questions for Radiology Injections:    Injection to be done at which interventional clinic site? [facility] [place]    If [facility] location, tell patient that this procedure requires a COVID-19 lab test be done within 4 days of the procedure. Would you still like to move forward with scheduling the procedure?  Not Applicable   If YES, let patient know that someone will call them to schedule the COVID-19 test and that they will only receive a call back if the result is positive. Route to nursing to enter order.     Instruct patient to arrive as directed prior to the scheduled appointment time:    [place] minutes before      [place]: 30 minutes before; if IV needed 1 hour before     Procedure ordered by     Procedure ordered? RIGHT SI joint injection     Transforaminal Cervical AMANDA - no pain provider currently performing    As a reminder, receiving steroids can decrease your body's ability to fight infection.   Would you still like to move forward with scheduling the injection?  Yes    What insurance would patient like us to bill for this procedure? [employer]      Worker's comp or MVA (motor vehicle accident) -Any injection DO NOT SCHEDULE and route to Romina Ramos.      [employer] insurance - For SI joint injections, DO NOT SCHEDULE and route Romina Ramos.       ALL BCBS, [employer] and [employer]-Route to Romina for review DO NOT SCHEDULE      IF SCHEDULING IN [place] AND NEEDS A PA, IT IS OKAY TO SCHEDULE. [place] HANDLES THEIR OWN PA'S AFTER THE PATIENT IS SCHEDULED. PLEASE SCHEDULE AT LEAST 1 WEEK OUT SO A PA CAN BE OBTAINED.    Any chance of pregnancy? NO   If YES, do NOT schedule and route to RN [place]    Is an  needed? No     Patient has a drive home? (mandatory) YES: ok    Is patient taking any blood thinners (i.e. plavix, coumadin, jantoven, warfarin, heparin, pradaxa or dabigatran, etc)? No   If hold needed, do NOT schedule, route to RN pool     Is  patient taking any aspirin products (includes Excedrin and Fiorinal)? Yes - Pt takes 81mg daily; instructed to hold 0 day(s) prior to procedure.      If more than 325mg/day, OK to schedule; Instruct pt to decrease to less than 325 mg for 7 days AND route to RN pool    For CERVICAL procedures, hold all aspirin products for 6 days.     Tell pt that if aspirin product is not held for 6 days, the procedure WILL BE cancelled.      Does the patient have a bleeding or clotting disorder? No     If YES, okay to schedule AND route to RN nurse pool    For any patients with platelet count <100, must be forwarded to provider    Any allergies to contrast dye, iodine, shellfish, or numbing and steroid medications? No    If YES, add allergy information to appointment notes AND route to the RN pool     If AMANDA and Contrast Dye / Iodine Allergy? DO NOT SCHEDULE, route to RN pool    Allergies: No known drug allergies     Is patient diabetic?  Yes  If YES, instruct them to bring their glucometer.    Does patient have an active infection or treated for one within the past week? No     Is patient currently taking any antibiotics?  No     For patients on chronic, preventative, or prophylactic antibiotics, procedures may be scheduled.     For patients on antibiotics for active or recent infection:antibiotic course must have been completed for 4 days    Is patient currently taking any steroid medications? (i.e. Prednisone, Medrol)  No     For patients on steroid medications, course must have been completed for 4 days    Is patient actively being treated for cancer or immunocompromised? No  If YES, do NOT schedule and route to RN pool     Are you able to get on and off an exam table with minimal or no assistance? Yes  If NO, do NOT schedule and route to RN pool    Are you able to roll over and lay on your stomach with minimal or no assistance? Yes  If NO, do NOT schedule and route to RN pool     Has the patient had a flu shot or any other  vaccinations within 7 days before or after the procedure.  No     Have you recently had a COVID vaccine or have plans to get it in the near future? No    If yes, explain that for the vaccine to work best they need to:       wait 1 week before and 1 week after getting Vaccine #1    wait 1 week before and 2 weeks after getting Vaccine #2    If patient has concerns about the timing, send to RN pool     Does patient have an MRI/CT?  Not Applicable  Check Procedure Scheduling Grid to see if required.      Was the MRI done within the last 3 years?  NA    If yes, where was the MRI done i.e.[facility], [facility], [place], [facility] etc?       If no, do not schedule and route to RN pool    If MRI was not done at [place], [facility] or [facility] do NOT schedule and route to RN pool.      If pt has an imaging disc, the injection MAY be scheduled but pt has to bring disc to appt.     If they show up without the disc the injection cannot be done    Procedure Specific Instructions:      If celiac plexus block, informed patient NPO for 6 hours and that it is okay to take medications with sips of water, especially blood pressure medications  Not Applicable         If this is for a cervical procedure, informed patient that aspirin needs to be held for 6 days.   Not Applicable      If IV needed:    Do not schedule procedures requiring IV placement in the first appointment of the day or first appointment after lunch. Do NOT schedule at 0745, 0815 or 1245. ok    Instructed pt to arrive 30 minutes early for IV start if required. (Check Procedure Scheduling Grid)  YES: ok    Reminders:      If you are started on any steroids or antibiotics between now and your appointment, you must contact us because the procedure may need to be cancelled.  Yes      For all procedures except radiofrequency ablations (RFAs) and spinal cord stimulator (SCS) trials, informed patient:    IV sedation is not provided for this procedure.  If you feel  that an oral anti-anxiety medication is needed, you can discuss this further with your referring provider or primary care provider.  The Pain Clinic provider will discuss specifics of what the procedure includes at your appointment.  Most procedures last 10-20 minutes.  We use numbing medications to help with any discomfort during the procedure.  Not Applicable      For patients 85 or older we recommend having an adult stay w/ them for the remainder of the day.   ok    Does the patient have any questions?  NO  Leilani Pepe  [facility]

## 2021-06-08 ENCOUNTER — RADIOLOGY INJECTION OFFICE VISIT (OUTPATIENT)
Dept: PALLIATIVE MEDICINE | Facility: CLINIC | Age: 81
End: 2021-06-08
Payer: COMMERCIAL

## 2021-06-08 VITALS — OXYGEN SATURATION: 96 % | SYSTOLIC BLOOD PRESSURE: 141 MMHG | HEART RATE: 73 BPM | DIASTOLIC BLOOD PRESSURE: 75 MMHG

## 2021-06-08 DIAGNOSIS — M53.3 SI (SACROILIAC) JOINT DYSFUNCTION: ICD-10-CM

## 2021-06-08 PROCEDURE — 27096 INJECT SACROILIAC JOINT: CPT | Mod: RT | Performed by: PAIN MEDICINE

## 2021-06-08 ASSESSMENT — PAIN SCALES - GENERAL: PAINLEVEL: MODERATE PAIN (4)

## 2021-06-08 NOTE — PROGRESS NOTES
Pre procedure Diagnosis: SI joint dysfunction    Post procedure Diagnosis: Same  Procedure performed: right SI joint injection  Anesthesia: none  Complications: None  Operators: Abilio Rivera MD     Indications:   Maira Leon is a 81 year old female. The patient has a history of right buttocks pain in the setting of prior lumbar surgery. Other conservative treatments prior to injection include meds/PT/injections/surgery.    Options/alternatives, benefits and risks were discussed with the patient including bleeding, infection, tissue trauma, exposure to radiation, reaction to medications including seizure, nerve injury, weakness, and numbness.  Questions were answered to her satisfaction and she agrees to proceed. Voluntary informed consent was obtained and signed.     Vitals were reviewed: Ye  Allergies were reviewed:  Yes   Medications were reviewed:  Yes   Pre-procedure pain score: 8/10    Procedure:  After obtaining signed informed consent, the patient was brought into the procedure suite and was placed in a prone position on the procedure table.   A Pause for the Cause was performed.  The patient was prepped and draped in the usual sterile fashion.     After identifying the right SI joint, the C-arm was rotated obliquely to obtain the best view of the inferior angle of the joint.  Then 5 ml of Lidocaine 1%  was used to anesthetize the skin at a skin entry site coaxial with the fluoroscopy beam at this location.  A 22 gauge 3.5 inch needle was advanced under intermittent fluoroscopy until it was felt to enter the SI joint.  Aspiration was negative.    A total of 1ml of Omnipaque-300 was injected, confirming appropriate position, with spread into the intraarticular space, with no intravascular uptake noted.  9ml of Omnipaque was wasted. Location was verified in lateral view.    2 ml of 0.5% bupivacaine with 40mg of Kenalog was injected.  The needle was removed.     Hemostasis was achieved, the area was  cleaned, and bandaids were placed when appropriate.  The patient tolerated the procedure well, and was taken to the recovery room.  Images were saved to PACS.    Post-procedure pain score: 5/10  Follow-up includes:   -f/u phone call in one week  -f/u with referring Physician     Abilio Rivera MD  [facility] [place]

## 2021-06-08 NOTE — PATIENT INSTRUCTIONS
[facility]   Procedure Discharge Instructions    Today you saw:  Dr. Abilio Rivera     You had an:    sacroiliac joint injection        Be cautious when walking. Numbness and/or weakness in the lower extremities may occur for up to 6-8 hours after the procedure due to effect of the local anesthetic    Do not drive for 6 hours. The effect of the local anesthetic could slow your reflexes.     You may resume your regular activities after 24 hours    Avoid strenuous activity for the first 24 hours    You may shower, however avoid swimming, tub baths or hot tubs for 24 hours following your procedure    You may have a mild to moderate increase in pain for several days following the injection.    It may take up to 14 days for the steroid medication to start working although you may feel the effect as early as a few days after the procedure.       You may use ice packs for 10-15 minutes, 3 to 4 times a day at the injection site for comfort    Do not use heat to painful areas for 6 to 8 hours. This will give the local anesthetic time to wear off and prevent you from accidentally burning your skin.     Unless you have been directed to avoid the use of anti-inflammatory medications (NSAIDS), you may use medications such as ibuprofen, Aleve or Tylenol for pain control if needed.     If you were fasting, you may resume your normal diet and medications after the procedure    If you have diabetes, check your blood sugar more frequently than usual as your blood sugar may be higher than normal for 10-14 days following a steroid injection. Contact your doctor who manages your diabetes if your blood sugar is higher than usual    Possible side effects of steroids that you may experience include flushing, elevated blood pressure, increased appetite, mild headaches and restlessness.  All of these symptoms will get better with time.    If you experience any of the following, call the Pain Clinic during  work hours (Mon-Friday 8-4:30 pm) at [phone] or the Provider Line after hours at [phone]:  -Fever over 100 degree F  -Swelling, bleeding, redness, drainage, warmth at the injection site  -Progressive weakness or numbness in your legs or arms  -Loss of bowel or bladder function  -Unusual headache that is not relieved by Tylenol or other pain reliever  -Unusual new onset of pain that is not improving

## 2021-06-08 NOTE — NURSING NOTE
Pre-procedure Intake    Have you been fasting? NA    If yes, for how long?     Are you taking a prescribed blood thinner such as coumadin, Plavix, Xarelto?    No    If yes, when did you take your last dose?     Do you take aspirin?  Yes     If cervical procedure, have you held aspirin for 6 days?   No     Do you have any allergies to contrast dye, iodine, steroid and/or numbing medications?  NO    Are you currently taking antibiotics or have an active infection?  NO    Have you had a fever/elevated temperature within the past week? NO    Are you currently taking oral steroids? NO    Do you have a ? Yes       Are you pregnant or breastfeeding?  NO    Have you received the COVID-19 vaccine? Yes       If yes, was it your 1st, 2nd or only dose needed? 2nd dose     Date of most recent vaccine: on March     Mauricio Perez MA      Notify provider and RNs if systolic BP >170, diastolic BP >100, P >100 or O2 sats < 90%

## 2021-06-08 NOTE — NURSING NOTE
Discharge Information    IV Discontiued Time:  NA    Amount of Fluid Infused:  NA    Discharge Criteria = When patient returns to baseline or as per MD order    Consciousness:  Pt is fully awake    Circulation:  BP +/- 20% of pre-procedure level    Respiration:  Patient is able to breathe deeply    O2 Sat:  Patient is able to maintain O2 Sat >92% on room air    Activity:  Moves 4 extremities on command    Ambulation:  Patient is able to stand and walk or stand and pivot into wheelchair    Dressing:  Clean/dry or No Dressing    Notes:   Discharge instructions and AVS given to patient    Patient meets criteria for discharge?  YES    Admitted to PCU?  No    Responsible adult present to accompany patient home?  Yes    Signature/Title:    ghada valentin RN  RN Care Coordinator  [facility] [place]

## 2021-06-09 ENCOUNTER — ANCILLARY PROCEDURE (OUTPATIENT)
Dept: MRI IMAGING | Facility: CLINIC | Age: 81
End: 2021-06-09
Attending: ANESTHESIOLOGY
Payer: COMMERCIAL

## 2021-06-09 DIAGNOSIS — M54.16 LUMBAR RADICULOPATHY: ICD-10-CM

## 2021-06-09 PROCEDURE — 72148 MRI LUMBAR SPINE W/O DYE: CPT | Mod: TC | Performed by: RADIOLOGY

## 2021-06-21 ENCOUNTER — TELEPHONE (OUTPATIENT)
Dept: FAMILY MEDICINE | Facility: CLINIC | Age: 81
End: 2021-06-21

## 2021-06-21 ENCOUNTER — TELEPHONE (OUTPATIENT)
Dept: PALLIATIVE MEDICINE | Facility: CLINIC | Age: 81
End: 2021-06-21

## 2021-06-21 DIAGNOSIS — M54.42 CHRONIC BILATERAL LOW BACK PAIN WITH BILATERAL SCIATICA: Primary | ICD-10-CM

## 2021-06-21 DIAGNOSIS — R29.818 NEUROGENIC CLAUDICATION: ICD-10-CM

## 2021-06-21 DIAGNOSIS — M54.41 CHRONIC BILATERAL LOW BACK PAIN WITH BILATERAL SCIATICA: Primary | ICD-10-CM

## 2021-06-21 DIAGNOSIS — G89.29 CHRONIC BILATERAL LOW BACK PAIN WITH BILATERAL SCIATICA: Primary | ICD-10-CM

## 2021-06-21 NOTE — TELEPHONE ENCOUNTER
RN spoke with patient who was cheerful at time of phone call.     Dr. Buddy Davies is now practicing at [facility]. Patient prefers to stay with this provider. She will call her insurance company to see if [facility] is within network and/or if a referral is needed.     Patient asked for Clinked message to be sent with new [facility] contact information. Code [place] sent    If not in network, she will call clinic back and ask for referral for new ortho/spine provider within [facility].     Belkys Foley, RN, BSN, PHN  [facility]: [place]

## 2021-06-21 NOTE — TELEPHONE ENCOUNTER
Reason for call:  Other   Patient called regarding (reason for call): Patient was seen by pain medicine. The RN called her.     They referred her back to Dr. Davies. Though he is no longer with in [place]. She upset and confused on what she should do to stay within [facility].     She needs a new referral for a spine orthopedic doctor per pain clinic's recommendations. Patient is tearful and upset. And would like a call Back.     Additional comments:   Phone number to reach patient: [phone] home phone/ or cell phone [phone]    Best Time:      Can we leave a detailed message on this number?  YES    Travel screening: Not Applicable

## 2021-06-21 NOTE — TELEPHONE ENCOUNTER
Called Nadya. She was just running her garbage out when Dr Burton called.  Informed her of Dr Maza information listed below.  Pt will follow up with Dr Davies due to significant changes to the MRI.    Jennifer Coyne RN  Care Coordinator  [facility]

## 2021-06-21 NOTE — TELEPHONE ENCOUNTER
Attempt #1 to call patient.     RN left detailed VM informing that referral had been placed by PCP, which will be in patient's chart and ready for whichever provider she chooses to schedule with. Encouraged call back if further questions/concerns.    To clarify prior message, patient ultimately would prefer to stay with Dr. Davies, only if approved by insurance as in-network. She was under the impression that Dr. Davies had left [facility] and was no longer practicing in initial message. If [facility] is not in network, she plans to establish care with a [facility] provider.     Routing to PCP as FYI.    Belkys Foley, RN, BSN, PHN  [facility]: [place]

## 2021-06-21 NOTE — TELEPHONE ENCOUNTER
Reason for call:  Other   Patient called regarding (reason for call): call back  Additional comments: Pt calling to state that she would like to speak to the care team in regards to her MRI Results.    Phone number to reach patient:  Cell number on file:    Telephone Information:   Mobile [phone]       Best Time:  anytime    Can we leave a detailed message on this number?  YES    Travel screening: Not Applicable     Flaca ALLEN    [facility]

## 2021-06-21 NOTE — TELEPHONE ENCOUNTER
Calling Nadya. She came in to have the Right hip injection done 06/08/2021.   Pt had the MRI done 06/09/2021. She is not sure what the MRI results are.  Depending on the results, should she come back here for a left hip injection or does she need to follow up with Dr Davies (orthopedic)    Routing to provider to review and advise    Jennifer Coyne RN  Care Coordinator  [facility]

## 2021-06-21 NOTE — TELEPHONE ENCOUNTER
Can you clarify for me?   The initial message says she wants to stay within [facility].  The second message says she wants to stay with Dr. Davies.    I absolutely want to help her get the best relief of her pain.  I will provide a referral with both options.  She has some history with Dr. Davies - so if that is her preferred option due to past relationship I can support that.    But her records are most easily available within [facility] -so if that is her preference I also can support that.    Currently the referral - either way - goes to the Spine team  - who I believe can provide her with [facility] information if she prefers to go with Dr. Davies.    So I will submit the referral, but please let her know.

## 2021-06-21 NOTE — TELEPHONE ENCOUNTER
I tried to call the patient myself but she did not answer.     I do not know why I never got the results of her MRI sent to me.  Send my apologies to the patient.        I ordered the MRI because in order to do a Lumbar AMANDA we need one in the last 3 years and her last MRI was from 2016 (pre lumbar fusion with dr. Davies).      I think her pain is a combination of right SI joint pain (for which she had the injection on 6/8 with snitzer) and right L2-3 radiculopathy.  I was hoping to get the MRI so we could do an epidural at the L2-3 level.     However, unfortunately, there has been a significant amount of change noted on her MRI and she has some severe changes at the level above her fusion (L2-3) including central narrowing, narrowing where the nerve roots exit and severe facet joint arthritis.  Doing an injection at this level is not possible nor do I believe it will be helpful.      I am going to recommend that she follow up with Dr. Davies.  He may recommend extending her fusion up one level.     Lelo Batista MD

## 2021-06-28 ENCOUNTER — TRANSCRIBE ORDERS (OUTPATIENT)
Dept: OTHER | Age: 81
End: 2021-06-28

## 2021-06-28 ENCOUNTER — TRANSFERRED RECORDS (OUTPATIENT)
Dept: HEALTH INFORMATION MANAGEMENT | Facility: CLINIC | Age: 81
End: 2021-06-28

## 2021-06-28 DIAGNOSIS — M54.16 LUMBAR RADICULAR PAIN: Primary | ICD-10-CM

## 2021-07-16 ENCOUNTER — RADIOLOGY INJECTION OFFICE VISIT (OUTPATIENT)
Dept: PALLIATIVE MEDICINE | Facility: CLINIC | Age: 81
End: 2021-07-16
Payer: COMMERCIAL

## 2021-07-16 VITALS
SYSTOLIC BLOOD PRESSURE: 129 MMHG | OXYGEN SATURATION: 97 % | HEART RATE: 74 BPM | DIASTOLIC BLOOD PRESSURE: 75 MMHG | RESPIRATION RATE: 16 BRPM

## 2021-07-16 DIAGNOSIS — M54.16 LUMBAR RADICULOPATHY: ICD-10-CM

## 2021-07-16 DIAGNOSIS — M54.16 LUMBAR RADICULAR PAIN: ICD-10-CM

## 2021-07-16 PROCEDURE — 64483 NJX AA&/STRD TFRM EPI L/S 1: CPT | Mod: RT | Performed by: PAIN MEDICINE

## 2021-07-16 RX ORDER — DEXAMETHASONE SODIUM PHOSPHATE 10 MG/ML
10 INJECTION INTRAMUSCULAR; INTRAVENOUS ONCE
Status: COMPLETED | OUTPATIENT
Start: 2021-07-16 | End: 2021-07-16

## 2021-07-16 RX ADMIN — DEXAMETHASONE SODIUM PHOSPHATE 10 MG: 10 INJECTION INTRAMUSCULAR; INTRAVENOUS at 15:28

## 2021-07-16 ASSESSMENT — PAIN SCALES - GENERAL: PAINLEVEL: MODERATE PAIN (4)

## 2021-07-16 NOTE — NURSING NOTE
Pre-procedure Intake    Have you been fasting? NA    If yes, for how long? NA    Are you taking a prescribed blood thinner such as coumadin, Plavix, Xarelto?    No    If yes, when did you take your last dose? NA    Do you take aspirin?  Yes -   ASA    If cervical procedure, have you held aspirin for 6 days?   NA    Do you have any allergies to contrast dye, iodine, steroid and/or numbing medications?  NO    Are you currently taking antibiotics or have an active infection?  NO    Have you had a fever/elevated temperature within the past week? NO    Are you currently taking oral steroids? NO    Do you have a ? Yes       Are you pregnant or breastfeeding?  NO    Have you received the COVID-19 vaccine? Yes       If yes, was it your 1st, 2nd or only dose needed? 2nd    Date of most recent vaccine: 02/18/21    Notify provider and RNs if systolic BP >170, diastolic BP >100, P >100 or O2 sats < 90%      Linda Martínez CMA ([facility])

## 2021-07-16 NOTE — PATIENT INSTRUCTIONS
[facility]   Procedure Discharge Instructions    Today you saw:  Dr. Abilio Rivera      You had an:  Lumbar Epidural steroid injection       Medications used:  Lidocaine   Bupivacaine   Dexamethasone Omnipaque                If you were holding your blood thinning medication, please restart taking it: N/A    Be cautious when walking. Numbness and/or weakness in the lower extremities may occur for up to 6-8 hours after the procedure due to effect of the local anesthetic    Do not drive for 6 hours. The effect of the local anesthetic could slow your reflexes.     You may resume your regular activities after 24 hours    Avoid strenuous activity for the first 24 hours    You may shower, however avoid swimming, tub baths or hot tubs for 24 hours following your procedure    You may have a mild to moderate increase in pain for several days following the injection.    It may take up to 14 days for the steroid medication to start working although you may feel the effect as early as a few days after the procedure.       You may use ice packs for 10-15 minutes, 3 to 4 times a day at the injection site for comfort    Do not use heat to painful areas for 6 to 8 hours. This will give the local anesthetic time to wear off and prevent you from accidentally burning your skin.     Unless you have been directed to avoid the use of anti-inflammatory medications (NSAIDS), you may use medications such as ibuprofen, Aleve or Tylenol for pain control if needed.     If you have diabetes, check your blood sugar more frequently than usual as your blood sugar may be higher than normal for 10-14 days following a steroid injection. Contact your doctor who manages your diabetes if your blood sugar is higher than usual    Possible side effects of steroids that you may experience include flushing, elevated blood pressure, increased appetite, mild headaches and restlessness.  All of these symptoms will get better  with time.    If you experience any of the following, call the Pain Clinic during work hours (Mon-Friday 8-4:30 pm) at [phone] or the Provider Line after hours at [phone]:  -Fever over 100 degree F  -Swelling, bleeding, redness, drainage, warmth at the injection site  -Progressive weakness or numbness in your legs  -Loss of bowel or bladder function  -Unusual new onset of pain that is not improving

## 2021-07-16 NOTE — PROGRESS NOTES
Pre procedure Diagnosis: lumbar radiculopathy, lumbar degenerative disc disease   Post procedure Diagnosis: Same  Procedure performed: lumbar transforaminal epidural steroid injection at right L2-3, fluoroscopically guided, contrast controlled  Anesthesia: none  Complications: none  Operators: Abilio Rivera MD     Indications:   Maira Leon is a 81 year old female.  They have a history of prior lumbar fusion now with worsening right-sided low back pain radiating to her anterior thigh.  Exam shows neg slump and they have tried conservative treatment including meds/pt/injections/surgery.    MRI     FINDINGS: There are five lumbar-type vertebral bodies assumed for the  purposes of this dictation. The tip of the conus terminates at the  level of L1. Probable thin central syrinx only partially visualized in  the lower thoracic spinal cord. 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     Postoperative changes with posterior maru and screw fixation from L3 to  L5 and intervertebral disc spacer at L4-L5. Hardware causes  susceptibility artifact and limits evaluation in those areas.  Posterior decompression laminectomies from L3 to L5.     Grade 1 retrolisthesis of L2 on L3. No definite loss of vertebral body  height. Apparent edema within the opposing L2 and L3 vertebral bodies  although evaluation is limited in this area given the metallic  artifact.     Level by level as follows:      T12-L1: No loss of disc height. No significant disc herniation. Normal  facets. No spinal canal or neural foraminal narrowing.      L1-L2: No loss of disc height. No significant disc herniation. Normal  facets. No spinal canal or neural foraminal narrowing.      L2-L3: Grade 1 retrolisthesis. Marked loss of disc height with  degenerative endplate changes. Circumferential disc bulge with  endplate osteophytic spurring. Marked bilateral facet hypertrophy  with  thickening of the ligamentum flavum. Severe spinal canal narrowing.  Severe right neural foraminal narrowing. Moderate left neural  foraminal narrowing.      L3-L4: Postoperative changes at this level with hardware that limits  evaluation. Posterior decompression. No spinal canal or neural  foraminal narrowing appreciated.      L4-L5: Postoperative changes at this level with hardware that limits  evaluation. Posterior decompression. No spinal canal or neural  foraminal narrowing appreciated.      L5-S1: Mild loss of disc height. No significant disc herniation.  Marked bilateral facet hypertrophy. No spinal canal narrowing. No  significant neural foraminal narrowing.      Paraspinous soft tissues: Unremarkable.                                                                       IMPRESSION:    1. Severe degenerative changes at the L2-L3 level above the fusion  which have markedly progressed since 9/16/2016. There is severe spinal  canal narrowing as well as severe right and moderate left neural  foraminal narrowing at this level. This is secondary to  retrolisthesis, disc bulge with osteophytic spurring, and marked facet  hypertrophy.  2. Postoperative changes from L3 to L5 as detailed above. No residual  spinal canal or neural foraminal narrowing at these levels which is  markedly improved compared to MR 9/16/2016 particularly at the L4-L5  level.  3. 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   4. No other significant spinal canal or neural foraminal narrowing in  the lumbar spine. Marked facet hypertrophy at L5-S1 which is not  significantly changed.   Options/alternatives, benefits and risks were discussed with the patient including bleeding, infection, tissue trauma, numbness, weakness, paralysis, spinal cord injury, radiation exposure, headache and reaction to medications. Questions were answered to her satisfaction and she  agrees to proceed. Voluntary informed consent was obtained and signed.     Vitals were reviewed: Yes  LMP  (LMP Unknown)   Allergies were reviewed:  Yes   Medications were reviewed:  Yes   Pre-procedure pain score: 4/10    Procedure:  After getting informed consent, patient was brought into the procedure suite and was placed in a prone position on the procedure table.   A Pause for the Cause was performed.  Patient was prepped and draped in sterile fashion.     After identifying the right L2-3 neuroforamen, the C-arm was rotated to a right lateral oblique angle.  A total of 4ml of Lidocaine 1% was used to anesthetize the skin and the needle track at a skin entry site coaxial with the fluoroscopy beam, and overriding the superior aspect of the neuroforamen.  A 22 gauge 3.5 inch spinal needle was advanced under intermittent fluoroscopy until it entered the foramen superiorly.    The position was then inspected from anteroposterior and lateral views, and the needle adjusted appropriately.  A total of 1ml of Omnipaque-300 was injected, confirming appropriate position, with spread into the nerve root sheath and the epidural space, with no intravascular uptake. 9ml was wasted    Then, after repeated negative aspiration, a combination of Decadron 10 mg, 0.25% bupivacaine 2 ml, diluted with 3ml of normal saline was injected.     Hemostasis was achieved, the area was cleaned, and bandaids were placed when appropriate.  The patient tolerated the procedure well, and was taken to the recovery room.    Images were saved to PACS.    Post-procedure pain score: 0/10  Follow-up includes:   -f/u phone call in one week  -f/u with referring provider    Abilio Rivera MD  [facility] [place]

## 2021-07-16 NOTE — NURSING NOTE
Discharge Information    IV Discontiued Time:  NA    Amount of Fluid Infused:  NA    Discharge Criteria = When patient returns to baseline or as per MD order    Consciousness:  Pt is fully awake    Circulation:  BP +/- 20% of pre-procedure level    Respiration:  Patient is able to breathe deeply    O2 Sat:  Patient is able to maintain O2 Sat >92% on room air    Activity:  Moves 4 extremities on command    Ambulation:  Patient is able to stand and walk or stand and pivot into wheelchair    Dressing:  Clean/dry or No Dressing    Notes:   Discharge instructions and AVS given to patient    Patient meets criteria for discharge?  YES    Admitted to PCU?  No    Responsible adult present to accompany patient home?  Yes    Signature/Title:    Alvaro Martinez RN  RN Care Coordinator  [facility] [place]

## 2021-08-18 ENCOUNTER — OFFICE VISIT (OUTPATIENT)
Dept: DERMATOLOGY | Facility: CLINIC | Age: 81
End: 2021-08-18
Payer: COMMERCIAL

## 2021-08-18 DIAGNOSIS — L82.1 SEBORRHEIC KERATOSIS: ICD-10-CM

## 2021-08-18 DIAGNOSIS — D22.9 MULTIPLE BENIGN NEVI: ICD-10-CM

## 2021-08-18 DIAGNOSIS — L57.8 SUN-DAMAGED SKIN: ICD-10-CM

## 2021-08-18 DIAGNOSIS — D18.01 CHERRY ANGIOMA: ICD-10-CM

## 2021-08-18 DIAGNOSIS — Z85.828 HISTORY OF NONMELANOMA SKIN CANCER: Primary | ICD-10-CM

## 2021-08-18 DIAGNOSIS — L57.0 ACTINIC KERATOSIS: ICD-10-CM

## 2021-08-18 DIAGNOSIS — L81.4 SOLAR LENTIGO: ICD-10-CM

## 2021-08-18 DIAGNOSIS — D36.10 NEUROFIBROMA: ICD-10-CM

## 2021-08-18 PROCEDURE — 17000 DESTRUCT PREMALG LESION: CPT | Performed by: DERMATOLOGY

## 2021-08-18 PROCEDURE — 99213 OFFICE O/P EST LOW 20 MIN: CPT | Mod: 25 | Performed by: DERMATOLOGY

## 2021-08-18 NOTE — LETTER
8/18/2021         RE: Maira Leon  [address] [address]        Dear Colleague,    Thank you for referring your patient, Maira Leon, to the [facility]. Please see a copy of my visit note below.    [facility] Dermatology Note  Encounter Date: Aug 18, 2021  Office Visit     Dermatology Problem List:  Last skin check 8/18/21, recommended yearly.  1. 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 Enjoys water color painting. Plays golf. Splits time between [place] and [place].  Family History: Negative for skin cancer.  ____________________________________________    Assessment & Plan:     # History of NMSC. No evidence of recurrence.   - Recommend sunscreens SPF #30 or greater, protective clothing and avoidance of tanning beds.   - Recommended yearly skin exams.    # Sun damaged skin with solar lentigines: Chronic, stable.  - Recommend sunscreens SPF #30 or greater, protective clothing and avoidance of tanning beds.    # Benign skin findings including: seborrheic keratoses, cherry angioma, neurofibroma - minor, self limited problem  - No further intervention required. Patient to report changes.   - Patient reassured of the benign nature of these lesions.    # Multiple clinically benign nevi: Chronic, stable  - No further intervention required. Patient to report changes.   - Patient reassured of the benign nature of these lesions.    # Actinic Keratosis - left cheek x1.  - Discussed precancerous nature of these lesions. Recommended treatment to prevent progression to a squamous cell carcinoma. Advised patient to call for follow up if not resolved in 1 month.   - See cryotherapy procedure note below.    Procedures Performed:   - Cryotherapy procedure note, location: left cheek. After verbal consent and discussion of  risks and benefits including, but not limited to, dyspigmentation/scar, blister, and pain, 1 AK was treated with 1-2 mm freeze border for 1-2 cycles with liquid nitrogen. Post cryotherapy instructions were provided.    Follow-up: 1 year in-person for skin check, or earlier for new or changing lesions.    Staff and Scribe:     Scribe Disclosure:   I, Shwetaedilson Gore, am serving as a scribe to document services personally performed by this physician, Dr. Tere Sutton, based on data collection and the provider's statements to me.     Provider Disclosure:   The documentation recorded by the scribe accurately reflects the services I personally performed and the decisions made by me.    Tere Sutton MD    Department of Dermatology  [facility]: Phone: [phone], Fax:[phone]  [facility]: Phone: [phone], Fax: [phone]    ____________________________________________    CC: Skin Check (full skin check. No areas of concern)    HPI:  Ms. Maira Leon is a(n) 81 year old female who presents today as a return patient for skin check.    Last seen 2/2/21 for ED&C of SCCis on the R medial chest.    Today, Nadya notes no areas of concern.     Patient is otherwise feeling well, without additional skin concerns.     Labs Reviewed:  N/A    Physical Exam:  Vitals: LMP  (LMP Unknown)   SKIN: Total skin excluding the undergarment areas was performed. The exam included the head/face, neck, both arms, chest, back, abdomen, buttocks, both legs, digits and/or nails.   - Love Type II  - Neurofibroma on the left mid back.   - Scattered brown macules on sun exposed areas.  - There are dome shaped bright red papules on the trunk and extremities.   - Multiple regular brown pigmented macules and papules are identified on the trunk and extremities.   - There are waxy stuck on tan to brown  papules on the trunk and extremities.   - There is an erythematous macule with overyling adherent scale on the left cheek x1.  - Right medial chest: well healed circular scar. No telangiectasias, no pearly appearance.  - No other lesions of concern on areas examined.       Medications:  Current Outpatient Medications   Medication     aspirin 81 MG tablet     atorvastatin (LIPITOR) 40 MG tablet     Calcium Carb-Cholecalciferol (CALCIUM 500 +D) 500-400 MG-UNIT TABS     fish oil-omega-3 fatty acids (FISH OIL) 1000 MG capsule     lisinopril (ZESTRIL) 2.5 MG tablet     metFORMIN (GLUCOPHAGE) 500 MG tablet     multivitamin (OCUVITE) TABS     multivitamin, therapeutic with minerals (MULTI-VITAMIN) TABS tablet     ONETOUCH DELICA LANCETS 33G MISC     ONETOUCH ULTRA test strip     blood glucose monitoring (NO BRAND SPECIFIED) meter device kit     polyethylene glycol (MIRALAX) powder     No current facility-administered medications for this visit.      Past Medical History:   Patient Active Problem List   Diagnosis     Osteoarthritis     Hyperlipidemia LDL goal <100     CKD (chronic kidney disease) stage 3, GFR 30-59 ml/min     Diabetes mellitus with stage 3 chronic kidney disease (H)     Primary osteoarthritis of left knee     Lumbar spinal stenosis     Osteopenia of multiple sites     Superior glenoid labrum lesion of left shoulder     Tear of left supraspinatus tendon     Arthritis of left acromioclavicular joint     Incomplete tear of left rotator cuff     Osteoarthritis of left hip, unspecified osteoarthritis type     Past Medical History:   Diagnosis Date     H/O arthroscopic knee surgery left    5/6/16     History of shingles 12/6/16     Hypertension      Low back pain     & radiates down left buttock & left leg     Need for prophylactic hormone replacement therapy (postmenopausal)      Nocturia     states she gets up every 2 hours at night to urinate     Osteoarthrosis, unspecified whether generalized or localized,  unspecified site      Other and unspecified hyperlipidemia      POSTMENOPAUSAL HORMONAL REPLACEMENT TX 5/23/2005     Type II or unspecified type diabetes mellitus without mention of complication, not stated as uncontrolled        CC Referred Self, MD  No address on file on close of this encounter.        Again, thank you for allowing me to participate in the care of your patient.        Sincerely,        Tere Sutton MD

## 2021-08-18 NOTE — PATIENT INSTRUCTIONS
Cryotherapy    What is it?    Use of a very cold liquid, such as liquid nitrogen, to freeze and destroy abnormal skin cells that need to be removed    What should I expect?    Tenderness and redness    A small blister that might grow and fill with dark purple blood. There may be crusting.    More than one treatment may be needed if the lesions do not go away.    How do I care for the treated area?    Gently wash the area with your hands when bathing.    Use a thin layer of Vaseline to help with healing. You may use a Band-Aid.     The area should heal within 7-10 days and may leave behind a pink or lighter color.     Do not use an antibiotic or Neosporin ointment.     You may take acetaminophen (Tylenol) for pain.     Call your doctor if you have:    Severe pain    Signs of infection (warmth, redness, cloudy yellow drainage, and or a bad smell)    Questions or concerns    Who should I call with questions?       [facility]: [phone]       [facility]: [phone]       For urgent needs outside of business hours call the [facility] at [phone] and ask for the dermatology resident on call

## 2021-08-18 NOTE — PROGRESS NOTES
[facility] Dermatology Note  Encounter Date: Aug 18, 2021  Office Visit     Dermatology Problem List:  Last skin check 8/18/21, recommended yearly.  1. 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 Enjoys water color painting. Plays golf. Splits time between [place] and [place].  Family History: Negative for skin cancer.  ____________________________________________    Assessment & Plan:     # History of NMSC. No evidence of recurrence.   - Recommend sunscreens SPF #30 or greater, protective clothing and avoidance of tanning beds.   - Recommended yearly skin exams.    # Sun damaged skin with solar lentigines: Chronic, stable.  - Recommend sunscreens SPF #30 or greater, protective clothing and avoidance of tanning beds.    # Benign skin findings including: seborrheic keratoses, cherry angioma, neurofibroma - minor, self limited problem  - No further intervention required. Patient to report changes.   - Patient reassured of the benign nature of these lesions.    # Multiple clinically benign nevi: Chronic, stable  - No further intervention required. Patient to report changes.   - Patient reassured of the benign nature of these lesions.    # Actinic Keratosis - left cheek x1.  - Discussed precancerous nature of these lesions. Recommended treatment to prevent progression to a squamous cell carcinoma. Advised patient to call for follow up if not resolved in 1 month.   - See cryotherapy procedure note below.    Procedures Performed:   - Cryotherapy procedure note, location: left cheek. After verbal consent and discussion of risks and benefits including, but not limited to, dyspigmentation/scar, blister, and pain, 1 AK was treated with 1-2 mm freeze border for 1-2 cycles with liquid nitrogen. Post cryotherapy instructions were provided.    Follow-up: 1 year in-person for skin check, or  earlier for new or changing lesions.    Staff and Scribe:     Scribe Disclosure:   I, Shweta Tongertin, am serving as a scribe to document services personally performed by this physician, Dr. Tere Sutton, based on data collection and the provider's statements to me.     Provider Disclosure:   The documentation recorded by the scribe accurately reflects the services I personally performed and the decisions made by me.    Tere Sutton MD    Department of Dermatology  [facility] [place] [facility]: Phone: [phone], Fax:[phone]  [facility]: Phone: [phone], Fax: [phone]    ____________________________________________    CC: Skin Check (full skin check. No areas of concern)    HPI:  Ms. Maira Leon is a(n) 81 year old female who presents today as a return patient for skin check.    Last seen 2/2/21 for ED&C of SCCis on the R medial chest.    Today, Nadya notes no areas of concern.     Patient is otherwise feeling well, without additional skin concerns.     Labs Reviewed:  N/A    Physical Exam:  Vitals: LMP  (LMP Unknown)   SKIN: Total skin excluding the undergarment areas was performed. The exam included the head/face, neck, both arms, chest, back, abdomen, buttocks, both legs, digits and/or nails.   - Love Type II  - Neurofibroma on the left mid back.   - Scattered brown macules on sun exposed areas.  - There are dome shaped bright red papules on the trunk and extremities.   - Multiple regular brown pigmented macules and papules are identified on the trunk and extremities.   - There are waxy stuck on tan to brown papules on the trunk and extremities.   - There is an erythematous macule with overyling adherent scale on the left cheek x1.  - Right medial chest: well healed circular scar. No telangiectasias, no pearly appearance.  - No other lesions of concern on areas examined.        Medications:  Current Outpatient Medications   Medication     aspirin 81 MG tablet     atorvastatin (LIPITOR) 40 MG tablet     Calcium Carb-Cholecalciferol (CALCIUM 500 +D) 500-400 MG-UNIT TABS     fish oil-omega-3 fatty acids (FISH OIL) 1000 MG capsule     lisinopril (ZESTRIL) 2.5 MG tablet     metFORMIN (GLUCOPHAGE) 500 MG tablet     multivitamin (OCUVITE) TABS     multivitamin, therapeutic with minerals (MULTI-VITAMIN) TABS tablet     ONETOUCH DELICA LANCETS 33G MISC     ONETOUCH ULTRA test strip     blood glucose monitoring (NO BRAND SPECIFIED) meter device kit     polyethylene glycol (MIRALAX) powder     No current facility-administered medications for this visit.      Past Medical History:   Patient Active Problem List   Diagnosis     Osteoarthritis     Hyperlipidemia LDL goal <100     CKD (chronic kidney disease) stage 3, GFR 30-59 ml/min     Diabetes mellitus with stage 3 chronic kidney disease (H)     Primary osteoarthritis of left knee     Lumbar spinal stenosis     Osteopenia of multiple sites     Superior glenoid labrum lesion of left shoulder     Tear of left supraspinatus tendon     Arthritis of left acromioclavicular joint     Incomplete tear of left rotator cuff     Osteoarthritis of left hip, unspecified osteoarthritis type     Past Medical History:   Diagnosis Date     H/O arthroscopic knee surgery left    5/6/16     History of shingles 12/6/16     Hypertension      Low back pain     & radiates down left buttock & left leg     Need for prophylactic hormone replacement therapy (postmenopausal)      Nocturia     states she gets up every 2 hours at night to urinate     Osteoarthrosis, unspecified whether generalized or localized, unspecified site      Other and unspecified hyperlipidemia      POSTMENOPAUSAL HORMONAL REPLACEMENT TX 5/23/2005     Type II or unspecified type diabetes mellitus without mention of complication, not stated as uncontrolled        CC Referred Self, MD  No address on file  on close of this encounter.

## 2021-10-29 ENCOUNTER — NURSE TRIAGE (OUTPATIENT)
Dept: FAMILY MEDICINE | Facility: CLINIC | Age: 81
End: 2021-10-29
Payer: COMMERCIAL

## 2021-10-29 ENCOUNTER — MYC MEDICAL ADVICE (OUTPATIENT)
Dept: FAMILY MEDICINE | Facility: CLINIC | Age: 81
End: 2021-10-29

## 2021-10-29 NOTE — TELEPHONE ENCOUNTER
If blood sugar elevation persists, we can adjust her metformin back up to 1000mg bid.  And I do see that she has a diabetes appointment in November.

## 2021-10-29 NOTE — TELEPHONE ENCOUNTER
Typically we would see someone with this type of injury.  Although typically we would see them shortly after the injury.  Is there a particular reason she was calling today? Rather than shortly after the injury?

## 2021-10-29 NOTE — TELEPHONE ENCOUNTER
Routing [employer] message to PCP      She definitely sounds better than she looks right now.  She reports not having any pain- please see detailed triage report.      Dorina Rizzo RN

## 2021-10-29 NOTE — TELEPHONE ENCOUNTER
"Attempted to reach pt back to review providers message below-  \"If blood sugar elevation persists, we can adjust her metformin back up to 1000mg bid.  And I do see that she has a diabetes appointment in November. \"    (Also see full triage message below and baljeet weeks sent from pt as well regarding fall).    Regarding baljeet weeks and fall provider wrote - \"Typically we would see someone with this type of injury. Although typically we would see them shortly after the injury.  Is there a particular reason she was calling today? Rather than shortly after the injury?\"    No answer, left micky to return call to [facility] at [phone].    Kathy Ziegler RN        "

## 2021-11-01 NOTE — TELEPHONE ENCOUNTER
Routing to PCP      With Sugars only around 144-154 do you want her to change Metformin to 1000 mg BID (currently takes 1500mg)        Dorina Rizzo RN

## 2021-11-02 NOTE — TELEPHONE ENCOUNTER
Routing My Chart message to PCP    Patient takes blood sugars first thing in the AM    Patient plans to increase her Metformin to 2 twice daily    Jay Jay Mittal RN, BSN, PHN  [facility]

## 2021-11-02 NOTE — TELEPHONE ENCOUNTER
Thank you.  That sounds great.  Her elevated fasting sugars may be related to recovery from her injury.  But metformin should help.

## 2021-11-23 ENCOUNTER — MYC MEDICAL ADVICE (OUTPATIENT)
Dept: FAMILY MEDICINE | Facility: CLINIC | Age: 81
End: 2021-11-23

## 2021-11-23 ENCOUNTER — OFFICE VISIT (OUTPATIENT)
Dept: FAMILY MEDICINE | Facility: CLINIC | Age: 81
End: 2021-11-23
Payer: COMMERCIAL

## 2021-11-23 VITALS
DIASTOLIC BLOOD PRESSURE: 74 MMHG | SYSTOLIC BLOOD PRESSURE: 138 MMHG | WEIGHT: 174 LBS | OXYGEN SATURATION: 96 % | HEART RATE: 107 BPM | HEIGHT: 62 IN | BODY MASS INDEX: 32.02 KG/M2 | TEMPERATURE: 98 F

## 2021-11-23 DIAGNOSIS — E78.5 HYPERLIPIDEMIA LDL GOAL <100: ICD-10-CM

## 2021-11-23 DIAGNOSIS — N18.30 DIABETES MELLITUS WITH STAGE 3 CHRONIC KIDNEY DISEASE (H): Primary | ICD-10-CM

## 2021-11-23 DIAGNOSIS — E11.22 DIABETES MELLITUS WITH STAGE 3 CHRONIC KIDNEY DISEASE (H): Primary | ICD-10-CM

## 2021-11-23 DIAGNOSIS — M15.0 PRIMARY OSTEOARTHRITIS INVOLVING MULTIPLE JOINTS: ICD-10-CM

## 2021-11-23 LAB
HBA1C MFR BLD: 6.2 % (ref 0–5.6)
HOLD SPECIMEN: NORMAL
HOLD SPECIMEN: NORMAL

## 2021-11-23 PROCEDURE — 83036 HEMOGLOBIN GLYCOSYLATED A1C: CPT | Performed by: FAMILY MEDICINE

## 2021-11-23 PROCEDURE — 99214 OFFICE O/P EST MOD 30 MIN: CPT | Performed by: FAMILY MEDICINE

## 2021-11-23 PROCEDURE — 80061 LIPID PANEL: CPT | Performed by: FAMILY MEDICINE

## 2021-11-23 PROCEDURE — 82043 UR ALBUMIN QUANTITATIVE: CPT | Performed by: FAMILY MEDICINE

## 2021-11-23 PROCEDURE — 80048 BASIC METABOLIC PNL TOTAL CA: CPT | Performed by: FAMILY MEDICINE

## 2021-11-23 PROCEDURE — 36415 COLL VENOUS BLD VENIPUNCTURE: CPT | Performed by: FAMILY MEDICINE

## 2021-11-23 ASSESSMENT — MIFFLIN-ST. JEOR: SCORE: 1207.51

## 2021-11-23 NOTE — PROGRESS NOTES
"  Assessment & Plan     (E11.22,  N18.30) Diabetes mellitus with stage 3 chronic kidney disease (H)  (primary encounter diagnosis)  Comment: improved control on increased dose of metformin  Plan: BASIC METABOLIC PANEL, HEMOGLOBIN A1C, Lipid         panel reflex to direct LDL Non-fasting, Albumin        Random Urine Quantitative with Creat Ratio,         metFORMIN (GLUCOPHAGE) 500 MG tablet        Continue current medication      (E78.5) Hyperlipidemia LDL goal <100  Comment:   Plan: Lipid panel reflex to direct LDL Non-fasting        adjust therapy based on labs      (M89.49) Primary osteoarthritis involving multiple joints  Comment: low back pain has flared up again almost 5 months out from last injection  Plan: patient plans to contact neurosurg to discuss next injection             BMI:   Estimated body mass index is 31.83 kg/m  as calculated from the following:    Height as of this encounter: 1.575 m (5' 2\").    Weight as of this encounter: 78.9 kg (174 lb).   Weight management plan: Discussed healthy diet and exercise guidelines    See Patient Instructions    Return in about 6 months (around 5/23/2022) for with me, Medicare Annual Wellness Visit, Diabetes, in person.    Michelle Barrios MD  [facility]    Elian Covington is a 81 year old who presents for the following health issues     HPI     Diabetes Follow-up    How often are you checking your blood sugar? One time daily  What time of day are you checking your blood sugars (select all that apply)?  in the morning before a meal  Have you had any blood sugars above 200?  No   Have you had any blood sugars below 70?  No    What concerns do you have today about your diabetes? None     Do you have any of these symptoms? (Select all that apply)  Numbness in feet and Burning in feet     Have you had a diabetic eye exam in the last 12 months?   Eye exam comin up on 11/30 with [facility]     Taking metformin 2 in the morning & 2 in the " afternoon. Been doing this since October 25th 2021  Average blood sugars 130's  Checks it every morning before eating.    Can still feel her toes, but her sensation in her feet has changed.  No tingling or burning sensation.  Seems to be symmetrical in both feet.  No symptoms in her hands.       Hyperlipidemia Follow-Up      Are you regularly taking any medication or supplement to lower your cholesterol?   Yes- Atorvastatin 40mg    Are you having muscle aches or other side effects that you think could be caused by your cholesterol lowering medication?  No    Hypertension Follow-up      Do you check your blood pressure regularly outside of the clinic? No  Does not own a BP machine    Are you following a low salt diet? Yes    Are your blood pressures ever more than 140 on the top number (systolic) OR more   than 90 on the bottom number (diastolic), for example 140/90?     BP Readings from Last 2 Encounters:   11/23/21 138/74   07/16/21 129/75     Hemoglobin A1C (%)   Date Value   11/23/2021 6.2 (H)   05/20/2021 6.2 (H)   11/24/2020 6.1 (H)     LDL Cholesterol Calculated (mg/dL)   Date Value   11/24/2020 69   11/26/2019 70           How many days per week do you exercise enough to make your heart beat faster? 7    How many minutes a day do you exercise enough to make your heart beat faster? back exercises & 5 k steps per day     How many days per week do you miss taking your medication? 0    Back pain-   Might make back injection with Dr de la torre  Last injection helped quite a bit for a month (had it in July 2021), and then gradually the pain returned.  Now pain is slightly less than when she had last injection. But has increased again.  Continues to do her home PT.   Has not been doing as much walking for exercise lately due to pain.    Bruising resolved after her fall.    Lives alone.  Has another home in [place] that she owns with two other friends.  She manages the finances of that other home.  Stays in close  "touch with niece and nephew.    Review of Systems   Constitutional, HEENT, cardiovascular, pulmonary, gi and gu systems are negative, except as otherwise noted.      Objective    /74 (BP Location: Right arm, Patient Position: Sitting, Cuff Size: Adult Regular)   Pulse 107   Temp 98  F (36.7  C) (Oral)   Ht 1.575 m (5' 2\")   Wt 78.9 kg (174 lb)   LMP  (LMP Unknown)   SpO2 96%   BMI 31.83 kg/m    Body mass index is 31.83 kg/m .  Physical Exam   GENERAL: healthy, alert and no distress  RESP: lungs clear to auscultation - no rales, rhonchi or wheezes  CV: regular rate and rhythm, normal S1 S2, no S3 or S4, no murmur, click or rub, no peripheral edema and peripheral pulses strong  MS: no gross musculoskeletal defects noted, no edema  SKIN: facial bruising from fall nearly resolved, except discoloration on upper cheeks  PSYCH: mentation appears normal, affect normal/bright  Diabetic foot exam: normal DP and PT pulses, no trophic changes or ulcerative lesions and normal sensory exam                "

## 2021-11-23 NOTE — PATIENT INSTRUCTIONS
You are due for a tetanus vaccine.  Due to your insurance, this is likely best covered through your pharmacy benefits.  Please see our pharmacist for a tetanus booster vaccine.

## 2021-11-24 LAB
ANION GAP SERPL CALCULATED.3IONS-SCNC: 9 MMOL/L (ref 3–14)
BUN SERPL-MCNC: 31 MG/DL (ref 7–30)
CALCIUM SERPL-MCNC: 9.8 MG/DL (ref 8.5–10.1)
CHLORIDE BLD-SCNC: 106 MMOL/L (ref 94–109)
CHOLEST SERPL-MCNC: 161 MG/DL
CO2 SERPL-SCNC: 24 MMOL/L (ref 20–32)
CREAT SERPL-MCNC: 0.83 MG/DL (ref 0.52–1.04)
GFR SERPL CREATININE-BSD FRML MDRD: 66 ML/MIN/1.73M2
GLUCOSE BLD-MCNC: 102 MG/DL (ref 70–99)
HDLC SERPL-MCNC: 66 MG/DL
LDLC SERPL CALC-MCNC: 72 MG/DL
NONHDLC SERPL-MCNC: 95 MG/DL
POTASSIUM BLD-SCNC: 4.1 MMOL/L (ref 3.4–5.3)
SODIUM SERPL-SCNC: 139 MMOL/L (ref 133–144)
TRIGL SERPL-MCNC: 113 MG/DL

## 2021-11-25 LAB
CREAT UR-MCNC: 60 MG/DL
MICROALBUMIN UR-MCNC: 11 MG/L
MICROALBUMIN/CREAT UR: 18.33 MG/G CR (ref 0–25)

## 2021-11-30 ENCOUNTER — OFFICE VISIT (OUTPATIENT)
Dept: OPHTHALMOLOGY | Facility: CLINIC | Age: 81
End: 2021-11-30
Payer: COMMERCIAL

## 2021-11-30 DIAGNOSIS — E11.9 TYPE 2 DIABETES MELLITUS WITHOUT OPHTHALMIC MANIFESTATIONS (H): Primary | ICD-10-CM

## 2021-11-30 DIAGNOSIS — H02.834 DERMATOCHALASIS OF BOTH UPPER EYELIDS: ICD-10-CM

## 2021-11-30 DIAGNOSIS — H52.4 PRESBYOPIA OF BOTH EYES: ICD-10-CM

## 2021-11-30 DIAGNOSIS — H52.13 MYOPIA, BILATERAL: ICD-10-CM

## 2021-11-30 DIAGNOSIS — H26.492 PCO (POSTERIOR CAPSULAR OPACIFICATION), LEFT: ICD-10-CM

## 2021-11-30 DIAGNOSIS — H02.831 DERMATOCHALASIS OF BOTH UPPER EYELIDS: ICD-10-CM

## 2021-11-30 DIAGNOSIS — Z96.1 PSEUDOPHAKIA, BOTH EYES: ICD-10-CM

## 2021-11-30 PROCEDURE — 92014 COMPRE OPH EXAM EST PT 1/>: CPT | Performed by: OPHTHALMOLOGY

## 2021-11-30 PROCEDURE — 92015 DETERMINE REFRACTIVE STATE: CPT | Performed by: OPHTHALMOLOGY

## 2021-11-30 ASSESSMENT — REFRACTION_WEARINGRX
SPECS_TYPE: SVL
OD_SPHERE: -1.00
OS_SPHERE: -1.50
OS_SPHERE: +2.75
OS_CYLINDER: SPHERE
OD_AXIS: 180
OS_CYLINDER: +1.50
OS_AXIS: 180
OD_CYLINDER: +2.50
OD_CYLINDER: SPHERE
OD_SPHERE: +2.75

## 2021-11-30 ASSESSMENT — CUP TO DISC RATIO
OS_RATIO: 0.2
OD_RATIO: 0.25

## 2021-11-30 ASSESSMENT — VISUAL ACUITY
OS_SC+: -1
OD_SC+: +2
OS_CC: J3
METHOD: SNELLEN - LINEAR
OD_CC: J2
OS_SC: 20/40
OS_PH_SC: 20/30
OD_SC: 20/50-1
OS_PH_SC+: -2
OD_PH_SC: 20/25

## 2021-11-30 ASSESSMENT — EXTERNAL EXAM - LEFT EYE: OS_EXAM: MILD DERMATOCHALASIS

## 2021-11-30 ASSESSMENT — REFRACTION_MANIFEST
OD_CYLINDER: +2.50
OS_ADD: +2.75
OS_AXIS: 178
OS_CYLINDER: +1.25
OS_SPHERE: -1.25
OD_SPHERE: -1.25
OD_AXIS: 006
OD_ADD: +2.75

## 2021-11-30 ASSESSMENT — TONOMETRY
OD_IOP_MMHG: 10
IOP_METHOD: ICARE
OS_IOP_MMHG: 11

## 2021-11-30 ASSESSMENT — CONF VISUAL FIELD: METHOD: COUNTING FINGERS

## 2021-11-30 NOTE — NURSING NOTE
Chief Complaints and History of Present Illnesses   Patient presents with     COMPREHENSIVE EYE EXAM     Diabetic      Chief Complaint(s) and History of Present Illness(es)     COMPREHENSIVE EYE EXAM     Laterality: both eyes    Associated symptoms: Negative for eye pain, redness, tearing and discharge    Treatments tried: no treatments    Pain scale: 0/10    Comments: Diabetic               Comments     Since last exam a year ago vision might be less. Notices reading not as clear. Finds that she is closing left eye reading to use right eye ( the clearer eye) Denies double vision. Question if both upper lids are drooping enough to be addressed.   Distance may be a little worse. Wears distance in unfamiliar areas to see road signs better.     ......Lab Results       Component                Value               Date                       A1C                      6.2                 11/23/2021                 A1C                      6.2                 05/20/2021                 A1C                      6.1                 11/24/2020                 A1C                      5.8                 05/21/2020                 A1C                      5.9                 11/26/2019                 A1C                      5.8                 05/29/2019            Patient presents with:  COMPREHENSIVE EYE EXAM: Diabetic     Chief Complaint(s) and History of Present Illness(es)     COMPREHENSIVE EYE EXAM     Laterality: both eyes   ......Lab Results       Component                Value               Date                       A1C                      6.2                 11/23/2021                 A1C                      6.2                 05/20/2021                 A1C                      6.1                 11/24/2020                 A1C                      5.8                 05/21/2020                 A1C                      5.9                 11/26/2019          Lindsey Duane, COT COT 9:25 AM November 30, 2021               A1C                      5.8                 05/29/2019

## 2021-11-30 NOTE — PROGRESS NOTES
HPI  Maira Leon is a 81 year old here for diabetic eye exam.  Vision distance and near not quite as clear.  Sometimes closes left eye to read with right eye.  No diplopia.  She feels her lids drooping when she reads.  Mostly wears glasses to drive or just over the counter readers.      PMH: diabetes mellitus 2  Past Medical History:   Diagnosis Date     H/O arthroscopic knee surgery left    5/6/16     History of shingles 12/6/16     Hypertension      Low back pain     & radiates down left buttock & left leg     Need for prophylactic hormone replacement therapy (postmenopausal)      Nocturia     states she gets up every 2 hours at night to urinate     Osteoarthrosis, unspecified whether generalized or localized, unspecified site      Other and unspecified hyperlipidemia      POSTMENOPAUSAL HORMONAL REPLACEMENT TX 5/23/2005     Type II or unspecified type diabetes mellitus without mention of complication, not stated as uncontrolled         POH: Glasses for myopia/presbyopia, cataract extraction posterior chamber intraocular lens (PCIOL) both eyes 2014, 2017   Oc Meds: ocuvite  FH: Denies any glaucoma, brother with age related macular degeneration, no other known eye diseases         Assessment & Plan      (E11.9) Type 2 diabetes mellitus without ophthalmic manifestations (H) - Both Eyes  (primary encounter diagnosis)  Comment: Diabetes Mellitus 2 w/o retinopathy   Great A1C 6.2%  Plan:   Discussed the importance of tight blood glucose, blood pressure, and cholesterol  control in the prevention of diabetic retinopathy. Recommend yearly dilated eye exam.    (H52.13) Myopia, bilateral - Both Eyes    (H52.4) Presbyopia of both eyes - Both Eyes  Comment: manifest refraction improves visual acuity  Posterior capsular opacity (PCO) limiting left eye slightly  Plan:  Manifest refraction done and prescription for glasses given to fill as needed     (Z96.1) Pseudophakia, both eyes - Both Eyes  (H26.492) PCO (posterior  capsular opacification), left - Left Eye  Comment: mild posterior capsular opacity (PCO) left eye   Plan: follow, call with worsening visual acuity     (H02.831,  H02.834) Dermatochalasis of both upper eyelids - Both Eyes  Comment: stable, +small involutional ptosis, not wanting referral  Plan: follow, oculoplastics when interested  -----------------------------------------------------------------------------------       Patient disposition:   Return in about 1 year (around 11/30/2022) for Comprehensive Exam. and patient to call sooner as needed.      Complete documentation of historical and exam elements from today's encounter can be found in the full encounter summary report (not reduplicated in this progress note). I personally obtained the chief complaint(s) and history of present illness.  I have confirmed and edited as necessary the CC, HPI, PMH/PSH, social history, FMH, ROS, and exam/neuro findings as obtained by the technician or others. I have examined this patient myself and I personally viewed the image(s) and studies listed above and the documentation reflects my findings and interpretation.  I formulated and edited as necessary the assessment and plan and discussed the findings and management plan with the patient and family.     Elisabeth Estrada MD

## 2021-11-30 NOTE — NURSING NOTE
Chief Complaints and History of Present Illnesses   Patient presents with     COMPREHENSIVE EYE EXAM     Diabetic      Chief Complaint(s) and History of Present Illness(es)     COMPREHENSIVE EYE EXAM     Laterality: both eyes    Associated symptoms: Negative for eye pain, redness, tearing and discharge    Treatments tried: no treatments    Pain scale: 0/10    Comments: Diabetic               Comments     Since last exam a year ago vision might be less. Notices reading not as clear. Finds that she is closing left eye reading to use right eye ( the clearer eye) Denies double vision. Question if both upper lids are drooping enough to be addressed.   Distance may be a little worse. Wears distance in unfamiliar areas to see road signs better.     ......Lab Results       Component                Value               Date                       A1C                      6.2                 11/23/2021                 A1C                      6.2                 05/20/2021                 A1C                      6.1                 11/24/2020                 A1C                      5.8                 05/21/2020                 A1C                      5.9                 11/26/2019                 A1C                      5.8                 05/29/2019            Patient presents with:  COMPREHENSIVE EYE EXAM: Diabetic     Chief Complaint(s) and History of Present Illness(es)     COMPREHENSIVE EYE EXAM     Laterality: both eyes   ......Lab Results       Component                Value               Date                       A1C                      6.2                 11/23/2021                 A1C                      6.2                 05/20/2021                 A1C                      6.1                 11/24/2020                 A1C                      5.8                 05/21/2020                 A1C                      5.9                 11/26/2019          Lindsey Duane, COT COT 9:25 AM November 30, 2021

## 2021-12-02 ENCOUNTER — TRANSCRIBE ORDERS (OUTPATIENT)
Dept: OTHER | Age: 81
End: 2021-12-02
Payer: COMMERCIAL

## 2021-12-02 DIAGNOSIS — M54.16 LUMBAR RADICULAR PAIN: Primary | ICD-10-CM

## 2022-01-04 ENCOUNTER — RADIOLOGY INJECTION OFFICE VISIT (OUTPATIENT)
Dept: PALLIATIVE MEDICINE | Facility: CLINIC | Age: 82
End: 2022-01-04
Payer: COMMERCIAL

## 2022-01-04 VITALS — HEART RATE: 69 BPM | SYSTOLIC BLOOD PRESSURE: 148 MMHG | OXYGEN SATURATION: 98 % | DIASTOLIC BLOOD PRESSURE: 73 MMHG

## 2022-01-04 DIAGNOSIS — M54.16 LUMBAR RADICULOPATHY: ICD-10-CM

## 2022-01-04 DIAGNOSIS — M54.16 LUMBAR RADICULAR PAIN: ICD-10-CM

## 2022-01-04 PROCEDURE — 64483 NJX AA&/STRD TFRM EPI L/S 1: CPT | Mod: RT | Performed by: PAIN MEDICINE

## 2022-01-04 RX ORDER — DEXAMETHASONE SODIUM PHOSPHATE 10 MG/ML
10 INJECTION INTRAMUSCULAR; INTRAVENOUS ONCE
Status: COMPLETED | OUTPATIENT
Start: 2022-01-04 | End: 2022-01-04

## 2022-01-04 RX ADMIN — DEXAMETHASONE SODIUM PHOSPHATE 10 MG: 10 INJECTION INTRAMUSCULAR; INTRAVENOUS at 09:54

## 2022-01-04 ASSESSMENT — PAIN SCALES - GENERAL: PAINLEVEL: MODERATE PAIN (4)

## 2022-01-04 NOTE — NURSING NOTE
Discharge Information    IV Discontiued Time:  NA    Amount of Fluid Infused:  NA    Discharge Criteria = When patient returns to baseline or as per MD order    Consciousness:  Pt is fully awake    Circulation:  BP +/- 20% of pre-procedure level    Respiration:  Patient is able to breathe deeply    O2 Sat:  Patient is able to maintain O2 Sat >92% on room air    Activity:  Moves 4 extremities on command    Ambulation:  Patient is able to stand and walk or stand and pivot into wheelchair    Dressing:  Clean/dry or No Dressing    Notes:   Discharge instructions and AVS given to patient    Patient meets criteria for discharge?  YES    Admitted to PCU?  No    Responsible adult present to accompany patient home?  Yes    Signature/Title:    ghada valentin RN  RN Care Coordinator  [facility]

## 2022-01-04 NOTE — PATIENT INSTRUCTIONS
[facility]   Procedure Discharge Instructions    Today you saw:  Dr. Abilio Rivera     You had an:  Epidural steroid injection  -lumbar     Medications used:  Lidocaine   Bupivacaine   Dexamethasone Omnipaque            Be cautious when walking. Numbness and/or weakness in the lower extremities may occur for up to 6-8 hours after the procedure due to effect of the local anesthetic    Do not drive for 6 hours. The effect of the local anesthetic could slow your reflexes.     You may resume your regular activities after 24 hours    Avoid strenuous activity for the first 24 hours    You may shower, however avoid swimming, tub baths or hot tubs for 24 hours following your procedure    You may have a mild to moderate increase in pain for several days following the injection.    It may take up to 14 days for the steroid medication to start working although you may feel the effect as early as a few days after the procedure.       You may use ice packs for 10-15 minutes, 3 to 4 times a day at the injection site for comfort    Do not use heat to painful areas for 6 to 8 hours. This will give the local anesthetic time to wear off and prevent you from accidentally burning your skin.     Unless you have been directed to avoid the use of anti-inflammatory medications (NSAIDS), you may use medications such as ibuprofen, Aleve or Tylenol for pain control if needed.     If you were fasting, you may resume your normal diet and medications after the procedure    If you have diabetes, check your blood sugar more frequently than usual as your blood sugar may be higher than normal for 10-14 days following a steroid injection. Contact your doctor who manages your diabetes if your blood sugar is higher than usual    Possible side effects of steroids that you may experience include flushing, elevated blood pressure, increased appetite, mild headaches and restlessness.  All of these symptoms will get better  with time.    If you experience any of the following, call the Pain Clinic during work hours (Mon-Friday 8-4:30 pm) at [phone] or the Provider Line after hours at [phone]:  -Fever over 100 degree F  -Swelling, bleeding, redness, drainage, warmth at the injection site  -Progressive weakness or numbness in your legs or arms  -Loss of bowel or bladder function  -Unusual headache that is not relieved by Tylenol or other pain reliever  -Unusual new onset of pain that is not improving

## 2022-01-04 NOTE — PROGRESS NOTES
Pre procedure Diagnosis: lumbar radiculopathy, lumbar degenerative disc disease   Post procedure Diagnosis: Same  Procedure performed: lumbar transforaminal epidural steroid injection at right L2-3, fluoroscopically guided, contrast controlled  Anesthesia: none  Complications: none  Operators: Abilio Rivera MD     Indications:   Maira Leon is a 81 year old female.  They have a history of prior lumbar fusion now with worsening right-sided low back pain radiating to her anterior thigh.  Exam shows neg slump and they have tried conservative treatment including meds/pt/injections/surgery.    MRI     FINDINGS: There are five lumbar-type vertebral bodies assumed for the  purposes of this dictation. The tip of the conus terminates at the  level of L1. Probable thin central syrinx only partially visualized in  the lower thoracic spinal cord. 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     Postoperative changes with posterior maru and screw fixation from L3 to  L5 and intervertebral disc spacer at L4-L5. Hardware causes  susceptibility artifact and limits evaluation in those areas.  Posterior decompression laminectomies from L3 to L5.     Grade 1 retrolisthesis of L2 on L3. No definite loss of vertebral body  height. Apparent edema within the opposing L2 and L3 vertebral bodies  although evaluation is limited in this area given the metallic  artifact.     Level by level as follows:      T12-L1: No loss of disc height. No significant disc herniation. Normal  facets. No spinal canal or neural foraminal narrowing.      L1-L2: No loss of disc height. No significant disc herniation. Normal  facets. No spinal canal or neural foraminal narrowing.      L2-L3: Grade 1 retrolisthesis. Marked loss of disc height with  degenerative endplate changes. Circumferential disc bulge with  endplate osteophytic spurring. Marked bilateral facet hypertrophy  with  thickening of the ligamentum flavum. Severe spinal canal narrowing.  Severe right neural foraminal narrowing. Moderate left neural  foraminal narrowing.      L3-L4: Postoperative changes at this level with hardware that limits  evaluation. Posterior decompression. No spinal canal or neural  foraminal narrowing appreciated.      L4-L5: Postoperative changes at this level with hardware that limits  evaluation. Posterior decompression. No spinal canal or neural  foraminal narrowing appreciated.      L5-S1: Mild loss of disc height. No significant disc herniation.  Marked bilateral facet hypertrophy. No spinal canal narrowing. No  significant neural foraminal narrowing.      Paraspinous soft tissues: Unremarkable.                                                                       IMPRESSION:    1. Severe degenerative changes at the L2-L3 level above the fusion  which have markedly progressed since 9/16/2016. There is severe spinal  canal narrowing as well as severe right and moderate left neural  foraminal narrowing at this level. This is secondary to  retrolisthesis, disc bulge with osteophytic spurring, and marked facet  hypertrophy.  2. Postoperative changes from L3 to L5 as detailed above. No residual  spinal canal or neural foraminal narrowing at these levels which is  markedly improved compared to MR 9/16/2016 particularly at the L4-L5  level.  3. 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   4. No other significant spinal canal or neural foraminal narrowing in  the lumbar spine. Marked facet hypertrophy at L5-S1 which is not  significantly changed.   Options/alternatives, benefits and risks were discussed with the patient including bleeding, infection, tissue trauma, numbness, weakness, paralysis, spinal cord injury, radiation exposure, headache and reaction to medications. Questions were answered to her satisfaction and she  agrees to proceed. Voluntary informed consent was obtained and signed.     Vitals were reviewed: Yes  LMP  (LMP Unknown)   Allergies were reviewed:  Yes   Medications were reviewed:  Yes   Pre-procedure pain score: 4/10    Procedure:  After getting informed consent, patient was brought into the procedure suite and was placed in a prone position on the procedure table.   A Pause for the Cause was performed.  Patient was prepped and draped in sterile fashion.     After identifying the right L2-3 neuroforamen, the C-arm was rotated to a right lateral oblique angle.  A total of 4ml of Lidocaine 1% was used to anesthetize the skin and the needle track at a skin entry site coaxial with the fluoroscopy beam, and overriding the superior aspect of the neuroforamen.  A 22 gauge 3.5 inch spinal needle was advanced under intermittent fluoroscopy until it entered the foramen superiorly.    The position was then inspected from anteroposterior and lateral views, and the needle adjusted appropriately.  A total of 1ml of Omnipaque-300 was injected, confirming appropriate position, with spread into the nerve root sheath and the epidural space, with no intravascular uptake. 9ml was wasted    Then, after repeated negative aspiration, a combination of Decadron 10 mg, 0.25% bupivacaine 2 ml, diluted with 3ml of normal saline was injected.     Hemostasis was achieved, the area was cleaned, and bandaids were placed when appropriate.  The patient tolerated the procedure well, and was taken to the recovery room.    Images were saved to PACS.    Post-procedure pain score: 0/10  Follow-up includes:   -f/u phone call in one week  -f/u with referring provider    Abilio Rivera MD  [facility] [place]

## 2022-01-04 NOTE — NURSING NOTE
Pre-procedure Intake  If YES to any questions or NO to having a   Please complete laminated checklist and leave on the computer keyboard for Provider, verbally inform provider if able.    For SCS Trial, RFA's or any sedation procedure:  Have you been fasting? NA    If yes, for how long?     Are you taking any any blood thinners such as Coumadin, Warfarin, Jantoven, Pradaxa Xarelto, Eliquis, Edoxaban, Enoxaparin, Lovenox, Heparin, Arixtra, Fondaparinux, or Fragmin? OR Antiplatelet medication such as Plavix, Brilinta, or Effient?   No     If yes, when did you take your last dose?     Do you take aspirin?  Yes    If cervical procedure, have you held aspirin for 6 days?   No     Do you have any allergies to contrast dye, iodine, steroid and/or numbing medications?  NO    Are you currently taking antibiotics or have an active infection?  NO    Have you had a fever/elevated temperature within the past week? NO    Are you currently taking oral steroids? NO    Do you have a ? Yes    Are you pregnant or breastfeeding?  NO    Have you received the COVID-19 vaccine? Yes    If yes, was it your 1st, 2nd or only dose needed? Booster     Date of most recent vaccine: 10/06/21    Notify provider and RNs if systolic BP >170, diastolic BP >100, P >100 or O2 sats < 90%

## 2022-02-13 ENCOUNTER — HEALTH MAINTENANCE LETTER (OUTPATIENT)
Age: 82
End: 2022-02-13

## 2022-04-05 ENCOUNTER — ANCILLARY PROCEDURE (OUTPATIENT)
Dept: MAMMOGRAPHY | Facility: CLINIC | Age: 82
End: 2022-04-05
Attending: FAMILY MEDICINE
Payer: COMMERCIAL

## 2022-04-05 DIAGNOSIS — Z12.31 SCREENING MAMMOGRAM FOR HIGH-RISK PATIENT: ICD-10-CM

## 2022-04-05 PROCEDURE — 77067 SCR MAMMO BI INCL CAD: CPT | Mod: TC | Performed by: RADIOLOGY

## 2022-04-10 ENCOUNTER — VIRTUAL VISIT (OUTPATIENT)
Dept: URGENT CARE | Facility: CLINIC | Age: 82
End: 2022-04-10
Payer: COMMERCIAL

## 2022-04-10 ENCOUNTER — NURSE TRIAGE (OUTPATIENT)
Dept: NURSING | Facility: CLINIC | Age: 82
End: 2022-04-10
Payer: COMMERCIAL

## 2022-04-10 DIAGNOSIS — U07.1 INFECTION DUE TO 2019 NOVEL CORONAVIRUS: Primary | ICD-10-CM

## 2022-04-10 DIAGNOSIS — N18.30 DIABETES MELLITUS WITH STAGE 3 CHRONIC KIDNEY DISEASE (H): ICD-10-CM

## 2022-04-10 DIAGNOSIS — E11.22 DIABETES MELLITUS WITH STAGE 3 CHRONIC KIDNEY DISEASE (H): ICD-10-CM

## 2022-04-10 PROCEDURE — 99442 PR PHYSICIAN TELEPHONE EVALUATION 11-20 MIN: CPT | Mod: 95 | Performed by: PHYSICIAN ASSISTANT

## 2022-04-10 RX ORDER — ASCORBIC ACID 500 MG
500 TABLET ORAL
COMMUNITY
End: 2022-06-02

## 2022-04-10 RX ORDER — POLYETHYLENE GLYCOL 3350 17 G/17G
1 POWDER, FOR SOLUTION ORAL
COMMUNITY
End: 2022-06-02

## 2022-04-10 NOTE — PROGRESS NOTES
Assessment:     COVID-19 positive patient.  Encounter for consideration of medication intervention.    Patient does qualify for a prescription.   Full discussion with patient including medication options, risks and benefits.   Potential drug interactions reviewed with patient.      Plan:  Treatment planned -  Paxlovid, Rx sent to [place] pharmacy  [facility] [phone]  [address], [address]  [place] [address]    Hours:  Mon-Fri: 7:00a - 7:00p    Drive-thru services available.    Temporary change to home medications:   Hold Statin medication    See patient instructions      Nika Ramirez PA-C  [facility]      Patient preference to obtain AVS:  Sandy Rao  is a 82 year old  who has a confirmed new positive COVID-19 diagnosis.    She has been identified as high risk for complications of this infection, and is being evaluated via a billable Phone visit.      Phone call duration: 14 minutes    Concern for COVID-19  Exactly how many days ago did these symptoms start? 1 day    Patient started having some covid symptoms yesterday. She tested positive on home testing. She is having body aches, runny nose, congestion and cough     Are any of the following symptoms significant for you?    New or worsening difficulty breathing? No    Worsening cough? Yes, it's a dry cough.     Fever or chills? Yes, I felt feverish or had chills.    Headache: YES    Sore throat: YES    Chest pain: no    Diarrhea: no    Body aches? YES    What treatments has patient tried? Acetaminophen and Decongestant - oral   Does patient live in a nursing home, group home, or shelter? no  Does patient have a way to get food/medications during quarantined? Yes, I have a friend or family member who can help me.    Constitutional, HEENT, cardiovascular, pulmonary, gi and gu systems are negative, except as otherwise noted.    Objective    Vitals:  No vitals were obtained today due to virtual  "visit.  Estimated body mass index is 31.83 kg/m  as calculated from the following:    Height as of 11/23/21: 1.575 m (5' 2\").    Weight as of 11/23/21: 78.9 kg (174 lb).   General: Alert, no apparent distress  PSYCH: Alert; coherent speech, normal rate and volume, able to articulate logical thoughts, appropriate insight, no tangential thoughts, no hallucination or delusions.  Affect is appropriate  RESP: No cough, no audible wheezing, able to talk in full sentences  Remainder of exam unable to be completed due to telephone visit  GFR Estimate   Date Value Ref Range Status   11/23/2021 66 >60 mL/min/1.73m2 Final     Comment:     As of July 11, 2021, eGFR is calculated by the CKD-EPI creatinine equation, without race adjustment. eGFR can be influenced by muscle mass, exercise, and diet. The reported eGFR is an estimation only and is only applicable if the renal function is stable.   05/20/2021 62 >60 mL/min/[1.73_m2] Final     Comment:     Non  GFR Calc  Starting 12/18/2018, serum creatinine based estimated GFR (eGFR) will be   calculated using the Chronic Kidney Disease Epidemiology Collaboration   (CKD-EPI) equation.              =    "

## 2022-04-10 NOTE — PATIENT INSTRUCTIONS
You can start Paxlovid tomorrow morning.   DO not take Atorvastatin for the next 10-12 days. Let us know if new symptoms or concerns.     FACT SHEET FOR PATIENTS, PARENTS, AND CAREGIVERS  EMERGENCY USE AUTHORIZATION (EUA) OF PAXLOVID FOR CORONAVIRUS DISEASE 2019 (COVID-19)    You are being given this Fact Sheet because your healthcare provider believes it is necessary to provide you with PAXLOVID for the treatment of mild-to-moderate coronavirus disease (COVID-19) caused by the SARS-CoV-2 virus. This Fact Sheet contains information to help you understand the risks and benefits of taking the PAXLOVID you have received or may receive.    The U.S. Food and Drug Administration (FDA) has issued an Emergency Use Authorization (EUA) to make PAXLOVID available during the COVID-19 pandemic (for more details about an EUA please see  What is an Emergency Use Authorization?  at the end of this document).     PAXLOVID is not an FDA-approved medicine in the [place]. Read this Fact Sheet for information about PAXLOVID. Talk to your healthcare provider about your options or if you have any questions. It is your choice to take PAXLOVID.    What is COVID-19?  COVID-19 is caused by a virus called a coronavirus. You can get COVID-19 through close contact with another person who has the virus.  COVID-19 illnesses have ranged from very mild-to-severe, including illness resulting in death. While information so far suggests that most COVID-19 illness is mild, serious illness can happen and may cause some of your other medical conditions to become worse. Older people and people of all ages with severe, long lasting (chronic) medical conditions like heart disease, lung disease, and diabetes, for example seem to be at higher risk of being hospitalized for COVID-19.    What is PAXLOVID?  PAXLOVID is an investigational medicine used to treat mild-to-moderate COVID-19 in adults and children [12 years of age and older weighing at least 88  pounds (40 kg)] with positive results of direct SARS-CoV-2 viral testing, and who are at high risk for progression to severe COVID-19, including hospitalization or death. PAXLOVID is investigational because it is still being studied. There is limited information about the safety and effectiveness of using PAXLOVID to treat people with mild-to-moderate COVID-19.  The FDA has authorized the emergency use of PAXLOVID for the treatment of mild-to- moderate COVID-19 in adults and children [12 years of age and older weighing at least 88 pounds (40 kg)] with a positive test for the virus that causes COVID-19, and who are at high risk for progression to severe COVID-19, including hospitalization or death, under an EUA.  1 Revised: 18 March 2022    What should I tell my healthcare provider before I take PAXLOVID?  Tell your healthcare provider if you:  ? Have any allergies  ? Have liver or kidney disease  ? Are pregnant or plan to become pregnant  ? Are breastfeeding a child  ? Have any serious illnesses  Tell your healthcare provider about all the medicines you take, including prescription and over-the-counter medicines, vitamins, and herbal supplements.  Some medicines may interact with PAXLOVID and may cause serious side effects. Keep a list of your medicines to show your healthcare provider and pharmacist when you get a new medicine.  You can ask your healthcare provider or pharmacist for a list of medicines that interact with PAXLOVID. Do not start taking a new medicine without telling your healthcare provider. Your healthcare provider can tell you if it is safe to take PAXLOVID with other medicines.  Tell your healthcare provider if you are taking combined hormonal contraceptive.  PAXLOVID may affect how your birth control pills work. Females who are able to become pregnant should use another effective alternative form of contraception or an additional barrier method of contraception. Talk to your healthcare provider  if you have any questions about contraceptive methods that might be right for you.  How do I take PAXLOVID?  ? PAXLOVID consists of 2 medicines: nirmatrelvir and ritonavir.  o Take 2 pink tablets of nirmatrelvir with 1 white tablet of ritonavir by mouth  2 times each day (in the morning and in the evening) for 5 days. For each  dose, take all 3 tablets at the same time.  o If you have kidney disease, talk to your healthcare provider. You  may need a different dose.  ? Swallow the tablets whole. Do not chew, break, or crush the tablets.  ? Take PAXLOVID with or without food.  ? Do not stop taking PAXLOVID without talking to your healthcare provider, even if  you feel better.  ? If you miss a dose of PAXLOVID within 8 hours of the time it is usually taken, take it as soon as you remember. If you miss a dose by more than 8 hours, skip the missed dose and take the next dose at your regular time. Do not take  2 doses of PAXLOVID at the same time.  ? If you take too much PAXLOVID, call your healthcare provider or go to the nearest hospital emergency room right away.  ? If you are taking a ritonavir- or cobicistat-containing medicine to treat hepatitis C or Human Immunodeficiency Virus (HIV), you should continue to take your medicine as prescribed by your healthcare provider.  2 Revised: 18 March 2022    Talk to your healthcare provider if you do not feel better or if you feel worse after 5 days.  Who should generally not take PAXLOVID?  Do not take PAXLOVID if:  ? You are allergic to nirmatrelvir, ritonavir, or any of the ingredients in PAXLOVID.  ? You are taking any of the following medicines:  o Alfuzosin  o Pethidine, propoxyphene  o Ranolazine  o Amiodarone, dronedarone, flecainide, propafenone, quinidine o Colchicine  o Lurasidone, pimozide, clozapine  o Dihydroergotamine, ergotamine, methylergonovine  o Lovastatin, simvastatin  o Sildenafil (Revatio ) for pulmonary arterial hypertension (PAH) o Triazolam, oral  midazolam  o Apalutamide  o Carbamazepine, phenobarbital, phenytoin  o Rifampin  o Isaac s Wort (hypericum perforatum)  Taking PAXLOVID with these medicines may cause serious or life-threatening side effects or affect how PAXLOVID works.  These are not the only medicines that may cause serious side effects if taken with PAXLOVID. PAXLOVID may increase or decrease the levels of multiple other medicines. It is very important to tell your healthcare provider about all of the medicines you are taking because additional laboratory tests or changes in the dose of your other medicines may be necessary while you are taking PAXLOVID. Your healthcare provider may also tell you about specific symptoms to watch out for that may indicate that you need to stop or decrease the dose of some of your other medicines.  What are the important possible side effects of PAXLOVID?  Possible side effects of PAXLOVID are:  ? Allergic Reactions. Allergic reactions can happen in people taking  PAXLOVID, even after only 1 dose. Stop taking PAXLOVID and call your healthcare provider right away if you get any of the following symptoms of an allergic reaction:  o hives  o trouble swallowing or breathing  o swelling of the mouth, lips, or face o throat tightness  o hoarseness  3 Revised: 18 March 2022    o skin rash  ? Liver Problems. Tell your healthcare provider right away if you have any of  these signs and symptoms of liver problems: loss of appetite, yellowing of your skin and the whites of eyes (jaundice), dark-colored urine, pale colored stools and itchy skin, stomach area (abdominal) pain.  ? Resistance to HIV Medicines. If you have untreated HIV infection, PAXLOVID may lead to some HIV medicines not working as well in the future.  ? Other possible side effects include:  o altered sense of taste  o diarrhea  o high blood pressure o muscle aches  These are not all the possible side effects of PAXLOVID. Not many people have taken PAXLOVID.  Serious and unexpected side effects may happen. PAXLOVID is still being studied, so it is possible that all of the risks are not known at this time.  What other treatment choices are there?  Veklury (remdesivir) is FDA-approved for the treatment of mild-to-moderate COVID-19 in certain adults and children. Talk with your doctor to see if Veklury is appropriate for you.  Like PAXLOVID, FDA may also allow for the emergency use of other medicines to treat people with COVID-19. Go to https://www.fda.gov/emergency-preparedness-and- response/mcm-legal-regulatory-and-policy-framework/emergency-use-authorization for information on the emergency use of other medicines that are authorized by FDA to treat people with COVID-19. Your healthcare provider may talk with you about clinical trials for which you may be eligible.  It is your choice to be treated or not to be treated with PAXLOVID. Should you decide not to receive it or for your child not to receive it, it will not change your standard medical care.  What if I am pregnant or breastfeeding?  There is no experience treating pregnant women or breastfeeding mothers with PAXLOVID. For a mother and unborn baby, the benefit of taking PAXLOVID may be greater than the risk from the treatment. If you are pregnant, discuss your options and specific situation with your healthcare provider.  It is recommended that you use effective barrier contraception or do not have sexual activity while taking PAXLOVID.  If you are breastfeeding, discuss your options and specific situation with your healthcare provider.  4 Revised: 18 March 2022    How do I report side effects with PAXLOVID?  Contact your healthcare provider if you have any side effects that bother you or do not go away.  Report side effects to FDA MedWatch at www.fda.gov/medwatch or call 3-370-SKV- 8682 or you can report side effects to [employer] at the contact information provided below.  Telephone  number  [email or web address] [phone] How should I store PAXLOVID?  Store PAXLOVID tablets at room temperature, between 68?F to 77?F (20?C to 25?C).  How can I learn more about COVID-19?  ? Ask your healthcare provider.  ? Visit https://www.cdc.gov/COVID19.  ? Contact your local or state public health department.  What is an Emergency Use Authorization (EUA)?  The [place] FDA has made PAXLOVID available under an emergency access mechanism called an Emergency Use Authorization (EUA). The EUA is supported by a  of Health and Human Service (HHS) declaration that circumstances exist to justify the emergency use of drugs and biological products during the COVID-19 pandemic.  PAXLOVID for the treatment of mild-to-moderate COVID-19 in adults and children  [12 years of age and older weighing at least 88 pounds (40 kg)] with positive results of direct SARS-CoV-2 viral testing, and who are at high risk for progression to severe COVID-19, including hospitalization or death, has not undergone the same type of review as an FDA-approved product. In issuing an EUA under the COVID-19 public health emergency, the FDA has determined, among other things, that based on the total amount of scientific evidence available including data from adequate and well-controlled clinical trials, if available, it is reasonable to believe that the product may be effective for diagnosing, treating, or preventing COVID-19, or a serious or life-threatening disease or condition caused by COVID-19; that the known and potential benefits of the product, when used to diagnose, treat, or prevent such disease or condition, outweigh the known and potential risks of such product; and that there are no adequate, approved, and available alternatives.  All of these criteria must be met to allow for the product to be used in the treatment of patients during the COVID-19 pandemic. The EUA for PAXLOVID is in effect for the  duration of the COVID-19 declaration justifying emergency use of this product, unless terminated or revoked (after which the products may no longer be used under the EUA).    Website  Fax number         5 Revised: 18 March 2022    Additional Information  For general questions, visit the website or call the telephone number provided below.  Website Telephone number  You can also go to www.[employer] or call [phone] for more information.  [id number]  Revised: 18 March 2022      [email or web address]     [phone] (4-845-P84-PACK)      6 Revised: 18 March 2022

## 2022-04-10 NOTE — TELEPHONE ENCOUNTER
Patient tested positive for Covid yesterday. Symptoms started yesterday as well - congestion, body aches, and cough.    No fever    Took two tabs ESTylenol     Advised Virtual Visit for patient to determine if she qualifies for Covid treatment. Patient having difficulty navigating [facility]. Transferred patient to scheduling to assist.    Tere Feliz RN  04/10/22 10:59 AM  [facility] Nurse Advisor      Reason for Disposition    HIGH RISK for severe COVID complications (e.g., weak immune system, age > 64 years, obesity with BMI > 25, pregnant, chronic lung disease or other chronic medical condition)  (Exception: Already seen by PCP and no new or worsening symptoms.)    Additional Information    Negative: SEVERE difficulty breathing (e.g., struggling for each breath, speaks in single words)    Negative: Difficult to awaken or acting confused (e.g., disoriented, slurred speech)    Negative: Bluish (or gray) lips or face now    Negative: Shock suspected (e.g., cold/pale/clammy skin, too weak to stand, low BP, rapid pulse)    Negative: Sounds like a life-threatening emergency to the triager    Negative: [1] Diagnosed or suspected COVID-19 AND [2] symptoms lasting 3 or more weeks    Negative: [1] COVID-19 exposure AND [2] no symptoms    Negative: COVID-19 vaccine reaction suspected (e.g., fever, headache, muscle aches) occurring 1 to 3 days after getting vaccine    Negative: COVID-19 vaccine, questions about    Negative: [1] Lives with someone known to have influenza (flu test positive) AND [2] flu-like symptoms (e.g., cough, runny nose, sore throat, SOB; with or without fever)    Negative: [1] Adult with possible COVID-19 symptoms AND [2] triager concerned about severity of symptoms or other causes    Negative: COVID-19 and breastfeeding, questions about    Negative: SEVERE or constant chest pain or pressure  (Exception: Mild central chest pain, present only when coughing.)    Negative: MODERATE difficulty  breathing (e.g., speaks in phrases, SOB even at rest, pulse 100-120)    Negative: [1] Headache AND [2] stiff neck (can't touch chin to chest)    Negative: Oxygen level (e.g., pulse oximetry) 90 percent or lower    Negative: Chest pain or pressure    Negative: Patient sounds very sick or weak to the triager    Negative: MILD difficulty breathing (e.g., minimal/no SOB at rest, SOB with walking, pulse <100)    Negative: Fever > 103 F (39.4 C)    Negative: [1] Fever > 101 F (38.3 C) AND [2] age > 60 years    Negative: [1] Fever > 100.0 F (37.8 C) AND [2] bedridden (e.g., nursing home patient, CVA, chronic illness, recovering from surgery)    Protocols used: CORONAVIRUS (COVID-19) DIAGNOSED OR [id number] 1.18.2022

## 2022-04-12 ENCOUNTER — MYC MEDICAL ADVICE (OUTPATIENT)
Dept: FAMILY MEDICINE | Facility: CLINIC | Age: 82
End: 2022-04-12
Payer: COMMERCIAL

## 2022-05-13 NOTE — PROGRESS NOTES
"Subjective:  HPI                    Objective:  System    Physical Exam    General     ROS     PROGRESS  REPORT     Progress reporting period is from 2/21/19 to 3/25/19.        SUBJECTIVE  Patient returns to PT follow a 4 weeks absence. States she is concerned that she still can't stand up straight and thinks this is because of her left hip and low back.  As noted in my previous evaluation: patient has a history of 3-level lumbar fusion January 2017 and left hip ORIF August 2017 due to a fall.  She currently complains of difficulty standing > 5 minutes or walking at a \"normal pace\" like she used to be able to do. Notices that she also has to move slower to perform ADLs.  Left knee, overall, has been feeling better and she has continued to perform the exercises  previously prescribed.       Current pain level is 2/10  .     Previous pain level was  1/10  .   Changes in function:  None  Adverse reaction to treatment or activity: None     OBJECTIVE  Gait: ambulates with a slightly forward flexed trunk.  Left Hip AROM: flexion 85, Extension - unable to achieve neutral, Abd 10,  IR 27, ER 15.  Left Hip PROM: flexion 115, Abd 20, IR 32, ER 18.  Left Hip Strength: flexion 4-/5, Abd 3-/5, IR 3-/5, ER 3-/5  Flexibility: decreased in L>R quads, adductors, hip flexors, and HS  Special Tests:  Fernanda test +, Fadir +          ASSESSMENT/PLAN  Updated problem list and treatment plan: Diagnosis 1:  Left Hip Pain    Pain -  self management, education and home program  Decreased ROM/flexibility - therapeutic exercise  Decreased joint mobility - manual therapy and therapeutic exercise  Decreased strength - therapeutic exercise and therapeutic activities  Impaired gait - gait training  Decreased function - home program  Impaired posture - neuro re-education    STG/LTGs have been met or progress has been made towards goals: None  Assessment of Progress: The patient's condition has potential to improve.  Self Management Plans:  patient " "has continued to perform previous home exercises for her knee; now seeking exercises for her hip  I have re-evaluated this patient and find that the nature, scope, duration and intensity of the therapy is appropriate for the medical condition of the patient.  Maira continues to require the following intervention to meet STG and LTG's:  PT     Recommendations:  Given patient's new concerns regarding her left hip and previous surgeries noted above, advised patient to follow up with MD to discuss  mobility restrictions and to obtain new MD referral for her hip. Without knowing the status of her left hip and internal hardware, explained that it is difficult to determine how much her hip can be \"manipulated\"/stretched and whether PT will even be able to further improve her function.  Would like to seek MD clearance on this first before continuing with any treatment.         Please refer to the daily flowsheet for treatment today, total treatment time and time spent performing 1:1 timed codes.          " I have reviewed and confirmed nurses' notes for patient's medications, allergies, medical history, and surgical history.

## 2022-05-18 ENCOUNTER — TELEPHONE (OUTPATIENT)
Dept: DERMATOLOGY | Facility: CLINIC | Age: 82
End: 2022-05-18
Payer: COMMERCIAL

## 2022-05-18 NOTE — TELEPHONE ENCOUNTER
[phone] Left voicemail for patient to schedule follow up in one year around 8/18/22    Mer m[facility]   Dermatology, General and Plastic Surgery, Urology Specialties   [facility] [place] [phone]

## 2022-06-02 ENCOUNTER — OFFICE VISIT (OUTPATIENT)
Dept: FAMILY MEDICINE | Facility: CLINIC | Age: 82
End: 2022-06-02

## 2022-06-02 VITALS
DIASTOLIC BLOOD PRESSURE: 68 MMHG | HEART RATE: 62 BPM | HEIGHT: 62 IN | OXYGEN SATURATION: 96 % | BODY MASS INDEX: 31.28 KG/M2 | SYSTOLIC BLOOD PRESSURE: 120 MMHG | RESPIRATION RATE: 16 BRPM | WEIGHT: 170 LBS | TEMPERATURE: 97.8 F

## 2022-06-02 DIAGNOSIS — E78.5 HYPERLIPIDEMIA LDL GOAL <100: ICD-10-CM

## 2022-06-02 DIAGNOSIS — N18.30 STAGE 3 CHRONIC KIDNEY DISEASE, UNSPECIFIED WHETHER STAGE 3A OR 3B CKD (H): ICD-10-CM

## 2022-06-02 DIAGNOSIS — M85.89 OSTEOPENIA OF MULTIPLE SITES: ICD-10-CM

## 2022-06-02 DIAGNOSIS — E11.22 DIABETES MELLITUS WITH STAGE 3 CHRONIC KIDNEY DISEASE (H): ICD-10-CM

## 2022-06-02 DIAGNOSIS — N18.30 DIABETES MELLITUS WITH STAGE 3 CHRONIC KIDNEY DISEASE (H): ICD-10-CM

## 2022-06-02 LAB
HBA1C MFR BLD: 6.3 % (ref 0–5.6)
HGB BLD-MCNC: 13.7 G/DL (ref 11.7–15.7)

## 2022-06-02 PROCEDURE — 36415 COLL VENOUS BLD VENIPUNCTURE: CPT | Performed by: FAMILY MEDICINE

## 2022-06-02 PROCEDURE — 83036 HEMOGLOBIN GLYCOSYLATED A1C: CPT | Performed by: FAMILY MEDICINE

## 2022-06-02 PROCEDURE — 80048 BASIC METABOLIC PNL TOTAL CA: CPT | Performed by: FAMILY MEDICINE

## 2022-06-02 PROCEDURE — 91305 COVID-19,PF,PFIZER (12+ YRS): CPT | Performed by: FAMILY MEDICINE

## 2022-06-02 PROCEDURE — 99214 OFFICE O/P EST MOD 30 MIN: CPT | Mod: 25 | Performed by: FAMILY MEDICINE

## 2022-06-02 PROCEDURE — 85018 HEMOGLOBIN: CPT | Performed by: FAMILY MEDICINE

## 2022-06-02 PROCEDURE — 0054A COVID-19,PF,PFIZER (12+ YRS): CPT | Performed by: FAMILY MEDICINE

## 2022-06-02 RX ORDER — ATORVASTATIN CALCIUM 40 MG/1
40 TABLET, FILM COATED ORAL DAILY
Qty: 90 TABLET | Refills: 1 | Status: SHIPPED | OUTPATIENT
Start: 2022-06-02 | End: 2022-12-01

## 2022-06-02 RX ORDER — LISINOPRIL 2.5 MG/1
2.5 TABLET ORAL DAILY
Qty: 90 TABLET | Refills: 1 | Status: SHIPPED | OUTPATIENT
Start: 2022-06-02 | End: 2022-12-01

## 2022-06-02 ASSESSMENT — PAIN SCALES - GENERAL: PAINLEVEL: NO PAIN (0)

## 2022-06-02 NOTE — LETTER
"June 2, 2022      Maira CHAUDHRY Edward  [address] [id number]        To Whom It May Concern,     Maira (\"Nadya\") tested positive for COVID-19 on 4/9/22.  She will likely have a positive COVID-19 PCR test for the subsequent 90 days.  If she does not have new symptoms, a positive COVID-19 PCR test during that time frame would be considered residual from her past infection.      Sincerely,          Michelle Barrios MD          "

## 2022-06-02 NOTE — PATIENT INSTRUCTIONS
[facility] (xray, mammogram, bone density, and ultrasound) [phone]  To schedule your bone density test

## 2022-06-02 NOTE — PROGRESS NOTES
"    Assessment & Plan     (E11.22,  N18.30) Diabetes mellitus with stage 3 chronic kidney disease (H)  Comment: well controlled on current medication  Plan: HEMOGLOBIN A1C, atorvastatin (LIPITOR) 40 MG         tablet, lisinopril (ZESTRIL) 2.5 MG tablet        Discussed at patient's request why her current level of Hemoglobin A1C is good, compared to previously lower Hemoglobin A1C's    (M85.89) Osteopenia of multiple sites  Comment: due for recheck  Plan: DEXA HIP/PELVIS/SPINE - Future        Adjust treatment recommendations based on results    (E78.5) Hyperlipidemia LDL goal <100  Comment:   Plan: atorvastatin (LIPITOR) 40 MG tablet        Continue current medication      (N18.30) Stage 3 chronic kidney disease, unspecified whether stage 3a or 3b CKD (H)  Comment:   Plan: Hemoglobin, BASIC METABOLIC PANEL, lisinopril         (ZESTRIL) 2.5 MG tablet        adjust therapy based on labs                 BMI:   Estimated body mass index is 31.09 kg/m  as calculated from the following:    Height as of this encounter: 1.575 m (5' 2\").    Weight as of this encounter: 77.1 kg (170 lb).   Weight management plan: Discussed healthy diet and exercise guidelines        Return in about 6 months (around 12/2/2022) for Medicare Annual Wellness Visit, Diabetes, in person, with me.    Michelle Barrios MD  [facility]    Elian Covington is a 82 year old who presents for the following health issues  accompanied by her Niece Digna.    History of Present Illness       CKD: She uses over the counter pain medication, including Tylenol, one time daily.    Diabetes:   She presents for follow up of diabetes.  She is checking home blood glucose with a continuous glucose monitor.  She checks blood glucose before meals, after meals and at bedtime.  Blood glucose is never over 200 and never under 70. When her blood glucose is low, the patient is asymptomatic for confusion, blurred vision, lethargy and reports not " "feeling dizzy, shaky, or weak.  She is concerned about other. She is having numbness in feet.         She eats 2-3 servings of fruits and vegetables daily.She consumes 0 sweetened beverage(s) daily.She exercises with enough effort to increase her heart rate 30 to 60 minutes per day.  She exercises with enough effort to increase her heart rate 3 or less days per week.   She is taking medications regularly.    Patient tested covid positive 04/09/22, exposed to covid on 04/07/22. plaxlovoid treatment 04/12/22. Stopped the atorvastatin and then started plaxlovoid.     Patient going to [facility], patient would like attestation to travel. Patient would like a letter that she is safe to travel. Brought in forms to be signed.      losing some feeling in her toes.    2 h after breakfast average 125  2 hr after lunch average 124  Am fasting average 128-130    Patient is asking about a research study, if she should be part of it. Developing vaccine against RSV.   She is hesitant to participate as apparently the study requires that she receive no other vaccines during the study - and she would like to be able to take additional COVID vaccines if/when they are recommended.    Had a spinal injection 1/2022- finds them helpful  Sometimes takes tylenol in the morning.  (most days)  Does morning stretching.          Review of Systems   Constitutional, HEENT, cardiovascular, pulmonary, gi and gu systems are negative, except as otherwise noted.      Objective    /68 (BP Location: Right arm, Patient Position: Chair, Cuff Size: Adult Regular)   Pulse 62   Temp 97.8  F (36.6  C) (Tympanic)   Resp 16   Ht 1.575 m (5' 2\")   Wt 77.1 kg (170 lb)   LMP  (LMP Unknown)   SpO2 96%   BMI 31.09 kg/m    Body mass index is 31.09 kg/m .  Physical Exam   GENERAL: healthy, alert and no distress  RESP: lungs clear to auscultation - no rales, rhonchi or wheezes  CV: regular rate and rhythm, normal S1 S2, no S3 or S4, no murmur, click or " rub, no peripheral edema and peripheral pulses strong  PSYCH: mentation appears normal, affect normal/bright

## 2022-06-03 LAB
ANION GAP SERPL CALCULATED.3IONS-SCNC: 7 MMOL/L (ref 3–14)
BUN SERPL-MCNC: 29 MG/DL (ref 7–30)
CALCIUM SERPL-MCNC: 10.5 MG/DL (ref 8.5–10.1)
CHLORIDE BLD-SCNC: 107 MMOL/L (ref 94–109)
CO2 SERPL-SCNC: 25 MMOL/L (ref 20–32)
CREAT SERPL-MCNC: 0.91 MG/DL (ref 0.52–1.04)
GFR SERPL CREATININE-BSD FRML MDRD: 63 ML/MIN/1.73M2
GLUCOSE BLD-MCNC: 130 MG/DL (ref 70–99)
POTASSIUM BLD-SCNC: 4.2 MMOL/L (ref 3.4–5.3)
SODIUM SERPL-SCNC: 139 MMOL/L (ref 133–144)

## 2022-06-10 ENCOUNTER — TELEPHONE (OUTPATIENT)
Dept: FAMILY MEDICINE | Facility: CLINIC | Age: 82
End: 2022-06-10
Payer: COMMERCIAL

## 2022-06-10 ENCOUNTER — LAB (OUTPATIENT)
Dept: LAB | Facility: CLINIC | Age: 82
End: 2022-06-10
Attending: PHYSICIAN ASSISTANT
Payer: COMMERCIAL

## 2022-06-10 DIAGNOSIS — Z01.89 PATIENT REQUEST FOR DIAGNOSTIC TESTING: ICD-10-CM

## 2022-06-10 DIAGNOSIS — Z01.89 PATIENT REQUEST FOR DIAGNOSTIC TESTING: Primary | ICD-10-CM

## 2022-06-10 PROCEDURE — U0003 INFECTIOUS AGENT DETECTION BY NUCLEIC ACID (DNA OR RNA); SEVERE ACUTE RESPIRATORY SYNDROME CORONAVIRUS 2 (SARS-COV-2) (CORONAVIRUS DISEASE [COVID-19]), AMPLIFIED PROBE TECHNIQUE, MAKING USE OF HIGH THROUGHPUT TECHNOLOGIES AS DESCRIBED BY CMS-2020-01-R: HCPCS

## 2022-06-10 PROCEDURE — U0005 INFEC AGEN DETEC AMPLI PROBE: HCPCS

## 2022-06-10 NOTE — TELEPHONE ENCOUNTER
I have placed this lab order.  Please let the patient know she can get these done other locations including the airport.  We do not routinely do COVID testing on demand and we are making an exception for her because of her upcoming travel and recent appointment    Meliza Mederos DO

## 2022-06-10 NOTE — TELEPHONE ENCOUNTER
Routing to [facility] providers    Willing to order Covid PCR for travel?    Patient at lab waiting.      RN spoke with Criselda from [facility] lab.    Patient is at lab requesting Covid Test for travel to [facility]    Patient saw Dr Monet 6/2 and Dr. Monet wrote travel letter indicating test may still be positive    Patient tested covid positive 04/09/22, exposed to covid on 04/07/22. plaxlovoid treatment 04/12/22. Stopped the atorvastatin and then started plaxlovoid.      [facility] patient would like attestation to travel. Patient would like a letter that she is safe to travel. Brought in forms to be signed.          Patient insisting she needs both the test and the letter to travel    Jay Jay Mittal RN, BSN, PHN  [facility]

## 2022-06-11 LAB — SARS-COV-2 RNA RESP QL NAA+PROBE: NEGATIVE

## 2022-06-29 NOTE — TELEPHONE ENCOUNTER
70 year old gentlewoman, former smoker, followed by Dr. Mehdi Castro of our practice for COPD/asthma syndrome, pulmonary hypertension and an abnormal chest CT -> hyperaeration with moderate centrilobular and minimal paraseptal emphysema - stable mucus plugging and atelectasis in the lingula - nodularity in the anterior left lower lobe favored to represent confluent vascular structures - hepatic steatosis. Most recent spirometry -> moderate restrictive lung disease with superimposed mild - moderate obstruction. She patient is currently on no pulmonary medications. She developed hoarseness on a long acting inhaled steroid (Arnuity Ellipta. She stopped Spiriva and Xopenex MDI as needed on her own. The patient was last seen in our office in November. She missed her appointment in May due to exposure to COVID. She has chronic shortness of breath with exertion. She sleeps slightly upright in bed. She has no PND, orthopnea or lower extremity swelling. An ECHO in 2020 had preserved LVEF and no significant valvular heart disease. The patient developed a sore throat several days ago. She has since developed worsening of her shortness of breath. She has a cough productive of scant sputum. She has chest congestion and wheeze. She has no fevers, chills or sweats. No chest pain/pressure or palpitations. She was seen in an urgent care center yesterday and sent to the ER due to hypoxemia.    dyspnea/hypoxemia -> multifactorial - VBG -> 7.33/60/42/32/66.7 suggestive of acute on chronic hypercapnic respiratory failure  1) restrictive lung disease in the setting of morbid obesity limiting diaphragmatic excursion and chest wall excursion  2) viral induced COPD/asthma exacerbation -   3) deconditioning    PLAN/RECOMMENDATIONS:    stable but borderline oxygenation on room air at rest (89 - 91%)  oxygen saturation on room air with ambulation reportedly ~ 85% (not documented in sunrise)  will recheck room air saturation with ambulation to evaluate the need for home oxygen  VBG 6/27 -> 7.33/60/42/32/66.7 - acute on chronic hypercapnic respiratory failure  VBG 6/28 -> 7.43/46/50/30/84.0 - no evidence of chronic hypercapnic respiratory failure  spirometry     FEV1 - 0.76 liters - 33% predicted     FVC - 2.11 liters - 70% predicted     FEV1% - 36       c/w very severe obstructive lung disease  CTA -> heart is enlarged in size - no pulmonary emboli - marked narrowing of the trachea at the thoracic inlet is noted - narrowing of the bronchus intermedius is also noted - mucous/secretions are noted within several of the bronchi in both lower lobes with atelectasis  azithromycin  transition solumedrol to prednisone -> taper as written  continue albuterol/atrovent nebs q6h until discharge  continue pulmicort 0.5mg nebs q12h until discharge  discharge on on a trelegy ellipta 200/62.5/25mcg/inhlation - 1 inhalation daily  cardiac meds: losartan/lipitor  throat lozenges/ibuprofen for pharyngitis  outpatient cardiology evaluation to see if there is an additional cardiac component to the patient's dyspnea  follow-up CT in 3 months - may need bronchoscopy to evaluate the airway anatomy ? tracheobronchomalacia ? endobronchial tumor ? mucous  . She will follow-up with Dr. Mehdi Castro in our office as planned; ptn needs home oxygen but refused and states she will see her pulmonologist  Dr. castro for prescription.. Ptn stated she is comfortable and has been living like this for long time.    LMOM for patient to call back main line to schedule an appointment for diabetes follow up in 6 months.    Thank you,  Tisha GRECO  [facility]  Team Mariah Coordinator

## 2022-07-28 ENCOUNTER — TRANSFERRED RECORDS (OUTPATIENT)
Dept: HEALTH INFORMATION MANAGEMENT | Facility: CLINIC | Age: 82
End: 2022-07-28

## 2022-08-17 ENCOUNTER — OFFICE VISIT (OUTPATIENT)
Dept: DERMATOLOGY | Facility: CLINIC | Age: 82
End: 2022-08-17
Payer: COMMERCIAL

## 2022-08-17 DIAGNOSIS — L81.4 SOLAR LENTIGO: ICD-10-CM

## 2022-08-17 DIAGNOSIS — Z85.828 HISTORY OF NONMELANOMA SKIN CANCER: Primary | ICD-10-CM

## 2022-08-17 DIAGNOSIS — D18.01 CHERRY ANGIOMA: ICD-10-CM

## 2022-08-17 DIAGNOSIS — D22.9 MULTIPLE BENIGN NEVI: ICD-10-CM

## 2022-08-17 DIAGNOSIS — L82.1 SEBORRHEIC KERATOSES: ICD-10-CM

## 2022-08-17 DIAGNOSIS — L57.0 ACTINIC KERATOSIS: ICD-10-CM

## 2022-08-17 PROCEDURE — 17000 DESTRUCT PREMALG LESION: CPT | Performed by: PHYSICIAN ASSISTANT

## 2022-08-17 PROCEDURE — 17003 DESTRUCT PREMALG LES 2-14: CPT | Performed by: PHYSICIAN ASSISTANT

## 2022-08-17 PROCEDURE — 99214 OFFICE O/P EST MOD 30 MIN: CPT | Mod: 25 | Performed by: PHYSICIAN ASSISTANT

## 2022-08-17 NOTE — PROGRESS NOTES
[facility] Dermatology Note  Encounter Date: Aug 17, 2022  Office Visit     Dermatology Problem List:  Last skin check 8/17/22  1. 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 Enjoys water color painting. Plays golf. Splits time between [place] and [place].  Family History: Negative for skin cancer.  ____________________________________________     Assessment & Plan:      # History of NMSC. No evidence of recurrence.   - Recommend sunscreens SPF #30 or greater, protective clothing and avoidance of tanning beds.   - Recommended yearly skin exams.    # Cherry angioma(s).    - No further intervention needed.    # Multiple clinically benign nevi.  - Signs and Symptoms of non-melanoma skin cancer and ABCDEs of melanoma reviewed with patient. Patient encouraged to perform monthly self skin exams and educated on how to perform them. UV precautions reviewed with patient. Patient was asked about new or changing moles/lesions on body.   - Sunscreen: Apply 20 minutes prior to going outdoors and reapply every two hours, when wet or sweating. We recommend using an SPF 30 or higher, and to use one that is water resistant.       - No further intervention needed.     # Seborrheic keratosis, non irritated.   - No further intervention needed.     # Solar lentigines.  - Sunscreen: Apply 20 minutes prior to going outdoors and reapply every two hours, when wet or sweating. We recommend using an SPF 30 or higher, and to use one that is water resistant.      - No further intervention needed.     # Actinic keratosis - chest x1 and left jaw line x1  - See cryo note.       Procedures Performed:   - Cryotherapy procedure note. After verbal consent and discussion of risks and benefits including, but not limited to, dyspigmentation/scar, blister, and pain, 2 lesion(s) was(were) treated with 1-2 mm freeze  border for 1-2 cycles with liquid nitrogen. Post cryotherapy instructions were provided.      Follow-up: 6 months skin check.     Staff and Scribe:     Scribe Disclosure:   I, Shawn Espinoza, am serving as a scribe to document services personally performed by Adalgisa Ahn PA-C, based on data collection and the provider's statements to me.  Provider Disclosure:   The documentation recorded by the scribe accurately reflects the services I personally performed and the decisions made by me.    All risks, benefits and alternatives were discussed with patient.  Patient is in agreement and understands the assessment and plan.  All questions were answered.    Adalgisa Ahn PA-C, [facility]  [facility] [place]: Phone: [phone], Fax: [phone]  [facility]: Phone: [phone],  Fax: [phone]  [facility]: Phone: [phone], Fax: [phone]  ____________________________________________    CC: Skin Check (Hx nmsc/ areas of concern none)    HPI:  Ms. Maira Leon is a(n) 82 year old female who presents today as a return patient for a skin check.     Last seen on 8/18/21 with Dr. Sutton for a skin check. At that time, AKs were treated with cryo.     Today, patient notes no concern. Hx SCC on chest which was treated via ED&C. Patient notes no concern with that site.     Patient is otherwise feeling well, without additional concerns.    Labs:  NA    Physical Exam:  Vitals: LMP  (LMP Unknown)   SKIN: Total skin excluding the undergarment areas was performed. The exam included the head/face, neck, both arms, external buttocks, chest, back, abdomen, both legs, digits and/or nails.   - Love Type ll  - Well healed scar on the right medial chest. NERD.  - There are dome shaped bright red papules on the trunk and extremities.  - Multiple regular brown pigmented macules and papules are identified on the trunk  and extremities.   - Scattered brown macules on sun exposed areas.  - There are waxy stuck on tan to brown papules on the trunk and extremities..   - There is an erythematous macule with overyling adherent scale on the chest and left jaw line x2.  - No other lesions of concern on areas examined.     Medications:  Current Outpatient Medications   Medication     aspirin 81 MG tablet     atorvastatin (LIPITOR) 40 MG tablet     blood glucose monitoring (NO BRAND SPECIFIED) meter device kit     Calcium Carb-Cholecalciferol (CALCIUM 500 +D) 500-400 MG-UNIT TABS     fish oil-omega-3 fatty acids 1000 MG capsule     lisinopril (ZESTRIL) 2.5 MG tablet     metFORMIN (GLUCOPHAGE) 500 MG tablet     multivitamin (OCUVITE) TABS     multivitamin w/minerals (THERA-VIT-M) tablet     ONETOUCH DELICA LANCETS 33G MISC     ONETOUCH ULTRA test strip     No current facility-administered medications for this visit.      Past Medical History:   Patient Active Problem List   Diagnosis     Osteoarthritis     Hyperlipidemia LDL goal <100     CKD (chronic kidney disease) stage 3, GFR 30-59 ml/min (H)     Diabetes mellitus with stage 3 chronic kidney disease (H)     Primary osteoarthritis of left knee     Lumbar spinal stenosis     Osteopenia of multiple sites     Superior glenoid labrum lesion of left shoulder     Tear of left supraspinatus tendon     Arthritis of left acromioclavicular joint     Incomplete tear of left rotator cuff     Osteoarthritis of left hip, unspecified osteoarthritis type     Past Medical History:   Diagnosis Date     H/O arthroscopic knee surgery left    5/6/16     History of shingles 12/6/16     Hypertension      Low back pain     & radiates down left buttock & left leg     Need for prophylactic hormone replacement therapy (postmenopausal)      Nocturia     states she gets up every 2 hours at night to urinate     Osteoarthrosis, unspecified whether generalized or localized, unspecified site      Other and unspecified  hyperlipidemia      POSTMENOPAUSAL HORMONAL REPLACEMENT TX 5/23/2005     Type II or unspecified type diabetes mellitus without mention of complication, not stated as uncontrolled

## 2022-08-17 NOTE — LETTER
8/17/2022         RE: Maira Leon  [address] [facility] [address]        Dear Colleague,    Thank you for referring your patient, Maira Leon, to the [facility]. Please see a copy of my visit note below.    [facility] Dermatology Note  Encounter Date: Aug 17, 2022  Office Visit     Dermatology Problem List:  Last skin check 8/17/22  1. 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 Enjoys water color painting. Plays golf. Splits time between [place] and [place].  Family History: Negative for skin cancer.  ____________________________________________     Assessment & Plan:      # History of NMSC. No evidence of recurrence.   - Recommend sunscreens SPF #30 or greater, protective clothing and avoidance of tanning beds.   - Recommended yearly skin exams.    # Cherry angioma(s).    - No further intervention needed.    # Multiple clinically benign nevi.  - Signs and Symptoms of non-melanoma skin cancer and ABCDEs of melanoma reviewed with patient. Patient encouraged to perform monthly self skin exams and educated on how to perform them. UV precautions reviewed with patient. Patient was asked about new or changing moles/lesions on body.   - Sunscreen: Apply 20 minutes prior to going outdoors and reapply every two hours, when wet or sweating. We recommend using an SPF 30 or higher, and to use one that is water resistant.       - No further intervention needed.     # Seborrheic keratosis, non irritated.   - No further intervention needed.     # Solar lentigines.  - Sunscreen: Apply 20 minutes prior to going outdoors and reapply every two hours, when wet or sweating. We recommend using an SPF 30 or higher, and to use one that is water resistant.      - No further intervention needed.     # Actinic keratosis - chest x1 and left jaw line  x1  - See cryo note.       Procedures Performed:   - Cryotherapy procedure note. After verbal consent and discussion of risks and benefits including, but not limited to, dyspigmentation/scar, blister, and pain, 2 lesion(s) was(were) treated with 1-2 mm freeze border for 1-2 cycles with liquid nitrogen. Post cryotherapy instructions were provided.      Follow-up: 6 months skin check.     Staff and Scribe:     Scribe Disclosure:   RADHA, Shawn Espinoza, am serving as a scribe to document services personally performed by Adalgisa Ahn PA-C, based on data collection and the provider's statements to me.  Provider Disclosure:   The documentation recorded by the scribe accurately reflects the services I personally performed and the decisions made by me.    All risks, benefits and alternatives were discussed with patient.  Patient is in agreement and understands the assessment and plan.  All questions were answered.    Adalgisa Ahn PA-C, MPAS  [facility]: Phone: [phone], Fax: [phone]  [facility]: Phone: [phone],  Fax: [phone]  [facility]: Phone: [phone], Fax: [phone]  ____________________________________________    CC: Skin Check (Hx nmsc/ areas of concern none)    HPI:  Ms. Maira Leon is a(n) 82 year old female who presents today as a return patient for a skin check.     Last seen on 8/18/21 with Dr. Sutton for a skin check. At that time, AKs were treated with cryo.     Today, patient notes no concern. Hx SCC on chest which was treated via ED&C. Patient notes no concern with that site.     Patient is otherwise feeling well, without additional concerns.    Labs:  NA    Physical Exam:  Vitals: LMP  (LMP Unknown)   SKIN: Total skin excluding the undergarment areas was performed. The exam included the head/face, neck, both arms, external buttocks, chest, back, abdomen, both  legs, digits and/or nails.   - Love Type ll  - Well healed scar on the right medial chest. NERD.  - There are dome shaped bright red papules on the trunk and extremities.  - Multiple regular brown pigmented macules and papules are identified on the trunk and extremities.   - Scattered brown macules on sun exposed areas.  - There are waxy stuck on tan to brown papules on the trunk and extremities..   - There is an erythematous macule with overyling adherent scale on the chest and left jaw line x2.  - No other lesions of concern on areas examined.     Medications:  Current Outpatient Medications   Medication     aspirin 81 MG tablet     atorvastatin (LIPITOR) 40 MG tablet     blood glucose monitoring (NO BRAND SPECIFIED) meter device kit     Calcium Carb-Cholecalciferol (CALCIUM 500 +D) 500-400 MG-UNIT TABS     fish oil-omega-3 fatty acids 1000 MG capsule     lisinopril (ZESTRIL) 2.5 MG tablet     metFORMIN (GLUCOPHAGE) 500 MG tablet     multivitamin (OCUVITE) TABS     multivitamin w/minerals (THERA-VIT-M) tablet     ONETOUCH DELICA LANCETS 33G MISC     ONETOUCH ULTRA test strip     No current facility-administered medications for this visit.      Past Medical History:   Patient Active Problem List   Diagnosis     Osteoarthritis     Hyperlipidemia LDL goal <100     CKD (chronic kidney disease) stage 3, GFR 30-59 ml/min (H)     Diabetes mellitus with stage 3 chronic kidney disease (H)     Primary osteoarthritis of left knee     Lumbar spinal stenosis     Osteopenia of multiple sites     Superior glenoid labrum lesion of left shoulder     Tear of left supraspinatus tendon     Arthritis of left acromioclavicular joint     Incomplete tear of left rotator cuff     Osteoarthritis of left hip, unspecified osteoarthritis type     Past Medical History:   Diagnosis Date     H/O arthroscopic knee surgery left    5/6/16     History of shingles 12/6/16     Hypertension      Low back pain     & radiates down left buttock &  left leg     Need for prophylactic hormone replacement therapy (postmenopausal)      Nocturia     states she gets up every 2 hours at night to urinate     Osteoarthrosis, unspecified whether generalized or localized, unspecified site      Other and unspecified hyperlipidemia      POSTMENOPAUSAL HORMONAL REPLACEMENT TX 5/23/2005     Type II or unspecified type diabetes mellitus without mention of complication, not stated as uncontrolled                 Again, thank you for allowing me to participate in the care of your patient.        Sincerely,        Adalgisa Ahn PA-C

## 2022-08-17 NOTE — PATIENT INSTRUCTIONS
Cryotherapy    What is it?  Use of a very cold liquid, such as liquid nitrogen, to freeze and destroy abnormal skin cells that need to be removed    What should I expect?  Tenderness and redness  A small blister that might grow and fill with dark purple blood. There may be crusting.  More than one treatment may be needed if the lesions do not go away.    How do I care for the treated area?  Gently wash the area with your hands when bathing.  Use a thin layer of Vaseline to help with healing. You may use a Band-Aid.   The area should heal within 7-10 days and may leave behind a pink or lighter color.   Do not use an antibiotic or Neosporin ointment.   You may take acetaminophen (Tylenol) for pain.     Call your doctor if you have:  Severe pain  Signs of infection (warmth, redness, cloudy yellow drainage, and or a bad smell)  Questions or concerns    Who should I call with questions?      [facility]: [phone]      [facility]: [phone]      For urgent needs outside of business hours call the [facility] at [phone] and ask for the dermatology resident on call

## 2022-08-17 NOTE — NURSING NOTE
Maira Leon's goals for this visit include:   Chief Complaint   Patient presents with     Skin Check     Hx nmsc/ areas of concern none     She requests these members of her care team be copied on today's visit information:     PCP: Michelle Monet    Referring Provider:  Referred Self, MD  No address on file    LMP  (LMP Unknown)     Do you need any medication refills at today's visit? No  Kaley Renee, CMA on 8/17/2022 at 8:33 AM

## 2022-10-15 ENCOUNTER — HEALTH MAINTENANCE LETTER (OUTPATIENT)
Age: 82
End: 2022-10-15

## 2022-12-01 ENCOUNTER — TRANSFERRED RECORDS (OUTPATIENT)
Dept: MULTI SPECIALTY CLINIC | Facility: CLINIC | Age: 82
End: 2022-12-01

## 2022-12-01 ENCOUNTER — OFFICE VISIT (OUTPATIENT)
Dept: OPHTHALMOLOGY | Facility: CLINIC | Age: 82
End: 2022-12-01
Payer: COMMERCIAL

## 2022-12-01 ENCOUNTER — OFFICE VISIT (OUTPATIENT)
Dept: FAMILY MEDICINE | Facility: CLINIC | Age: 82
End: 2022-12-01
Payer: COMMERCIAL

## 2022-12-01 VITALS
DIASTOLIC BLOOD PRESSURE: 74 MMHG | HEART RATE: 70 BPM | OXYGEN SATURATION: 99 % | HEIGHT: 63 IN | TEMPERATURE: 98.1 F | RESPIRATION RATE: 16 BRPM | BODY MASS INDEX: 28.88 KG/M2 | SYSTOLIC BLOOD PRESSURE: 130 MMHG | WEIGHT: 163 LBS

## 2022-12-01 DIAGNOSIS — N18.30 STAGE 3 CHRONIC KIDNEY DISEASE, UNSPECIFIED WHETHER STAGE 3A OR 3B CKD (H): ICD-10-CM

## 2022-12-01 DIAGNOSIS — N18.30 DIABETES MELLITUS WITH STAGE 3 CHRONIC KIDNEY DISEASE (H): Primary | ICD-10-CM

## 2022-12-01 DIAGNOSIS — Z96.1 PSEUDOPHAKIA, BOTH EYES: ICD-10-CM

## 2022-12-01 DIAGNOSIS — H02.831 DERMATOCHALASIS OF BOTH UPPER EYELIDS: ICD-10-CM

## 2022-12-01 DIAGNOSIS — E78.5 HYPERLIPIDEMIA LDL GOAL <100: ICD-10-CM

## 2022-12-01 DIAGNOSIS — E11.9 TYPE 2 DIABETES MELLITUS WITHOUT OPHTHALMIC MANIFESTATIONS (H): ICD-10-CM

## 2022-12-01 DIAGNOSIS — E11.22 DIABETES MELLITUS WITH STAGE 3 CHRONIC KIDNEY DISEASE (H): Primary | ICD-10-CM

## 2022-12-01 DIAGNOSIS — Z78.0 ASYMPTOMATIC POSTMENOPAUSAL STATUS: ICD-10-CM

## 2022-12-01 DIAGNOSIS — H02.834 DERMATOCHALASIS OF BOTH UPPER EYELIDS: ICD-10-CM

## 2022-12-01 DIAGNOSIS — E83.52 SERUM CALCIUM ELEVATED: ICD-10-CM

## 2022-12-01 DIAGNOSIS — Z13.220 SCREENING FOR HYPERLIPIDEMIA: ICD-10-CM

## 2022-12-01 DIAGNOSIS — H26.492 POSTERIOR CAPSULAR OPACIFICATION OF LEFT EYE, OBSCURING VISION: Primary | ICD-10-CM

## 2022-12-01 LAB
HBA1C MFR BLD: 6.5 % (ref 0–5.6)
HOLD SPECIMEN: NORMAL
PTH-INTACT SERPL-MCNC: 30 PG/ML (ref 15–65)
RETINOPATHY: NORMAL

## 2022-12-01 PROCEDURE — 83970 ASSAY OF PARATHORMONE: CPT | Performed by: FAMILY MEDICINE

## 2022-12-01 PROCEDURE — 92014 COMPRE OPH EXAM EST PT 1/>: CPT | Performed by: OPHTHALMOLOGY

## 2022-12-01 PROCEDURE — 36415 COLL VENOUS BLD VENIPUNCTURE: CPT | Performed by: FAMILY MEDICINE

## 2022-12-01 PROCEDURE — 82306 VITAMIN D 25 HYDROXY: CPT | Performed by: FAMILY MEDICINE

## 2022-12-01 PROCEDURE — 99214 OFFICE O/P EST MOD 30 MIN: CPT | Performed by: FAMILY MEDICINE

## 2022-12-01 PROCEDURE — 80061 LIPID PANEL: CPT | Performed by: FAMILY MEDICINE

## 2022-12-01 PROCEDURE — 80048 BASIC METABOLIC PNL TOTAL CA: CPT | Performed by: FAMILY MEDICINE

## 2022-12-01 PROCEDURE — 83036 HEMOGLOBIN GLYCOSYLATED A1C: CPT | Performed by: FAMILY MEDICINE

## 2022-12-01 PROCEDURE — 99207 PR FOOT EXAM NO CHARGE: CPT | Performed by: FAMILY MEDICINE

## 2022-12-01 PROCEDURE — 82043 UR ALBUMIN QUANTITATIVE: CPT | Performed by: FAMILY MEDICINE

## 2022-12-01 RX ORDER — LISINOPRIL 2.5 MG/1
2.5 TABLET ORAL DAILY
Qty: 90 TABLET | Refills: 1 | Status: SHIPPED | OUTPATIENT
Start: 2022-12-01 | End: 2023-06-01

## 2022-12-01 RX ORDER — ATORVASTATIN CALCIUM 40 MG/1
40 TABLET, FILM COATED ORAL DAILY
Qty: 90 TABLET | Refills: 1 | Status: SHIPPED | OUTPATIENT
Start: 2022-12-01 | End: 2023-06-01

## 2022-12-01 ASSESSMENT — CONF VISUAL FIELD
OD_SUPERIOR_NASAL_RESTRICTION: 0
OS_INFERIOR_TEMPORAL_RESTRICTION: 0
OS_SUPERIOR_NASAL_RESTRICTION: 0
OD_NORMAL: 1
OD_INFERIOR_TEMPORAL_RESTRICTION: 0
OS_SUPERIOR_TEMPORAL_RESTRICTION: 0
OS_NORMAL: 1
OD_SUPERIOR_TEMPORAL_RESTRICTION: 0
OS_INFERIOR_NASAL_RESTRICTION: 0
OD_INFERIOR_NASAL_RESTRICTION: 0
METHOD: COUNTING FINGERS

## 2022-12-01 ASSESSMENT — CUP TO DISC RATIO
OD_RATIO: 0.25
OS_RATIO: 0.2

## 2022-12-01 ASSESSMENT — REFRACTION_WEARINGRX
SPECS_TYPE: READING ONLY
OD_CYLINDER: +2.50
OD_AXIS: 006
OD_SPHERE: +1.50
OS_CYLINDER: +1.25
OD_AXIS: 006
OS_SPHERE: +1.50
OS_AXIS: 178
OD_CYLINDER: +2.50
OS_SPHERE: -1.25
OS_AXIS: 178
OD_SPHERE: -1.25
SPECS_TYPE: PAL
OD_ADD: +2.75
OS_ADD: +2.75
OS_CYLINDER: +1.25

## 2022-12-01 ASSESSMENT — ENCOUNTER SYMPTOMS
COUGH: 1
FREQUENCY: 1

## 2022-12-01 ASSESSMENT — VISUAL ACUITY
OD_CC: 20/25
OS_CC: 20/70
OS_CC+: -1
CORRECTION_TYPE: GLASSES
METHOD: SNELLEN - LINEAR
OD_CC+: -3

## 2022-12-01 ASSESSMENT — PAIN SCALES - GENERAL: PAINLEVEL: NO PAIN (0)

## 2022-12-01 ASSESSMENT — EXTERNAL EXAM - LEFT EYE: OS_EXAM: MILD DERMATOCHALASIS

## 2022-12-01 ASSESSMENT — TONOMETRY
OD_IOP_MMHG: 10
IOP_METHOD: ICARE
OS_IOP_MMHG: 12

## 2022-12-01 ASSESSMENT — ACTIVITIES OF DAILY LIVING (ADL): CURRENT_FUNCTION: NO ASSISTANCE NEEDED

## 2022-12-01 NOTE — PROGRESS NOTES
"SUBJECTIVE:   Nadya is a 82 year old who presents for Preventive Visit.    Patient has been advised of split billing requirements and indicates understanding: Yes  Are you in the first 12 months of your Medicare coverage?  No    Healthy Habits:     In general, how would you rate your overall health?  Good    Frequency of exercise:  6-7 days/week    Duration of exercise:  15-30 minutes    Do you usually eat at least 4 servings of fruit and vegetables a day, include whole grains    & fiber and avoid regularly eating high fat or \"junk\" foods?  Yes    Taking medications regularly:  Yes    Medication side effects:  Not applicable    Ability to successfully perform activities of daily living:  No assistance needed    Home Safety:  No safety concerns identified    Hearing Impairment:  Difficulty following a conversation in a noisy restaurant or crowded room and difficulty understanding soft or whispered speech    In the past 6 months, have you been bothered by leaking of urine? Yes    In general, how would you rate your overall mental or emotional health?  Good      PHQ-2 Total Score: 1    Additional concerns today:  Yes    Got flu vaccine this season. Did not recall date.   Accompanied by niece - who she is close with but lives in [place]. Niece's  is a physician.    Went off injections for diabetes about 1-2 years ago. Still wonders why.  Asks about CKD    Back pain  1/4/22 injection  Was taking tylenol twice daily at that time.  Then pain resolved with injection and has stayed manageable    Currently doesn't need to take tylenol regularly.  Will take prior to golfing 18 holes or transporting [employer] decorations.    Sometimes names don't come to her easily.  But has not had forgetfullness extending beyond occasional names or objects that are less familiar to her.  Still drives but is considering no longer driving at night.      Have you ever done Advance Care Planning? (For example, a Health Directive, POLST, or a " discussion with a medical provider or your loved ones about your wishes): Yes, advance care planning is on file.       Fall risk  Fallen 2 or more times in the past year?: No  Any fall with injury in the past year?: Yes    Cognitive Screening   1) Repeat 3 items (Leader, Season, Table)    2) Clock draw: NORMAL  3) 3 item recall: Recalls 3 objects  Results: 3 items recalled: COGNITIVE IMPAIRMENT LESS LIKELY    Mini-CogTM Copyright RONDA Bonner. Licensed by the author for use in [facility]; reprinted with permission (max@[facility]). All rights reserved.      Do you have sleep apnea, excessive snoring or daytime drowsiness?: no    Reviewed and updated as needed this visit by clinical staff   Tobacco  Allergies  Meds  Problems  Med Hx  Surg Hx  Fam Hx          Reviewed and updated as needed this visit by Provider   Tobacco  Allergies  Meds  Problems  Med Hx  Surg Hx  Fam Hx         Social History     Tobacco Use     Smoking status: Former     Types: Cigarettes     Quit date: 1981     Years since quittin.9     Smokeless tobacco: Never   Substance Use Topics     Alcohol use: Yes     Comment: 3-4 drink per week         Alcohol Use 2022   Prescreen: >3 drinks/day or >7 drinks/week? No   Prescreen: >3 drinks/day or >7 drinks/week? -           Diabetes Follow-up  182 160, those made her nervous and she quit testing often  She had a low one but nothing under 70.   Two hours after meals was 154 average.   120 to 136 is total average  Brought records to visit today.     How often are you checking your blood sugar? Not at all    What concerns do you have today about your diabetes? None     Do you have any of these symptoms? (Select all that apply)  No numbness or tingling in feet.  No redness, sores or blisters on feet.  No complaints of excessive thirst.  No reports of blurry vision.  No significant changes to weight.    Have you had a diabetic eye exam in the last 12 months? Yes- Date of last  eye exam: today,  Location: [place]     Going back for procedure on the 16th.               Hyperlipidemia Follow-Up      Are you regularly taking any medication or supplement to lower your cholesterol?   Yes- statin therapy    Are you having muscle aches or other side effects that you think could be caused by your cholesterol lowering medication?  No    Hypertension Follow-up      Do you check your blood pressure regularly outside of the clinic? No     Are you following a low salt diet? No    Are your blood pressures ever more than 140 on the top number (systolic) OR more   than 90 on the bottom number (diastolic), for example 140/90? No    BP Readings from Last 2 Encounters:   12/01/22 130/74   06/02/22 120/68     Hemoglobin A1C (%)   Date Value   12/01/2022 6.5 (H)   06/02/2022 6.3 (H)   05/20/2021 6.2 (H)   11/24/2020 6.1 (H)     LDL Cholesterol Calculated (mg/dL)   Date Value   11/23/2021 72   11/24/2020 69   11/26/2019 70         Current providers sharing in care for this patient include:   Patient Care Team:  Michelle Monet MD as PCP - General (Family Practice)  Jackie Killian, RN as   [facility] ([facility]  Jane Killian RD as Diabetes Educator (Dietitian, Registered)  Michelle Monet MD as Assigned PCP  Adalgisa Ahn PA-C as Physician Assistant (Dermatology)  Adalgisa Ahn PA-C as Assigned Surgical Provider    The following health maintenance items are reviewed in Epic and correct as of today:  Health Maintenance   Topic Date Due     MEDICARE ANNUAL WELLNESS VISIT  11/24/2021     DEXA  02/26/2022     INFLUENZA VACCINE (1) 09/01/2022     BMP  09/02/2022     LIPID  11/23/2022     MICROALBUMIN  11/23/2022     A1C  06/01/2023     HEMOGLOBIN  06/02/2023     DIABETIC FOOT EXAM  12/01/2023     ANNUAL REVIEW OF HM ORDERS  12/01/2023     EYE EXAM  12/01/2023     FALL RISK ASSESSMENT  12/01/2023     ADVANCE CARE PLANNING  12/01/2027      "DTAP/TDAP/TD IMMUNIZATION (5 - Td or Tdap) 12/16/2031     PHQ-2 (once per calendar year)  Completed     Pneumococcal Vaccine: 65+ Years  Completed     URINALYSIS  Completed     ZOSTER IMMUNIZATION  Completed     COVID-19 Vaccine  Completed     IPV IMMUNIZATION  Aged Out     MENINGITIS IMMUNIZATION  Aged Out     MAMMO SCREENING  Discontinued     Labs reviewed in EPIC        Pertinent mammograms are reviewed under the imaging tab.    Review of Systems   Respiratory: Positive for cough.    Genitourinary: Positive for frequency and urgency. Negative for pelvic pain and vaginal bleeding.     Constitutional, HEENT, cardiovascular, pulmonary, gi and gu systems are negative, except as otherwise noted.    OBJECTIVE:   /74   Pulse 70   Temp 98.1  F (36.7  C) (Oral)   Resp 16   Ht 1.594 m (5' 2.75\")   Wt 73.9 kg (163 lb)   LMP  (LMP Unknown)   SpO2 99%   BMI 29.10 kg/m   Estimated body mass index is 29.1 kg/m  as calculated from the following:    Height as of this encounter: 1.594 m (5' 2.75\").    Weight as of this encounter: 73.9 kg (163 lb).  Physical Exam  GENERAL: healthy, alert and no distress  RESP: lungs clear to auscultation - no rales, rhonchi or wheezes  CV: regular rate and rhythm, normal S1 S2, no S3 or S4, no murmur, click or rub, no peripheral edema and peripheral pulses strong  MS: no gross musculoskeletal defects noted, no edema  PSYCH: mentation appears normal, affect normal/bright  Diabetic foot exam: normal DP and PT pulses, no trophic changes or ulcerative lesions and normal sensory exam    Diagnostic Test Results:  Labs reviewed in Epic    ASSESSMENT / PLAN:   (E11.22,  N18.30) Diabetes mellitus with stage 3 chronic kidney disease (H)  (primary encounter diagnosis)  Comment: stable  Plan: REVIEW OF HEALTH MAINTENANCE PROTOCOL ORDERS,         atorvastatin (LIPITOR) 40 MG tablet, lisinopril        (ZESTRIL) 2.5 MG tablet, metFORMIN (GLUCOPHAGE)        500 MG tablet, Lipid panel reflex to direct " "LDL        Non-fasting, Albumin Random Urine Quantitative         with Creat Ratio, Hemoglobin A1c, FOOT EXAM        Continue current medication  Discussed Hemoglobin A1C goal    (E78.5) Hyperlipidemia LDL goal <100  Comment:   Plan: REVIEW OF HEALTH MAINTENANCE PROTOCOL ORDERS,         atorvastatin (LIPITOR) 40 MG tablet        Continue current medication      (Z78.0) Asymptomatic postmenopausal status  Comment:   Plan: DX Hip/Pelvis/Spine        adjust therapy/treatment based on results      (N18.30) Stage 3 chronic kidney disease, unspecified whether stage 3a or 3b CKD (H)  Comment:   Plan: lisinopril (ZESTRIL) 2.5 MG tablet        Reviewed stages of CKD    (Z13.220) Screening for hyperlipidemia  Comment:   Plan: Lipid panel reflex to direct LDL Non-fasting        adjust therapy/treatment based on results      (E83.52) Serum calcium elevated  Comment:   Plan: Basic metabolic panel  (Ca, Cl, CO2, Creat,         Gluc, K, Na, BUN), Vitamin D Deficiency,         Parathyroid Hormone Intact        adjust therapy/treatment based on results    Discussed back pain.  And when to consider future injections.          COUNSELING:  Reviewed preventive health counseling, as reflected in patient instructions      BMI:   Estimated body mass index is 29.1 kg/m  as calculated from the following:    Height as of this encounter: 1.594 m (5' 2.75\").    Weight as of this encounter: 73.9 kg (163 lb).         She reports that she quit smoking about 41 years ago. She has never used smokeless tobacco.      Appropriate preventive services were discussed with this patient, including applicable screening as appropriate for cardiovascular disease, diabetes, osteopenia/osteoporosis, and glaucoma.  As appropriate for age/gender, discussed screening for colorectal cancer, prostate cancer, breast cancer, and cervical cancer. Checklist reviewing preventive services available has been given to the patient.    Reviewed patients plan of care and " provided an AVS. The Intermediate Care Plan ( asthma action plan, low back pain action plan, and migraine action plan) for Maira meets the Care Plan requirement. This Care Plan has been established and reviewed with the Patient.          Michelle Barrios MD  [facility]    Identified Health Risks:

## 2022-12-01 NOTE — PROGRESS NOTES
FREDI Leon is a 82 year old here for diabetic eye exam.  Vision distance and near not quite as clear left eye, sometimes closes left eye to read with right eye.  No diplopia.  She feels her lids drooping when she reads.  Mostly wears glasses to drive or just over the counter readers.  No eye pain or sudden vision change.  Has some floater both eyes for a very long time, no changes.     PMH: diabetes mellitus 2  Past Medical History:   Diagnosis Date     H/O arthroscopic knee surgery left    5/6/16     History of shingles 12/6/16     Hypertension      Low back pain     & radiates down left buttock & left leg     Need for prophylactic hormone replacement therapy (postmenopausal)      Nocturia     states she gets up every 2 hours at night to urinate     Osteoarthrosis, unspecified whether generalized or localized, unspecified site      Other and unspecified hyperlipidemia      POSTMENOPAUSAL HORMONAL REPLACEMENT TX 5/23/2005     Type II or unspecified type diabetes mellitus without mention of complication, not stated as uncontrolled         POH: Glasses for myopia/presbyopia, cataract extraction posterior chamber intraocular lens (PCIOL) both eyes 2014, 2017   Oc Meds: ocuvite  FH: Denies any glaucoma, brother with age related macular degeneration, no other known eye diseases         Assessment & Plan     1. Posterior capsular opacification of left eye, obscuring vision - Left Eye    2. Pseudophakia, both eyes - Both Eyes    3. Type 2 diabetes mellitus without ophthalmic manifestations (H) - Both Eyes    4. Dermatochalasis of both upper eyelids - Both Eyes       (E11.9) Type 2 diabetes mellitus without ophthalmic manifestations (H) - Both Eyes  (primary encounter diagnosis)  Comment: Diabetes Mellitus 2 w/o retinopathy Great A1C   Plan:   Discussed the importance of tight blood glucose, blood pressure, and cholesterol  control in the prevention of diabetic retinopathy. Recommend yearly dilated eye  exam.    (Z96.1) Pseudophakia, both eyes - Both Eyes  (posterior capsular opacity (PCO)) posterior capsular opacity (PCO) left eye   Plan: discussed with patient natural history and she would like to retinal for YAG capsulotomy     (H02.831,  H02.834) Dermatochalasis of both upper eyelids - Both Eyes  Comment: stable, +small involutional ptosis, not wanting referral  Plan: follow, oculoplastics when interested  -----------------------------------------------------------------------------------       Patient disposition:   Return in about 1 month (around 1/1/2023) for dilate LEFT eye 2 drops and iopidine left for YAG capsulotomy. and patient to call sooner as needed.      Complete documentation of historical and exam elements from today's encounter can be found in the full encounter summary report (not reduplicated in this progress note). I personally obtained the chief complaint(s) and history of present illness.  I have confirmed and edited as necessary the CC, HPI, PMH/PSH, social history, FMH, ROS, and exam/neuro findings as obtained by the technician or others. I have examined this patient myself and I personally viewed the image(s) and studies listed above and the documentation reflects my findings and interpretation.  I formulated and edited as necessary the assessment and plan and discussed the findings and management plan with the patient and family.     Elisabeth Estrada MD

## 2022-12-01 NOTE — NURSING NOTE
Chief Complaints and History of Present Illnesses   Patient presents with     Diabetic Eye Exam     Pt here for yearly diabetic eye exam.      Chief Complaint(s) and History of Present Illness(es)     Diabetic Eye Exam            Diabetes Type: Type 2    Comments: Pt here for yearly diabetic eye exam.           Comments    Pt notes intermittent blurred vision when she is reading. Vision is overall stable. She notes her lids feel like they are drooping more.   Does not use drops.   Lab Results       Component                Value               Date                       A1C                      6.3                 06/02/2022                 A1C                      6.2                 11/23/2021                 A1C                      6.2                 05/20/2021                 A1C                      6.1                 11/24/2020                 A1C                      5.8                 05/21/2020                 A1C                      5.9                 11/26/2019                 A1C                      5.8                 05/29/2019     KIM REDDY, COLTEN 8:45 AM December 1, 2022

## 2022-12-01 NOTE — PATIENT INSTRUCTIONS
DEXA/Mammogram Scheduling  [place] [facility] [phone]   [place] [phone]    Patient Education

## 2022-12-02 LAB
ANION GAP SERPL CALCULATED.3IONS-SCNC: 19 MMOL/L (ref 7–15)
BUN SERPL-MCNC: 28.5 MG/DL (ref 8–23)
CALCIUM SERPL-MCNC: 10.3 MG/DL (ref 8.8–10.2)
CHLORIDE SERPL-SCNC: 102 MMOL/L (ref 98–107)
CHOLEST SERPL-MCNC: 154 MG/DL
CREAT SERPL-MCNC: 0.88 MG/DL (ref 0.51–0.95)
CREAT UR-MCNC: 39.8 MG/DL
DEPRECATED CALCIDIOL+CALCIFEROL SERPL-MC: 52 UG/L (ref 20–75)
DEPRECATED HCO3 PLAS-SCNC: 21 MMOL/L (ref 22–29)
GFR SERPL CREATININE-BSD FRML MDRD: 65 ML/MIN/1.73M2
GLUCOSE SERPL-MCNC: 102 MG/DL (ref 70–99)
HDLC SERPL-MCNC: 56 MG/DL
LDLC SERPL CALC-MCNC: 76 MG/DL
MICROALBUMIN UR-MCNC: <12 MG/L
MICROALBUMIN/CREAT UR: NORMAL MG/G{CREAT}
NONHDLC SERPL-MCNC: 98 MG/DL
POTASSIUM SERPL-SCNC: 3.9 MMOL/L (ref 3.4–5.3)
SODIUM SERPL-SCNC: 142 MMOL/L (ref 136–145)
TRIGL SERPL-MCNC: 110 MG/DL

## 2022-12-06 ENCOUNTER — ANCILLARY PROCEDURE (OUTPATIENT)
Dept: BONE DENSITY | Facility: CLINIC | Age: 82
End: 2022-12-06
Attending: FAMILY MEDICINE
Payer: COMMERCIAL

## 2022-12-06 DIAGNOSIS — Z78.0 ASYMPTOMATIC POSTMENOPAUSAL STATUS: ICD-10-CM

## 2022-12-06 PROCEDURE — 77080 DXA BONE DENSITY AXIAL: CPT | Mod: XU | Performed by: INTERNAL MEDICINE

## 2022-12-06 PROCEDURE — 77081 DXA BONE DENSITY APPENDICULR: CPT | Performed by: INTERNAL MEDICINE

## 2022-12-13 ENCOUNTER — OFFICE VISIT (OUTPATIENT)
Dept: OPHTHALMOLOGY | Facility: CLINIC | Age: 82
End: 2022-12-13
Payer: COMMERCIAL

## 2022-12-13 ENCOUNTER — TELEPHONE (OUTPATIENT)
Dept: OPHTHALMOLOGY | Facility: CLINIC | Age: 82
End: 2022-12-13

## 2022-12-13 DIAGNOSIS — H26.492 POSTERIOR CAPSULAR OPACIFICATION OF LEFT EYE, OBSCURING VISION: ICD-10-CM

## 2022-12-13 DIAGNOSIS — H26.492 POSTERIOR CAPSULAR OPACIFICATION OF LEFT EYE, OBSCURING VISION: Primary | ICD-10-CM

## 2022-12-13 PROCEDURE — 66821 AFTER CATARACT LASER SURGERY: CPT | Mod: LT | Performed by: OPHTHALMOLOGY

## 2022-12-13 ASSESSMENT — CUP TO DISC RATIO
OS_RATIO: 0.2
OD_RATIO: 0.25

## 2022-12-13 ASSESSMENT — REFRACTION_WEARINGRX
OD_SPHERE: -1.25
OD_CYLINDER: +2.50
OD_AXIS: 006
OS_ADD: +2.75
OS_CYLINDER: +1.25
OS_AXIS: 178
OS_SPHERE: -1.25
OD_ADD: +2.75

## 2022-12-13 ASSESSMENT — TONOMETRY
OD_IOP_MMHG: 12
OS_IOP_MMHG: 10
IOP_METHOD: ICARE

## 2022-12-13 ASSESSMENT — VISUAL ACUITY
OD_CC: 20/20
METHOD: SNELLEN - LINEAR
OS_CC: 20/60
CORRECTION_TYPE: GLASSES
OD_CC+: -2
OS_CC+: -1

## 2022-12-13 ASSESSMENT — EXTERNAL EXAM - LEFT EYE: OS_EXAM: MILD DERMATOCHALASIS

## 2022-12-13 NOTE — TELEPHONE ENCOUNTER
Patient returned VM regarding earlier appointment same day. Patient was instructed to just arrive when she is able and she will be seen earlier as provider can fit her in.-Per Patient

## 2022-12-13 NOTE — NURSING NOTE
Chief Complaints and History of Present Illnesses   Patient presents with     Procedure     Chief Complaint(s) and History of Present Illness(es)     Procedure           Comments    Patient notes that she is taking ocuvite and a mulitvitamin and she notes her MD is pushing to discontinue one of them.     Mary Anne THORNTON December 13, 2022 2:28 PM

## 2022-12-17 ENCOUNTER — TRANSFERRED RECORDS (OUTPATIENT)
Dept: HEALTH INFORMATION MANAGEMENT | Facility: CLINIC | Age: 82
End: 2022-12-17

## 2022-12-31 NOTE — PROGRESS NOTES
HPI  Maira Leon is a 82 year old female here for YAG capsulotomy left eye.  Denies any change in vision since the last visit, still very blurry left eye. Denies flashes/floaters.          Assessment & Plan     (H26.492) Posterior capsular opacification, left - Left Eye  (primary encounter diagnosis)  Comment: YAG Capsulotomy OS (left eye)  Discussed with patient risks, benefits, and alternatives to procedure, patient wished to proceed.  Plan: See procedure note, patient tolerated procedure well, did require lid speculum to keep eye open as she is very sensitive to drops and touching eyelid.    Reviewed symptoms of elevated IOP, new flashes and new floaters, and to call immediately if they occur.  Preservative free artificial tears at least four times a day today.     -----------------------------------------------------------------------------------       Patient disposition:   Return in about 2 weeks (around 12/27/2022) for sp yag, va/iop/dilate os . Patient to call sooner as needed.    Complete documentation of historical and exam elements from today's encounter can be found in the full encounter summary report (not reduplicated in this progress note). I personally obtained the chief complaint(s) and history of present illness.  I have confirmed and edited as necessary the CC, HPI, PMH/PSH, social history, FMH, ROS, and exam/neuro findings as obtained by the technician or others. I have examined this patient myself and I personally viewed the image(s) and studies listed above and the documentation reflects my findings and interpretation.  I formulated and edited as necessary the assessment and plan and discussed the findings and management plan with the patient and family.     Elisabeth Estrada MD

## 2023-01-10 ENCOUNTER — PRE VISIT (OUTPATIENT)
Dept: OPHTHALMOLOGY | Facility: CLINIC | Age: 83
End: 2023-01-10

## 2023-01-10 ENCOUNTER — OFFICE VISIT (OUTPATIENT)
Dept: OPHTHALMOLOGY | Facility: CLINIC | Age: 83
End: 2023-01-10
Payer: COMMERCIAL

## 2023-01-10 DIAGNOSIS — Z98.890 STATUS POST YAG CAPSULOTOMY OF LEFT EYE: Primary | ICD-10-CM

## 2023-01-10 DIAGNOSIS — H52.13 MYOPIA, BILATERAL: ICD-10-CM

## 2023-01-10 PROCEDURE — 99024 POSTOP FOLLOW-UP VISIT: CPT | Performed by: OPHTHALMOLOGY

## 2023-01-10 PROCEDURE — 92015 DETERMINE REFRACTIVE STATE: CPT | Performed by: OPHTHALMOLOGY

## 2023-01-10 ASSESSMENT — REFRACTION_WEARINGRX
OD_CYLINDER: +1.50
OS_ADD: +2.75
SPECS_TYPE: SVL DISTANCE
OD_SPHERE: -1.25
OD_CYLINDER: +2.50
OD_ADD: +2.75
OS_AXIS: 178
OS_CYLINDER: +1.75
OD_AXIS: 180
OS_CYLINDER: +1.25
OS_AXIS: 025
OS_SPHERE: +0.25
OD_AXIS: 006
OS_SPHERE: -1.25
OD_SPHERE: -1.25

## 2023-01-10 ASSESSMENT — REFRACTION_MANIFEST
OD_SPHERE: -1.25
OS_AXIS: 175
OS_SPHERE: -1.25
OS_CYLINDER: +1.25
OD_AXIS: 005
OD_CYLINDER: +2.50
OS_ADD: +2.50
OD_ADD: +2.50

## 2023-01-10 ASSESSMENT — CONF VISUAL FIELD
METHOD: COUNTING FINGERS
OS_INFERIOR_NASAL_RESTRICTION: 0
OS_NORMAL: 1
OD_NORMAL: 1
OS_SUPERIOR_TEMPORAL_RESTRICTION: 0
OD_INFERIOR_NASAL_RESTRICTION: 0
OS_INFERIOR_TEMPORAL_RESTRICTION: 0
OS_SUPERIOR_NASAL_RESTRICTION: 0
OD_INFERIOR_TEMPORAL_RESTRICTION: 0
OD_SUPERIOR_NASAL_RESTRICTION: 0
OD_SUPERIOR_TEMPORAL_RESTRICTION: 0

## 2023-01-10 ASSESSMENT — VISUAL ACUITY
METHOD: SNELLEN - LINEAR
OS_CC+: -2
OS_PH_CC+: -2
OS_PH_CC: 20/30
CORRECTION_TYPE: GLASSES
OD_CC+: -3
OS_CC: 20/40
OD_CC: 20/25

## 2023-01-10 ASSESSMENT — TONOMETRY
OD_IOP_MMHG: 10
IOP_METHOD: ICARE
OS_IOP_MMHG: 10

## 2023-01-10 ASSESSMENT — CUP TO DISC RATIO
OD_RATIO: 0.25
OS_RATIO: 0.2

## 2023-01-10 ASSESSMENT — EXTERNAL EXAM - LEFT EYE: OS_EXAM: MILD DERMATOCHALASIS

## 2023-01-10 NOTE — PROGRESS NOTES
status post YAG capsulotomy left eye 12/13/22     HPI:  No complaints.  Denies eye pain.  Vision improved.  Floaters left eye are mild, stable.       Assessment/Plan:  1. Status post YAG capsulotomy of left eye - Left Eye    2. Myopia, bilateral       Good post operative appearance, great visual acuity, no new retinal tears on exam.  Instructed patient to contact immediately for decreasing vision, eye pain, increased redness, new floaters or flashes of light, or other concerning symptoms.  Manifest refraction done and prescription for glasses given.     Followup: Return in about 1 year (around 1/10/2024) for Comprehensive Exam.          Complete documentation of historical and exam elements from today's encounter can be found in the full encounter summary report (not reduplicated in this progress note). I personally obtained the chief complaint(s) and history of present illness.  I have confirmed and edited as necessary the CC, HPI, PMH/PSH, social history, FMH, ROS, and exam/neuro findings as obtained by the technician or others. I have examined this patient myself and I personally viewed the image(s) and studies listed above and the documentation reflects my findings and interpretation.  I formulated and edited as necessary the assessment and plan and discussed the findings and management plan with the patient and family.     Elisabeth Estrada MD

## 2023-01-10 NOTE — NURSING NOTE
Chief Complaints and History of Present Illnesses   Patient presents with     Post Op (Ophthalmology) Left Eye     Pt here s/p yag left eye 12/13/2022.     Chief Complaint(s) and History of Present Illness(es)     Post Op (Ophthalmology) Left Eye            Laterality: left eye    Associated symptoms: floaters.  Negative for eye pain    Comments: Pt here s/p yag left eye 12/13/2022.          Comments    Pt notes vision is much better after procedure. Pt states she no longer has to close her left eye to read. Pt has new floaters left eye since laser.   Aura Lew, COA on 1/10/2023 at 3:11 PM

## 2023-01-13 ENCOUNTER — TELEPHONE (OUTPATIENT)
Dept: FAMILY MEDICINE | Facility: CLINIC | Age: 83
End: 2023-01-13

## 2023-01-13 NOTE — TELEPHONE ENCOUNTER
"Dr Monet gave back information sheet with a note on it saying \"please suggest a virtual appointment if patient would like to discuss this\".    Called patient and left a voicemail message that Dr Monet would like to do a virtual (video or phone) to discuss this( if patient would like to do).    Placed information sheet in \"[employer] completed box\".    Roxy Lauren [facility]    "

## 2023-01-13 NOTE — TELEPHONE ENCOUNTER
Forms received from Information sheet on a research study patient wants to participate in  for Michelle Barrios MD.  Forms placed in provider 'sign me' folder.      Roxy Lauren [facility]

## 2023-01-17 ENCOUNTER — VIRTUAL VISIT (OUTPATIENT)
Dept: FAMILY MEDICINE | Facility: CLINIC | Age: 83
End: 2023-01-17
Payer: COMMERCIAL

## 2023-01-17 DIAGNOSIS — N18.30 DIABETES MELLITUS WITH STAGE 3 CHRONIC KIDNEY DISEASE (H): Primary | ICD-10-CM

## 2023-01-17 DIAGNOSIS — E11.22 DIABETES MELLITUS WITH STAGE 3 CHRONIC KIDNEY DISEASE (H): Primary | ICD-10-CM

## 2023-01-17 PROCEDURE — 99441 PR PHYSICIAN TELEPHONE EVALUATION 5-10 MIN: CPT | Mod: 95 | Performed by: FAMILY MEDICINE

## 2023-01-17 NOTE — PROGRESS NOTES
Nadya is a 83 year old who is being evaluated via a billable telephone visit.      What phone number would you like to be contacted at? [phone]  How would you like to obtain your AVS? Sandy    Distant Location (provider location):  On-site    Assessment & Plan     (E11.22,  N18.30) Diabetes mellitus with stage 3 chronic kidney disease (H)  (primary encounter diagnosis)  Comment: interested in participating in research regarding CKD  Plan: questions answered.                 No follow-ups on file.    Michelle Barrios MD  [facility]    Subjective   Nadya is a 83 year old, presenting for the following health issues:  Patient Request      HPI     Patient would like to discuss about a research she wants to participate in. She states since she has diabetes and kidney disorder those are the things they are looking for for a new medication. [employer] is the one doing the study and they are looking to see how kidney disorder works with the medication.    They are aware of the medications she is already on.    Does want to participate in research.    Review of Systems   Constitutional, HEENT, cardiovascular, pulmonary, gi and gu systems are negative, except as otherwise noted.      Objective           Vitals:  No vitals were obtained today due to virtual visit.    Physical Exam   healthy, alert and no distress  PSYCH: Alert and oriented times 3; coherent speech, normal   rate and volume, able to articulate logical thoughts, able   to abstract reason, no tangential thoughts, no hallucinations   or delusions  Her affect is normal  RESP: No cough, no audible wheezing, able to talk in full sentences  Remainder of exam unable to be completed due to telephone visits              Phone call duration: 6 minutes  Start 3:12 PM  Stop 3:18 PM

## 2023-02-21 ENCOUNTER — TELEPHONE (OUTPATIENT)
Dept: DERMATOLOGY | Facility: CLINIC | Age: 83
End: 2023-02-21
Payer: COMMERCIAL

## 2023-02-21 NOTE — TELEPHONE ENCOUNTER
Patient has an appointment scheduled for 2/22/2023 with Adalgisa Ahn.    Adalgisa Ahn is closing clinic in the early am and afternoon on 2/22/2023 and patient will need to be rescheduled to another day &/or provider.    Left message for patient to return call to the clinic to reschedule.  Will also send patient a [employer] message.

## 2023-02-24 NOTE — TELEPHONE ENCOUNTER
Tentatively scheduled patient for an appointment with Dr. Jackson on 3/21. Unable to reach patient; left VM to call the clinic.     Gale KENNEY

## 2023-02-27 NOTE — TELEPHONE ENCOUNTER
Left VM for patient to call the clinic at [phone] to get her appointment rescheduled. Will also send [employer] message. Will route to provider as this is the 3rd attempt to contact patient.    Gale KENNEY

## 2023-02-27 NOTE — TELEPHONE ENCOUNTER
Disregard note below- patient called this morning to confirm her appointment with Dr. Jackson.     Gale KENNEY

## 2023-03-21 ENCOUNTER — OFFICE VISIT (OUTPATIENT)
Dept: DERMATOLOGY | Facility: CLINIC | Age: 83
End: 2023-03-21
Payer: COMMERCIAL

## 2023-03-21 DIAGNOSIS — L85.3 XEROSIS OF SKIN: ICD-10-CM

## 2023-03-21 DIAGNOSIS — Z85.828 HISTORY OF NONMELANOMA SKIN CANCER: Primary | ICD-10-CM

## 2023-03-21 DIAGNOSIS — L82.1 SEBORRHEIC KERATOSES: ICD-10-CM

## 2023-03-21 DIAGNOSIS — Z12.83 SCREENING FOR SKIN CANCER: ICD-10-CM

## 2023-03-21 PROCEDURE — 99213 OFFICE O/P EST LOW 20 MIN: CPT | Performed by: DERMATOLOGY

## 2023-03-21 NOTE — NURSING NOTE
Maira Leon's goals for this visit include:   Chief Complaint   Patient presents with     Skin Check     Patient here for a 6 month skin check, areas of concern none       She requests these members of her care team be copied on today's visit information:     PCP: Michelle Monet    Referring Provider:  No referring provider defined for this encounter.    LMP  (LMP Unknown)     Do you need any medication refills at today's visit? No         Nicole Wood EMT

## 2023-03-21 NOTE — PROGRESS NOTES
Visit Date: 2023    SUBJECTIVE:  Recheck visit for Nadya, who has had a basal cell in the past.  I could not locate and she did not recall exactly where this was.  At any rate, she comes in for a skin check. 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    OBJECTIVE:  We checked her face, her neck, her chest, her breasts, her back, her arms and legs.  I saw no evidence of any basal cells or squamous cells or that type of problem.  She did not show any actinic keratoses, simply somewhat dry skin.  On her submammary regions were numerous large, bulky seborrheic keratoses.  She denied knowledge of any lesions bleeding or being tender to the touch or tender per se.    ASSESSMENT:  No evidence of any active skin problems today.  Seborrheic keratoses.     PLAN:  Suggested a 1 year return rather than 6 months.  I am not sure why the 6 months was recommended.  Her medicines were reviewed.  Again, I asked her to call us should she develop any lesions that might be bleeding or tender to the touch or dark in color.      HILTON Jackson MD        D: 2023   T: 2023   MT: [id number]    Name:     LORETTA MORGAN  MRN:      [id number]        Account:    [id number]   :      1940           Visit Date: 2023     Document: [id number]

## 2023-03-21 NOTE — LETTER
3/21/2023         RE: Maira Leon  [address] [id number]        Dear Colleague,    Thank you for referring your patient, Maira Leon, to the [facility]. Please see a copy of my visit note below.    Visit Date: 2023    SUBJECTIVE:  Recheck visit for Nadya, who has had a basal cell in the past.  I could not locate and she did not recall exactly where this was.  At any rate, she comes in for a skin check. 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    OBJECTIVE:  We checked her face, her neck, her chest, her breasts, her back, her arms and legs.  I saw no evidence of any basal cells or squamous cells or that type of problem.  She did not show any actinic keratoses, simply somewhat dry skin.  On her submammary regions were numerous large, bulky seborrheic keratoses.  She denied knowledge of any lesions bleeding or being tender to the touch or tender per se.    ASSESSMENT:  No evidence of any active skin problems today.  Seborrheic keratoses.     PLAN:  Suggested a 1 year return rather than 6 months.  I am not sure why the 6 months was recommended.  Her medicines were reviewed.  Again, I asked her to call us should she develop any lesions that might be bleeding or tender to the touch or dark in color.      HILTON Jackson MD        D: 2023   T: 2023   MT: [id number]    Name:     MAIRA LEON  MRN:      [id number]        Account:    [id number]   :      1940           Visit Date: 2023     Document: [id number]      Again, thank you for allowing me to participate in the care of your patient.        Sincerely,        HILTON Jackson MD

## 2023-03-26 ENCOUNTER — HEALTH MAINTENANCE LETTER (OUTPATIENT)
Age: 83
End: 2023-03-26

## 2023-04-18 ENCOUNTER — ANCILLARY PROCEDURE (OUTPATIENT)
Dept: MAMMOGRAPHY | Facility: CLINIC | Age: 83
End: 2023-04-18
Attending: FAMILY MEDICINE
Payer: COMMERCIAL

## 2023-04-18 DIAGNOSIS — Z12.31 VISIT FOR SCREENING MAMMOGRAM: ICD-10-CM

## 2023-04-18 PROCEDURE — 77067 SCR MAMMO BI INCL CAD: CPT | Mod: TC | Performed by: RADIOLOGY

## 2023-04-21 DIAGNOSIS — E11.22 DIABETES MELLITUS WITH STAGE 3 CHRONIC KIDNEY DISEASE (H): ICD-10-CM

## 2023-04-21 DIAGNOSIS — N18.30 DIABETES MELLITUS WITH STAGE 3 CHRONIC KIDNEY DISEASE (H): ICD-10-CM

## 2023-04-21 NOTE — TELEPHONE ENCOUNTER
Patient called requesting to have this medication reorder.    Will be going out of town next on Tuesday,would like to have medication ready today if possible       Daina Arias    [facility]

## 2023-05-05 ENCOUNTER — ANCILLARY PROCEDURE (OUTPATIENT)
Dept: MAMMOGRAPHY | Facility: CLINIC | Age: 83
End: 2023-05-05
Attending: FAMILY MEDICINE
Payer: COMMERCIAL

## 2023-05-05 DIAGNOSIS — R92.8 ABNORMAL MAMMOGRAM: ICD-10-CM

## 2023-05-05 PROCEDURE — 77065 DX MAMMO INCL CAD UNI: CPT | Mod: LT

## 2023-05-17 ENCOUNTER — ANCILLARY PROCEDURE (OUTPATIENT)
Dept: MAMMOGRAPHY | Facility: CLINIC | Age: 83
End: 2023-05-17
Attending: FAMILY MEDICINE
Payer: COMMERCIAL

## 2023-05-17 DIAGNOSIS — R92.8 ABNORMAL MAMMOGRAM: ICD-10-CM

## 2023-05-17 PROCEDURE — 19081 BX BREAST 1ST LESION STRTCTC: CPT | Mod: LT | Performed by: STUDENT IN AN ORGANIZED HEALTH CARE EDUCATION/TRAINING PROGRAM

## 2023-05-17 PROCEDURE — 88305 TISSUE EXAM BY PATHOLOGIST: CPT | Mod: GC | Performed by: PATHOLOGY

## 2023-05-19 LAB
PATH REPORT.COMMENTS IMP SPEC: NORMAL
PATH REPORT.COMMENTS IMP SPEC: NORMAL
PATH REPORT.FINAL DX SPEC: NORMAL
PATH REPORT.GROSS SPEC: NORMAL
PATH REPORT.MICROSCOPIC SPEC OTHER STN: NORMAL
PATH REPORT.RELEVANT HX SPEC: NORMAL
PHOTO IMAGE: NORMAL

## 2023-05-22 ENCOUNTER — TELEPHONE (OUTPATIENT)
Dept: GENERAL RADIOLOGY | Facility: CLINIC | Age: 83
End: 2023-05-22
Payer: COMMERCIAL

## 2023-05-22 NOTE — TELEPHONE ENCOUNTER
Called and left pt message to call back for biopsy results X 2.  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  Recommendation is for annual mammograms.

## 2023-06-01 ENCOUNTER — OFFICE VISIT (OUTPATIENT)
Dept: FAMILY MEDICINE | Facility: CLINIC | Age: 83
End: 2023-06-01
Payer: COMMERCIAL

## 2023-06-01 VITALS
TEMPERATURE: 97.9 F | WEIGHT: 167.4 LBS | RESPIRATION RATE: 16 BRPM | BODY MASS INDEX: 30.8 KG/M2 | DIASTOLIC BLOOD PRESSURE: 70 MMHG | HEIGHT: 62 IN | OXYGEN SATURATION: 96 % | SYSTOLIC BLOOD PRESSURE: 122 MMHG | HEART RATE: 71 BPM

## 2023-06-01 DIAGNOSIS — N18.30 DIABETES MELLITUS WITH STAGE 3 CHRONIC KIDNEY DISEASE (H): Primary | ICD-10-CM

## 2023-06-01 DIAGNOSIS — M54.50 BILATERAL LOW BACK PAIN WITHOUT SCIATICA, UNSPECIFIED CHRONICITY: ICD-10-CM

## 2023-06-01 DIAGNOSIS — E11.22 DIABETES MELLITUS WITH STAGE 3 CHRONIC KIDNEY DISEASE (H): Primary | ICD-10-CM

## 2023-06-01 DIAGNOSIS — E78.5 HYPERLIPIDEMIA LDL GOAL <100: ICD-10-CM

## 2023-06-01 DIAGNOSIS — N18.30 STAGE 3 CHRONIC KIDNEY DISEASE, UNSPECIFIED WHETHER STAGE 3A OR 3B CKD (H): ICD-10-CM

## 2023-06-01 DIAGNOSIS — M25.552 HIP PAIN, LEFT: ICD-10-CM

## 2023-06-01 LAB
HBA1C MFR BLD: 6.9 % (ref 0–5.6)
HGB BLD-MCNC: 13.2 G/DL (ref 11.7–15.7)
HOLD SPECIMEN: NORMAL
HOLD SPECIMEN: NORMAL

## 2023-06-01 PROCEDURE — 83036 HEMOGLOBIN GLYCOSYLATED A1C: CPT | Performed by: FAMILY MEDICINE

## 2023-06-01 PROCEDURE — 80048 BASIC METABOLIC PNL TOTAL CA: CPT | Performed by: FAMILY MEDICINE

## 2023-06-01 PROCEDURE — 85018 HEMOGLOBIN: CPT | Performed by: FAMILY MEDICINE

## 2023-06-01 PROCEDURE — 99214 OFFICE O/P EST MOD 30 MIN: CPT | Performed by: FAMILY MEDICINE

## 2023-06-01 PROCEDURE — 36415 COLL VENOUS BLD VENIPUNCTURE: CPT | Performed by: FAMILY MEDICINE

## 2023-06-01 RX ORDER — LISINOPRIL 2.5 MG/1
2.5 TABLET ORAL DAILY
Qty: 90 TABLET | Refills: 1 | Status: SHIPPED | OUTPATIENT
Start: 2023-06-01 | End: 2023-12-05

## 2023-06-01 RX ORDER — ATORVASTATIN CALCIUM 40 MG/1
40 TABLET, FILM COATED ORAL DAILY
Qty: 90 TABLET | Refills: 1 | Status: SHIPPED | OUTPATIENT
Start: 2023-06-01 | End: 2023-12-05

## 2023-06-01 ASSESSMENT — PAIN SCALES - GENERAL: PAINLEVEL: NO PAIN (1)

## 2023-06-01 NOTE — PROGRESS NOTES
"  Assessment & Plan     (E11.22,  N18.30) Diabetes mellitus with stage 3 chronic kidney disease (H)  (primary encounter diagnosis)  Comment: slight increase in Hemoglobin A1C.  But still under 7.0  Plan: BASIC METABOLIC PANEL, HEMOGLOBIN A1C,         Hemoglobin, atorvastatin (LIPITOR) 40 MG         tablet, lisinopril (ZESTRIL) 2.5 MG tablet,         metFORMIN (GLUCOPHAGE) 500 MG tablet        Continue current medication  Discussed the importance of balancing tight diabetes control, with risks of low blood sugars.    (E78.5) Hyperlipidemia LDL goal <100  Comment:   Plan: atorvastatin (LIPITOR) 40 MG tablet        adjust therapy/treatment based on results  Continue current medication      (N18.30) Stage 3 chronic kidney disease, unspecified whether stage 3a or 3b CKD (H)  Comment:   Plan: lisinopril (ZESTRIL) 2.5 MG tablet        Continue current medication      (M25.552) Hip pain, left  Comment: previous hip fracture related to traumatic fall  Plan: Physical Therapy Referral        Discussed restarting formal PT     (M54.50) Bilateral low back pain without sciatica, unspecified chronicity  Comment: doing well, but doesn't feel as strong as it once was  Plan: Physical Therapy Referral        See above  Discussed dexa  Given traumatic fracture, and other normal bone density results, elected not to pursue treatment at this time.             BMI:   Estimated body mass index is 30.42 kg/m  as calculated from the following:    Height as of this encounter: 1.58 m (5' 2.21\").    Weight as of this encounter: 75.9 kg (167 lb 6.4 oz).   Weight management plan: Discussed healthy diet and exercise guidelines    See Patient Instructions    Michelle Barrios MD  [facility]        Elian Covington is a 83 year old, presenting for the following health issues:  Diabetes    History of Present Illness       Diabetes:   She presents for follow up of diabetes.  She is checking home blood glucose a few times a " "month. She checks blood glucose after meals.  Blood glucose is never over 200 and never under 70. When her blood glucose is low, the patient is asymptomatic for confusion, blurred vision, lethargy and reports not feeling dizzy, shaky, or weak.  She has no concerns regarding her diabetes at this time.  She is having numbness in feet.         Reason for visit:  Regular 6 month    She eats 4 or more servings of fruits and vegetables daily.She consumes 0 sweetened beverage(s) daily.She exercises with enough effort to increase her heart rate 20 to 29 minutes per day.  She exercises with enough effort to increase her heart rate 3 or less days per week.   She is taking medications regularly.       Hemoglobin A1C (%)   Date Value   06/01/2023 6.9 (H)   12/01/2022 6.5 (H)   05/20/2021 6.2 (H)   11/24/2020 6.1 (H)     LDL Cholesterol Calculated (mg/dL)   Date Value   12/01/2022 76   11/23/2021 72   11/24/2020 69   11/26/2019 70           Feels her arthritis pain more when she goes up and down stairs.  Left hip mainly, some in left knee    Back feels much better.  Has gone over a year without an injection.  Still cannot stand for a long time, and distance walking is more difficult  Does PT exercises sometimes.  Walks at least 5000 steps per day.    Still able to play golf  Tries to modify her twisting to protect her back.    Feels balance is more challenging    Does need to blow nose in morning to relieve congestion.  Finds that sniffing menthol in the morning is helpful.  Previous trials of nasal sprays have not been helpful      Review of Systems   Constitutional, HEENT, cardiovascular, pulmonary, gi and gu systems are negative, except as otherwise noted.      Objective    /70   Pulse 71   Temp 97.9  F (36.6  C) (Oral)   Resp 16   Ht 1.58 m (5' 2.21\")   Wt 75.9 kg (167 lb 6.4 oz)   LMP  (LMP Unknown)   SpO2 96%   BMI 30.42 kg/m    Body mass index is 30.42 kg/m .  Physical Exam   GENERAL: healthy, alert and no " distress  RESP: lungs clear to auscultation - no rales, rhonchi or wheezes  CV: regular rate and rhythm, normal S1 S2, no S3 or S4, no murmur, click or rub, no peripheral edema and peripheral pulses strong  MS: no gross musculoskeletal defects noted, no edema  PSYCH: mentation appears normal, affect normal/bright

## 2023-06-02 LAB
ANION GAP SERPL CALCULATED.3IONS-SCNC: 16 MMOL/L (ref 7–15)
BUN SERPL-MCNC: 17.7 MG/DL (ref 8–23)
CALCIUM SERPL-MCNC: 9.9 MG/DL (ref 8.8–10.2)
CHLORIDE SERPL-SCNC: 105 MMOL/L (ref 98–107)
CREAT SERPL-MCNC: 0.87 MG/DL (ref 0.51–0.95)
DEPRECATED HCO3 PLAS-SCNC: 21 MMOL/L (ref 22–29)
GFR SERPL CREATININE-BSD FRML MDRD: 66 ML/MIN/1.73M2
GLUCOSE SERPL-MCNC: 108 MG/DL (ref 70–99)
POTASSIUM SERPL-SCNC: 4.2 MMOL/L (ref 3.4–5.3)
SODIUM SERPL-SCNC: 142 MMOL/L (ref 136–145)

## 2023-06-05 ENCOUNTER — THERAPY VISIT (OUTPATIENT)
Dept: PHYSICAL THERAPY | Facility: CLINIC | Age: 83
End: 2023-06-05
Attending: FAMILY MEDICINE
Payer: COMMERCIAL

## 2023-06-05 DIAGNOSIS — M25.552 HIP PAIN, LEFT: ICD-10-CM

## 2023-06-05 DIAGNOSIS — M54.50 BILATERAL LOW BACK PAIN WITHOUT SCIATICA, UNSPECIFIED CHRONICITY: ICD-10-CM

## 2023-06-05 DIAGNOSIS — M54.50 LUMBAR SPINE PAIN: ICD-10-CM

## 2023-06-05 PROCEDURE — 97161 PT EVAL LOW COMPLEX 20 MIN: CPT | Mod: GP | Performed by: PHYSICAL THERAPIST

## 2023-06-05 PROCEDURE — 97110 THERAPEUTIC EXERCISES: CPT | Mod: GP | Performed by: PHYSICAL THERAPIST

## 2023-06-05 NOTE — PROGRESS NOTES
PHYSICAL THERAPY EVALUATION  Type of Visit: Evaluation    See electronic medical record for Abuse and Falls Screening details.    Subjective      Presenting condition or subjective complaint: It is getting more painful doing steps-back,hi, knee.  Date of onset: 23    Relevant medical history: Arthritis; COPD   Dates & types of surgery: appendix/age 4, bladder repair in my 50's, 2017--glued 3 vetebra together---6 months later a fractured hip,rods to my leg and downll    Prior diagnostic imaging/testing results: MRI; CT scan; Other I don't know names--bone density-injection-   Prior therapy history for the same diagnosis, illness or injury: Yes [facility] therapy--could do things I can't do now,about 2-3 yrs ago--2 injection,1 and another a month later--then 1 that has lasted a yr and a half and 2 injections-a    Prior Level of Function   Transfers: Independent  Ambulation: Independent  ADL: Independent  IADL: Housekeeping, Laundry    Living Environment  Social support: Alone   Type of home: [facility]; 2-story; Basement   Stairs to enter the home: Yes 3 Is there a railing: Yes   Ramp: No   Stairs inside the home: Yes 29 Is there a railing: Yes   Help at home: None  Equipment owned: [employer]     Employment: No    Hobbies/Interests: painting, social-lots of friends & family, golf,share a house on [address],travel    Patient goals for therapy: Yes, tie shoes,put on socks, walk more, lift weights---    Pain assessment: Location: Lumbar spine/Ratin/10  Location: Left hip/Ratin     Objective   HIP EVALUATION  INTEGUMENTARY (edema, incisions): Surgical scar over lumbar spine  POSTURE: Standing Posture: Rounded shoulders, Forward head, Lordosis decreased, right lateral shift  GAIT:   Assistive Device(s): None  Gait Deviations: Antalgic  Stride length decreased     Balance/Proprioception: WNL  Weight Bearing Alignment: Right lateral shift  Non-Weight Bearing Alignment: WNL   ROM:   (Degrees) Left AROM Left  PROM  Right AROM Right PROM   Hip Flexion  112  123   Hip Extension       Hip Abduction       Hip Adduction       Hip Internal Rotation       Hip External Rotation       Knee Flexion       Knee Extension       Lumbar Side glide Moderately restricted - Pain in right lumbar spine and low back Moderately restricted - No increase in pain   Lumbar Flexion Mildly reduced / Repeated motions - No increase in symptoms   Lumbar Extension Significantly reduced / Repeated motions - Pain in lumbar spine     Strength: Hip abduction 4-/5 on left and 4/5 on right / Hip extension - 4-/5 on left and 4/5 on right  LE FLEXIBILITY: WNL  Special Tests:    Left Right   KAE Negative  Negative    FADIR/Labrum/PATRICK Negative  Negative    Femoral Nerve     Layton's     Piriformis Negative  Negative    Quadrant Testing     SLR     Slump     Stork with Extension     Rogelio            PALPATION:   + Tenderness At Location Left Right   Ischial Tuberosity     Greater Trochanter     IT Band     Hip Flexors + +   Piriformis + -   PSIS - -   ASIS - -   Adductors     Abductors     Iliac Crest     Glut Medius     Bursa     Pubis       LUMBAR SPINE EVALUATION    MYOTOMES:    Left Right   T12-L3 (Hip Flexion) 4 4   L2-4 (Quads)  5 5   L4 (Ankle DF) 5 5   L5 (Great Toe Ext) 5 5   S1 (Toe Raise) 5 5     DTR S:    Left Right   C5 (Biceps)     C6 (Brachioradialis)     C7 (Triceps)     L4 (Quad) 2 2   S1 (Achilles)         DERMATOMES: WNL  NEURAL TENSION:    Left Right   SLR Negative  Negative    SLR with DF Negative  Negative    Femoral Nerve     Slump     Manjit (Lumbar)     Manjit (Thoracic)     Manjit (Cervical)     Median     Ulnar     Radial          PELVIS/SI SPECIAL TESTS:    Left Right Additional Notes   ASLR      Gaenslen's Test      Pelvic Compression Negative Negative    Pelvic Gapping Negative Negative    Sacral Thrust Negative Negative    Thigh Thrust Not tested  Negative          PALPATION: Tenderness to palpation over left piriformis and left thigh  and hip flexor  SPINAL SEGMENTAL CONCLUSIONS: Hypomobility of L1-L2. Surgical scar over L3-L5    Assessment & Plan   CLINICAL IMPRESSIONS   Medical Diagnosis: Hip pain, left / Bilateral low back pain without sciatica, unspecified chronicity    Treatment Diagnosis: Left hip pain / Lumbar spine pain   Impression/Assessment: Patient is a 83 year old female with Lumbar spine and left hip and thigh complaints.  The following significant findings have been identified: Pain, Decreased ROM/flexibility, Decreased joint mobility, Decreased strength and Decreased activity tolerance. These impairments interfere with their ability to perform self care tasks, work tasks, household chores, household mobility and community mobility as compared to previous level of function.     Clinical Decision Making (Complexity):   Clinical Presentation: Stable/Uncomplicated  Clinical Presentation Rationale: based on medical and personal factors listed in PT evaluation  Clinical Decision Making (Complexity): Low complexity    PLAN OF CARE  Treatment Interventions:  Interventions: Manual Therapy, Neuromuscular Re-education, Therapeutic Activity, Therapeutic Exercise, Self-Care/Home Management    Long Term Goals     PT Goal 1  Goal Identifier: Stairs  Goal Description: Pt will be able to ascend and descend 1 flight of stairs with a reciprical gait pattern and handrail assist with a minimal increase in pain 1-2/10  Target Date: 07/31/23  PT Goal 2  Goal Identifier: Shoes  Goal Description: Pt will be able to tie her shoes with a minimal increase in pain 1-2/10  Target Date: 07/31/23      Frequency of Treatment: 1 time per week  Duration of Treatment: 8 weeks    Education Assessment:   Learner/Method: Patient;Listening;Reading;Demonstration;Pictures/Video;No Barriers to Learning    Risks and benefits of evaluation/treatment have been explained.   Patient/Family/caregiver agrees with Plan of Care.     Evaluation Time:     PT Prince, Low Complexity  Minutes (58345): 22      Signing Clinician: Leroy Moreno, FELIPA      [facility]                                                                                   OUTPATIENT PHYSICAL THERAPY      PLAN OF TREATMENT FOR OUTPATIENT REHABILITATION   Patient's Last Name, First Name, Maira Barrios YOB: 1940   Provider's Name   [facility]   Medical Record No.  [id number]     Onset Date: 06/01/23  Start of Care Date: 06/05/23     Medical Diagnosis:  Hip pain, left / Bilateral low back pain without sciatica, unspecified chronicity      PT Treatment Diagnosis:  Left hip pain / Lumbar spine pain Plan of Treatment  Frequency/Duration: 1 time per week/ 8 weeks    Certification date from 06/05/23 to 07/31/23         See note for plan of treatment details and functional goals     Leroy Moreno, FELIPA                         I CERTIFY THE NEED FOR THESE SERVICES FURNISHED UNDER        THIS PLAN OF TREATMENT AND WHILE UNDER MY CARE     (Physician attestation of this document indicates review and certification of the therapy plan).                  Referring Provider:  Michelle Monet      Initial Assessment  See Epic Evaluation- Start of Care Date: 06/05/23

## 2023-07-05 ENCOUNTER — THERAPY VISIT (OUTPATIENT)
Dept: PHYSICAL THERAPY | Facility: CLINIC | Age: 83
End: 2023-07-05
Attending: FAMILY MEDICINE
Payer: COMMERCIAL

## 2023-07-05 DIAGNOSIS — M54.50 LUMBAR SPINE PAIN: Primary | ICD-10-CM

## 2023-07-05 DIAGNOSIS — M25.552 HIP PAIN, LEFT: ICD-10-CM

## 2023-07-05 PROCEDURE — 97112 NEUROMUSCULAR REEDUCATION: CPT | Mod: GP | Performed by: PHYSICAL THERAPIST

## 2023-07-05 PROCEDURE — 97110 THERAPEUTIC EXERCISES: CPT | Mod: GP | Performed by: PHYSICAL THERAPIST

## 2023-07-12 ENCOUNTER — THERAPY VISIT (OUTPATIENT)
Dept: PHYSICAL THERAPY | Facility: CLINIC | Age: 83
End: 2023-07-12
Attending: FAMILY MEDICINE
Payer: COMMERCIAL

## 2023-07-12 DIAGNOSIS — M25.552 HIP PAIN, LEFT: ICD-10-CM

## 2023-07-12 DIAGNOSIS — M54.50 LUMBAR SPINE PAIN: Primary | ICD-10-CM

## 2023-07-12 PROCEDURE — 97110 THERAPEUTIC EXERCISES: CPT | Mod: GP | Performed by: PHYSICAL THERAPIST

## 2023-07-12 PROCEDURE — 97112 NEUROMUSCULAR REEDUCATION: CPT | Mod: GP | Performed by: PHYSICAL THERAPIST

## 2023-07-19 ENCOUNTER — THERAPY VISIT (OUTPATIENT)
Dept: PHYSICAL THERAPY | Facility: CLINIC | Age: 83
End: 2023-07-19
Payer: COMMERCIAL

## 2023-07-19 DIAGNOSIS — M25.552 HIP PAIN, LEFT: ICD-10-CM

## 2023-07-19 DIAGNOSIS — M54.50 LUMBAR SPINE PAIN: Primary | ICD-10-CM

## 2023-07-19 PROCEDURE — 97110 THERAPEUTIC EXERCISES: CPT | Mod: GP

## 2023-07-26 ENCOUNTER — THERAPY VISIT (OUTPATIENT)
Dept: PHYSICAL THERAPY | Facility: CLINIC | Age: 83
End: 2023-07-26
Payer: COMMERCIAL

## 2023-07-26 DIAGNOSIS — M54.50 LUMBAR SPINE PAIN: Primary | ICD-10-CM

## 2023-07-26 DIAGNOSIS — M25.552 HIP PAIN, LEFT: ICD-10-CM

## 2023-07-26 PROCEDURE — 97112 NEUROMUSCULAR REEDUCATION: CPT | Mod: GP

## 2023-07-26 PROCEDURE — 97110 THERAPEUTIC EXERCISES: CPT | Mod: GP

## 2023-07-27 DIAGNOSIS — N18.30 DIABETES MELLITUS WITH STAGE 3 CHRONIC KIDNEY DISEASE (H): ICD-10-CM

## 2023-07-27 DIAGNOSIS — E11.22 DIABETES MELLITUS WITH STAGE 3 CHRONIC KIDNEY DISEASE (H): ICD-10-CM

## 2023-08-02 ENCOUNTER — THERAPY VISIT (OUTPATIENT)
Dept: PHYSICAL THERAPY | Facility: CLINIC | Age: 83
End: 2023-08-02
Payer: COMMERCIAL

## 2023-08-02 DIAGNOSIS — M25.552 HIP PAIN, LEFT: ICD-10-CM

## 2023-08-02 DIAGNOSIS — M54.50 LUMBAR SPINE PAIN: Primary | ICD-10-CM

## 2023-08-02 PROCEDURE — 97112 NEUROMUSCULAR REEDUCATION: CPT | Mod: GP

## 2023-08-02 PROCEDURE — 97110 THERAPEUTIC EXERCISES: CPT | Mod: GP

## 2023-08-02 NOTE — PROGRESS NOTES
08/02/23 0500   Appointment Info   Signing clinician's name / credentials Yamile Montgomery, PT, DPT, OCS & Vanessa Pagan, SPT   Total/Authorized Visits 8 (POC) (E&T) +2 per cert today   Visits Used 6   Medical Diagnosis Hip pain, left / Bilateral low back pain without sciatica, unspecified chronicity   PT Tx Diagnosis Left hip pain / Lumbar spine pain   Precautions/Limitations Previous hip fx with maru in femur / Lumbar fusion   Quick Adds Certification;Student Supervision   Progress Note/Certification   Start of Care Date 06/05/23   Onset of illness/injury or Date of Surgery 06/01/23   Therapy Frequency 1 time per week   Predicted Duration 8 weeks   Certification date from 08/01/23   Certification date to 09/27/23   Progress Note Due Date 07/31/23   Progress Note Completed Date 06/05/23   Supervision   Student Supervision Direct supervision provided       Present No   GOALS   PT Goals 2;3   PT Goal 1   Goal Identifier Stairs   Goal Description Pt will be able to ascend and descend 1 flight of stairs with a reciprical gait pattern and handrail assist with a minimal increase in pain 1-2/10   Goal Progress Pt did 6 flights of stairs w/railing assist and onset of symptoms   Target Date 07/31/23   Date Met 08/02/23   PT Goal 2   Goal Identifier Shoes   Goal Description Pt will be able to tie her shoes with a minimal increase in pain 1-2/10   Goal Progress not assessed today   Target Date 07/31/23   PT Goal 3   Goal Identifier Floor recovery   Goal Description Patient will demonstrat ability to transfer floor<> 30 seconds with UE assist in order to safely transfer from the ground independently.   Target Date 09/27/23   Subjective Report   Subjective Report Reports pain is about the same as last time. Her knee is still bothering her, but is happy with the progress she is seeing. Sit to stand have gotten easier. Marching backwards and having her eyes closed are still difficult for her. HEP is  going great and she is completing them every day. She states she would like to continue therapy to focus on full body strengthening.   Objective Measures   Objective Measures Objective Measure 1;Objective Measure 2   Objective Measure 1   Objective Measure 30s STS   Details 13 reps   Objective Measure 2   Objective Measure Stairs   Details L LE shows an increase in power when ascending stairs   Treatment Interventions (PT)   Interventions Therapeutic Procedure/Exercise;Neuromuscular Re-education   Therapeutic Procedure/Exercise   Therapeutic Procedures: strength, endurance, ROM, flexibillity minutes (60335) 15   Therapeutic Procedures Ther Proc 2;Ther Proc 3;Ther Proc 4;Ther Proc 5   Ther Proc 1 Nustep   Ther Proc 1 - Details x 6min, every other minute lvl 4-6   Ther Proc 2 stairs   Ther Proc 2 - Details x6 sets, uses railing, uses stand - by assist for first 3 and last 3 w/supervision & railing   Ther Proc 3 Deadlift   Ther Proc 4 Sit to stand   Ther Proc 5 deep squat   PTRx Ther Proc 1 Standing Hip Flexor Stretch   PTRx Ther Proc 1 - Details verbal review   PTRx Ther Proc 2 Supine Lumbar Hip Roll   PTRx Ther Proc 2 - Details continue at home   PTRx Ther Proc 3 Posterior Pelvic Tilt   PTRx Ther Proc 3 - Details continue at home   PTRx Ther Proc 4 Sit to Stand   PTRx Ther Proc 4 - Details continue at home   PTRx Ther Proc 5 Scapular Stabilization Shoulder Abduction and Wall Slide   PTRx Ther Proc 5 - Details continue at home   PTRx Ther Proc 6 Stepdown Backward   PTRx Ther Proc 6 - Details continue at home   Skilled Intervention targeting posterior chain and LE power   Patient Response/Progress tolerates well and accomplished stair goal!   Neuromuscular Re-education   Neuromuscular re-ed of mvmt, balance, coord, kinesthetic sense, posture, proprioception minutes (42027) 25   Neuromuscular Re-education Neuro Re-ed 2;Neuro Re-ed 3;Neuro Re-ed 4;Neuro Re-ed 5;Neuro Re-ed 6;Neuro Re-ed 7   Neuro Re-ed 1 marching over  ground   Neuro Re-ed 1 - Details x4 sets uses railing   Neuro Re-ed 2 retrostep   Neuro Re-ed 2 - Details x4 sets uses railing // x4 sets uses railing w/eyes closed   Neuro Re-ed 3 carioca   Neuro Re-ed 4 Semi tandem with head turns   Neuro Re-ed 4 - Details x 60sec // x 60sec w/ball out in front   Neuro Re-ed 5 Golf swing static with perturbations   Neuro Re-ed 6 reactive balance   Neuro Re-ed 7 limits of stability   Skilled Intervention targeting dynamic and static balance   Patient Response/Progress tolerated well w/appropriate weight shifts   Neuro Re-ed 8 semi tandem w/eyes closed   Neuro Re-ed 8 - Details x60 sec, uses railing   Education   Learner/Method Patient;Listening;Reading;Demonstration;Pictures/Video;No Barriers to Learning   Plan   Home program paper handouts; daily   Updates to plan of care semi tandem w/eyes closed   Plan for next session full body strength focused   Total Session Time   Timed Code Treatment Minutes 40   Total Treatment Time (sum of timed and untimed services) 40         [facility]                                                                                   OUTPATIENT PHYSICAL THERAPY    PLAN OF TREATMENT FOR OUTPATIENT REHABILITATION   Patient's Last Name, First Name, Maira Barrios YOB: 1940   Provider's Name   [facility]   Medical Record No.  [id number]     Onset Date: 06/01/23  Start of Care Date: 06/05/23     Medical Diagnosis:  Hip pain, left / Bilateral low back pain without sciatica, unspecified chronicity      PT Treatment Diagnosis:  Left hip pain / Lumbar spine pain Plan of Treatment  Frequency/Duration: 1 time per week/ 8 weeks    Certification date from 08/01/23 to (P) 09/27/23         See note for plan of treatment details and functional goals     ALMA FAGAN, PT                         I CERTIFY THE NEED FOR THESE SERVICES FURNISHED UNDER        THIS PLAN OF TREATMENT AND  WHILE UNDER MY CARE     (Physician attestation of this document indicates review and certification of the therapy plan).                Referring Provider:  Michelle Monte      Initial Assessment  See Epic Evaluation- Start of Care Date: 06/05/23            PLAN  Continue therapy per current plan of care.  Strength focus    Beginning/End Dates of Progress Note Reporting Period:  06/05/23 to 08/02/2023    Referring Provider:  Michelle Monet

## 2023-08-09 ENCOUNTER — THERAPY VISIT (OUTPATIENT)
Dept: PHYSICAL THERAPY | Facility: CLINIC | Age: 83
End: 2023-08-09
Payer: COMMERCIAL

## 2023-08-09 DIAGNOSIS — M25.552 HIP PAIN, LEFT: ICD-10-CM

## 2023-08-09 DIAGNOSIS — M54.50 LUMBAR SPINE PAIN: Primary | ICD-10-CM

## 2023-08-09 PROCEDURE — 97112 NEUROMUSCULAR REEDUCATION: CPT | Mod: GP | Performed by: PHYSICAL THERAPIST

## 2023-08-09 PROCEDURE — 97110 THERAPEUTIC EXERCISES: CPT | Mod: GP | Performed by: PHYSICAL THERAPIST

## 2023-08-23 ENCOUNTER — THERAPY VISIT (OUTPATIENT)
Dept: PHYSICAL THERAPY | Facility: CLINIC | Age: 83
End: 2023-08-23
Payer: COMMERCIAL

## 2023-08-23 DIAGNOSIS — M25.552 HIP PAIN, LEFT: ICD-10-CM

## 2023-08-23 DIAGNOSIS — M54.50 LUMBAR SPINE PAIN: Primary | ICD-10-CM

## 2023-08-23 PROCEDURE — 97110 THERAPEUTIC EXERCISES: CPT | Mod: GP | Performed by: PHYSICAL THERAPIST

## 2023-09-06 ENCOUNTER — THERAPY VISIT (OUTPATIENT)
Dept: PHYSICAL THERAPY | Facility: CLINIC | Age: 83
End: 2023-09-06
Payer: COMMERCIAL

## 2023-09-06 DIAGNOSIS — M25.552 HIP PAIN, LEFT: ICD-10-CM

## 2023-09-06 DIAGNOSIS — M54.50 LUMBAR SPINE PAIN: Primary | ICD-10-CM

## 2023-09-06 PROCEDURE — 97112 NEUROMUSCULAR REEDUCATION: CPT | Mod: GP | Performed by: PHYSICAL THERAPIST

## 2023-09-06 PROCEDURE — 97110 THERAPEUTIC EXERCISES: CPT | Mod: GP | Performed by: PHYSICAL THERAPIST

## 2023-09-20 ENCOUNTER — THERAPY VISIT (OUTPATIENT)
Dept: PHYSICAL THERAPY | Facility: CLINIC | Age: 83
End: 2023-09-20
Payer: COMMERCIAL

## 2023-09-20 DIAGNOSIS — M54.50 LUMBAR SPINE PAIN: Primary | ICD-10-CM

## 2023-09-20 DIAGNOSIS — M25.552 HIP PAIN, LEFT: ICD-10-CM

## 2023-09-20 PROCEDURE — 97530 THERAPEUTIC ACTIVITIES: CPT | Mod: GP | Performed by: PHYSICAL THERAPIST

## 2023-09-20 PROCEDURE — 97110 THERAPEUTIC EXERCISES: CPT | Mod: 59 | Performed by: PHYSICAL THERAPIST

## 2023-09-20 NOTE — PROGRESS NOTES
09/20/23 0500   Appointment Info   Signing clinician's name / credentials Yamile Montgomery, PT, DPT, OCS   Total/Authorized Visits 10   Visits Used 10   Medical Diagnosis Hip pain, left / Bilateral low back pain without sciatica, unspecified chronicity   PT Tx Diagnosis Left hip pain / Lumbar spine pain   Precautions/Limitations Previous hip fx with maru in femur / Lumbar fusion   Other pertinent information Late 10'   Quick Adds Certification   Progress Note/Certification   Start of Care Date 06/05/23   Onset of illness/injury or Date of Surgery 06/01/23   Therapy Frequency 1 time per week   Predicted Duration 8 weeks   Certification date from 08/01/23   Certification date to 09/27/23   Progress Note Due Date 10/01/23   Progress Note Completed Date 08/02/23   GOALS   PT Goals 2;3   PT Goal 1   Goal Identifier Stairs   Goal Description Pt will be able to ascend and descend 1 flight of stairs with a reciprical gait pattern and handrail assist with a minimal increase in pain 1-2/10   Goal Progress Pt did 6 flights of stairs w/railing assist and onset of symptoms   Target Date 07/31/23   Date Met 08/02/23   PT Goal 2   Goal Identifier Shoes   Goal Description Pt will be able to tie her shoes with a minimal increase in pain 1-2/10   Goal Progress lumbar ROM full, able to tie shoes   Target Date 07/31/23   Date Met 09/06/23   PT Goal 3   Goal Identifier Floor recovery   Goal Description Patient will demonstrate ability to transfer floor<> 30 seconds with UE assist in order to safely transfer from the ground independently.   Goal Progress reports ability to floor transfer with confidence. Not assessed today   Target Date 09/27/23   Subjective Report   Subjective Report Enjoying strengthening, R knee is still a little sore.   Objective Measures   Objective Measures Objective Measure 1;Objective Measure 2   Objective Measure 1   Objective Measure 30s STS   Details 13 reps   Objective Measure 2   Objective Measure  "Stairs   Details L LE shows an increase in power when ascending stairs   Treatment Interventions (PT)   Interventions Therapeutic Procedure/Exercise;Neuromuscular Re-education;Therapeutic Activity   Therapeutic Procedure/Exercise   Therapeutic Procedures: strength, endurance, ROM, flexibillity minutes (57403) 30   Therapeutic Procedures Ther Proc 2;Ther Proc 3;Ther Proc 4;Ther Proc 5;Ther Proc 6   Ther Proc 1 Nustep   Ther Proc 1 - Details x 4 min warmup   Ther Proc 2 farmer carry   Ther Proc 2 - Details for endurance and postural muscles - x 4 min x 10# DBs   Ther Proc 3 HEP ed   Ther Proc 3 - Details progressions as needed, variations every 3 months. Pt asked questions to clarify several exercises and their progressions   Ther Proc 4 Bicep curls   Ther Proc 5 Lateral step   Ther Proc 6 Overhead press   Ther Proc 6 - Details with sit to stand, x 3#   PTRx Ther Proc 1 Standing Hip Flexor Stretch   PTRx Ther Proc 1 - Details pt demonstrated   PTRx Ther Proc 2 Sit to Stand   PTRx Ther Proc 2 - Details x 10 x 10# at side   PTRx Ther Proc 3 Scapular Stabilization Shoulder Abduction and Wall Slide   PTRx Ther Proc 3 - Details verbal review   PTRx Ther Proc 4 Shoulder Theraband Rows   PTRx Ther Proc 4 - Details verbal review   PTRx Ther Proc 5 low sit to stand + pivot   PTRx Ther Proc 6 Single Leg Standing Deadlift   PTRx Ther Proc 6 - Details pt demonstrated to 6\" box   PTRx Ther Proc 7 Stepdown Backward   PTRx Ther Proc 7 - Details verbal review   Skilled Intervention targeting posterior chain and LE power   Patient Response/Progress tolerated well, left LE more challenging   Therapeutic Activity   Therapeutic Activities: dynamic activities to improve functional performance minutes (77102) 8   Therapeutic Activities Ther Act 2   Ther Act 1 JORGE retake and interpretation   Ther Act 1 - Details x 4 min   Ther Act 2 Fall recovery ed   Ther Act 2 - Details carry a phone, assess if you hit your head or have other injuries/are " safe to move, call for help, then get up if able   Neuromuscular Re-education   Neuromuscular Re-education Neuro Re-ed 2;Neuro Re-ed 3;Neuro Re-ed 4;Neuro Re-ed 5;Neuro Re-ed 6;Neuro Re-ed 7   Neuro Re-ed 1 marching over ground   Neuro Re-ed 2 retrostep   Neuro Re-ed 3 carioca   Neuro Re-ed 4 reactive balance   Neuro Re-ed 5 Golf swing static with perturbations   Neuro Re-ed 6 reactive balance   Neuro Re-ed 7 limits of stability   Neuro Re-ed 8 Floor recovery   Education   Learner/Method Patient;Listening;Reading;Demonstration;Pictures/Video;No Barriers to Learning   Plan   Home program paper handouts; daily   Plan for next session d/c PT   Total Session Time   Timed Code Treatment Minutes 38   Total Treatment Time (sum of timed and untimed services) 38         DISCHARGE  Reason for Discharge: Patient has met all goals.    Equipment Issued: none    Discharge Plan: Patient to continue home program.    Referring Provider:  Michelle Monet

## 2023-10-27 DIAGNOSIS — N18.30 DIABETES MELLITUS WITH STAGE 3 CHRONIC KIDNEY DISEASE (H): ICD-10-CM

## 2023-10-27 DIAGNOSIS — E11.22 DIABETES MELLITUS WITH STAGE 3 CHRONIC KIDNEY DISEASE (H): ICD-10-CM

## 2023-11-28 ASSESSMENT — ENCOUNTER SYMPTOMS
JOINT SWELLING: 0
HEMATURIA: 0
NERVOUS/ANXIOUS: 0
DIZZINESS: 0
FEVER: 0
NAUSEA: 0
DIARRHEA: 0
MYALGIAS: 0
BREAST MASS: 0
HEARTBURN: 0
WEAKNESS: 0
PALPITATIONS: 0
CONSTIPATION: 0
CHILLS: 0
SHORTNESS OF BREATH: 0
COUGH: 1
EYE PAIN: 0
HEMATOCHEZIA: 0
HEADACHES: 0
DYSURIA: 0
FREQUENCY: 1
ABDOMINAL PAIN: 0
SORE THROAT: 0
ARTHRALGIAS: 0
PARESTHESIAS: 0

## 2023-11-28 ASSESSMENT — ACTIVITIES OF DAILY LIVING (ADL)
CURRENT_FUNCTION: NO ASSISTANCE NEEDED
CURRENT_FUNCTION: TELEPHONE REQUIRES ASSISTANCE

## 2023-12-05 ENCOUNTER — TELEPHONE (OUTPATIENT)
Dept: FAMILY MEDICINE | Facility: CLINIC | Age: 83
End: 2023-12-05

## 2023-12-05 ENCOUNTER — OFFICE VISIT (OUTPATIENT)
Dept: FAMILY MEDICINE | Facility: CLINIC | Age: 83
End: 2023-12-05
Payer: COMMERCIAL

## 2023-12-05 VITALS
WEIGHT: 164.2 LBS | SYSTOLIC BLOOD PRESSURE: 124 MMHG | TEMPERATURE: 98.2 F | HEIGHT: 62 IN | BODY MASS INDEX: 30.22 KG/M2 | RESPIRATION RATE: 15 BRPM | HEART RATE: 64 BPM | DIASTOLIC BLOOD PRESSURE: 78 MMHG | OXYGEN SATURATION: 98 %

## 2023-12-05 DIAGNOSIS — R05.9 COUGH, UNSPECIFIED TYPE: ICD-10-CM

## 2023-12-05 DIAGNOSIS — E11.22 DIABETES MELLITUS WITH STAGE 3 CHRONIC KIDNEY DISEASE (H): ICD-10-CM

## 2023-12-05 DIAGNOSIS — N18.30 DIABETES MELLITUS WITH STAGE 3 CHRONIC KIDNEY DISEASE (H): ICD-10-CM

## 2023-12-05 DIAGNOSIS — Z00.00 ENCOUNTER FOR MEDICARE ANNUAL WELLNESS EXAM: Primary | ICD-10-CM

## 2023-12-05 DIAGNOSIS — E78.5 HYPERLIPIDEMIA LDL GOAL <100: ICD-10-CM

## 2023-12-05 DIAGNOSIS — N18.30 STAGE 3 CHRONIC KIDNEY DISEASE, UNSPECIFIED WHETHER STAGE 3A OR 3B CKD (H): ICD-10-CM

## 2023-12-05 LAB
ANION GAP SERPL CALCULATED.3IONS-SCNC: 14 MMOL/L (ref 7–15)
BUN SERPL-MCNC: 28.5 MG/DL (ref 8–23)
CALCIUM SERPL-MCNC: 10.2 MG/DL (ref 8.8–10.2)
CHLORIDE SERPL-SCNC: 100 MMOL/L (ref 98–107)
CHOLEST SERPL-MCNC: 161 MG/DL
CREAT SERPL-MCNC: 0.98 MG/DL (ref 0.51–0.95)
CREAT UR-MCNC: 29.4 MG/DL
DEPRECATED HCO3 PLAS-SCNC: 27 MMOL/L (ref 22–29)
EGFRCR SERPLBLD CKD-EPI 2021: 57 ML/MIN/1.73M2
GLUCOSE SERPL-MCNC: 131 MG/DL (ref 70–99)
HBA1C MFR BLD: 6.2 % (ref 0–5.6)
HDLC SERPL-MCNC: 65 MG/DL
HOLD SPECIMEN: NORMAL
LDLC SERPL CALC-MCNC: 84 MG/DL
MICROALBUMIN UR-MCNC: <12 MG/L
MICROALBUMIN/CREAT UR: NORMAL MG/G{CREAT}
NONHDLC SERPL-MCNC: 96 MG/DL
POTASSIUM SERPL-SCNC: 3.9 MMOL/L (ref 3.4–5.3)
SODIUM SERPL-SCNC: 141 MMOL/L (ref 135–145)
TRIGL SERPL-MCNC: 58 MG/DL

## 2023-12-05 PROCEDURE — G0439 PPPS, SUBSEQ VISIT: HCPCS | Performed by: FAMILY MEDICINE

## 2023-12-05 PROCEDURE — 99207 PR FOOT EXAM NO CHARGE: CPT | Performed by: FAMILY MEDICINE

## 2023-12-05 PROCEDURE — 82570 ASSAY OF URINE CREATININE: CPT | Performed by: FAMILY MEDICINE

## 2023-12-05 PROCEDURE — 80061 LIPID PANEL: CPT | Performed by: FAMILY MEDICINE

## 2023-12-05 PROCEDURE — 99214 OFFICE O/P EST MOD 30 MIN: CPT | Mod: 25 | Performed by: FAMILY MEDICINE

## 2023-12-05 PROCEDURE — 36415 COLL VENOUS BLD VENIPUNCTURE: CPT | Performed by: FAMILY MEDICINE

## 2023-12-05 PROCEDURE — 83036 HEMOGLOBIN GLYCOSYLATED A1C: CPT | Performed by: FAMILY MEDICINE

## 2023-12-05 PROCEDURE — 82043 UR ALBUMIN QUANTITATIVE: CPT | Performed by: FAMILY MEDICINE

## 2023-12-05 PROCEDURE — 80048 BASIC METABOLIC PNL TOTAL CA: CPT | Performed by: FAMILY MEDICINE

## 2023-12-05 RX ORDER — ATORVASTATIN CALCIUM 40 MG/1
40 TABLET, FILM COATED ORAL DAILY
Qty: 90 TABLET | Refills: 3 | Status: SHIPPED | OUTPATIENT
Start: 2023-12-05

## 2023-12-05 RX ORDER — LISINOPRIL 2.5 MG/1
2.5 TABLET ORAL DAILY
Qty: 90 TABLET | Refills: 3 | Status: SHIPPED | OUTPATIENT
Start: 2023-12-05

## 2023-12-05 ASSESSMENT — ENCOUNTER SYMPTOMS
FREQUENCY: 1
HEADACHES: 0
PALPITATIONS: 0
HEARTBURN: 0
BREAST MASS: 0
DYSURIA: 0
FEVER: 0
NAUSEA: 0
HEMATURIA: 0
HEMATOCHEZIA: 0
DIZZINESS: 0
ABDOMINAL PAIN: 0
WEAKNESS: 0
CHILLS: 0
SORE THROAT: 0
NERVOUS/ANXIOUS: 0
PARESTHESIAS: 0
CONSTIPATION: 0
COUGH: 1
ARTHRALGIAS: 0
EYE PAIN: 0
JOINT SWELLING: 0
DIARRHEA: 0
MYALGIAS: 0
SHORTNESS OF BREATH: 0

## 2023-12-05 ASSESSMENT — ACTIVITIES OF DAILY LIVING (ADL)
CURRENT_FUNCTION: TELEPHONE REQUIRES ASSISTANCE
CURRENT_FUNCTION: NO ASSISTANCE NEEDED

## 2023-12-05 NOTE — PATIENT INSTRUCTIONS
Patient Education   Hearing Loss: Care Instructions  Overview     Hearing loss is a sudden or slow decrease in how well you hear. It can range from slight to profound. Permanent hearing loss can occur with aging. It also can happen when you are exposed long-term to loud noise. Examples include listening to loud music, riding motorcycles, or being around other loud machines.  Hearing loss can affect your work and home life. It can make you feel lonely or depressed. You may feel that you have lost your independence. But hearing aids and other devices can help you hear better and feel connected to others.  Follow-up care is a key part of your treatment and safety. Be sure to make and go to all appointments, and call your doctor if you are having problems. It's also a good idea to know your test results and keep a list of the medicines you take.  How can you care for yourself at home?  Avoid loud noises whenever possible. This helps keep your hearing from getting worse.  Always wear hearing protection around loud noises.  Wear a hearing aid as directed.  A professional can help you pick a hearing aid that will work best for you.  You can also get hearing aids over the counter for mild to moderate hearing loss.  Have hearing tests as your doctor suggests. They can show whether your hearing has changed. Your hearing aid may need to be adjusted.  Use other devices as needed. These may include:  Telephone amplifiers and hearing aids that can connect to a television, stereo, radio, or microphone.  Devices that use lights or vibrations. These alert you to the doorbell, a ringing telephone, or a baby monitor.  Television closed-captioning. This shows the words at the bottom of the screen. Most new TVs can do this.  TTY (text telephone). This lets you type messages back and forth on the telephone instead of talking or listening. These devices are also called TDD. When messages are typed on the keyboard, they are sent over the  "phone line to a receiving TTY. The message is shown on a monitor.  Use text messaging, social media, and email if it is hard for you to communicate by telephone.  Try to learn a listening technique called speechreading. It is not lipreading. You pay attention to people's gestures, expressions, posture, and tone of voice. These clues can help you understand what a person is saying. Face the person you are talking to, and have them face you. Make sure the lighting is good. You need to see the other person's face clearly.  Think about counseling if you need help to adjust to your hearing loss.  When should you call for help?  Watch closely for changes in your health, and be sure to contact your doctor if:    You think your hearing is getting worse.     You have new symptoms, such as dizziness or nausea.   Where can you learn more?  Go to https://www.[email or web address].retickr/patiented  Enter R798 in the search box to learn more about \"Hearing Loss: Care Instructions.\"  Current as of: February 28, 2023               Content Version: 13.8    4188-7919 [employer].   Care instructions adapted under license by your healthcare professional. If you have questions about a medical condition or this instruction, always ask your healthcare professional. [employer] disclaims any warranty or liability for your use of this information.      Bladder Training: Care Instructions  Your Care Instructions     Bladder training is used to treat urge incontinence and stress incontinence. Urge incontinence means that the need to urinate comes on so fast that you can't get to a toilet in time. Stress incontinence means that you leak urine because of pressure on your bladder. For example, it may happen when you laugh, cough, or lift something heavy.  Bladder training can increase how long you can wait before you have to urinate. It can also help your bladder hold more urine. And it can give you better control over the urge to " urinate.  It is important to remember that bladder training takes a few weeks to a few months to make a difference. You may not see results right away, but don't give up.  Follow-up care is a key part of your treatment and safety. Be sure to make and go to all appointments, and call your doctor if you are having problems. It's also a good idea to know your test results and keep a list of the medicines you take.  How can you care for yourself at home?  Work with your doctor to come up with a bladder training program that is right for you. You may use one or more of the following methods.  Delayed urination  In the beginning, try to keep from urinating for 5 minutes after you first feel the need to go.  While you wait, take deep, slow breaths to relax. Kegel exercises can also help you delay the need to go to the bathroom.  After some practice, when you can easily wait 5 minutes to urinate, try to wait 10 minutes before you urinate.  Slowly increase the waiting period until you are able to control when you have to urinate.  Scheduled urination  Empty your bladder when you first wake up in the morning.  Schedule times throughout the day when you will urinate.  Start by going to the bathroom every hour, even if you don't need to go.  Slowly increase the time between trips to the bathroom.  When you have found a schedule that works well for you, keep doing it.  If you wake up during the night and have to urinate, do it. Apply your schedule to waking hours only.  Kegel exercises  These tighten and strengthen pelvic muscles, which can help you control the flow of urine. (If doing these exercises causes pain, stop doing them and talk with your doctor.) To do Kegel exercises:  Squeeze your muscles as if you were trying not to pass gas. Or squeeze your muscles as if you were stopping the flow of urine. Your belly, legs, and buttocks shouldn't move.  Hold the squeeze for 3 seconds, then relax for 5 to 10 seconds.  Start with 3  "seconds, then add 1 second each week until you are able to squeeze for 10 seconds.  Repeat the exercise 10 times a session. Do 3 to 8 sessions a day.  When should you call for help?  Watch closely for changes in your health, and be sure to contact your doctor if:    Your incontinence is getting worse.     You do not get better as expected.   Where can you learn more?  Go to https://www.judge.me.net/patiented  Enter V684 in the search box to learn more about \"Bladder Training: Care Instructions.\"  Current as of: February 28, 2023               Content Version: 13.8    2031-8849 [email or web address].   Care instructions adapted under license by your healthcare professional. If you have questions about a medical condition or this instruction, always ask your healthcare professional. [employer] disclaims any warranty or liability for your use of this information.         Patient Education   Personalized Prevention Plan  You are due for the preventive services outlined below.  Your care team is available to assist you in scheduling these services.  If you have already completed any of these items, please share that information with your care team to update in your medical record.  Health Maintenance Due   Topic Date Due     RSV VACCINE (Pregnancy & 60+) (1 - 1-dose 60+ series) Never done     Basic Metabolic Panel  09/01/2023     Cholesterol Lab  12/01/2023     Kidney Microalbumin Urine Test  12/01/2023     Eye Exam  12/13/2023     Activities of Daily Living    Your Health Risk Assessment indicates you have difficulties with activities of daily living such as housework, bathing, preparing meals, taking medication, etc. Please make a follow up appointment for us to address this issue in more detail.  Hearing Loss: Care Instructions  Overview     Hearing loss is a sudden or slow decrease in how well you hear. It can range from slight to profound. Permanent hearing loss can occur with aging. It also can " happen when you are exposed long-term to loud noise. Examples include listening to loud music, riding motorcycles, or being around other loud machines.  Hearing loss can affect your work and home life. It can make you feel lonely or depressed. You may feel that you have lost your independence. But hearing aids and other devices can help you hear better and feel connected to others.  Follow-up care is a key part of your treatment and safety. Be sure to make and go to all appointments, and call your doctor if you are having problems. It's also a good idea to know your test results and keep a list of the medicines you take.  How can you care for yourself at home?  Avoid loud noises whenever possible. This helps keep your hearing from getting worse.  Always wear hearing protection around loud noises.  Wear a hearing aid as directed.  A professional can help you pick a hearing aid that will work best for you.  You can also get hearing aids over the counter for mild to moderate hearing loss.  Have hearing tests as your doctor suggests. They can show whether your hearing has changed. Your hearing aid may need to be adjusted.  Use other devices as needed. These may include:  Telephone amplifiers and hearing aids that can connect to a television, stereo, radio, or microphone.  Devices that use lights or vibrations. These alert you to the doorbell, a ringing telephone, or a baby monitor.  Television closed-captioning. This shows the words at the bottom of the screen. Most new TVs can do this.  TTY (text telephone). This lets you type messages back and forth on the telephone instead of talking or listening. These devices are also called TDD. When messages are typed on the keyboard, they are sent over the phone line to a receiving TTY. The message is shown on a monitor.  Use text messaging, social media, and email if it is hard for you to communicate by telephone.  Try to learn a listening technique called speechreading. It is  "not lipreading. You pay attention to people's gestures, expressions, posture, and tone of voice. These clues can help you understand what a person is saying. Face the person you are talking to, and have them face you. Make sure the lighting is good. You need to see the other person's face clearly.  Think about counseling if you need help to adjust to your hearing loss.  When should you call for help?  Watch closely for changes in your health, and be sure to contact your doctor if:    You think your hearing is getting worse.     You have new symptoms, such as dizziness or nausea.   Where can you learn more?  Go to [email or web address].[email or web address]/patiented  Enter R798 in the search box to learn more about \"Hearing Loss: Care Instructions.\"  Current as of: February 28, 2023               Content Version: 13.8    3747-1114 [employer].   Care instructions adapted under license by your healthcare professional. If you have questions about a medical condition or this instruction, always ask your healthcare professional. [employer] disclaims any warranty or liability for your use of this information.      Bladder Training: Care Instructions  Your Care Instructions     Bladder training is used to treat urge incontinence and stress incontinence. Urge incontinence means that the need to urinate comes on so fast that you can't get to a toilet in time. Stress incontinence means that you leak urine because of pressure on your bladder. For example, it may happen when you laugh, cough, or lift something heavy.  Bladder training can increase how long you can wait before you have to urinate. It can also help your bladder hold more urine. And it can give you better control over the urge to urinate.  It is important to remember that bladder training takes a few weeks to a few months to make a difference. You may not see results right away, but don't give up.  Follow-up care is a key part of your treatment and " safety. Be sure to make and go to all appointments, and call your doctor if you are having problems. It's also a good idea to know your test results and keep a list of the medicines you take.  How can you care for yourself at home?  Work with your doctor to come up with a bladder training program that is right for you. You may use one or more of the following methods.  Delayed urination  In the beginning, try to keep from urinating for 5 minutes after you first feel the need to go.  While you wait, take deep, slow breaths to relax. Kegel exercises can also help you delay the need to go to the bathroom.  After some practice, when you can easily wait 5 minutes to urinate, try to wait 10 minutes before you urinate.  Slowly increase the waiting period until you are able to control when you have to urinate.  Scheduled urination  Empty your bladder when you first wake up in the morning.  Schedule times throughout the day when you will urinate.  Start by going to the bathroom every hour, even if you don't need to go.  Slowly increase the time between trips to the bathroom.  When you have found a schedule that works well for you, keep doing it.  If you wake up during the night and have to urinate, do it. Apply your schedule to waking hours only.  Kegel exercises  These tighten and strengthen pelvic muscles, which can help you control the flow of urine. (If doing these exercises causes pain, stop doing them and talk with your doctor.) To do Kegel exercises:  Squeeze your muscles as if you were trying not to pass gas. Or squeeze your muscles as if you were stopping the flow of urine. Your belly, legs, and buttocks shouldn't move.  Hold the squeeze for 3 seconds, then relax for 5 to 10 seconds.  Start with 3 seconds, then add 1 second each week until you are able to squeeze for 10 seconds.  Repeat the exercise 10 times a session. Do 3 to 8 sessions a day.  When should you call for help?  Watch closely for changes in your  "health, and be sure to contact your doctor if:    Your incontinence is getting worse.     You do not get better as expected.   Where can you learn more?  Go to https://www.yepme.com.net/patiented  Enter V684 in the search box to learn more about \"Bladder Training: Care Instructions.\"  Current as of: February 28, 2023               Content Version: 13.8    5916-5396 [employer].   Care instructions adapted under license by your healthcare professional. If you have questions about a medical condition or this instruction, always ask your healthcare professional. [employer] disclaims any warranty or liability for your use of this information.         "

## 2023-12-05 NOTE — PROGRESS NOTES
"SUBJECTIVE:   Nadya is a 83 year old, presenting for the following:  Physical        12/5/2023    12:48 PM   Additional Questions   Roomed by Marcia WESTBROOK MA   Accompanied by n/a         12/5/2023    12:48 PM   Patient Reported Additional Medications   Patient reports taking the following new medications none       Are you in the first 12 months of your Medicare coverage?  No    Healthy Habits:     In general, how would you rate your overall health?  Good    Frequency of exercise:  6-7 days/week    Duration of exercise:  15-30 minutes    Do you usually eat at least 4 servings of fruit and vegetables a day, include whole grains    & fiber and avoid regularly eating high fat or \"junk\" foods?  Yes    Taking medications regularly:  Yes    Barriers to taking medications:  None    Medication side effects:  Not applicable    Ability to successfully perform activities of daily living:  Telephone requires assistance and no assistance needed    Home Safety:  No safety concerns identified    Hearing Impairment:  Difficulty following a conversation in a noisy restaurant or crowded room and difficulty understanding soft or whispered speech    In the past 6 months, have you been bothered by leaking of urine? Yes    In general, how would you rate your overall mental or emotional health?  Excellent    Really happy with PT.  Is continuing with home exercises.  Arm weights, and some leg and core exercises  Staying active at 7000 steps daily.    Cough  Every morning has some phlegm that she coughs and sneezes up.  She drinks coffee in bed in the morning while she reads, and this is when these symptoms occur.  No other cough or excess phlegm production throughout the day.    Urinary frequency and urgency when her bladder is full.  Is used to emptying her bladder every 2 hours.  Tries to push fluids  No new or concerning symptoms     Vision is okay. Has an appointment in January.    Sometimes cannot think of a word.  No unusual losing of " items.  No leaving on stove or sink unexpectedly.   Friends have not mentioned concerns.    Today's PHQ-2 Score:       2023    11:36 AM   PHQ-2 (  [employer])   Q1: Little interest or pleasure in doing things 0   Q2: Feeling down, depressed or hopeless 0   PHQ-2 Score 0   Q1: Little interest or pleasure in doing things Not at all   Q2: Feeling down, depressed or hopeless Not at all   PHQ-2 Score 0       Have you ever done Advance Care Planning? (For example, a Health Directive, POLST, or a discussion with a medical provider or your loved ones about your wishes): Yes, advance care planning is on file.       Fall risk  Fallen 2 or more times in the past year?: No  Any fall with injury in the past year?: No    Cognitive Screening   1) Repeat 3 items (Leader, Season, Table)    2) Clock draw: NORMAL  3) 3 item recall: Recalls 2 objects   Results: NORMAL clock, 1-2 items recalled: COGNITIVE IMPAIRMENT LESS LIKELY    Mini-CogTM Copyright RONDA Bonner. Licensed by the author for use in [facility]; reprinted with permission (max@[facility]). All rights reserved.      Do you have sleep apnea, excessive snoring or daytime drowsiness? : no    Reviewed and updated as needed this visit by clinical staff   Tobacco  Allergies  Meds  Problems             Reviewed and updated as needed this visit by Provider    Allergies  Meds  Problems            Social History     Tobacco Use     Smoking status: Former     Packs/day: 1.50     Years: 22.00     Additional pack years: 0.00     Total pack years: 33.00     Types: Cigarettes     Start date: 1959     Quit date: 1981     Years since quittin.9     Smokeless tobacco: Current     Last attempt to quit: 1981     Tobacco comments:     I smoked---quit a couple of times but totally in    Substance Use Topics     Alcohol use: Yes     Comment: some wine---not every day--1 glass or more sometimes sociall             2023    10:03 AM   Alcohol Use    Prescreen: >3 drinks/day or >7 drinks/week? No     Do you have a current opioid prescription? No  Do you use any other controlled substances or medications that are not prescribed by a provider? None        Diabetes Follow-up    How often are you checking your blood sugar? Not at all  What concerns do you have today about your diabetes? None   Do you have any of these symptoms? (Select all that apply)  Weight loss  Have you had a diabetic eye exam in the last 12 months? Yes, sometime in May or June through [facility] Estrada and upcoming appt in January 25th also        BP Readings from Last 2 Encounters:   12/05/23 124/78   06/01/23 122/70     Hemoglobin A1C (%)   Date Value   12/05/2023 6.2 (H)   06/01/2023 6.9 (H)   05/20/2021 6.2 (H)   11/24/2020 6.1 (H)     LDL Cholesterol Calculated (mg/dL)   Date Value   12/01/2022 76   11/23/2021 72   11/24/2020 69   11/26/2019 70             Hyperlipidemia Follow-Up    Are you regularly taking any medication or supplement to lower your cholesterol?   Yes- statin  Are you having muscle aches or other side effects that you think could be caused by your cholesterol lowering medication?  No    Current providers sharing in care for this patient include:   Patient Care Team:  Michelle Monet MD as PCP - General (Family Practice)  Jackie Killian, RN as   Elisabeth Estrada MD as MD (Ophthalmology)  Jane Killian RD as Diabetes Educator (Dietitian, Registered)  Michelle Monet MD as Assigned PCP  Adalgisa Ahn PA-C as Physician Assistant (Dermatology)  Elisabeth Estrada MD as Assigned Surgical Provider    The following health maintenance items are reviewed in Epic and correct as of today:  Health Maintenance   Topic Date Due     RSV VACCINE (Pregnancy & 60+) (1 - 1-dose 60+ series) Never done     BMP  09/01/2023     LIPID  12/01/2023     MICROALBUMIN  12/01/2023     EYE EXAM  12/13/2023     HEMOGLOBIN  06/01/2024     A1C  " 06/05/2024     MEDICARE ANNUAL WELLNESS VISIT  12/05/2024     DIABETIC FOOT EXAM  12/05/2024     ANNUAL REVIEW OF HM ORDERS  12/05/2024     FALL RISK ASSESSMENT  12/05/2024     DEXA  12/06/2024     ADVANCE CARE PLANNING  12/05/2028     DTAP/TDAP/TD IMMUNIZATION (4 - Td or Tdap) 12/16/2031     PHQ-2 (once per calendar year)  Completed     INFLUENZA VACCINE  Completed     Pneumococcal Vaccine: 65+ Years  Completed     URINALYSIS  Completed     ZOSTER IMMUNIZATION  Completed     COVID-19 Vaccine  Completed     HPV IMMUNIZATION  Aged Out     MENINGITIS IMMUNIZATION  Aged Out     RSV MONOCLONAL ANTIBODY  Aged Out     MAMMO SCREENING  Discontinued     IPV IMMUNIZATION  Discontinued     Labs reviewed in EPIC        Pertinent mammograms are reviewed under the imaging tab.    Review of Systems   Constitutional:  Negative for chills and fever.   HENT:  Negative for congestion, ear pain, hearing loss and sore throat.    Eyes:  Negative for pain and visual disturbance.   Respiratory:  Positive for cough. Negative for shortness of breath.    Cardiovascular:  Negative for chest pain, palpitations and peripheral edema.   Gastrointestinal:  Negative for abdominal pain, constipation, diarrhea, heartburn, hematochezia and nausea.   Breasts:  Negative for tenderness, breast mass and discharge.   Genitourinary:  Positive for frequency and urgency. Negative for dysuria, genital sores, hematuria, pelvic pain, vaginal bleeding and vaginal discharge.   Musculoskeletal:  Negative for arthralgias, joint swelling and myalgias.   Skin:  Negative for rash.   Neurological:  Negative for dizziness, weakness, headaches and paresthesias.   Psychiatric/Behavioral:  Negative for mood changes. The patient is not nervous/anxious.          OBJECTIVE:   /78 (BP Location: Right arm, Patient Position: Chair, Cuff Size: Adult Regular)   Pulse 64   Temp 98.2  F (36.8  C) (Oral)   Resp 15   Ht 1.58 m (5' 2.21\")   Wt 74.5 kg (164 lb 3.2 oz)   LMP  " "(LMP Unknown)   SpO2 98%   BMI 29.83 kg/m   Estimated body mass index is 29.83 kg/m  as calculated from the following:    Height as of this encounter: 1.58 m (5' 2.21\").    Weight as of this encounter: 74.5 kg (164 lb 3.2 oz).  Physical Exam  GENERAL: healthy, alert and no distress  RESP: lungs clear to auscultation - no rales, rhonchi or wheezes  CV: regular rate and rhythm, normal S1 S2, no S3 or S4, no murmur, click or rub, no peripheral edema and peripheral pulses strong  MS: no gross musculoskeletal defects noted, no edema  PSYCH: mentation appears normal, affect normal/bright  Diabetic foot exam: normal DP and PT pulses, no trophic changes or ulcerative lesions, and normal sensory exam    Diagnostic Test Results:  Labs reviewed in Epic    ASSESSMENT / PLAN:   (Z00.00) Encounter for Medicare annual wellness exam  (primary encounter diagnosis)  Comment: stable health  Plan: updated RSV vaccine notation which patient states was also given at time of COVID and Flu vaccines.    (E11.22,  N18.30) Diabetes mellitus with stage 3 chronic kidney disease (H)  Comment: Hemoglobin A1C is looking great!  Plan: BASIC METABOLIC PANEL, HEMOGLOBIN A1C, Lipid         panel reflex to direct LDL Non-fasting, Albumin        Random Urine Quantitative with Creat Ratio,         FOOT EXAM, atorvastatin (LIPITOR) 40 MG tablet,        lisinopril (ZESTRIL) 2.5 MG tablet, metFORMIN         (GLUCOPHAGE) 500 MG tablet        Continue current medication      (N18.30) Stage 3 chronic kidney disease, unspecified whether stage 3a or 3b CKD (H)  Comment:   Plan: lisinopril (ZESTRIL) 2.5 MG tablet        adjust therapy/treatment based on results      (E78.5) Hyperlipidemia LDL goal <100  Comment:   Plan: atorvastatin (LIPITOR) 40 MG tablet        adjust therapy/treatment based on results      (R05.9) Cough, unspecified type  Comment: due to phlegm in am, no other cough symptoms.  Plan: discussed morning hydration.  Possible effect of hot fluids " "on phlegm production.  Discussed symptomatic relief  Discussed warning signs/symptoms for which she needs followup.            COUNSELING:  Reviewed preventive health counseling, as reflected in patient instructions      BMI:   Estimated body mass index is 29.83 kg/m  as calculated from the following:    Height as of this encounter: 1.58 m (5' 2.21\").    Weight as of this encounter: 74.5 kg (164 lb 3.2 oz).         She reports that she quit smoking about 42 years ago. Her smoking use included cigarettes. She started smoking about 64 years ago. She has a 33.00 pack-year smoking history. She uses smokeless tobacco.      Appropriate preventive services were discussed with this patient, including applicable screening as appropriate for fall prevention, nutrition, physical activity, Tobacco-use cessation, weight loss and cognition.  Checklist reviewing preventive services available has been given to the patient.    Reviewed patients plan of care and provided an AVS. The Intermediate Care Plan ( asthma action plan, low back pain action plan, and migraine action plan) for Maira meets the Care Plan requirement. This Care Plan has been established and reviewed with the Patient.          Michelle Barrios MD  [facility]    Identified Health Risks:  I have reviewed Opioid Use Disorder and Substance Use Disorder risk factors and made any needed referrals. The patient reports that she has difficulty with activities of daily living. I have asked that the patient make a follow up appointment in 6 months where this issue will be further evaluated and addressed.  The patient was provided with written information regarding signs of hearing loss.  Information on urinary incontinence and treatment options given to patient.  "

## 2023-12-05 NOTE — TELEPHONE ENCOUNTER
FYI - Status Update    Who is Calling: patient    Update: She received new hearing aide    Does caller want a call/response back: No

## 2023-12-21 ENCOUNTER — OFFICE VISIT (OUTPATIENT)
Dept: FAMILY MEDICINE | Facility: CLINIC | Age: 83
End: 2023-12-21
Payer: COMMERCIAL

## 2023-12-21 ENCOUNTER — ANCILLARY PROCEDURE (OUTPATIENT)
Dept: GENERAL RADIOLOGY | Facility: CLINIC | Age: 83
End: 2023-12-21
Attending: PHYSICIAN ASSISTANT
Payer: COMMERCIAL

## 2023-12-21 ENCOUNTER — NURSE TRIAGE (OUTPATIENT)
Dept: FAMILY MEDICINE | Facility: CLINIC | Age: 83
End: 2023-12-21

## 2023-12-21 VITALS
HEART RATE: 85 BPM | TEMPERATURE: 97.5 F | WEIGHT: 164 LBS | RESPIRATION RATE: 18 BRPM | OXYGEN SATURATION: 97 % | BODY MASS INDEX: 30.18 KG/M2 | SYSTOLIC BLOOD PRESSURE: 136 MMHG | DIASTOLIC BLOOD PRESSURE: 83 MMHG | HEIGHT: 62 IN

## 2023-12-21 DIAGNOSIS — S16.1XXA STRAIN OF NECK MUSCLE, INITIAL ENCOUNTER: Primary | ICD-10-CM

## 2023-12-21 DIAGNOSIS — M62.9 MUSCULOSKELETAL DISORDER INVOLVING UPPER TRAPEZIUS MUSCLE: ICD-10-CM

## 2023-12-21 DIAGNOSIS — E11.22 DIABETES MELLITUS WITH STAGE 3 CHRONIC KIDNEY DISEASE (H): ICD-10-CM

## 2023-12-21 DIAGNOSIS — S16.1XXA STRAIN OF NECK MUSCLE, INITIAL ENCOUNTER: ICD-10-CM

## 2023-12-21 DIAGNOSIS — N18.30 DIABETES MELLITUS WITH STAGE 3 CHRONIC KIDNEY DISEASE (H): ICD-10-CM

## 2023-12-21 PROCEDURE — 72040 X-RAY EXAM NECK SPINE 2-3 VW: CPT | Mod: TC | Performed by: RADIOLOGY

## 2023-12-21 PROCEDURE — 99213 OFFICE O/P EST LOW 20 MIN: CPT | Performed by: PHYSICIAN ASSISTANT

## 2023-12-21 ASSESSMENT — PAIN SCALES - GENERAL: PAINLEVEL: MODERATE PAIN (5)

## 2023-12-21 NOTE — PATIENT INSTRUCTIONS
Tizanidine 4 mg three times a day as needed with Tylenol   Diclofenac gel apply 4 times a day  Tylenol 500-1000 mg every 4-6 hours as needed for pain  Start PT   Use supportive pillow

## 2023-12-21 NOTE — PROGRESS NOTES
Assessment & Plan   Problem List Items Addressed This Visit          Endocrine    Diabetes mellitus with stage 3 chronic kidney disease (H)     Other Visit Diagnoses       Strain of neck muscle, initial encounter    -  Primary    Relevant Medications    tiZANidine (ZANAFLEX) 4 MG tablet    diclofenac (VOLTAREN) 1 % topical gel    Other Relevant Orders    XR Cervical Spine 2/3 Views (Completed)    Physical Therapy Referral    Musculoskeletal disorder involving upper trapezius muscle        Relevant Medications    tiZANidine (ZANAFLEX) 4 MG tablet    diclofenac (VOLTAREN) 1 % topical gel    Other Relevant Orders    Physical Therapy Referral           Tizanidine 4 mg three times a day as needed with Tylenol   Diclofenac gel apply 4 times a day  Tylenol 500-1000 mg every 4-6 hours as needed for pain  Start PT   Use supportive pillow      28 minutes spent by me on the date of the encounter doing chart review, history and exam, documentation and further activities per the note           ROBERT [facility] MAGGIE Covington is a 83 year old, presenting for the following health issues:  Neck Pain        12/21/2023    12:38 PM   Additional Questions   Roomed by mango andres   Accompanied by none         12/21/2023    12:38 PM   Patient Reported Additional Medications   Patient reports taking the following new medications none       History of Present Illness       Reason for visit:  Extreme neck ache  Symptom onset:  3-7 days ago  Symptoms include:  Neck hurts bad  Symptom intensity:  Severe  Symptom progression:  Worsening  Had these symptoms before:  No  What makes it worse:  Moving or trying to move  What makes it better:  Ice packs, a cool-heat ointment, tylenol 650mg--but not much better    She eats 4 or more servings of fruits and vegetables daily.She consumes 0 sweetened beverage(s) daily. She exercises with enough effort to increase her heart rate 6 days per week.     "        Neck Pain    Duration: 1 week  Description:  Location: left >right   Radiation: none  Intensity:  moderate, severe  Accompanying signs and symptoms: hurts to turn neck  History (similar episodes/previous evaluation): fell asleep while reading   Precipitating or alleviating factors: slept wrong  Therapies tried and outcome: Tylenol -slight improvement , ice- did not help        Review of Systems   Constitutional, HEENT, cardiovascular, pulmonary, gi and gu systems are negative, except as otherwise noted.      Objective    /83   Pulse 85   Temp 97.5  F (36.4  C) (Tympanic)   Resp 18   Ht 1.58 m (5' 2.21\")   Wt 74.4 kg (164 lb)   LMP  (LMP Unknown)   SpO2 97%   BMI 29.79 kg/m    Body mass index is 29.79 kg/m .  Physical Exam   GENERAL: healthy, alert and no distress  MS: RUE/LUE exam shows normal strength and muscle mass, no deformities, no erythema, induration, or nodules, no evidence of joint effusion, ROM of all joints is normal, and no evidence of joint instability and neck exam shows normal strength, no torticollis, and ROM is limited   NEURO: Normal strength and tone, mentation intact and speech normal  PSYCH: mentation appears normal, affect normal/bright    XR Cervical Spine 2/3 Views    Result Date: 12/21/2023  XR CERVICAL SPINE 2/3 VIEWS 12/21/2023 1:41 PM HISTORY: Strain of neck muscle, initial encounter COMPARISON: None.     IMPRESSION: No significant anterolisthesis or retrolisthesis. Multilevel mild loss of disc height. No jumped or perched facets. Spinous processes are intact. No prevertebral edema. Degenerative changes of the craniocervical junction. Lateral masses are symmetric. DALLAS SKINNER MD   SYSTEM ID:  [id number]                   "

## 2023-12-21 NOTE — TELEPHONE ENCOUNTER
"Routing to provider that patient is scheduled with.   Situation   Priority transfer for sever neck pain-      Assessment   \" I Fell asleep reading in bed   One week ago woke up one morning with a very stiff neck. Every day it is getting worse\"    Pain is moderate with head in neutral position, more on left but present on both sides. Pain worsens when she turns her neck or looks up   I can't turn my head or look up due the pain - severe 7-8/10 pain when she turns either way.     Slept about 2 hours and then awake  Has not been walking or working out due to the pain     Tried Tylenol - took one dose of 650 mg last night   And took one Tylenol a couple days ago.   Has been Icing a couple times a day.     Pain is from base of neck and upper back meet up to about base of skull. Denied pain going down arms, hands or fingers. Denied weakness in arm, hands or fingers.     No chronic neck pain   No falls or injuries     Recommendations  Pcp is full today and all new [facility] provider [facility] is full.     Scheduled opening in [facility]   Gave address, arrival and apt time.   Take Tylenol now   Advise a professional massage  Future Appointments 12/21/2023 - 6/18/2024        Date Visit Type Length Department Provider     12/21/2023  1:00 PM OFFICE VISIT 30 min [facility]/IM/Hilary Egan PA-C    Location Instructions:     [facility] is located at [address] less than a mile north of the [facility] exit off [address]. Free parking is available; access the lot by turning east from [facility] onto [address].              1/25/2024  9:20 AM RETURN ADULT EYE 20 min [facility] Elisabeth Estrada MD    Location Instructions:     Located in the [facility] at [address] [place] [address]. For parking options, enter the [facility] /arrival cecy from [facility] and attendants can assist you based on your needs.  " parking is available for those with limited mobility M-F from 7 a.m. to 5 p.m. Due to short staffing, we are unable to offer  to all patients/visitors. Visit [email or web address] for more details.  Self-parking:&nbsp;  West Lot: Located across from the main entrance, this is a convenient option for patients. Enter on [facility]. Parking attendants available most hours to assist.&nbsp;     [facility] Ramp: Enter at the [facility] SE entrance (one block north of the [facility] main entrance). Do not enter the ramp from [facility] - this entrance is not staffed and is further from the [facility] main entrance.              6/6/2024  1:00 PM OFFICE VISIT 20 min [facility]/Micehlle Gould MD    Location Instructions:     [facility] is located at [address], just north of the exit off of [address]. Free parking is available. If traveling north, access the lot directly from [address]; if traveling south on [address], turn east on [address] to access the lot.                     Reason for Disposition   SEVERE pain (e.g., excruciating, unable to do any normal activities)    Additional Information   Negative: Shock suspected (e.g., cold/pale/clammy skin, too weak to stand, low BP, rapid pulse)   Negative: Similar pain previously and it was from 'heart attack'   Negative: Similar pain previously from 'angina' and not relieved by nitroglycerin   Negative: Difficult to awaken or acting confused (e.g., disoriented, slurred speech)   Negative: Sounds like a life-threatening emergency to the triager   Negative: Followed an injury to neck (e.g., MVA, sports, impact or collision)   Negative: Chest pain   Negative: Lymph node in the neck is swollen or painful to the touch   Negative: Sore throat is main symptom   Negative: Difficulty breathing or unusual sweating (e.g., sweating without exertion)   Negative: Chest pain lasting longer than 5 minutes    Negative: Stiff neck (can't touch chin to chest) and has headache   Negative: Stiff neck (can't touch chin to chest) and fever   Negative: Weakness of an arm or hand     Denied numbness or tingling in arm hands or fingers   Negative: Problems with bowel or bladder control   Negative: Patient sounds very sick or weak to the triager   Negative: Numbness in an arm or hand (i.e., loss of sensation)   Negative: Head is twisting to one side (or ask 'is it turning against your will?')   Negative: Fever > 103 F (39.4 C)   Negative: Fever > 100.0 F (37.8 C) and Intravenous Drug Use (IVDU)   Negative: Fever > 100.0 F (37.8 C) and has diabetes mellitus or a weak immune system (e.g., HIV positive, cancer chemotherapy, organ transplant, splenectomy, chronic steroids)    Protocols used: Neck Pain or [id number]

## 2024-01-10 ENCOUNTER — THERAPY VISIT (OUTPATIENT)
Dept: PHYSICAL THERAPY | Facility: CLINIC | Age: 84
End: 2024-01-10
Attending: PHYSICIAN ASSISTANT
Payer: COMMERCIAL

## 2024-01-10 DIAGNOSIS — S16.1XXA STRAIN OF NECK MUSCLE, INITIAL ENCOUNTER: ICD-10-CM

## 2024-01-10 DIAGNOSIS — M62.9 MUSCULOSKELETAL DISORDER INVOLVING UPPER TRAPEZIUS MUSCLE: ICD-10-CM

## 2024-01-10 DIAGNOSIS — M54.2 CERVICAL SPINE PAIN: Primary | ICD-10-CM

## 2024-01-10 PROCEDURE — 97161 PT EVAL LOW COMPLEX 20 MIN: CPT | Mod: GP | Performed by: PHYSICAL THERAPIST

## 2024-01-10 PROCEDURE — 97140 MANUAL THERAPY 1/> REGIONS: CPT | Mod: GP | Performed by: PHYSICAL THERAPIST

## 2024-01-10 PROCEDURE — 97110 THERAPEUTIC EXERCISES: CPT | Mod: GP | Performed by: PHYSICAL THERAPIST

## 2024-01-10 NOTE — PROGRESS NOTES
PHYSICAL THERAPY EVALUATION  Type of Visit: Evaluation    See electronic medical record for Abuse and Falls Screening details.    Subjective Started having neck pain 12/21/2023. Was reading and fell asleep and then woke up with pain. Was prescribed pain pills and muscle relaxants and they have helped. Hurts to look over either shoulder. Pain isn't as bad anymore however. Gets pain on both sides of the neck. Has had massages that have helped as well. Has been doing stretches given to her by her doctor. Wants to drive to [place] to see relatives, but afraid to drive due to pain with looking over shoulders. Still taking muscle relaxants but not pain pills.      Presenting condition or subjective complaint: neck  Date of onset: 12/21/23    Relevant medical history: Arthritis; Diabetes; Hearing problems; Kidney disease   Dates & types of surgery: 1944-appendix-also adnods&?/abt 1986 bladder repair/2017 lower back vertebr & hip fracture    Prior diagnostic imaging/testing results:       Prior therapy history for the same diagnosis, illness or injury: No      Prior Level of Function  Transfers: Independent  Ambulation: Independent  ADL: Independent  IADL:     Living Environment  Social support: Alone   Type of home: [facility]; 2-story; Basement   Stairs to enter the home: Yes 32 Is there a railing: Yes   Ramp: No   Stairs inside the home: Yes 28 Is there a railing: Yes   Help at home:    Equipment owned: Walker; Grab bars; Commode     Employment: No    Hobbies/Interests: Reading, painting, golf social stuff    Patient goals for therapy: can't turn neck without some pain/question safety driving    Pain assessment:      Objective   CERVICAL SPINE EVALUATION  PAIN: Pain Level at Rest: 1/10  Pain Level with Use: 5/10  POSTURE: Sitting Posture: Rounded shoulders, Forward head  ROM:   (Degrees) Left AROM Right AROM    Cervical Flexion 55    Cervical Extension 37    Cervical Side bend 10 with pain 12 with pain    Cervical Rotation  53 48    Cervical Protrusion     Cervical Retraction     Thoracic Flexion     Thoracic Extension     Thoracic Rotation       Left AROM Left PROM Right AROM Right PROM   Shoulder Flexion Full and symmetrical  Full and symmetrical    Shoulder Extension       Shoulder Abduction Full and symmetrical  Full and symmetrical    Shoulder Adduction       Shoulder IR       Shoulder ER       Shoulder Horiz Abduction       Shoulder Horiz Adduction         MYOTOMES:    Left Right   C1-2 (Neck Flexion)     C3 (Neck Side Bend)      C4 (Shrug) 5 5   C5 (Deltoid) 5 5   C6 (Biceps) 5 5   C7 (Triceps) 5 5   C8 (Thumb Ext) 5 5   T1 (Intrinsics) 5 5     DERMATOMES: WNL    FLEXIBILITY: Decreased rhomboids L, Decreased upper trap L, Decreased levator L, Decreased pectoralis major L, Decreased pectoralis minor L, Decreased rhomboids R, Decreased upper trap R, Decreased levator R, Decreased pectoralis major R, Decreased pectoralis minor R   SPECIAL TESTS: Negative Spurling  PALPATION:   + Tenderness At Location Left Right   Sternocleidomastoid     Scalenes     Rhomboids - -   Facet     Upper Trap + +   Levator + -   Erector Spinae + +   Suboccipitals + +     SPINAL SEGMENTAL CONCLUSIONS: Hypomobility of C3-C6    Assessment & Plan   CLINICAL IMPRESSIONS  Medical Diagnosis: Strain of neck muscle, initial encounter / Musculoskeletal disorder involving upper trapezius muscle    Treatment Diagnosis: Cervical spine pain   Impression/Assessment: Patient is a 84 year old female with Cervical spine complaints.  The following significant findings have been identified: Pain, Decreased ROM/flexibility, Decreased joint mobility, Decreased strength, and Decreased activity tolerance. These impairments interfere with their ability to perform self care tasks, recreational activities, and driving  as compared to previous level of function.     Clinical Decision Making (Complexity):  Clinical Presentation: Stable/Uncomplicated  Clinical Presentation Rationale:  based on medical and personal factors listed in PT evaluation  Clinical Decision Making (Complexity): Low complexity    PLAN OF CARE  Treatment Interventions:  Modalities: Hot Pack, Ultrasound  Interventions: Manual Therapy, Neuromuscular Re-education, Therapeutic Activity, Therapeutic Exercise, Self-Care/Home Management    Long Term Goals     PT Goal 1  Goal Identifier: Driving  Goal Description: Pt will be able to look over her shoulder in order to check her blind spot with a minimal increase in pain 1-2/10.  Target Date: 03/06/24      Frequency of Treatment: 1 time per week  Duration of Treatment: 8 weeks    Recommended Referrals to Other Professionals:   Education Assessment:   Learner/Method: Patient;No Barriers to Learning    Risks and benefits of evaluation/treatment have been explained.   Patient/Family/caregiver agrees with Plan of Care.     Evaluation Time:     PT Eval, Low Complexity Minutes (39721): 15       Signing Clinician: Leroy Moreno PT      [facility]                                                                                   OUTPATIENT PHYSICAL THERAPY      PLAN OF TREATMENT FOR OUTPATIENT REHABILITATION   Patient's Last Name, First Name, DEVAUGHNFaustinoRADHAMaira Sandhu YOB: 1940   Provider's Name   [facility]   Medical Record No.  [id number]     Onset Date: 12/21/23  Start of Care Date: 01/10/24     Medical Diagnosis:  Strain of neck muscle, initial encounter / Musculoskeletal disorder involving upper trapezius muscle      PT Treatment Diagnosis:  Cervical spine pain Plan of Treatment  Frequency/Duration: 1 time per week/ 8 weeks    Certification date from 01/10/24 to 03/06/24         See note for plan of treatment details and functional goals     Leroy Moreno, PT                         I CERTIFY THE NEED FOR THESE SERVICES FURNISHED UNDER        THIS PLAN OF TREATMENT AND WHILE UNDER MY CARE     (Physician  attestation of this document indicates review and certification of the therapy plan).              Referring Provider:  Hilary Newman    Initial Assessment  See Epic Evaluation- Start of Care Date: 01/10/24

## 2024-01-11 ENCOUNTER — OFFICE VISIT (OUTPATIENT)
Dept: AUDIOLOGY | Facility: CLINIC | Age: 84
End: 2024-01-11
Payer: COMMERCIAL

## 2024-01-11 ENCOUNTER — TRANSFERRED RECORDS (OUTPATIENT)
Dept: HEALTH INFORMATION MANAGEMENT | Facility: CLINIC | Age: 84
End: 2024-01-11

## 2024-01-11 DIAGNOSIS — H90.3 SENSORINEURAL HEARING LOSS, BILATERAL: Primary | ICD-10-CM

## 2024-01-11 PROCEDURE — V5299 HEARING SERVICE: HCPCS | Performed by: AUDIOLOGIST

## 2024-01-11 NOTE — PROGRESS NOTES
HEARING AID RECHECK    Patient Name:  Maira Leon    Patient Age:   84 year old    :  1940    Background:   Patient is here with Widex Demo hearing aids that were purchased from another audiologist who is no longer in business.  She has 2 chargers, one of which is working and another that is not working (Model [id number], SN [id number]).  Hearing aids are listed below:    SIDE: Both    : Widex     TYPE: Moment MRR4D    S/N: [id number]    WARRANTY: 3 Years    Procedures:   I called [employer] and they are sending out a replacement for the , which is in warranty. Note that while I was looking for the serial number on one of the aids, the port cover on the back side of the hearing aid fell off and was lost. [employer] will not send me out the port cover and told me the aid would have to go in for repair. Since the aid is working there is no need to send the aid in for repair.  With patient's permission, I placed some tape over the opening.  [employer] is sending out a replacement  under their express replacement program. When it comes in I will return the defective .    Plan:   Call patient for pickup when the  comes in.    NO CHARGE VISIT    Db Parikh MA, CCC-A  [place] Licensed Audiologist #[id number]  2024

## 2024-01-17 ENCOUNTER — THERAPY VISIT (OUTPATIENT)
Dept: PHYSICAL THERAPY | Facility: CLINIC | Age: 84
End: 2024-01-17
Attending: PHYSICIAN ASSISTANT
Payer: COMMERCIAL

## 2024-01-17 DIAGNOSIS — M54.2 CERVICAL SPINE PAIN: Primary | ICD-10-CM

## 2024-01-17 PROCEDURE — 97035 APP MDLTY 1+ULTRASOUND EA 15: CPT | Mod: GP | Performed by: PHYSICAL THERAPIST

## 2024-01-17 PROCEDURE — 97140 MANUAL THERAPY 1/> REGIONS: CPT | Mod: GP | Performed by: PHYSICAL THERAPIST

## 2024-01-17 PROCEDURE — 97110 THERAPEUTIC EXERCISES: CPT | Mod: GP | Performed by: PHYSICAL THERAPIST

## 2024-01-17 NOTE — TELEPHONE ENCOUNTER
Patient's Widex  came in.  I called her to let her know she may pick it up at the 1st floor reception desk.    Db Parikh MA, CCC-A  [place] Licensed Audiologist #[id number]  [facility]        1/17/2024

## 2024-01-25 ENCOUNTER — OFFICE VISIT (OUTPATIENT)
Dept: OPHTHALMOLOGY | Facility: CLINIC | Age: 84
End: 2024-01-25
Payer: COMMERCIAL

## 2024-01-25 ENCOUNTER — THERAPY VISIT (OUTPATIENT)
Dept: PHYSICAL THERAPY | Facility: CLINIC | Age: 84
End: 2024-01-25
Payer: COMMERCIAL

## 2024-01-25 DIAGNOSIS — H52.203 MYOPIC ASTIGMATISM OF BOTH EYES: ICD-10-CM

## 2024-01-25 DIAGNOSIS — Z96.1 PSEUDOPHAKIA, BOTH EYES: ICD-10-CM

## 2024-01-25 DIAGNOSIS — M54.2 CERVICAL SPINE PAIN: Primary | ICD-10-CM

## 2024-01-25 DIAGNOSIS — E11.9 TYPE 2 DIABETES MELLITUS WITHOUT OPHTHALMIC MANIFESTATIONS (H): Primary | ICD-10-CM

## 2024-01-25 DIAGNOSIS — H52.13 MYOPIC ASTIGMATISM OF BOTH EYES: ICD-10-CM

## 2024-01-25 DIAGNOSIS — H52.4 PRESBYOPIA OF BOTH EYES: ICD-10-CM

## 2024-01-25 DIAGNOSIS — H35.3131 EARLY DRY STAGE NONEXUDATIVE AGE-RELATED MACULAR DEGENERATION OF BOTH EYES: ICD-10-CM

## 2024-01-25 PROCEDURE — 92015 DETERMINE REFRACTIVE STATE: CPT | Performed by: OPHTHALMOLOGY

## 2024-01-25 PROCEDURE — 97035 APP MDLTY 1+ULTRASOUND EA 15: CPT | Mod: GP | Performed by: PHYSICAL THERAPIST

## 2024-01-25 PROCEDURE — 97140 MANUAL THERAPY 1/> REGIONS: CPT | Mod: GP | Performed by: PHYSICAL THERAPIST

## 2024-01-25 PROCEDURE — 92134 CPTRZ OPH DX IMG PST SGM RTA: CPT | Performed by: OPHTHALMOLOGY

## 2024-01-25 PROCEDURE — 92014 COMPRE OPH EXAM EST PT 1/>: CPT | Performed by: OPHTHALMOLOGY

## 2024-01-25 PROCEDURE — 97110 THERAPEUTIC EXERCISES: CPT | Mod: GP | Performed by: PHYSICAL THERAPIST

## 2024-01-25 ASSESSMENT — CONF VISUAL FIELD
OS_INFERIOR_TEMPORAL_RESTRICTION: 0
OS_SUPERIOR_TEMPORAL_RESTRICTION: 0
OS_SUPERIOR_NASAL_RESTRICTION: 0
OD_INFERIOR_TEMPORAL_RESTRICTION: 0
OS_NORMAL: 1
OD_NORMAL: 1
OD_INFERIOR_NASAL_RESTRICTION: 0
OD_SUPERIOR_NASAL_RESTRICTION: 0
METHOD: COUNTING FINGERS
OD_SUPERIOR_TEMPORAL_RESTRICTION: 0
OS_INFERIOR_NASAL_RESTRICTION: 0

## 2024-01-25 ASSESSMENT — REFRACTION_WEARINGRX
OD_AXIS: 005
OD_CYLINDER: +2.50
OD_AXIS: 005
OS_SPHERE: PLANO
OS_CYLINDER: +1.25
SPECS_TYPE: DISTANCE ONLY
OD_SPHERE: -1.25
OS_AXIS: 175
OD_AXIS: 005
OS_CYLINDER: +1.25
OS_CYLINDER: +1.25
OS_SPHERE: -1.25
OD_CYLINDER: +2.50
OD_SPHERE: +1.25
SPECS_TYPE: READING ONLY
OD_SPHERE: PLANO
OS_AXIS: 175
OD_CYLINDER: +2.50
OS_SPHERE: +1.25
OS_AXIS: 175

## 2024-01-25 ASSESSMENT — TONOMETRY
OS_IOP_MMHG: 12
OD_IOP_MMHG: 15
IOP_METHOD: ICARE

## 2024-01-25 ASSESSMENT — REFRACTION_MANIFEST
OS_CYLINDER: +1.50
OD_CYLINDER: +2.25
OD_ADD: +2.50
OS_ADD: +2.50
OS_SPHERE: -1.50
OD_AXIS: 012
OS_AXIS: 168
OD_SPHERE: -1.50

## 2024-01-25 ASSESSMENT — CUP TO DISC RATIO
OS_RATIO: 0.2
OD_RATIO: 0.25

## 2024-01-25 ASSESSMENT — VISUAL ACUITY
OS_SC: 20/25
OD_SC: 20/40 -1
OS_PH_SC: 20/20
METHOD: SNELLEN - LINEAR
OD_PH_SC: 20/40 +2
OS_SC+: -2

## 2024-01-25 ASSESSMENT — EXTERNAL EXAM - LEFT EYE: OS_EXAM: MILD DERMATOCHALASIS

## 2024-01-25 NOTE — NURSING NOTE
Chief Complaints and History of Present Illnesses   Patient presents with    Annual Eye Exam     Chief Complaint(s) and History of Present Illness(es)       Annual Eye Exam              Laterality: both eyes    Onset: 1 year ago    Severity: mild    Frequency: constantly    Context: distance vision    Course: stable    Treatments tried: no treatments    Response to treatment: no improvement    Pain scale: 0/10              Comments    Patient feels vision improved since last visit.  Patient's floaters are improved.   Denies flashes.  Patient denies eye pain.  Patient sometimes wears glasses for distance when driving but if in familiar settings will go without.   Patient has type 2 diabetes, stage III kidney disease.  She was diagnosed with approximately 2000.  Blood sugars have been well controlled on oral medications.      Lab Results       Component                Value               Date                       A1C                      6.2                 12/05/2023                 A1C                      6.9                 06/01/2023                 A1C                      6.5                 12/01/2022                 A1C                      6.3                 06/02/2022                 A1C                      6.2                 11/23/2021                 A1C                      6.2                 05/20/2021                 A1C                      6.1                 11/24/2020                 A1C                      5.8                 05/21/2020                 A1C                      5.9                 11/26/2019                 A1C                      5.8                 05/29/2019              Saira Boss on 1/25/2024 at 9:30 AM

## 2024-01-25 NOTE — PROGRESS NOTES
HPI  Maira Leon is a 84 year old here for diabetic eye exam.    HPI       Annual Eye Exam    In both eyes.  This started 1 year ago.  Severity is mild.  Occurring constantly.  Context:  distance vision.  Since onset it is stable.  Treatments tried include no treatments.  Response to treatment was no improvement.  Pain was noted as 0/10.             Comments    Patient feels vision improved since last visit.  Patient's floaters are improved.   Denies flashes.  Patient denies eye pain.  Patient sometimes wears glasses for distance when driving but if in familiar settings will go without.   Patient has type 2 diabetes, stage III kidney disease.  She was diagnosed with approximately 2000.  Blood sugars have been well controlled on oral medications.      Lab Results       Component                Value               Date                       A1C                      6.2                 12/05/2023                 A1C                      6.9                 06/01/2023                 A1C                      6.5                 12/01/2022                 A1C                      6.3                 06/02/2022                 A1C                      6.2                 11/23/2021                 A1C                      6.2                 05/20/2021                 A1C                      6.1                 11/24/2020                 A1C                      5.8                 05/21/2020                 A1C                      5.9                 11/26/2019                 A1C                      5.8                 05/29/2019              Saira Boss on 1/25/2024 at 9:30 AM              Last edited by Saira Boss on 1/25/2024  9:32 AM.        Has some floaters both eyes for a very long time, no changes.     PMH: diabetes mellitus 2  Past Medical History:   Diagnosis Date    H/O arthroscopic knee surgery left    5/6/16    History of shingles 12/06/2016    Hypertension     Low back pain      & radiates down left buttock & left leg    Need for prophylactic hormone replacement therapy (postmenopausal)     Nocturia     states she gets up every 2 hours at night to urinate    Osteoarthrosis, unspecified whether generalized or localized, unspecified site     Other and unspecified hyperlipidemia     POSTMENOPAUSAL HORMONAL REPLACEMENT TX 05/23/2005    Type II or unspecified type diabetes mellitus without mention of complication, not stated as uncontrolled         POH: Glasses for myopia/presbyopia, cataract extraction posterior chamber intraocular lens (PCIOL) both eyes 2014, 2017 ,    status post YAG capsulotomy left eye 12/13/22  Oc Meds: ocuvite  FH: Denies any glaucoma, brother with age related macular degeneration, no other known eye diseases         Assessment & Plan     1. Type 2 diabetes mellitus without ophthalmic manifestations (H) - Both Eyes    2. Pseudophakia, both eyes - Both Eyes    3. Myopic astigmatism of both eyes - Both Eyes    4. Presbyopia of both eyes - Both Eyes    5. Early dry stage nonexudative age-related macular degeneration of both eyes - Both Eyes         (E11.9) Type 2 diabetes mellitus without ophthalmic manifestations (H) - Both Eyes  (primary encounter diagnosis)  Comment: Diabetes Mellitus 2 w/o retinopathy Great A1C   Plan:   Discussed the importance of tight blood glucose, blood pressure, and cholesterol  control in the prevention of diabetic retinopathy. Recommend yearly dilated eye exam.    (Z96.1) Pseudophakia, both eyes - Both Eyes  Clear media.  Observe     Myopic astigmatism of both eyes /presbyopia  Comment: mild changes   Plan: manifest refraction done and prescription for glasses given update as needed     (H35.8660) Nonexudative macular degeneration - Both Eyes  Comment:  Mild, early macular changes   - Macular OCT with few drusen both eyes no srf   - Patient has been taking AREDS 2 vitamins due to brother with age related macular degeneration  at this point  Plan:   ok to continue ocuvite    Return to clinic annually for dilated fundus exam    - Call if changes in vision occur in the interim      -----------------------------------------------------------------------------------       Patient disposition:   Return in about 1 year (around 1/25/2025) for Comprehensive Exam, OCT macula OU. and patient to call sooner as needed.      Complete documentation of historical and exam elements from today's encounter can be found in the full encounter summary report (not reduplicated in this progress note). I personally obtained the chief complaint(s) and history of present illness.  I have confirmed and edited as necessary the CC, HPI, PMH/PSH, social history, FMH, ROS, and exam/neuro findings as obtained by the technician or others. I have examined this patient myself and I personally viewed the image(s) and studies listed above and the documentation reflects my findings and interpretation.  I formulated and edited as necessary the assessment and plan and discussed the findings and management plan with the patient and family.     Elisabeth Estrada MD

## 2024-02-19 ENCOUNTER — THERAPY VISIT (OUTPATIENT)
Dept: PHYSICAL THERAPY | Facility: CLINIC | Age: 84
End: 2024-02-19
Payer: COMMERCIAL

## 2024-02-19 DIAGNOSIS — M54.2 CERVICAL SPINE PAIN: Primary | ICD-10-CM

## 2024-02-19 PROCEDURE — 97035 APP MDLTY 1+ULTRASOUND EA 15: CPT | Mod: GP | Performed by: PHYSICAL THERAPIST

## 2024-02-19 PROCEDURE — 97110 THERAPEUTIC EXERCISES: CPT | Mod: GP | Performed by: PHYSICAL THERAPIST

## 2024-02-19 PROCEDURE — 97140 MANUAL THERAPY 1/> REGIONS: CPT | Mod: GP | Performed by: PHYSICAL THERAPIST

## 2024-02-26 ENCOUNTER — THERAPY VISIT (OUTPATIENT)
Dept: PHYSICAL THERAPY | Facility: CLINIC | Age: 84
End: 2024-02-26
Payer: COMMERCIAL

## 2024-02-26 DIAGNOSIS — M54.2 CERVICAL SPINE PAIN: Primary | ICD-10-CM

## 2024-02-26 PROCEDURE — 97110 THERAPEUTIC EXERCISES: CPT | Mod: GP | Performed by: PHYSICAL THERAPIST

## 2024-02-26 PROCEDURE — 97140 MANUAL THERAPY 1/> REGIONS: CPT | Mod: GP | Performed by: PHYSICAL THERAPIST

## 2024-02-26 NOTE — PROGRESS NOTES
DISCHARGE  Reason for Discharge: Patient has met all goals.  Patient chooses to discontinue therapy.    Equipment Issued: None    Discharge Plan: Patient to continue home program.    Referring Provider:  Hilary Newman       02/26/24 0500   Appointment Info   Signing clinician's name / credentials Joshua Moreno DPT   Total/Authorized Visits 8 (POC) (E&T)   Visits Used 5   Medical Diagnosis Strain of neck muscle, initial encounter / Musculoskeletal disorder involving upper trapezius muscle   PT Tx Diagnosis Cervical spine pain   Precautions/Limitations Osteopenia   Other pertinent information Discharged   Quick Adds Certification   Progress Note/Certification   Start of Care Date 01/10/24   Onset of illness/injury or Date of Surgery 12/21/23   Therapy Frequency 1 time per week   Predicted Duration 8 weeks   Certification date from 01/10/24   Certification date to 03/06/24   Progress Note Due Date 03/06/24   Progress Note Completed Date 01/10/24   PT Goal 1   Goal Identifier Driving   Goal Description Pt will be able to look over her shoulder in order to check her blind spot with a minimal increase in pain 1-2/10.   Target Date 03/06/24   Date Met 02/26/24   Subjective Report   Subjective Report Neck has been feeling a little better. Has been using some CBD oil. 85% improvement since beginning physical therapy.   Objective Measures   Objective Measures Objective Measure 1;Objective Measure 2;Objective Measure 3   Objective Measure 1   Objective Measure AROM   Details Flexion - 43, Extension - 45, SB R - 12 and L - 20, Rotation R - 60 and L - 68   Objective Measure 2   Objective Measure Palpation   Details Hypertonicity of cervical paraspinals, upper trapezius, levator scapulae B reduced from previous visit.   Objective Measure 3   Objective Measure NDI   Details 4% (20% initially)   Treatment Interventions (PT)   Interventions Therapeutic Procedure/Exercise;Manual Therapy   Therapeutic Procedure/Exercise    Therapeutic Procedures: strength, endurance, ROM, flexibillity minutes (86132) 15   Therapeutic Procedures Ther Proc 2;Ther Proc 3;Ther Proc 4;Ther Proc 5   Ther Proc 1 Wall push ups x15   Ther Proc 2 Counter push ups x15   Ther Proc 3 B ER x15 with Green TB   Ther Proc 4 Scaption x15 B with 1#   Skilled Intervention Stretching and ROM exercise education to improve function   Patient Response/Progress No increase in discomfort noted   Manual Therapy   Manual Therapy: Mobilization, MFR, MLD, friction massage minutes (00920) 13   Manual Therapy Manual Therapy 2   Manual Therapy 1 STM cervical paraspinals, upper trapezius B and levator scapulae B to reduce tone and pain   Patient Response/Progress Tone reduction   Education   Learner/Method Patient;No Barriers to Learning   Plan   Home program PTRx (handout)   Comments   Comments Pt has done well in physical therapy and states that she has seen an 85% improvement. Pt has met her functional goal set at the time of her initial evaluation and would like to be discharged to her [facility] at this time.   Total Session Time   Timed Code Treatment Minutes 28   Total Treatment Time (sum of timed and untimed services) 28

## 2024-06-06 ENCOUNTER — OFFICE VISIT (OUTPATIENT)
Dept: FAMILY MEDICINE | Facility: CLINIC | Age: 84
End: 2024-06-06
Payer: COMMERCIAL

## 2024-06-06 VITALS
DIASTOLIC BLOOD PRESSURE: 70 MMHG | BODY MASS INDEX: 29.06 KG/M2 | TEMPERATURE: 98.2 F | HEIGHT: 63 IN | SYSTOLIC BLOOD PRESSURE: 132 MMHG | OXYGEN SATURATION: 96 % | RESPIRATION RATE: 16 BRPM | HEART RATE: 78 BPM | WEIGHT: 164 LBS

## 2024-06-06 DIAGNOSIS — N18.30 DIABETES MELLITUS WITH STAGE 3 CHRONIC KIDNEY DISEASE (H): Primary | ICD-10-CM

## 2024-06-06 DIAGNOSIS — N18.30 STAGE 3 CHRONIC KIDNEY DISEASE, UNSPECIFIED WHETHER STAGE 3A OR 3B CKD (H): ICD-10-CM

## 2024-06-06 DIAGNOSIS — Z23 NEED FOR COVID-19 VACCINE: ICD-10-CM

## 2024-06-06 DIAGNOSIS — E11.22 DIABETES MELLITUS WITH STAGE 3 CHRONIC KIDNEY DISEASE (H): Primary | ICD-10-CM

## 2024-06-06 LAB
HBA1C MFR BLD: 6.1 % (ref 0–5.6)
HGB BLD-MCNC: 13.7 G/DL (ref 11.7–15.7)
HOLD SPECIMEN: NORMAL

## 2024-06-06 PROCEDURE — 36415 COLL VENOUS BLD VENIPUNCTURE: CPT | Performed by: FAMILY MEDICINE

## 2024-06-06 PROCEDURE — 91320 SARSCV2 VAC 30MCG TRS-SUC IM: CPT | Performed by: FAMILY MEDICINE

## 2024-06-06 PROCEDURE — 85018 HEMOGLOBIN: CPT | Performed by: FAMILY MEDICINE

## 2024-06-06 PROCEDURE — 80048 BASIC METABOLIC PNL TOTAL CA: CPT | Performed by: FAMILY MEDICINE

## 2024-06-06 PROCEDURE — 90480 ADMN SARSCOV2 VAC 1/ONLY CMP: CPT | Performed by: FAMILY MEDICINE

## 2024-06-06 PROCEDURE — 99214 OFFICE O/P EST MOD 30 MIN: CPT | Performed by: FAMILY MEDICINE

## 2024-06-06 PROCEDURE — 83036 HEMOGLOBIN GLYCOSYLATED A1C: CPT | Performed by: FAMILY MEDICINE

## 2024-06-06 ASSESSMENT — PAIN SCALES - GENERAL: PAINLEVEL: NO PAIN (0)

## 2024-06-06 NOTE — PROGRESS NOTES
"  Assessment & Plan     (E11.22,  N18.30) Diabetes mellitus with stage 3 chronic kidney disease (H)  (primary encounter diagnosis)  Comment: awaiting today's Hemoglobin A1C   Plan: BASIC METABOLIC PANEL, HEMOGLOBIN A1C,         Hemoglobin        Continue current medication  adjust therapy/treatment based on results      (N18.30) Stage 3 chronic kidney disease, unspecified whether stage 3a or 3b CKD (H)  Comment:   Plan: BASIC METABOLIC PANEL, Hemoglobin        adjust therapy/treatment based on results      (Z23) Need for COVID-19 vaccine  Comment:   Plan: COVID-19 12+ (2023-24) (PFIZER)          Back is satisfied that she can manage low back pain.  Discussed knee discomfort and exercise modifications.  Do not believe foot discomfort represents neuropathy - but more OA.            BMI  Estimated body mass index is 29.52 kg/m  as calculated from the following:    Height as of this encounter: 1.588 m (5' 2.5\").    Weight as of this encounter: 74.4 kg (164 lb).         FUTURE APPOINTMENTS:       - Follow-up for annual visit or as needed    Elian Covington is a 84 year old, presenting for the following health issues:  Chronic Disease Management      Low back - without radiation.  If she does more activity than usual.  Knee discomfort going up and down stairs.  Feet -some pain at night primarily.   More than pain has restlessness some nights, and some nights has cramping.  Is wondering about neuropathy    Did play 18 holes of gold yesterday with a cart.  Does not feel at all limited in her daily activities - does find that she paces herself a bit with some activities more than previous.  Did drive to [facility] this spring on her own, and then was able to travel to [place] for a little while.  Drives 3.5 hours regularly to her cabin.      6/6/2024    12:32 PM   Additional Questions   Roomed by Minal RICARDO   Accompanied by Digna del cid          6/6/2024    12:32 PM   Patient Reported Additional Medications " "  Patient reports taking the following new medications Tylenol as needed      History of Present Illness       Back Pain:  She presents for follow up of back pain. Patient's back pain is a recurring problem.  Location of back pain:  Right lower back  Description of back pain: dull ache  Back pain spreads: right knee and left knee    Since patient first noticed back pain, pain is: always present, but gets better and worse  Does back pain interfere with her job:  Not applicable       CKD: She uses over the counter pain medication, including once in awhile-Tylenol, a few times a month.    Diabetes:   She presents for follow up of diabetes.    She is not checking blood glucose.        She is concerned about other.   She is having burning in feet.            She eats 4 or more servings of fruits and vegetables daily.She consumes 0 sweetened beverage(s) daily.She exercises with enough effort to increase her heart rate 20 to 29 minutes per day.  She exercises with enough effort to increase her heart rate 7 days per week. She is missing 1 dose(s) of medications per week.  She is not taking prescribed medications regularly due to remembering to take.       Hyperlipidemia Follow-Up    Are you regularly taking any medication or supplement to lower your cholesterol?   Yes- atorvastatin  Are you having muscle aches or other side effects that you think could be caused by your cholesterol lowering medication?  No            Review of Systems  Constitutional, HEENT, cardiovascular, pulmonary, gi and gu systems are negative, except as otherwise noted.      Objective    /70 (BP Location: Right arm, Patient Position: Sitting, Cuff Size: Adult Regular)   Pulse 78   Temp 98.2  F (36.8  C) (Oral)   Resp 16   Ht 1.588 m (5' 2.5\")   Wt 74.4 kg (164 lb)   LMP  (LMP Unknown)   SpO2 96%   BMI 29.52 kg/m    Body mass index is 29.52 kg/m .  Physical Exam   GENERAL: alert and no distress  RESP: lungs clear to auscultation - no " rales, rhonchi or wheezes  CV: regular rate and rhythm, normal S1 S2, no S3 or S4, no murmur, click or rub, no peripheral edema  MS: no gross musculoskeletal defects noted, no edema  PSYCH: mentation appears normal, affect normal/bright            Signed Electronically by: Michelle Barrios MD

## 2024-06-07 LAB
ANION GAP SERPL CALCULATED.3IONS-SCNC: 11 MMOL/L (ref 7–15)
BUN SERPL-MCNC: 26.9 MG/DL (ref 8–23)
CALCIUM SERPL-MCNC: 10 MG/DL (ref 8.8–10.2)
CHLORIDE SERPL-SCNC: 102 MMOL/L (ref 98–107)
CREAT SERPL-MCNC: 0.99 MG/DL (ref 0.51–0.95)
DEPRECATED HCO3 PLAS-SCNC: 26 MMOL/L (ref 22–29)
EGFRCR SERPLBLD CKD-EPI 2021: 56 ML/MIN/1.73M2
GLUCOSE SERPL-MCNC: 133 MG/DL (ref 70–99)
POTASSIUM SERPL-SCNC: 4.2 MMOL/L (ref 3.4–5.3)
SODIUM SERPL-SCNC: 139 MMOL/L (ref 135–145)

## 2024-08-29 ENCOUNTER — NURSE TRIAGE (OUTPATIENT)
Dept: NURSING | Facility: CLINIC | Age: 84
End: 2024-08-29
Payer: COMMERCIAL

## 2024-08-29 ENCOUNTER — TELEPHONE (OUTPATIENT)
Dept: OPHTHALMOLOGY | Facility: CLINIC | Age: 84
End: 2024-08-29
Payer: COMMERCIAL

## 2024-08-29 NOTE — TELEPHONE ENCOUNTER
Caller reporting the following red-flag symptom(s): Blood in right eye    Per the system red-flag symptom policy, patient was instructed to:  speak with a Registered Nurse    Action:  Patient warm transferred to a Registered Nurse

## 2024-08-29 NOTE — TELEPHONE ENCOUNTER
Nadya Leon [phone]       Called times 3 and going to voicemail in past hour.    Left message reviewing will send mychart message asking to send image in response back.    Provided direct number if having trouble getting on mychart.    Wali Noland RN 2:29 PM 08/29/24

## 2024-08-29 NOTE — TELEPHONE ENCOUNTER
"  Nurse Triage SBAR       Is this a 2nd Level Triage?  YES, LICENSED PRACTITIONER REVIEW IS REQUIRED    CC: Right eye with blood at bottom of eye    Situation:  Pt calling to report that her right eye has \"blood\" in the white part of her eye from the iris down to the bottom of her eye.  Symptom started yesterday with it presenting as \"kind of bloodshot\" looking at the bottom of eye, then today looking more red and covering larger portion of bottom of eye. Pt denies changes of vision, pain,swelling or colored drainage. Pt states her right eye duenas periodically, but this has been present for years.Pt takes 81 mg of ASA daily, but states she did not take dose today.  Pt would like call from clinic to further assess and would like to be seen as soon as possible.       PMHx: DM2, Hyperlipidemia,   Meds: ASA 81 mg, Metformin     Protocol Recommended Disposition:   Discuss With PCP And Call Back By Nurse Today      Recommendation: Please call pt to further assess symptoms and provide appointment if appropriate. Encouraged patient to: Callback if: symptoms worsen or new symptoms arise or go to ER for evaluation. Pt verbalizes understanding and amenable to plan.     Routed to provider/care team.  Mimi DEAL RN  P Central Nursing/[facility] Triage & Med Refill Team    Reason for Disposition   Bleeding on white of the eye    Additional Information   Negative: Chemical got in the eye   Negative: Piece of something got in the eye   Negative: Followed an eye injury   Negative: Yellow or green pus in the eyes   Negative: Eyelid is swollen and no redness of white of eye (sclera)   Negative: SEVERE eye pain   Negative: Recent eye surgery and has increasing eye pain   Negative: Patient sounds very sick or weak to the triager   Negative: MODERATE eye pain or discomfort (e.g., interferes with normal activities or awakens from sleep; more than mild)   Negative: Looking at light causes MODERATE to SEVERE eye pain (i.e., photophobia)   " "Negative: New or worsening blurred vision   Negative: Cloudy spot or sore seen on the cornea (clear part of the eye)   Negative: Eyelids are very swollen (shut or almost)   Negative: Eyelid (outer) is very red   Negative: Vomiting   Negative: Foreign body sensation ('feels like something is in there')   Negative: Patient wants to be seen   Negative: Eye pain present > 24 hours   Negative: Bleeding on white of the eye and is taking Coumadin or known bleeding disorder (e.g., thrombocytopenia)   Negative: Only 1 eye is red, and persists > 48 hours   Negative: Red eyes present > 7 days    Answer Assessment - Initial Assessment Questions  1. LOCATION: Location: \"What's red, the eyeball or the outer eyelids?\" (Note: when callers say the eye is red, they usually mean the sclera is red)        Eye ball  2. REDNESS OF SCLERA: \"Is the redness in one or both eyes?\" \"When did the redness start?\"       Right eye  3. ONSET: \"When did the eye become red?\" (e.g., hours, days)       Started yesterday  4. EYELIDS: \"Are the eyelids red or swollen?\" If Yes, ask: \"How much?\"       no  5. VISION: \"Is there any difficulty seeing clearly?\"       no  6. ITCHING: \"Does it feel itchy?\" If so ask: \"How bad is it\" (e.g., Scale 1-10; or mild, moderate, severe)      no  7. PAIN: \"Is there any pain? If Yes, ask: \"How bad is it?\" (e.g., Scale 1-10; or mild, moderate, severe)      no  8. CONTACT LENS: \"Do you wear contacts?\"      no  9. CAUSE: \"What do you think is causing the redness?\"      Dont know  10. OTHER SYMPTOMS: \"Do you have any other symptoms?\" (e.g., fever, runny nose, cough, vomiting)        no    Protocols used: Eye - Red Without Pus-A-OH    "

## 2024-08-30 NOTE — TELEPHONE ENCOUNTER
Called times 2 home/mobile and going directly to voicemail.    Left message at 0840 providing direct number and also reviewed the [employer] App[employer] message sent yesterday and may respond to the mychart message sent and try to attach image in response back.    No eye pain and no vision changes per message received.    Wali Noland RN 8:46 AM 08/30/24

## 2024-08-30 NOTE — TELEPHONE ENCOUNTER
Health Call Center    Phone Message    May a detailed message be left on voicemail: yes     Reason for Call: Other: Patient returning Wali's call and states she took some pictures of her eyes but cannot get into MyChart. Patient would like to know if there is other ways of getting them to us. Patient lives 3.5 hours away and if we need to see her then allow enough time for her to get to us.     Please call patient back at [phone]. Thank you.    Action Taken: Message routed to:  [facility]    Travel Screening: Not Applicable

## 2024-09-05 ENCOUNTER — TELEPHONE (OUTPATIENT)
Dept: OPHTHALMOLOGY | Facility: CLINIC | Age: 84
End: 2024-09-05
Payer: COMMERCIAL

## 2024-09-05 NOTE — TELEPHONE ENCOUNTER
LVM for patient regarding scheduling as offered for 9/16/24 at 11am with . Provided direct number for scheduling as offered.          Patient having concerns of :  If I close my left eye---only using my right eye: the print disappears in the center/it is small [place]- --blank. When I open the left eye I can see the letters again. -Per Patient

## 2024-09-06 ENCOUNTER — TELEPHONE (OUTPATIENT)
Dept: OPHTHALMOLOGY | Facility: CLINIC | Age: 84
End: 2024-09-06
Payer: COMMERCIAL

## 2024-09-06 NOTE — TELEPHONE ENCOUNTER
Patient returned VM regarding scheduling as offered for 9/16/24 at 11am with . Scheduled patient and patient is aware of date, time and location. -Per Patient        Patient having concerns of :  If I close my left eye---only using my right eye: the print disappears in the center/it is small [place]- --blank. When I open the left eye I can see the letters again. -Per Patient

## 2024-09-16 ENCOUNTER — OFFICE VISIT (OUTPATIENT)
Dept: OPHTHALMOLOGY | Facility: CLINIC | Age: 84
End: 2024-09-16
Payer: COMMERCIAL

## 2024-09-16 DIAGNOSIS — H52.203 MYOPIC ASTIGMATISM OF BOTH EYES: ICD-10-CM

## 2024-09-16 DIAGNOSIS — D23.121 BENIGN NEOPLASM OF LEFT UPPER EYELID: ICD-10-CM

## 2024-09-16 DIAGNOSIS — H00.022 HORDEOLUM INTERNUM OF RIGHT LOWER EYELID: ICD-10-CM

## 2024-09-16 DIAGNOSIS — H26.491 POSTERIOR CAPSULAR OPACIFICATION NON VISUALLY SIGNIFICANT, RIGHT: ICD-10-CM

## 2024-09-16 DIAGNOSIS — E11.9 TYPE 2 DIABETES MELLITUS WITHOUT OPHTHALMIC MANIFESTATIONS (H): Primary | Chronic | ICD-10-CM

## 2024-09-16 DIAGNOSIS — H35.3131 EARLY DRY STAGE NONEXUDATIVE AGE-RELATED MACULAR DEGENERATION OF BOTH EYES: ICD-10-CM

## 2024-09-16 DIAGNOSIS — H52.13 MYOPIC ASTIGMATISM OF BOTH EYES: ICD-10-CM

## 2024-09-16 PROCEDURE — 92134 CPTRZ OPH DX IMG PST SGM RTA: CPT | Performed by: OPHTHALMOLOGY

## 2024-09-16 PROCEDURE — 92014 COMPRE OPH EXAM EST PT 1/>: CPT | Performed by: OPHTHALMOLOGY

## 2024-09-16 RX ORDER — NEOMYCIN SULFATE, POLYMYXIN B SULFATE, AND DEXAMETHASONE 3.5; 10000; 1 MG/G; [USP'U]/G; MG/G
0.5 OINTMENT OPHTHALMIC 3 TIMES DAILY
Qty: 3.5 G | Refills: 0 | Status: SHIPPED | OUTPATIENT
Start: 2024-09-16 | End: 2024-09-23

## 2024-09-16 ASSESSMENT — REFRACTION_WEARINGRX
SPECS_TYPE: READING ONLY
OD_AXIS: 005
OS_AXIS: 175
OS_CYLINDER: +1.25
OD_CYLINDER: +2.50
OD_CYLINDER: +2.50
OS_SPHERE: -1.25
OD_SPHERE: +1.25
OD_AXIS: 005
OS_AXIS: 175
OS_CYLINDER: +1.25
OS_SPHERE: PLANO
OD_SPHERE: PLANO
OD_AXIS: 005
OS_SPHERE: +1.25
OS_CYLINDER: +1.25
OD_CYLINDER: +2.50
OS_AXIS: 175
SPECS_TYPE: DISTANCE ONLY
OD_SPHERE: -1.25

## 2024-09-16 ASSESSMENT — CONF VISUAL FIELD
OS_SUPERIOR_NASAL_RESTRICTION: 0
OD_SUPERIOR_TEMPORAL_RESTRICTION: 0
OS_NORMAL: 1
OD_SUPERIOR_NASAL_RESTRICTION: 0
OD_INFERIOR_NASAL_RESTRICTION: 0
OS_SUPERIOR_TEMPORAL_RESTRICTION: 0
OS_INFERIOR_NASAL_RESTRICTION: 0
METHOD: COUNTING FINGERS
OD_NORMAL: 1
OD_INFERIOR_TEMPORAL_RESTRICTION: 0
OS_INFERIOR_TEMPORAL_RESTRICTION: 0

## 2024-09-16 ASSESSMENT — EXTERNAL EXAM - LEFT EYE: OS_EXAM: MILD DERMATOCHALASIS

## 2024-09-16 ASSESSMENT — REFRACTION_MANIFEST
OD_SPHERE: -2.00
OS_AXIS: 168
OD_ADD: +2.50
OS_ADD: +2.50
OS_SPHERE: -1.50
OD_CYLINDER: +2.00
OS_CYLINDER: +1.50
OD_AXIS: 005

## 2024-09-16 ASSESSMENT — VISUAL ACUITY
OD_CC: 20/50
OS_PH_CC: 20/25
OD_PH_CC+: -2
OS_PH_CC+: -1
OS_CC+: -2
METHOD: SNELLEN - LINEAR
OD_PH_CC: 20/30
OS_CC: 20/30

## 2024-09-16 ASSESSMENT — TONOMETRY
OS_IOP_MMHG: 14
IOP_METHOD: ICARE
OD_IOP_MMHG: 13

## 2024-09-16 ASSESSMENT — CUP TO DISC RATIO
OD_RATIO: 0.25
OS_RATIO: 0.2

## 2024-09-16 NOTE — PROGRESS NOTES
HPI  Maira Leon is a 84 year old here for diabetic eye exam.    HPI       painful OD     Additional comments: Right eye has been hurting the past 2 days.              Comments    There has been a lump or skin noticed growing right on right lower lid causing discomfort. There is watering but has not noticed any mucus. No light sensitivity. Pain with left eye is a 4 when its at its worst.  Additionally has noticed reading there is a spot in the center of vision where print disappears the past 2 weeks.   Not sure what eye affected. Can work around to see this but had not gone away. Spot is in the center of vision and very small.   Vision has remained stable with each eye other than this new spot.     Lindsey Zepeda, COT COT 11:11 AM September 16, 2024                   Last edited by Lindsey Zepeda COT on 9/16/2024 11:11 AM.        Has some floaters both eyes for a very long time, no changes. Right eye has mild monocular diplopia when glasses are off.  Currently only wearing over the counter readers. Has old prescription /astigmatism glasses in the car.     PMH: diabetes mellitus 2  Past Medical History:   Diagnosis Date    H/O arthroscopic knee surgery left    5/6/16    History of shingles 12/06/2016    Hypertension     Low back pain     & radiates down left buttock & left leg    Need for prophylactic hormone replacement therapy (postmenopausal)     Nocturia     states she gets up every 2 hours at night to urinate    Osteoarthrosis, unspecified whether generalized or localized, unspecified site     Other and unspecified hyperlipidemia     POSTMENOPAUSAL HORMONAL REPLACEMENT TX 05/23/2005    Type II or unspecified type diabetes mellitus without mention of complication, not stated as uncontrolled         POH: Glasses for myopia/presbyopia, cataract extraction posterior chamber intraocular lens (PCIOL) both eyes 2014, 2017 ,    status post YAG capsulotomy left eye 12/13/22  Oc Meds: ocuvite  FH: Denies any  "glaucoma, brother with age related macular degeneration, no other known eye diseases         Assessment & Plan     1. Type 2 diabetes mellitus without ophthalmic manifestations (H) - Both Eyes    2. Early dry stage nonexudative age-related macular degeneration of both eyes - Both Eyes    3. Hordeolum internum of right lower eyelid - Right Eye    4. Posterior capsular opacification non visually significant, right - Right Eye    5. Benign neoplasm of left upper eyelid - Left Eye    6. Myopic astigmatism of both eyes - Both Eyes          (E11.9) Type 2 diabetes mellitus without ophthalmic manifestations (H) - Both Eyes  (primary encounter diagnosis)  Comment: Diabetes Mellitus 2 w/o retinopathy Great A1C 6.1%  Plan:   Discussed the importance of tight blood glucose, blood pressure, and cholesterol control in the prevention of diabetic retinopathy. Recommend yearly dilated eye exam.    Early dry age related macular degeneration both eyes -  Comment:  Mild, early macular changes   - Macular OCT with few drusen both eyes no subretinal fluid    - Patient has been taking AREDS 2 vitamins   Plan:  ok to continue AREDS2   Return to clinic q 6 months for OCT   - Call if changes in vision occur in the interim    (H00.022) Hordeolum internum of right lower eyelid - Right Eye  Comment: new  Plan: neomycin-polymyxin-dexAMETHasone (MAXITROL)         [id number] ophthalmic ointment three times a day x 7 days        Warm compresses twice a day   Return precautions reviewed  Wants left upper eyelid skin lesion excised, can see if right eye is resolved by the time she returns for that next month    (H26.491) Posterior capsular opacification non visually significant, right - Right Eye  Comment: mild visual significance  Uncorrected astigmatism likely more of visual problem  Plan: observe     (D23.121) Benign neoplasm of left upper eyelid - Left Eye  Comment: bothersome to patient, \"always messing with it\"   No bleeding or significant " growth  Plan: requests excision- oculoplastics appointment made    (H52.203,  H52.13) Myopic astigmatism of both eyes - Both Eyes  Comment: mild changes from january  Plan: manifest refraction done and prescription for glasses given -update as needed       -----------------------------------------------------------------------------------       Patient disposition:   Return in about 1 month (around 10/16/2024) for oculoplastics next avail.  PEE skin tag excision; RLL chalazion possible excision if not resovled. and patient to call sooner as needed.      Complete documentation of historical and exam elements from today's encounter can be found in the full encounter summary report (not reduplicated in this progress note). I personally obtained the chief complaint(s) and history of present illness.  I have confirmed and edited as necessary the CC, HPI, PMH/PSH, social history, FMH, ROS, and exam/neuro findings as obtained by the technician or others. I have examined this patient myself and I personally viewed the image(s) and studies listed above and the documentation reflects my findings and interpretation.  I formulated and edited as necessary the assessment and plan and discussed the findings and management plan with the patient and family.     Elisabeth Estrada MD

## 2024-09-16 NOTE — NURSING NOTE
Chief Complaints and History of Present Illnesses   Patient presents with    painful OD     Right eye has been hurting the past 2 days.      Chief Complaint(s) and History of Present Illness(es)       painful OD              Comments: Right eye has been hurting the past 2 days.               Comments    There has been a lump or skin noticed growing right on right lower lid causing discomfort. There is watering but has not noticed any mucus. No light sensitivity. Pain with left eye is a 4 when its at its worst.  Additionally has noticed reading there is a spot in the center of vision where print disappears the past 2 weeks.   Not sure what eye affected. Can work around to see this but had not gone away. Spot is in the center of vision and very small.   Vision has remained stable with each eye other than this new spot.     Lindsey Zepeda, COT COT 11:11 AM September 16, 2024

## 2024-09-17 NOTE — TELEPHONE ENCOUNTER
FUTURE VISIT INFORMATION      FUTURE VISIT INFORMATION:  Date: 10/28/24  Time: 7:30am  Location: [facility]  REFERRAL INFORMATION:  Referring provider:  Elisabeth Estrada MD   Referring providers clinic:  [facility] eye  Reason for visit/diagnosis   PEE skin tag excision; RLL chalazion possible excision if not resovled.     RECORDS REQUESTED FROM:       Clinic name Comments Records Status Imaging Status   [facility] eye Ov/referral 9/16/24  Ov/referral 9/16/24-4/8/15 epic

## 2024-10-21 ENCOUNTER — TELEPHONE (OUTPATIENT)
Dept: FAMILY MEDICINE | Facility: CLINIC | Age: 84
End: 2024-10-21

## 2024-10-21 ENCOUNTER — OFFICE VISIT (OUTPATIENT)
Dept: FAMILY MEDICINE | Facility: CLINIC | Age: 84
End: 2024-10-21
Payer: COMMERCIAL

## 2024-10-21 VITALS
DIASTOLIC BLOOD PRESSURE: 78 MMHG | HEART RATE: 64 BPM | BODY MASS INDEX: 30.69 KG/M2 | SYSTOLIC BLOOD PRESSURE: 126 MMHG | HEIGHT: 62 IN | RESPIRATION RATE: 20 BRPM | WEIGHT: 166.8 LBS | OXYGEN SATURATION: 97 % | TEMPERATURE: 98.1 F

## 2024-10-21 DIAGNOSIS — L98.9 SKIN LESION: Primary | ICD-10-CM

## 2024-10-21 PROCEDURE — 99213 OFFICE O/P EST LOW 20 MIN: CPT

## 2024-10-21 NOTE — TELEPHONE ENCOUNTER
Attempt #1 to call patient.     RN left voicemail and requested return call to [facility] at [phone].     Layla Montoya RN, BSN  [facility]

## 2024-10-21 NOTE — TELEPHONE ENCOUNTER
Please call patient, have her first also call the [facility] referral number as well and see when they can get her in. Tell her to make sure they know she was told this might be cancer. After she books this appointment I can message the provider directly to see if it can be moved up.

## 2024-10-21 NOTE — TELEPHONE ENCOUNTER
Routing to Mike Wilcox.  Please advise.    Patient is calling back to let Mike Wilcox know when she is scheduled with [facility] 11/19/24 at 9:45am.  Patient is worried that this is too far out. Patient thinks she should be seen earlier.  Fax number for Dr Magallanes is [phone].  Anytime if she can get in earlier will be fine with her.    JOO Paez  [facility]

## 2024-10-21 NOTE — PROGRESS NOTES
"  Assessment & Plan       Skin lesion  Suspicious for cancer given rapidly changing/growing and hx of BCC on chest. Urgent derm referral placed.   - Adult Dermatology  Referral              See Patient Instructions    Elian Covington is a 84 year old, presenting for the following health issues:  Derm Problem (/)        10/21/2024    12:58 PM   Additional Questions   Roomed by Shlomo RICARDO MA     History of Present Illness       Reason for visit:  I have a growth on my forhead  Symptoms include:  A growth or lump--a bulged out thing---dot in the middle & red all around it  Symptom intensity:  Moderate  Symptom progression:  Worsening  Had these symptoms before:  No  What makes it worse:  I dont feel bad  What makes it better:  I'd feel great if it went away/red spreading makes me nervous   She is taking medications regularly.     Started 3 weeks ago, it started as a pimple. Pt did try to squeeze it and white stuff came out and it has continued to grow.            Review of Systems  Constitutional, HEENT, cardiovascular, pulmonary, gi and gu systems are negative, except as otherwise noted.      Objective    BP (!) 144/86 (BP Location: Right arm, Patient Position: Chair, Cuff Size: Adult Regular)   Pulse 64   Temp 98.1  F (36.7  C) (Oral)   Resp 20   Ht 1.58 m (5' 2.21\")   Wt 75.7 kg (166 lb 12.8 oz)   LMP  (LMP Unknown)   SpO2 97%   Breastfeeding No   BMI 30.30 kg/m    Body mass index is 30.3 kg/m .  Physical Exam   GENERAL: alert and no distress  NECK: no adenopathy, no asymmetry, masses, or scars  RESP: lungs clear to auscultation - no rales, rhonchi or wheezes  CV: regular rate and rhythm, normal S1 S2, no S3 or S4, no murmur, click or rub, no peripheral edema  ABDOMEN: soft, nontender, no hepatosplenomegaly, no masses and bowel sounds normal  MS: no gross musculoskeletal defects noted, no edema    No results found for this or any previous visit (from the past 24 hours).        Signed Electronically " by: LINDA Carvajal CNP

## 2024-10-21 NOTE — TELEPHONE ENCOUNTER
Forms/Letter Request    Type of form/letter: OTHER: Dermatology clinic requesting notes/AVS from today's visit Along w any photos       Do we have the form/letter: Yes: AVS from today    Who is the form from? PCP  Where did/will the form come from? Provider to provide    When is form/letter needed by: Before her Appt 10/31/24 @ 9:15 at dermatologist    How would you like the form/letter returned: Fax : [phone]    Patient Notified form requests are processed in 5-7 business days:Yes  Notified but would request it sooner if possible    Could we send this information to you in [facility] or would you prefer to receive a phone call?:   No preference   Okay to leave a detailed message?: Yes at Other phone number:  [phone]

## 2024-10-21 NOTE — TELEPHONE ENCOUNTER
Faxed unsigned clinic notes with image and AVS to [facility] dermatology.   Fax: [phone]

## 2024-10-21 NOTE — PATIENT INSTRUCTIONS
"Schedule dermatology appointment for excision of the lesion.   Likely need provider who does \"Mohs surgery\".     [facility]  - ask to schedule with Dr. Magallanes    [facility]  - any provider    Send me a mychart message later today with the appointment date/time you get scheduled.         "

## 2024-10-23 NOTE — TELEPHONE ENCOUNTER
RN called and spoke with patient    Patient states she had mole surgery yesterday at [facility].    No need to call [facility].    No need for further action.    Jay Jay Mittal RN, BSN, PHN  [facility]

## 2024-10-28 ENCOUNTER — PRE VISIT (OUTPATIENT)
Dept: OPHTHALMOLOGY | Facility: CLINIC | Age: 84
End: 2024-10-28

## 2024-11-04 ENCOUNTER — TRANSFERRED RECORDS (OUTPATIENT)
Dept: HEALTH INFORMATION MANAGEMENT | Facility: CLINIC | Age: 84
End: 2024-11-04
Payer: COMMERCIAL

## 2024-11-07 PROBLEM — C44.320 SQUAMOUS CELL CARCINOMA OF SKIN OF FACE: Status: ACTIVE | Noted: 2024-11-07

## 2024-12-02 ENCOUNTER — TELEPHONE (OUTPATIENT)
Dept: OPHTHALMOLOGY | Facility: CLINIC | Age: 84
End: 2024-12-02
Payer: COMMERCIAL

## 2024-12-02 ENCOUNTER — TRANSFERRED RECORDS (OUTPATIENT)
Dept: HEALTH INFORMATION MANAGEMENT | Facility: CLINIC | Age: 84
End: 2024-12-02
Payer: COMMERCIAL

## 2024-12-02 NOTE — TELEPHONE ENCOUNTER
Spoke with patient regarding rescheduling due to provider template change.   Rescheduled patient accordingly and [facility]Per [facility]

## 2024-12-07 SDOH — HEALTH STABILITY: PHYSICAL HEALTH: ON AVERAGE, HOW MANY DAYS PER WEEK DO YOU ENGAGE IN MODERATE TO STRENUOUS EXERCISE (LIKE A BRISK WALK)?: 5 DAYS

## 2024-12-07 SDOH — HEALTH STABILITY: PHYSICAL HEALTH: ON AVERAGE, HOW MANY MINUTES DO YOU ENGAGE IN EXERCISE AT THIS LEVEL?: 20 MIN

## 2024-12-07 ASSESSMENT — SOCIAL DETERMINANTS OF HEALTH (SDOH): HOW OFTEN DO YOU GET TOGETHER WITH FRIENDS OR RELATIVES?: MORE THAN THREE TIMES A WEEK

## 2024-12-12 ENCOUNTER — OFFICE VISIT (OUTPATIENT)
Dept: FAMILY MEDICINE | Facility: CLINIC | Age: 84
End: 2024-12-12
Attending: FAMILY MEDICINE
Payer: COMMERCIAL

## 2024-12-12 VITALS
DIASTOLIC BLOOD PRESSURE: 64 MMHG | RESPIRATION RATE: 20 BRPM | TEMPERATURE: 98 F | SYSTOLIC BLOOD PRESSURE: 102 MMHG | OXYGEN SATURATION: 98 % | HEART RATE: 84 BPM | BODY MASS INDEX: 30.44 KG/M2 | WEIGHT: 165.4 LBS | HEIGHT: 62 IN

## 2024-12-12 DIAGNOSIS — Z78.0 ASYMPTOMATIC POSTMENOPAUSAL STATUS: ICD-10-CM

## 2024-12-12 DIAGNOSIS — Z00.00 ENCOUNTER FOR MEDICARE ANNUAL WELLNESS EXAM: Primary | ICD-10-CM

## 2024-12-12 DIAGNOSIS — M85.89 OSTEOPENIA OF MULTIPLE SITES: ICD-10-CM

## 2024-12-12 DIAGNOSIS — E78.5 HYPERLIPIDEMIA LDL GOAL <100: ICD-10-CM

## 2024-12-12 DIAGNOSIS — N18.30 STAGE 3 CHRONIC KIDNEY DISEASE, UNSPECIFIED WHETHER STAGE 3A OR 3B CKD (H): ICD-10-CM

## 2024-12-12 DIAGNOSIS — E11.22 DIABETES MELLITUS WITH STAGE 3 CHRONIC KIDNEY DISEASE (H): ICD-10-CM

## 2024-12-12 DIAGNOSIS — N18.30 DIABETES MELLITUS WITH STAGE 3 CHRONIC KIDNEY DISEASE (H): ICD-10-CM

## 2024-12-12 LAB
ANION GAP SERPL CALCULATED.3IONS-SCNC: 15 MMOL/L (ref 7–15)
BUN SERPL-MCNC: 25 MG/DL (ref 8–23)
CALCIUM SERPL-MCNC: 10.3 MG/DL (ref 8.8–10.4)
CHLORIDE SERPL-SCNC: 101 MMOL/L (ref 98–107)
CHOLEST SERPL-MCNC: 150 MG/DL
CREAT SERPL-MCNC: 0.87 MG/DL (ref 0.51–0.95)
CREAT UR-MCNC: 71.4 MG/DL
EGFRCR SERPLBLD CKD-EPI 2021: 65 ML/MIN/1.73M2
EST. AVERAGE GLUCOSE BLD GHB EST-MCNC: 131 MG/DL
GLUCOSE SERPL-MCNC: 128 MG/DL (ref 70–99)
HBA1C MFR BLD: 6.2 % (ref 0–5.6)
HCO3 SERPL-SCNC: 25 MMOL/L (ref 22–29)
HDLC SERPL-MCNC: 59 MG/DL
HOLD SPECIMEN: NORMAL
LDLC SERPL CALC-MCNC: 68 MG/DL
MICROALBUMIN UR-MCNC: <12 MG/L
MICROALBUMIN/CREAT UR: NORMAL MG/G{CREAT}
NONHDLC SERPL-MCNC: 91 MG/DL
POTASSIUM SERPL-SCNC: 4.2 MMOL/L (ref 3.4–5.3)
SODIUM SERPL-SCNC: 141 MMOL/L (ref 135–145)
TRIGL SERPL-MCNC: 115 MG/DL

## 2024-12-12 RX ORDER — ATORVASTATIN CALCIUM 40 MG/1
40 TABLET, FILM COATED ORAL DAILY
Qty: 90 TABLET | Refills: 3 | Status: SHIPPED | OUTPATIENT
Start: 2024-12-12

## 2024-12-12 RX ORDER — LISINOPRIL 2.5 MG/1
2.5 TABLET ORAL DAILY
Qty: 90 TABLET | Refills: 3 | Status: SHIPPED | OUTPATIENT
Start: 2024-12-12

## 2024-12-12 NOTE — PATIENT INSTRUCTIONS
Patient Education   Preventive Care Advice   This is general advice given by our system to help you stay healthy. However, your care team may have specific advice just for you. Please talk to your care team about your preventive care needs.  Nutrition  Eat 5 or more servings of fruits and vegetables each day.  Try wheat bread, brown rice and whole grain pasta (instead of white bread, rice, and pasta).  Get enough calcium and vitamin D. Check the label on foods and aim for 100% of the RDA (recommended daily allowance).  Lifestyle  Exercise at least 150 minutes each week  (30 minutes a day, 5 days a week).  Do muscle strengthening activities 2 days a week. These help control your weight and prevent disease.  No smoking.  Wear sunscreen to prevent skin cancer.  Have a dental exam and cleaning every 6 months.  Yearly exams  See your health care team every year to talk about:  Any changes in your health.  Any medicines your care team has prescribed.  Preventive care, family planning, and ways to prevent chronic diseases.  Shots (vaccines)   HPV shots (up to age 26), if you've never had them before.  Hepatitis B shots (up to age 59), if you've never had them before.  COVID-19 shot: Get this shot when it's due.  Flu shot: Get a flu shot every year.  Tetanus shot: Get a tetanus shot every 10 years.  Pneumococcal, hepatitis A, and RSV shots: Ask your care team if you need these based on your risk.  Shingles shot (for age 50 and up)  General health tests  Diabetes screening:  Starting at age 35, Get screened for diabetes at least every 3 years.  If you are younger than age 35, ask your care team if you should be screened for diabetes.  Cholesterol test: At age 39, start having a cholesterol test every 5 years, or more often if advised.  Bone density scan (DEXA): At age 50, ask your care team if you should have this scan for osteoporosis (brittle bones).  Hepatitis C: Get tested at least once in your life.  STIs (sexually  transmitted infections)  Before age 24: Ask your care team if you should be screened for STIs.  After age 24: Get screened for STIs if you're at risk. You are at risk for STIs (including HIV) if:  You are sexually active with more than one person.  You don't use condoms every time.  You or a partner was diagnosed with a sexually transmitted infection.  If you are at risk for HIV, ask about PrEP medicine to prevent HIV.  Get tested for HIV at least once in your life, whether you are at risk for HIV or not.  Cancer screening tests  Cervical cancer screening: If you have a cervix, begin getting regular cervical cancer screening tests starting at age 21.  Breast cancer scan (mammogram): If you've ever had breasts, begin having regular mammograms starting at age 40. This is a scan to check for breast cancer.  Colon cancer screening: It is important to start screening for colon cancer at age 45.  Have a colonoscopy test every 10 years (or more often if you're at risk) Or, ask your provider about stool tests like a FIT test every year or Cologuard test every 3 years.  To learn more about your testing options, visit:   .  For help making a decision, visit:   https://bit.ly/gz00862.  Prostate cancer screening test: If you have a prostate, ask your care team if a prostate cancer screening test (PSA) at age 55 is right for you.  Lung cancer screening: If you are a current or former smoker ages 50 to 80, ask your care team if ongoing lung cancer screenings are right for you.  For informational purposes only. Not to replace the advice of your health care provider. Copyright   2023 [employer]. All rights reserved. Clinically reviewed by the [facility] Transitions Program. [place] [id number] - REV 01/24.

## 2024-12-12 NOTE — PROGRESS NOTES
"Preventive Care Visit  [facility]  Michelle Barrios MD, Family Medicine  Dec 12, 2024      Assessment & Plan     (Z00.00) Encounter for Medicare annual wellness exam  (primary encounter diagnosis)  Comment: stable health  Plan: PRIMARY CARE FOLLOW-UP SCHEDULING        Is putting herself on the wait list at [facility] (which is connected across the street to an AL)    (E11.22,  N18.30) Diabetes mellitus with stage 3 chronic kidney disease (H)  Comment:   Plan: BASIC METABOLIC PANEL, Lipid panel reflex to         direct LDL Non-fasting, Albumin Random Urine         Quantitative with Creat Ratio, REVIEW OF HEALTH        MAINTENANCE PROTOCOL ORDERS, HEMOGLOBIN A1C        Continue current medication      (M85.89) Osteopenia of multiple sites  Comment:   Plan: REVIEW OF HEALTH MAINTENANCE PROTOCOL ORDERS        adjust therapy/treatment based on results      (E78.5) Hyperlipidemia LDL goal <100  Comment:   Plan: Continue current medication      (N18.30) Stage 3 chronic kidney disease, unspecified whether stage 3a or 3b CKD (H)  Comment:   Plan: adjust therapy/treatment based on results      (Z78.0) Asymptomatic postmenopausal status  Comment:   Plan: DEXA HIP/PELVIS/SPINE - Future        adjust therapy/treatment based on results              BMI  Estimated body mass index is 29.86 kg/m  as calculated from the following:    Height as of this encounter: 1.585 m (5' 2.4\").    Weight as of this encounter: 75 kg (165 lb 6.4 oz).       Counseling  Appropriate preventive services were addressed with this patient via screening, questionnaire, or discussion as appropriate for fall prevention, nutrition, physical activity, Tobacco-use cessation, social engagement, weight loss and cognition.  Checklist reviewing preventive services available has been given to the patient.  Reviewed patient's diet, addressing concerns and/or questions.       FUTURE APPOINTMENTS:       - " Follow-up visit in 6 months    Elian Covington is a 84 year old, presenting for the following:  Physical    Feels a weakness in her toes.  Notices it when standing on her tiptoes - feels a bit dizzy.    Lifts weights and exercises.  Is still finding ways to continue to travel.  Has two nieces and a nephew.   With one niece and grandnieces/nephews living in the [place].  But feels she does not want to drive to [place] for the holidays this year. Is more hesitant to drive on congested freeways through [place]        12/12/2024     1:02 PM   Additional Questions   Roomed by ricarda knight ma   Accompanied by self         12/12/2024     1:02 PM   Patient Reported Additional Medications   Patient reports taking the following new medications none         History of Present Illness       Diabetes:   She presents for follow up of diabetes.  She is checking home blood glucose a few times a week.   She checks blood glucose before meals.  Blood glucose is never over 200 and never under 70.  When her blood glucose is low, the patient is asymptomatic for confusion, blurred vision, lethargy and reports not feeling dizzy, shaky, or weak.  She is concerned about other.   She is having numbness in feet.                 Diabetes Follow-up    How often are you checking your blood sugar? A few times a month  What time of day are you checking your blood sugars (select all that apply)?   varies  Have you had any blood sugars above 200?  No  Have you had any blood sugars below 70?  No  What symptoms do you notice when your blood sugar is low?  None  What concerns do you have today about your diabetes? None   Do you have any of these symptoms? (Select all that apply)  Numbness in feet      BP Readings from Last 2 Encounters:   12/12/24 102/64   10/21/24 126/78     Hemoglobin A1C (%)   Date Value   12/12/2024 6.2 (H)   06/06/2024 6.1 (H)   05/20/2021 6.2 (H)   11/24/2020 6.1 (H)     LDL Cholesterol Calculated (mg/dL)   Date Value    12/05/2023 84   12/01/2022 76   11/24/2020 69   11/26/2019 70             Hyperlipidemia Follow-Up    Are you regularly taking any medication or supplement to lower your cholesterol?   Yes- statin  Are you having muscle aches or other side effects that you think could be caused by your cholesterol lowering medication?  No      Health Care Directive  Patient has a Health Care Directive on file  Advance care planning document is on file and is current.      12/7/2024   General Health   How would you rate your overall physical health? Good   Feel stress (tense, anxious, or unable to sleep) Only a little      (!) STRESS CONCERN      12/7/2024   Nutrition   Diet: Diabetic            12/7/2024   Exercise   Days per week of moderate/strenous exercise 5 days   Average minutes spent exercising at this level 20 min            12/7/2024   Social Factors   Frequency of gathering with friends or relatives More than three times a week   Worry food won't last until get money to buy more No   Food not last or not have enough money for food? No   Do you have housing? (Housing is defined as stable permanent housing and does not include staying ouside in a car, in a tent, in an abandoned building, in an overnight shelter, or couch-surfing.) No   Are you worried about losing your housing? No   Lack of transportation? No   Unable to get utilities (heat,electricity)? No   Want help with housing or utility concern? No      (!) HOUSING CONCERN PRESENT      12/7/2024   Fall Risk   Fallen 2 or more times in the past year? No    Trouble with walking or balance? No        Patient-reported          12/7/2024   Activities of Daily Living- Home Safety   Needs help with the following daily activites None of the above   Safety concerns in the home None of the above            12/7/2024   Dental   Dentist two times every year? Yes            12/7/2024   Hearing Screening   Hearing concerns? None of the above            12/7/2024   Driving Risk  Screening   Patient/family members have concerns about driving No            2024   General Alertness/Fatigue Screening   Have you been more tired than usual lately? No            2024   Urinary Incontinence Screening   Bothered by leaking urine in past 6 months No            2024   TB Screening   Were you born outside of the [place]? No            Today's PHQ-2 Score:       2024    12:45 PM   PHQ-2 (  [employer])   Q1: Little interest or pleasure in doing things 0    Q2: Feeling down, depressed or hopeless 0    PHQ-2 Score 0    Q1: Little interest or pleasure in doing things Not at all   Q2: Feeling down, depressed or hopeless Not at all   PHQ-2 Score 0       Patient-reported           2024   Substance Use   Alcohol more than 3/day or more than 7/wk No   Do you have a current opioid prescription? No   How severe/bad is pain from 1 to 10? 2/10   Do you use any other substances recreationally? No        Social History     Tobacco Use    Smoking status: Former     Current packs/day: 0.00     Average packs/day: 1.5 packs/day for 22.0 years (33.0 ttl pk-yrs)     Types: Cigarettes     Start date: 1959     Quit date: 1981     Years since quittin.9    Smokeless tobacco: Current     Last attempt to quit: 1981    Tobacco comments:     I smoked---quit a couple of times but totally in    Vaping Use    Vaping status: Never Used   Substance Use Topics    Alcohol use: Yes     Comment: some wine---not every day--1 glass or more sometimes sociall    Drug use: No           2023   LAST FHS-7 RESULTS   1st degree relative breast or ovarian cancer No   Any relative bilateral breast cancer No   Any male have breast cancer No   Any ONE woman have BOTH breast AND ovarian cancer No   Any woman with breast cancer before 50yrs No   2 or more relatives with breast AND/OR ovarian cancer No   2 or more relatives with breast AND/OR bowel cancer No           Mammogram Screening - After age 74-  "determine frequency with patient based on health status, life expectancy and patient goals              Reviewed and updated as needed this visit by Provider                      Current providers sharing in care for this patient include:  Patient Care Team:  Michelle Monet MD as PCP - General (Family Practice)  Jackie Killian, RN as   Elisabeth Estrada MD as MD (Ophthalmology)  Jane Killian RD as Diabetes Educator (Dietitian, Registered)  Michelle Monet MD as Assigned PCP  Adalgisa Ahn PA-C as Physician Assistant (Dermatology)  Elisabeth Estrada MD as Assigned Surgical Provider  Db Parikh MA as Audiologist (Audiology)    The following health maintenance items are reviewed in Epic and correct as of today:  Health Maintenance   Topic Date Due    BMP  09/06/2024    LIPID  12/05/2024    MICROALBUMIN  12/05/2024    DIABETIC FOOT EXAM  12/05/2024    ANNUAL REVIEW OF HM ORDERS  12/05/2024    DEXA  12/06/2024    MEDICARE ANNUAL WELLNESS VISIT  12/05/2024    HEMOGLOBIN  06/06/2025    A1C  06/12/2025    EYE EXAM  09/16/2025    FALL RISK ASSESSMENT  12/12/2025    ADVANCE CARE PLANNING  12/05/2028    DTAP/TDAP/TD IMMUNIZATION (4 - Td or Tdap) 12/16/2031    PHQ-2 (once per calendar year)  Completed    INFLUENZA VACCINE  Completed    Pneumococcal Vaccine: 65+ Years  Completed    URINALYSIS  Completed    ZOSTER IMMUNIZATION  Completed    RSV VACCINE  Completed    COVID-19 Vaccine  Completed    HPV IMMUNIZATION  Aged Out    MENINGITIS IMMUNIZATION  Aged Out    RSV MONOCLONAL ANTIBODY  Aged Out    MAMMO SCREENING  Discontinued         Review of Systems  Constitutional, HEENT, cardiovascular, pulmonary, gi and gu systems are negative, except as otherwise noted.     Objective    Exam  /64 (BP Location: Right arm, Patient Position: Chair, Cuff Size: Adult Regular)   Pulse 84   Temp 98  F (36.7  C) (Oral)   Resp 20   Ht 1.585 m (5' 2.4\")   Wt 75 kg (165 " "lb 6.4 oz)   LMP  (LMP Unknown)   SpO2 98%   BMI 29.86 kg/m     Estimated body mass index is 29.86 kg/m  as calculated from the following:    Height as of this encounter: 1.585 m (5' 2.4\").    Weight as of this encounter: 75 kg (165 lb 6.4 oz).    Physical Exam  GENERAL: alert and no distress  EYES: Eyes grossly normal to inspection, PERRL and conjunctivae and sclerae normal  HENT: normal cephalic/atraumatic and ear canals and TM's normal  NECK: no adenopathy, no asymmetry, masses, or scars  RESP: lungs clear to auscultation - no rales, rhonchi or wheezes  CV: regular rate and rhythm, normal S1 S2, no S3 or S4, no murmur, click or rub, no peripheral edema  MS: no gross musculoskeletal defects noted, no edema  PSYCH: mentation appears normal, affect normal/bright  Diabetic foot exam: normal DP and PT pulses, no trophic changes or ulcerative lesions, and normal sensory exam         12/12/2024   Mini Cog   Clock Draw Score 2 Normal   3 Item Recall 2 objects recalled   Mini Cog Total Score 4                 Signed Electronically by: Michelle Barrios MD    "

## 2024-12-13 ENCOUNTER — TRANSFERRED RECORDS (OUTPATIENT)
Dept: HEALTH INFORMATION MANAGEMENT | Facility: CLINIC | Age: 84
End: 2024-12-13

## 2024-12-19 ENCOUNTER — TELEPHONE (OUTPATIENT)
Dept: FAMILY MEDICINE | Facility: CLINIC | Age: 84
End: 2024-12-19
Payer: COMMERCIAL

## 2024-12-19 NOTE — TELEPHONE ENCOUNTER
General Call      Reason for Call:  patient called and needs to reschedule her DEXA scan but the order has now .    Please refer patient once again to reshedule from Sophie Neff at [facility]/     Thank you.      What are your questions or concerns:  no    Date of last appointment with provider: yesterday    Could we send this information to you in [employer] or would you prefer to receive a phone call?:   Patient would prefer a phone call   Okay to leave a detailed message?: Yes at Cell number on file:    Telephone Information:   Mobile [phone]

## 2024-12-19 NOTE — TELEPHONE ENCOUNTER
Patient Returning Call    Reason for call:  Questions regarding dexa scan order    Information relayed to patient:  Called pt, pt was told by imaging she needs a new order for dexa scan as she missed her dexa scan appointment today, told pt that order does not  till . Called imaging and spoke with Sandy and she said she will call patient back to reschedule.     Patient has additional questions:  No      Could we send this information to you in [facility] or would you prefer to receive a phone call?:   Patient would prefer a phone call   Okay to leave a detailed message?: Yes at Home number on file [phone] (home)

## 2024-12-20 ENCOUNTER — ANCILLARY PROCEDURE (OUTPATIENT)
Dept: BONE DENSITY | Facility: CLINIC | Age: 84
End: 2024-12-20
Attending: FAMILY MEDICINE
Payer: COMMERCIAL

## 2024-12-20 DIAGNOSIS — Z78.0 ASYMPTOMATIC POSTMENOPAUSAL STATUS: ICD-10-CM

## 2024-12-20 PROCEDURE — 77080 DXA BONE DENSITY AXIAL: CPT | Mod: TC | Performed by: RADIOLOGY

## 2024-12-20 PROCEDURE — 77081 DXA BONE DENSITY APPENDICULR: CPT | Mod: TC | Performed by: RADIOLOGY

## 2025-01-06 ENCOUNTER — TELEPHONE (OUTPATIENT)
Dept: FAMILY MEDICINE | Facility: CLINIC | Age: 85
End: 2025-01-06
Payer: COMMERCIAL

## 2025-01-06 NOTE — TELEPHONE ENCOUNTER
RN called patient/family and relayed provider's message. Patient/family verbalized understanding.     Layla Montoya RN, BSN  [facility] [place]

## 2025-01-06 NOTE — TELEPHONE ENCOUNTER
Patient has not reviewed her bone density results in EPIC from 12/23 or my notes attached.  Please contact patient to review with her and assist with scheduling a virtual visit with me (okay to use PERNELL spot if needed)

## 2025-01-08 ENCOUNTER — TRANSFERRED RECORDS (OUTPATIENT)
Dept: HEALTH INFORMATION MANAGEMENT | Facility: CLINIC | Age: 85
End: 2025-01-08
Payer: COMMERCIAL

## 2025-01-09 ENCOUNTER — TELEPHONE (OUTPATIENT)
Dept: FAMILY MEDICINE | Facility: CLINIC | Age: 85
End: 2025-01-09
Payer: COMMERCIAL

## 2025-01-09 NOTE — TELEPHONE ENCOUNTER
Patient Returning Call    Reason for call:  dexa follow up    Information relayed to patient:  care team will call    Patient has additional questions:  N/A    What are your questions/concerns:  is 2/18 soon enough to discuss results    Could we send this information to you in [facility] or would you prefer to receive a phone call?:   Patient would prefer a phone call   Okay to leave a detailed message?: N/A at Cell number on file:    Telephone Information:   Mobile [phone]     
Called patient and rescheduled.    JOO Paez  [facility]    
H&P by attending  Joni Laurent MD, Facep

## 2025-01-28 ENCOUNTER — OFFICE VISIT (OUTPATIENT)
Dept: FAMILY MEDICINE | Facility: CLINIC | Age: 85
End: 2025-01-28
Payer: COMMERCIAL

## 2025-01-28 VITALS
HEIGHT: 62 IN | TEMPERATURE: 97.9 F | SYSTOLIC BLOOD PRESSURE: 124 MMHG | OXYGEN SATURATION: 95 % | WEIGHT: 167.8 LBS | HEART RATE: 82 BPM | DIASTOLIC BLOOD PRESSURE: 82 MMHG | BODY MASS INDEX: 30.88 KG/M2 | RESPIRATION RATE: 16 BRPM

## 2025-01-28 DIAGNOSIS — E11.22 DIABETES MELLITUS WITH STAGE 3 CHRONIC KIDNEY DISEASE (H): ICD-10-CM

## 2025-01-28 DIAGNOSIS — M85.80 OSTEOPENIA, UNSPECIFIED LOCATION: Primary | ICD-10-CM
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
## 2025-01-28 DIAGNOSIS — N18.30 DIABETES MELLITUS WITH STAGE 3 CHRONIC KIDNEY DISEASE (H): ICD-10-CM

## 2025-01-28 LAB
ERYTHROCYTE [DISTWIDTH] IN BLOOD BY AUTOMATED COUNT: 13.3 % (ref 10–15)
HCT VFR BLD AUTO: 41.1 % (ref 35–47)
HGB BLD-MCNC: 13.5 G/DL (ref 11.7–15.7)
MCH RBC QN AUTO: 30.1 PG (ref 26.5–33)
MCHC RBC AUTO-ENTMCNC: 32.8 G/DL (ref 31.5–36.5)
MCV RBC AUTO: 92 FL (ref 78–100)
PLATELET # BLD AUTO: 275 10E3/UL (ref 150–450)
RBC # BLD AUTO: 4.49 10E6/UL (ref 3.8–5.2)
WBC # BLD AUTO: 6.2 10E3/UL (ref 4–11)

## 2025-01-28 PROCEDURE — 84443 ASSAY THYROID STIM HORMONE: CPT | Performed by: FAMILY MEDICINE

## 2025-01-28 PROCEDURE — 36415 COLL VENOUS BLD VENIPUNCTURE: CPT | Performed by: FAMILY MEDICINE

## 2025-01-28 PROCEDURE — 82306 VITAMIN D 25 HYDROXY: CPT | Performed by: FAMILY MEDICINE

## 2025-01-28 PROCEDURE — 84100 ASSAY OF PHOSPHORUS: CPT | Performed by: FAMILY MEDICINE

## 2025-01-28 PROCEDURE — 85027 COMPLETE CBC AUTOMATED: CPT | Performed by: FAMILY MEDICINE

## 2025-01-28 PROCEDURE — 99214 OFFICE O/P EST MOD 30 MIN: CPT | Performed by: FAMILY MEDICINE

## 2025-01-28 RX ORDER — IBANDRONATE SODIUM 150 MG/1
150 TABLET, FILM COATED ORAL
Status: CANCELLED | OUTPATIENT
Start: 2025-01-28

## 2025-01-28 NOTE — PATIENT INSTRUCTIONS
We will get labs today.  The results should be back within 24-48 hours.   Once I see the results I will send you a message.    If they are all normal, we will start ibandronate (Boniva) for osteopenia - to help keep your bones as strong as they are, perhaps a little stronger.

## 2025-01-28 NOTE — PROGRESS NOTES
Assessment & Plan     (M85.80) Osteopenia, unspecified location  (primary encounter diagnosis)  Comment: noted on bone density - with a decrease from previous  Plan: Vitamin D Deficiency, CBC with platelets,         Phosphorus, IBANdronate (BONIVA) 150 MG tablet        adjust therapy/treatment based on results  Discussed bisphosphonates.  And if labs are normal, she would like to begin preventive therapy.  I discussed with the patient risks and benefits of the new medications prescribed including potential side effects.  The patient had opportunity to ask questions and is comfortable with and interested in medications as prescribed.      (E11.22,  N18.30) Diabetes mellitus with stage 3 chronic kidney disease (H)  Comment:   Plan: TSH with free T4 reflex        adjust therapy/treatment based on results    Fall  With right shoulder pain  Recovering.  Believe that the shoulder pain is due to ligamentous and muscle strain. Discuss home stretching and range of motion exercises.          FUTURE APPOINTMENTS:       - Follow-up visit in 5 months for diabetes    Subjective   Nadya is a 85 year old, presenting for the following health issues:  Results and Fall        1/28/2025     2:14 PM   Additional Questions   Roomed by Shlomo RICARDO MA   Accompanied by n/a     History of Present Illness       Reason for visit:  Bone density report and a fall on ice 1/25/25    She eats 2-3 servings of fruits and vegetables daily.She consumes 6 sweetened beverage(s) daily.She exercises with enough effort to increase her heart rate 30 to 60 minutes per day.  She exercises with enough effort to increase her heart rate 6 days per week. She is missing 1 dose(s) of medications per week.  She is not taking prescribed medications regularly due to remembering to take.     Patient had a fall last Saturday, has some bruises on her R leg. Trouble with right arm and some pain right chest. Patient landed on her butt and hit her right arm. Patient says she  "did not see the ice under the snow. Patient has been taking tylenol 650 mg twice a day. Hurts when lifting R arm.   Was walking to her mailbox.  Snowfall covered the ice.    Discuss dexa results.     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 She's had 11 out of 20 radiation treatments for her basal cell of the right eye.                 Review of Systems  Constitutional, HEENT, cardiovascular, pulmonary, gi and gu systems are negative, except as otherwise noted.      Objective    /82 (BP Location: Left arm, Patient Position: Chair, Cuff Size: Adult Regular)   Pulse 82   Temp 97.9  F (36.6  C) (Oral)   Resp 16   Ht 1.585 m (5' 2.4\")   Wt 76.1 kg (167 lb 12.8 oz)   LMP  (LMP Unknown)   SpO2 95%   Breastfeeding No   BMI 30.30 kg/m    Body mass index is 30.3 kg/m .  Physical Exam   GENERAL: alert and no distress  RESP: lungs clear to auscultation - no rales, rhonchi or wheezes  CV: regular rate and rhythm, normal S1 S2, no S3 or S4, no murmur, click or rub, no peripheral edema   ORTHO:   SHOULDER Exam-Right   Inspection: no swelling, no bruising, no discoloration, no obvious deformity, no asymmetry, no glenohumeral joint anterior bulge, no distal clavicle elevation, no muscle atrophy, no scapular winging   Tenderness of: SC joint- no , clavicle(prox-mid)- no , clavicle-(mid-distal)- no , AC joint- YES, acromion- no , anterior capsule- YES, prox bicep tendon- YES, greater tuberosity- no , prox humerus- no , supraspinatous- no , infraspinatous- no , superior trapezious- no , rhomboids- no    Range of Motion: Active- limited due to pain.  Range of Motion: Passive- forward flexion- normal, abduction- normal, external rotation- normal, internal rotation- normal   Strength: forward flexion- 5/5, abduction- 5/5, internal rotation- 5/5, external rotation- 5/5 and bicep- full   Special tests:         SKIN: no suspicious lesions or rashes  PSYCH: mentation appears " normal, affect normal/bright    DEXA reviewed        Signed Electronically by: Michelle Barrios MD

## 2025-01-29 LAB
PHOSPHATE SERPL-MCNC: 4.3 MG/DL (ref 2.5–4.5)
TSH SERPL DL<=0.005 MIU/L-ACNC: 2.5 UIU/ML (ref 0.3–4.2)
VIT D+METAB SERPL-MCNC: 46 NG/ML (ref 20–50)

## 2025-01-29 RX ORDER — IBANDRONATE SODIUM 150 MG/1
150 TABLET, FILM COATED ORAL
Qty: 3 TABLET | Refills: 3 | Status: SHIPPED | OUTPATIENT
Start: 2025-01-29

## 2025-02-05 ENCOUNTER — TELEPHONE (OUTPATIENT)
Dept: FAMILY MEDICINE | Facility: CLINIC | Age: 85
End: 2025-02-05
Payer: COMMERCIAL

## 2025-02-05 NOTE — TELEPHONE ENCOUNTER
Patient calling, she stated on mychart it says all her medications are marked for removal. RN does not see any medications marked for removal and informed patient of this. She just wanted to check to make sure.     Patient requested to cancel her 2/18/25 appointment as she will be in [place] and said she already discussed her Dexa results with PCP. RN canceled this visit.     Kaley Doyle RN

## 2025-02-10 ENCOUNTER — OFFICE VISIT (OUTPATIENT)
Dept: OPHTHALMOLOGY | Facility: CLINIC | Age: 85
End: 2025-02-10
Payer: COMMERCIAL

## 2025-02-10 DIAGNOSIS — H02.9 LESION OF RIGHT LOWER EYELID: ICD-10-CM

## 2025-02-10 DIAGNOSIS — E11.9 TYPE 2 DIABETES MELLITUS WITHOUT OPHTHALMIC MANIFESTATIONS (H): Primary | ICD-10-CM

## 2025-02-10 DIAGNOSIS — H35.3131 EARLY DRY STAGE NONEXUDATIVE AGE-RELATED MACULAR DEGENERATION OF BOTH EYES: ICD-10-CM

## 2025-02-10 DIAGNOSIS — H52.203 MYOPIC ASTIGMATISM OF BOTH EYES: ICD-10-CM

## 2025-02-10 DIAGNOSIS — H52.13 MYOPIC ASTIGMATISM OF BOTH EYES: ICD-10-CM

## 2025-02-10 DIAGNOSIS — H26.491 POSTERIOR CAPSULAR OPACIFICATION NON VISUALLY SIGNIFICANT, RIGHT: ICD-10-CM

## 2025-02-10 DIAGNOSIS — Z96.1 PSEUDOPHAKIA, BOTH EYES: ICD-10-CM

## 2025-02-10 PROCEDURE — 92015 DETERMINE REFRACTIVE STATE: CPT | Performed by: OPHTHALMOLOGY

## 2025-02-10 PROCEDURE — 99214 OFFICE O/P EST MOD 30 MIN: CPT | Performed by: OPHTHALMOLOGY

## 2025-02-10 ASSESSMENT — CONF VISUAL FIELD
METHOD: COUNTING FINGERS
OD_INFERIOR_NASAL_RESTRICTION: 0
OS_INFERIOR_NASAL_RESTRICTION: 0
OS_SUPERIOR_NASAL_RESTRICTION: 0
OD_INFERIOR_TEMPORAL_RESTRICTION: 0
OS_NORMAL: 1
OS_SUPERIOR_TEMPORAL_RESTRICTION: 0
OS_INFERIOR_TEMPORAL_RESTRICTION: 0
OD_SUPERIOR_TEMPORAL_RESTRICTION: 0
OD_SUPERIOR_NASAL_RESTRICTION: 0
OD_NORMAL: 1

## 2025-02-10 ASSESSMENT — REFRACTION_MANIFEST
OD_SPHERE: -2.00
OD_ADD: +2.50
OS_AXIS: 168
OS_CYLINDER: +1.50
OS_SPHERE: -1.50
OS_ADD: +2.50
OD_AXIS: 005
OD_CYLINDER: +2.00

## 2025-02-10 ASSESSMENT — REFRACTION_WEARINGRX
OD_SPHERE: -1.25
OS_AXIS: 175
OS_SPHERE: -1.25
SPECS_TYPE: DISTANCE ONLY
OD_AXIS: 005
OS_CYLINDER: +1.25
OD_CYLINDER: +2.50

## 2025-02-10 ASSESSMENT — CUP TO DISC RATIO
OD_RATIO: 0.25
OS_RATIO: 0.2

## 2025-02-10 ASSESSMENT — TONOMETRY
IOP_METHOD: ICARE
OD_IOP_MMHG: 10
OS_IOP_MMHG: 13

## 2025-02-10 ASSESSMENT — VISUAL ACUITY
OS_CC+: -2
OS_PH_CC+: -1
METHOD: SNELLEN - LINEAR
OS_PH_CC: 20/25
CORRECTION_TYPE: GLASSES
OD_PH_CC+: -2
OD_CC: 20/30
OD_CC+: +2
OD_PH_CC: 20/25
OS_CC: 20/50

## 2025-02-10 ASSESSMENT — EXTERNAL EXAM - LEFT EYE: OS_EXAM: MILD DERMATOCHALASIS

## 2025-02-10 NOTE — PROGRESS NOTES
HPI  Maira Leon is a 85 year old here for diabetic eye exam.    HPI       Follow Up    In both eyes.  This started 6 months ago.  Severity is mild.  Occurring intermittently.  Associated symptoms include redness.  Negative for eye pain.  Response to treatment was no improvement.             Comments    Has been having radiation treatment to right lower lid for basal cell carcinoma.  Last treatment will be Wednesday.  Some times that right eye is bothersome, but thinks it is related to  basal cell carcinoma treatments.  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  No constant vision change but in the morning may be fuzzy.  Denies distortion.   Did not get new glasses after last visit.      Saira Boss on 2/10/2025 at 8:51 AM            Last edited by Saira Boss on 2/10/2025  8:51 AM.        Has been getting radiation therapy for right lower lid nodular basal cell carcinoma [facility]    PMH: diabetes mellitus 2  Past Medical History:   Diagnosis Date    H/O arthroscopic knee surgery left    5/6/16    History of shingles 12/06/2016    Hypertension     Low back pain     & radiates down left buttock & left leg    Need for prophylactic hormone replacement therapy (postmenopausal)     Nocturia     states she gets up every 2 hours at night to urinate    Osteoarthrosis, unspecified whether generalized or localized, unspecified site     Other and unspecified hyperlipidemia     POSTMENOPAUSAL HORMONAL REPLACEMENT TX 05/23/2005    Type II or unspecified type diabetes mellitus without mention of complication, not stated as uncontrolled         POH: Glasses for myopia/presbyopia, cataract extraction posterior chamber intraocular lens (PCIOL) both eyes 2014, 2017 ,    status post YAG capsulotomy left eye 12/13/22  Oc Meds: ocuvite  FH: Denies any glaucoma, brother with age related macular degeneration, no other known eye diseases      Reviewed records from  [facility] dermatology     Assessment & Plan     1. Type 2 diabetes mellitus without ophthalmic manifestations (H) - Both Eyes    2. Early dry stage nonexudative age-related macular degeneration of both eyes - Both Eyes    3. Posterior capsular opacification non visually significant, right - Right Eye    4. Pseudophakia, both eyes - Both Eyes    5. Lesion of right lower eyelid - Right Eye    6. Myopic astigmatism of both eyes        (E11.9) Type 2 diabetes mellitus without ophthalmic manifestations (H) - Both Eyes  (primary encounter diagnosis)  Comment: Diabetes Mellitus 2 w/o retinopathy A1C 6.2%   No radiation retinopathy  Plan:   Discussed the importance of tight blood glucose, blood pressure, and cholesterol control in the prevention of diabetic retinopathy. Recommend yearly dilated eye exam.    Early dry age related macular degeneration both eyes -  Comment:  Mild, early macular changes   - Macular OCT with few drusen both eyes no subretinal fluid -stable compared with prior, see report   - Patient has been taking AREDS 2 vitamins   Plan:  ok to continue AREDS2   Return to clinic q 6 months for OCT   - Call if changes in vision occur in the interim    (H26.491) Posterior capsular opacification non visually significant, right - Right Eye  Pseudophakia both eyes  Comment: mild visual significance  Uncorrected astigmatism likely more of visual problem  Plan: observe PCO, use glasses as needed    (H52.203,  H52.13) Myopic astigmatism of both eyes - Both Eyes  Comment: Changes on refraction improved acuity  Plan: manifest refraction done and prescription for glasses given -update as needed       -----------------------------------------------------------------------------------       Patient disposition:   Return in about 6 months (around 8/10/2025) for OCT macula OU follow-up AMD no dilation. and patient to call sooner as needed.      Complete documentation of historical and exam elements from today's encounter can  be found in the full encounter summary report (not reduplicated in this progress note). I personally obtained the chief complaint(s) and history of present illness.  I have confirmed and edited as necessary the CC, HPI, PMH/PSH, social history, FMH, ROS, and exam/neuro findings as obtained by the technician or others. I have examined this patient myself and I personally viewed the image(s) and studies listed above and the documentation reflects my findings and interpretation.  I formulated and edited as necessary the assessment and plan and discussed the findings and management plan with the patient and family.     Elisabeth Estrada MD

## 2025-02-10 NOTE — NURSING NOTE
Chief Complaints and History of Present Illnesses   Patient presents with    Follow Up     Chief Complaint(s) and History of Present Illness(es)       Follow Up              Laterality: both eyes    Onset: 6 months ago    Severity: mild    Frequency: intermittently    Associated symptoms: redness.  Negative for eye pain    Response to treatment: no improvement              Comments    Has been having radiation treatment to right lower lid for basal cell carcinoma.  Last treatment will be Wednesday.  Some times that right eye is bothersome, but thinks it is related to  basal cell carcinoma treatments.  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  No constant vision change but in the morning may be fuzzy.  Denies distortion.   Did not get new glasses after last visit.      Saira Boss on 2/10/2025 at 8:51 AM

## 2025-03-11 ENCOUNTER — TELEPHONE (OUTPATIENT)
Dept: OPHTHALMOLOGY | Facility: CLINIC | Age: 85
End: 2025-03-11
Payer: COMMERCIAL

## 2025-03-11 NOTE — TELEPHONE ENCOUNTER
LVM for patient regarding scheduling a Return in about 6 months (around 8/10/2025) for OCT macula OU follow-up AMD no dilation. Provided Eye Clinic and direct number for scheduling options. Also, made a recall letter.

## 2025-03-13 ENCOUNTER — TELEPHONE (OUTPATIENT)
Dept: OPHTHALMOLOGY | Facility: CLINIC | Age: 85
End: 2025-03-13
Payer: COMMERCIAL

## 2025-03-13 NOTE — TELEPHONE ENCOUNTER
Patient returned VM regarding scheduling a Return in about 6 months (around 8/10/2025) for OCT macula OU follow-up AMD no dilation. Scheduled patient accordingly and [facility]

## 2025-04-15 ENCOUNTER — OFFICE VISIT (OUTPATIENT)
Dept: FAMILY MEDICINE | Facility: CLINIC | Age: 85
End: 2025-04-15
Payer: COMMERCIAL

## 2025-04-15 VITALS
HEART RATE: 88 BPM | WEIGHT: 165.2 LBS | OXYGEN SATURATION: 97 % | RESPIRATION RATE: 16 BRPM | DIASTOLIC BLOOD PRESSURE: 70 MMHG | TEMPERATURE: 97.7 F | HEIGHT: 62 IN | BODY MASS INDEX: 30.4 KG/M2 | SYSTOLIC BLOOD PRESSURE: 142 MMHG

## 2025-04-15 DIAGNOSIS — H61.22 IMPACTED CERUMEN OF LEFT EAR: Primary | ICD-10-CM

## 2025-04-15 DIAGNOSIS — M25.511 RIGHT SHOULDER PAIN, UNSPECIFIED CHRONICITY: ICD-10-CM

## 2025-04-15 PROCEDURE — 3078F DIAST BP <80 MM HG: CPT | Performed by: FAMILY MEDICINE

## 2025-04-15 PROCEDURE — 90480 ADMN SARSCOV2 VAC 1/ONLY CMP: CPT | Performed by: FAMILY MEDICINE

## 2025-04-15 PROCEDURE — 3077F SYST BP >= 140 MM HG: CPT | Performed by: FAMILY MEDICINE

## 2025-04-15 PROCEDURE — 91320 SARSCV2 VAC 30MCG TRS-SUC IM: CPT | Performed by: FAMILY MEDICINE

## 2025-04-15 PROCEDURE — G2211 COMPLEX E/M VISIT ADD ON: HCPCS | Performed by: FAMILY MEDICINE

## 2025-04-15 PROCEDURE — 99213 OFFICE O/P EST LOW 20 MIN: CPT | Performed by: FAMILY MEDICINE

## 2025-04-15 PROCEDURE — 1126F AMNT PAIN NOTED NONE PRSNT: CPT | Performed by: FAMILY MEDICINE

## 2025-04-15 ASSESSMENT — PAIN SCALES - GENERAL: PAINLEVEL_OUTOF10: NO PAIN (0)

## 2025-04-15 NOTE — NURSING NOTE
Patient identified using two patient identifiers.  Ear exam showing wax occlusion completed by provider.  Solution: warm water was placed in the left ear(s) via irrigation tool: elephant ear.    Shlomo Lerner MA

## 2025-04-15 NOTE — PROGRESS NOTES
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        FUTURE APPOINTMENTS:       - Follow-up visit in 4-5 months for diabetes    Elian Covington is a 85 year old, presenting for the following health issues:  Ear Problem (/)        4/15/2025     3:18 PM   Additional Questions   Roomed by Shlomo RICARDO MA   Accompanied by Self     History of Present Illness       Reason for visit:  The audiologist I see within [facility] system does not have the equipment to clean ear wax   She is taking medications regularly.        Patient hear to get ears checked and ear wax removal.   Left ear feels blocked.     Right shoulder pain.  Did have a fall last winter and thought she had recovered well.  But then was in [place] [employer] and could not finish second round due to right shoulder pain.  Lateral/posterior pain.            Review of Systems  Constitutional, HEENT, cardiovascular, pulmonary, gi and gu systems are negative, except as otherwise noted.      Objective    BP (!) 147/87 (BP Location: Left arm, Patient Position: Chair, Cuff Size: Adult Regular)   Pulse 88   Temp 97.7  F (36.5  C) (Oral)   Resp 16   Ht 1.585 m (5' 2.4\")   Wt 74.9 kg (165 lb 3.2 oz)   LMP  (LMP Unknown)   SpO2 97%   Breastfeeding No   BMI 29.83 kg/m    Body mass index is 29.83 kg/m .  Physical Exam   GENERAL: alert and no distress  HENT: normal cephalic/atraumatic, right ear: normal: no effusions, no erythema, normal landmarks, and left ear: occluded with wax and unable to remove with curetter  ORTHO:   SHOULDER Exam-Right   Inspection: no swelling, no bruising, no " discoloration, no obvious deformity, no asymmetry, no glenohumeral joint anterior bulge, no distal clavicle elevation, no muscle atrophy, no scapular winging   Tenderness of: SC joint- no, clavicle(prox-mid)- no, clavicle-(mid-distal)- no, AC joint- no, acromion- no, anterior capsule- no, prox bicep tendon- no, greater tuberosity- no, prox humerus- no, supraspinatous- no, infraspinatous- no, superior trapezious- no, rhomboids- no   Range of Motion: Active- forward flexion- normal, abduction- normal, external rotation- normal, internal rotation- normal  Range of Motion: Passive- forward flexion- normal, abduction- normal, external rotation- normal, internal rotation- normal      Positive pain with crossbody motion of posterior rotator cuff.        PSYCH: mentation appears normal, affect normal/bright            Signed Electronically by: Michelle Barrios MD

## 2025-04-28 ENCOUNTER — TRANSFERRED RECORDS (OUTPATIENT)
Dept: HEALTH INFORMATION MANAGEMENT | Facility: CLINIC | Age: 85
End: 2025-04-28
Payer: COMMERCIAL

## 2025-05-09 PROBLEM — M25.511 RIGHT SHOULDER PAIN, UNSPECIFIED CHRONICITY: Status: ACTIVE | Noted: 2025-05-09

## 2025-06-13 ENCOUNTER — RESULTS FOLLOW-UP (OUTPATIENT)
Dept: FAMILY MEDICINE | Facility: CLINIC | Age: 85
End: 2025-06-13

## 2025-06-13 ENCOUNTER — ANCILLARY PROCEDURE (OUTPATIENT)
Dept: GENERAL RADIOLOGY | Facility: CLINIC | Age: 85
End: 2025-06-13
Attending: FAMILY MEDICINE
Payer: COMMERCIAL

## 2025-06-13 DIAGNOSIS — M25.562 LEFT KNEE PAIN, UNSPECIFIED CHRONICITY: ICD-10-CM

## 2025-06-13 PROBLEM — M25.511 RIGHT SHOULDER PAIN, UNSPECIFIED CHRONICITY: Status: RESOLVED | Noted: 2025-05-09 | Resolved: 2025-06-13

## 2025-06-13 PROCEDURE — 73562 X-RAY EXAM OF KNEE 3: CPT | Mod: TC | Performed by: RADIOLOGY

## 2025-06-16 ENCOUNTER — PATIENT OUTREACH (OUTPATIENT)
Dept: CARE COORDINATION | Facility: CLINIC | Age: 85
End: 2025-06-16
Payer: COMMERCIAL

## 2025-07-30 ENCOUNTER — VIRTUAL VISIT (OUTPATIENT)
Dept: FAMILY MEDICINE | Facility: CLINIC | Age: 85
End: 2025-07-30
Payer: COMMERCIAL
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
## 2025-07-30 DIAGNOSIS — S22.42XD CLOSED FRACTURE OF MULTIPLE RIBS OF LEFT SIDE WITH ROUTINE HEALING, SUBSEQUENT ENCOUNTER: Primary | ICD-10-CM

## 2025-07-30 PROBLEM — C44.320 SQUAMOUS CELL CARCINOMA OF SKIN OF FACE: Status: RESOLVED | Noted: 2024-11-07 | Resolved: 2025-07-30

## 2025-07-30 PROCEDURE — 98006 SYNCH AUDIO-VIDEO EST MOD 30: CPT | Performed by: STUDENT IN AN ORGANIZED HEALTH CARE EDUCATION/TRAINING PROGRAM

## 2025-07-30 RX ORDER — OXYCODONE HYDROCHLORIDE 5 MG/1
5 TABLET ORAL EVERY 6 HOURS PRN
Qty: 24 TABLET | Refills: 0 | Status: SHIPPED | OUTPATIENT
Start: 2025-07-30 | End: 2025-08-05

## 2025-07-30 RX ORDER — EXENATIDE 250 UG/ML
10 INJECTION SUBCUTANEOUS
COMMUNITY
End: 2025-07-30

## 2025-07-30 RX ORDER — DESONIDE 0.5 MG/G
OINTMENT TOPICAL
COMMUNITY
Start: 2024-12-13

## 2025-07-30 RX ORDER — CELECOXIB 200 MG/1
200 CAPSULE ORAL DAILY
Qty: 10 CAPSULE | Refills: 0 | Status: SHIPPED | OUTPATIENT
Start: 2025-07-30 | End: 2025-08-09

## 2025-07-30 RX ORDER — OXYCODONE HYDROCHLORIDE 5 MG/1
TABLET ORAL
COMMUNITY
Start: 2025-07-29 | End: 2025-07-30

## 2025-07-30 RX ORDER — CELECOXIB 200 MG/1
CAPSULE ORAL
COMMUNITY
Start: 2025-07-29 | End: 2025-07-30
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
## 2025-07-30 NOTE — PATIENT INSTRUCTIONS
Nadya,    Thank you for allowing [facility] to manage your care today.    Our plan is as follows:    I sent your prescription(s) to your pharmacy in [place]. Refer to medication section of your visit summary for prescription instructions.     Follow-up in clinic on 8/7/25 at 9:10 AM.    If you have any questions or concerns, please use [employer] message and/or feel free to call us at [phone].    Your partner in health,    Dr. Chinchilla        Additional scheduling and MyChart information:    You can schedule a video visit for follow-up appointments as well as future appointments for certain conditions. Please see the below link.     [email or web address]    If you have not already done so, I encourage you to sign up for [facility] ([email or web address]). This will allow you to review your results, securely communicate with a provider, and schedule virtual visits as well.

## 2025-08-07 ENCOUNTER — OFFICE VISIT (OUTPATIENT)
Dept: FAMILY MEDICINE | Facility: CLINIC | Age: 85
End: 2025-08-07
Payer: COMMERCIAL

## 2025-08-07 VITALS
BODY MASS INDEX: 30.11 KG/M2 | TEMPERATURE: 97.8 F | SYSTOLIC BLOOD PRESSURE: 130 MMHG | DIASTOLIC BLOOD PRESSURE: 84 MMHG | OXYGEN SATURATION: 95 % | RESPIRATION RATE: 16 BRPM | WEIGHT: 163.6 LBS | HEART RATE: 102 BPM | HEIGHT: 62 IN
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
## 2025-08-07 DIAGNOSIS — S22.42XD CLOSED FRACTURE OF MULTIPLE RIBS OF LEFT SIDE WITH ROUTINE HEALING, SUBSEQUENT ENCOUNTER: Primary | ICD-10-CM

## 2025-08-07 DIAGNOSIS — M25.552 HIP PAIN, LEFT: ICD-10-CM

## 2025-08-07 DIAGNOSIS — M25.562 ACUTE PAIN OF LEFT KNEE: ICD-10-CM

## 2025-08-07 DIAGNOSIS — G31.84 MILD COGNITIVE IMPAIRMENT: ICD-10-CM

## 2025-08-07 PROCEDURE — 3075F SYST BP GE 130 - 139MM HG: CPT | Performed by: STUDENT IN AN ORGANIZED HEALTH CARE EDUCATION/TRAINING PROGRAM

## 2025-08-07 PROCEDURE — 1125F AMNT PAIN NOTED PAIN PRSNT: CPT | Performed by: STUDENT IN AN ORGANIZED HEALTH CARE EDUCATION/TRAINING PROGRAM

## 2025-08-07 PROCEDURE — G2211 COMPLEX E/M VISIT ADD ON: HCPCS | Performed by: STUDENT IN AN ORGANIZED HEALTH CARE EDUCATION/TRAINING PROGRAM

## 2025-08-07 PROCEDURE — 99214 OFFICE O/P EST MOD 30 MIN: CPT | Performed by: STUDENT IN AN ORGANIZED HEALTH CARE EDUCATION/TRAINING PROGRAM

## 2025-08-07 PROCEDURE — 3079F DIAST BP 80-89 MM HG: CPT | Performed by: STUDENT IN AN ORGANIZED HEALTH CARE EDUCATION/TRAINING PROGRAM

## 2025-08-07 ASSESSMENT — PAIN SCALES - GENERAL: PAINLEVEL_OUTOF10: SEVERE PAIN (7)

## 2025-08-13 ENCOUNTER — TELEPHONE (OUTPATIENT)
Dept: OPHTHALMOLOGY | Facility: CLINIC | Age: 85
End: 2025-08-13
Payer: COMMERCIAL

## 2025-08-18 ENCOUNTER — OFFICE VISIT (OUTPATIENT)
Dept: OPHTHALMOLOGY | Facility: CLINIC | Age: 85
End: 2025-08-18
Payer: COMMERCIAL

## 2025-08-18 DIAGNOSIS — H35.3131 EARLY DRY STAGE NONEXUDATIVE AGE-RELATED MACULAR DEGENERATION OF BOTH EYES: Primary | ICD-10-CM

## 2025-08-18 DIAGNOSIS — H26.491 POSTERIOR CAPSULAR OPACIFICATION NON VISUALLY SIGNIFICANT, RIGHT: ICD-10-CM

## 2025-08-18 DIAGNOSIS — H52.203 MYOPIC ASTIGMATISM OF BOTH EYES: ICD-10-CM

## 2025-08-18 DIAGNOSIS — H52.13 MYOPIC ASTIGMATISM OF BOTH EYES: ICD-10-CM

## 2025-08-18 PROCEDURE — 92134 CPTRZ OPH DX IMG PST SGM RTA: CPT | Performed by: OPHTHALMOLOGY

## 2025-08-18 PROCEDURE — 92014 COMPRE OPH EXAM EST PT 1/>: CPT | Performed by: OPHTHALMOLOGY

## 2025-08-18 PROCEDURE — 92015 DETERMINE REFRACTIVE STATE: CPT | Performed by: OPHTHALMOLOGY

## 2025-08-18 ASSESSMENT — REFRACTION_MANIFEST
OD_CYLINDER: +2.25
OS_SPHERE: -1.25
OD_SPHERE: -1.50
OS_CYLINDER: +1.50
OS_ADD: +2.75
OD_AXIS: 007
OS_AXIS: 005
OD_ADD: +2.75

## 2025-08-18 ASSESSMENT — REFRACTION_WEARINGRX
OD_AXIS: 005
OS_AXIS: 175
OS_CYLINDER: +1.25
SPECS_TYPE: DISTANCE ONLY
OD_SPHERE: -1.25
OD_CYLINDER: +2.50
OS_SPHERE: -1.25

## 2025-08-18 ASSESSMENT — TONOMETRY
IOP_METHOD: ICARE
OS_IOP_MMHG: 12
OD_IOP_MMHG: 12

## 2025-08-18 ASSESSMENT — EXTERNAL EXAM - LEFT EYE: OS_EXAM: MILD DERMATOCHALASIS

## 2025-08-18 ASSESSMENT — VISUAL ACUITY
OS_SC+: +1
OD_PH_SC+: -1
CORRECTION_TYPE: GLASSES
OD_SC: 20/40
OD_PH_SC: 20/25
OS_SC: 20/25
METHOD: SNELLEN - LINEAR

## 2025-08-18 ASSESSMENT — CUP TO DISC RATIO
OD_RATIO: 0.25
OS_RATIO: 0.2